# Patient Record
Sex: MALE | Race: WHITE | NOT HISPANIC OR LATINO | Employment: OTHER | URBAN - NONMETROPOLITAN AREA
[De-identification: names, ages, dates, MRNs, and addresses within clinical notes are randomized per-mention and may not be internally consistent; named-entity substitution may affect disease eponyms.]

---

## 2017-01-10 ENCOUNTER — TRANSCRIBE ORDERS (OUTPATIENT)
Dept: ADMINISTRATIVE | Facility: HOSPITAL | Age: 57
End: 2017-01-10

## 2017-01-10 DIAGNOSIS — C18.7 MALIGNANT NEOPLASM OF SIGMOID COLON (HCC): Primary | ICD-10-CM

## 2017-01-13 ENCOUNTER — APPOINTMENT (OUTPATIENT)
Dept: CT IMAGING | Facility: HOSPITAL | Age: 57
End: 2017-01-13

## 2017-01-17 ENCOUNTER — HOSPITAL ENCOUNTER (OUTPATIENT)
Dept: CT IMAGING | Facility: HOSPITAL | Age: 57
Discharge: HOME | End: 2017-01-17

## 2017-01-17 DIAGNOSIS — C18.7 MALIGNANT NEOPLASM OF SIGMOID COLON (HCC): ICD-10-CM

## 2017-01-20 ENCOUNTER — HOSPITAL ENCOUNTER (OUTPATIENT)
Dept: CT IMAGING | Facility: HOSPITAL | Age: 57
Discharge: HOME | End: 2017-01-20

## 2017-03-21 ENCOUNTER — APPOINTMENT (OUTPATIENT)
Dept: CT IMAGING | Facility: HOSPITAL | Age: 57
End: 2017-03-21

## 2017-03-23 ENCOUNTER — HOSPITAL ENCOUNTER (OUTPATIENT)
Dept: CT IMAGING | Facility: HOSPITAL | Age: 57
Discharge: HOME | End: 2017-03-23
Admitting: SPECIALIST

## 2017-03-23 PROCEDURE — A9552 F18 FDG: HCPCS | Performed by: SPECIALIST

## 2017-03-23 PROCEDURE — 78815 PET IMAGE W/CT SKULL-THIGH: CPT

## 2017-03-23 PROCEDURE — 0 FLUDEOXYGLUCOSE F18 SOLUTION: Performed by: SPECIALIST

## 2017-03-23 RX ADMIN — FLUDEOXYGLUCOSE F18 1 DOSE: 300 INJECTION INTRAVENOUS at 13:30

## 2017-08-23 ENCOUNTER — OFFICE VISIT (OUTPATIENT)
Dept: GASTROENTEROLOGY | Facility: CLINIC | Age: 57
End: 2017-08-23

## 2017-08-23 VITALS
OXYGEN SATURATION: 92 % | HEART RATE: 76 BPM | DIASTOLIC BLOOD PRESSURE: 72 MMHG | WEIGHT: 225 LBS | TEMPERATURE: 97.5 F | SYSTOLIC BLOOD PRESSURE: 142 MMHG | BODY MASS INDEX: 35.31 KG/M2 | HEIGHT: 67 IN

## 2017-08-23 DIAGNOSIS — K63.5 COLON POLYPS: ICD-10-CM

## 2017-08-23 DIAGNOSIS — C18.7 MALIGNANT NEOPLASM OF SIGMOID COLON (HCC): Primary | ICD-10-CM

## 2017-08-23 DIAGNOSIS — Z79.01 ANTICOAGULATED BY ANTICOAGULATION TREATMENT: ICD-10-CM

## 2017-08-23 PROBLEM — C18.9 COLON CANCER (HCC): Status: RESOLVED | Noted: 2017-08-23 | Resolved: 2017-08-23

## 2017-08-23 PROBLEM — C18.9 COLON CANCER (HCC): Status: ACTIVE | Noted: 2017-08-23

## 2017-08-23 PROCEDURE — S0285 CNSLT BEFORE SCREEN COLONOSC: HCPCS | Performed by: INTERNAL MEDICINE

## 2017-08-23 RX ORDER — NITROGLYCERIN 0.4 MG/1
0.4 TABLET SUBLINGUAL
COMMUNITY

## 2017-08-23 RX ORDER — ATROPINE SULFATE 10 MG/ML
SOLUTION/ DROPS OPHTHALMIC
Status: ON HOLD | COMMUNITY
End: 2017-11-25

## 2017-08-23 RX ORDER — LOSARTAN POTASSIUM 100 MG/1
100 TABLET ORAL DAILY
Status: ON HOLD | COMMUNITY
End: 2020-01-17 | Stop reason: SDUPTHER

## 2017-08-23 RX ORDER — DOCUSATE SODIUM 100 MG/1
100 CAPSULE, LIQUID FILLED ORAL 2 TIMES DAILY PRN
COMMUNITY

## 2017-08-23 RX ORDER — LANOLIN ALCOHOL/MO/W.PET/CERES
CREAM (GRAM) TOPICAL
Status: ON HOLD | COMMUNITY
End: 2017-11-25

## 2017-08-23 RX ORDER — CLOPIDOGREL BISULFATE 75 MG/1
75 TABLET ORAL DAILY
COMMUNITY

## 2017-08-23 RX ORDER — RANITIDINE 150 MG/1
150 TABLET ORAL 2 TIMES DAILY PRN
COMMUNITY

## 2017-08-23 RX ORDER — DILTIAZEM HYDROCHLORIDE 180 MG/1
CAPSULE, COATED, EXTENDED RELEASE ORAL
Status: ON HOLD | COMMUNITY
End: 2017-11-25 | Stop reason: ALTCHOICE

## 2017-08-23 RX ORDER — DORZOLAMIDE HCL 20 MG/ML
SOLUTION/ DROPS OPHTHALMIC
Status: ON HOLD | COMMUNITY
End: 2017-11-25

## 2017-08-23 RX ORDER — GABAPENTIN 400 MG/1
CAPSULE ORAL
Status: ON HOLD | COMMUNITY
End: 2017-11-25 | Stop reason: ALTCHOICE

## 2017-08-23 RX ORDER — THEOPHYLLINE 300 MG/1
TABLET, EXTENDED RELEASE ORAL
Status: ON HOLD | COMMUNITY
End: 2017-11-25

## 2017-08-23 RX ORDER — MORPHINE SULFATE 30 MG/1
CAPSULE, EXTENDED RELEASE ORAL
Status: ON HOLD | COMMUNITY
End: 2017-11-25

## 2017-08-23 RX ORDER — PREDNISOLONE ACETATE 10 MG/ML
SUSPENSION/ DROPS OPHTHALMIC
Status: ON HOLD | COMMUNITY
End: 2017-11-25

## 2017-08-23 RX ORDER — ALBUTEROL SULFATE 2.5 MG/3ML
SOLUTION RESPIRATORY (INHALATION)
Status: ON HOLD | COMMUNITY
End: 2017-11-25 | Stop reason: SDUPTHER

## 2017-08-23 RX ORDER — ISOSORBIDE MONONITRATE 60 MG/1
90 TABLET, EXTENDED RELEASE ORAL DAILY
COMMUNITY
End: 2020-08-14 | Stop reason: DRUGHIGH

## 2017-08-23 RX ORDER — ERGOCALCIFEROL 1.25 MG/1
CAPSULE ORAL
Status: ON HOLD | COMMUNITY
End: 2017-11-25

## 2017-08-23 RX ORDER — CLONAZEPAM 1 MG/1
TABLET ORAL
Status: ON HOLD | COMMUNITY
End: 2017-11-25 | Stop reason: ALTCHOICE

## 2017-08-23 NOTE — PROGRESS NOTES
Primary Physician: Jarod Tillman MD    Chief Complaint   Patient presents with   • Follow-up     Patient is here today for a follow up.       Subjective     Sai Adam is a 56 y.o. male.    HPI  Colon cancer  08/23/17  He has had large polyps in his colon that were followed by a variety of doctors over the years.  Most recently, I did a colonoscopy 05/27/2016.  This did show a large mass in the splenic flexure/descending colon area.  This is been marked with ink.  Biopsies showed liver adenoma with high-grade dysplasia suspicious for malignancy.  I referred him to [facility].  He was seen by Narda Leggett M.D. who did a surgical procedure 11/08/2016.  I do not have her reports available for review.  What is clear as the patient has had a colostomy placed.  He tells me that it was in the lymph nodes.  [facility]  He had a follow-up PET scan after completion of therapy that shows that there is no evidence of disease.  He denies any melena or hematochezia.  There is no abdominal pain.  He is unaware if there is a plan to reverse the ostomy or not.    Past Medical History:   Diagnosis Date   • Arthritis    • Colon cancer 11/2016   • COPD (chronic obstructive pulmonary disease)    • Diabetes mellitus        Past Surgical History:   Procedure Laterality Date   • CARDIAC SURGERY     • COLON SURGERY     • COLONOSCOPY  05/27/2016   • HERNIA REPAIR          Current Outpatient Prescriptions:   •  albuterol (PROVENTIL) (2.5 MG/3ML) 0.083% nebulizer solution, Take 2.5 mg by nebulization every 6 hours as needed.  , Disp: , Rfl:   •  aspirin 325 MG EC tablet, Take  by mouth., Disp: , Rfl:   •  atropine 1 % ophthalmic solution, 1 drop 3 times daily.  , Disp: , Rfl:   •  clonazePAM (KlonoPIN) 1 MG tablet, Take  by mouth., Disp: , Rfl:   •  clopidogrel (PLAVIX) 75 MG tablet, Take  by mouth., Disp: , Rfl:   •  diltiaZEM CD (CARDIZEM CD) 180 MG 24 hr capsule, Take  by mouth.,  Disp: , Rfl:   •  docusate sodium (COLACE) 100 MG capsule, Take  by mouth., Disp: , Rfl:   •  dorzolamide (TRUSOPT) 2 % ophthalmic solution, 2 drops 3 times daily.  , Disp: , Rfl:   •  gabapentin (NEURONTIN) 400 MG capsule, Take  by mouth., Disp: , Rfl:   •  isosorbide mononitrate (IMDUR) 60 MG 24 hr tablet, Take  by mouth., Disp: , Rfl:   •  losartan (COZAAR) 100 MG tablet, Take  by mouth., Disp: , Rfl:   •  Magnesium Citrate (CITRATE OF MAGNESIA) 1.745 GM/30ML solution, Take 1 bottle by mouth 1 (One) Time., Disp: 1 bottle, Rfl: 0  •  metFORMIN (GLUCOPHAGE) 500 MG tablet, Take  by mouth., Disp: , Rfl:   •  metoprolol tartrate (LOPRESSOR) 25 MG tablet, Take  by mouth., Disp: , Rfl:   •  mometasone (ASMANEX TWISTHALER) inhaler 220 mcg/inhalation, Inhale., Disp: , Rfl:   •  Morphine (MAGDALENO) 30 MG 24 hr capsule, Take  by mouth., Disp: , Rfl:   •  niacin (SLO-NIACIN) 500 MG CR tablet, Take  by mouth., Disp: , Rfl:   •  nitroglycerin (NITROSTAT) 0.4 MG SL tablet, Place  under the tongue., Disp: , Rfl:   •  polyethylene glycol (GOLYTELY) 236 g solution, Take 4,000 mL by mouth 1 (One) Time for 1 dose. As directed by instructions provided at office, Disp: 4000 mL, Rfl: 0  •  prednisoLONE acetate (PRED FORTE) 1 % ophthalmic suspension, 1 drop 4 times daily.  , Disp: , Rfl:   •  raNITIdine (ZANTAC) 150 MG tablet, Take  by mouth., Disp: , Rfl:   •  theophylline (THEODUR) 300 MG 12 hr tablet, Take  by mouth., Disp: , Rfl:   •  tiotropium (SPIRIVA) 18 MCG per inhalation capsule, Place  into inhaler and inhale., Disp: , Rfl:   •  vitamin D (ERGOCALCIFEROL) 66711 units capsule capsule, Take  by mouth., Disp: , Rfl:     Allergies   Allergen Reactions   • Lamotrigine    • Lisinopril Hives   • Methadone Swelling   • Tetanus Toxoids    • Tramadol Hives   • Penicillins Rash       Social History     Social History   • Marital status:      Spouse name: N/A   • Number of children: N/A   • Years of education: N/A     Occupational  "History   • Not on file.     Social History Main Topics   • Smoking status: Current Every Day Smoker   • Smokeless tobacco: Never Used   • Alcohol use No   • Drug use: Not on file   • Sexual activity: Not on file     Other Topics Concern   • Not on file     Social History Narrative       History reviewed. No pertinent family history.    Review of Systems   Constitutional: Negative for fever.   Respiratory: Negative for cough and shortness of breath.    Cardiovascular: Negative for chest pain.   Genitourinary: Negative for dysuria.   Skin: Negative for rash.   Neurological: Negative for seizures.       Objective     /72  Pulse 76  Temp 97.5 °F (36.4 °C)  Ht 67\" (170.2 cm)  Wt 225 lb (102 kg)  SpO2 92%  BMI 35.24 kg/m2    Physical Exam   Constitutional: He is oriented to person, place, and time. He appears well-developed.   Eyes: No scleral icterus.   Cardiovascular: Normal rate and regular rhythm.    Pulmonary/Chest: Breath sounds normal.   Abdominal: Soft. Bowel sounds are normal. He exhibits no distension and no mass. There is no tenderness. There is no rebound and no guarding.   He has an ostomy in the mid abdominal area more so on the left side.   Musculoskeletal: Normal range of motion.   Neurological: He is alert and oriented to person, place, and time.   Skin: No rash noted.   Psychiatric: He has a normal mood and affect. His behavior is normal.   Vitals reviewed.      Lab Results   Component Value Date    WBC 10.20 05/11/2016    WBC 9.43 03/07/2016    WBC 11.25 (H) 06/05/2015    HGB 15.6 05/11/2016    HGB 16.5 03/07/2016    HGB 15.8 06/05/2015    HCT 44.7 05/11/2016    HCT 46.2 03/07/2016    HCT 49.0 06/06/2015     05/11/2016     03/07/2016     06/05/2015        Lab Results   Component Value Date     05/11/2016     (L) 03/07/2016     06/06/2015    K 3.9 05/11/2016    K 3.6 03/07/2016    K 2.9 (C) 06/05/2015    CL 98 05/11/2016    CL 93 (L) 03/07/2016    CL 93 " (L) 06/05/2015    CO2 26 05/11/2016    CO2 27 03/07/2016    CO2 29 06/05/2015    BUN 4 (L) 05/11/2016    BUN 8 03/07/2016    BUN 12 06/05/2015    CREATININE 0.82 05/11/2016    CREATININE 0.80 03/07/2016    CREATININE 0.95 06/05/2015    BILITOT 0.7 05/11/2016    BILITOT 0.8 03/07/2016    BILITOT 0.6 06/05/2015    ALKPHOS 89 05/11/2016    ALKPHOS 150 (H) 03/07/2016    ALKPHOS 111 06/05/2015    ALT 34 05/11/2016    ALT 32 03/07/2016    ALT 27 06/05/2015    AST 54 (H) 05/11/2016    AST 52 (H) 03/07/2016    AST 56 (H) 06/05/2015    GLUCOSE 419 (H) 05/11/2016    GLUCOSE 441 (H) 03/07/2016    GLUCOSE 266 (H) 06/05/2015       Lab Results   Component Value Date    INR 1.18 (H) 06/05/2015    INR 1.15 (H) 04/03/2015    INR 1.21 (H) 07/02/2014       IMPRESSION/PLAN:    Assessment/Plan      Problem List Items Addressed This Visit        Digestive    Malignant neoplasm of sigmoid colon - Primary    Relevant Medications    polyethylene glycol (GOLYTELY) 236 g solution    Magnesium Citrate (CITRATE OF MAGNESIA) 1.745 GM/30ML solution    Other Relevant Orders    Case Request (Completed)    Colon polyps    Overview     Surgically resected 11/08/2016.  The patient underwent chemotherapy for lymph node disease.  He is due for a colonoscopy around November 2017.  The risks, benefits, and alternatives of colonoscopy were reviewed with the patient today.  Risks including perforation of the colon possibly requiring surgery or colostomy.  Additional risks include risk of bleeding from biopsies or removal of colon tissue.  There is also the risk of a drug reaction or problems with anesthesia.  This will be discussed with the further by the anesthesia team on the day of the procedure.  Lastly there is a possibility of missing a colon polyp or cancer.  The benefits include the diagnosis and management of disease of the colon and rectum.  Alternatives to colonoscopy include barium enema, laboratory testing, radiographic evaluation, or no  intervention.  The patient verbalizes understanding and agrees.    I was very specific and emphasizing that he is at increased risk of perforation from doing a colonoscopy in the left colon stump.  I explained why this is the case.  I explained that we can mitigate his risk by using CO2 which is what we routinely due for colonoscopy.  If we do not look in the left colon, then there is risk formation and ultimately cancer progression.    We will also work on getting records from Dr. Narda Leggett's office to see if there is any plan to reanastomose his colon.            Other    Anticoagulated by anticoagulation treatment    Overview     Patient is on Plavix.         Current Assessment & Plan     We will need to hold his Plavix for 5 days prior to the procedure.  [facility].                       Yessenia Gregg MD  08/23/17  4:21 PM    Much of this encounter note is an electronic transcription/translation of spoken language to printed text. The electronic translation of spoken language may permit erroneous, or at times, nonsensical words or phrases to be inadvertently transcribed; although I have reviewed the note for such errors, some may still exist.

## 2017-08-23 NOTE — ASSESSMENT & PLAN NOTE
We will need to hold his Plavix for 5 days prior to the procedure.  [facility].

## 2017-09-06 ENCOUNTER — TELEPHONE (OUTPATIENT)
Dept: GASTROENTEROLOGY | Facility: CLINIC | Age: 57
End: 2017-09-06

## 2017-09-06 NOTE — TELEPHONE ENCOUNTER
OK to schedule procedure now that clearance to hold antiplatelet/anticoagulatant has been obtained.    Yessenia Gregg MD

## 2017-09-06 NOTE — TELEPHONE ENCOUNTER
I received Plavix clearance from Dr. Tillman at [facility].     He is already scheduled for colonoscopy on 10/12.

## 2017-09-06 NOTE — TELEPHONE ENCOUNTER
He is scheduled already for 10/12. I explained that Dr. Tillman has given Plavix clearance and last dose will be 10/6.

## 2017-10-12 ENCOUNTER — ANESTHESIA (OUTPATIENT)
Dept: GASTROENTEROLOGY | Facility: HOSPITAL | Age: 57
End: 2017-10-12

## 2017-10-12 ENCOUNTER — ANESTHESIA EVENT (OUTPATIENT)
Dept: GASTROENTEROLOGY | Facility: HOSPITAL | Age: 57
End: 2017-10-12

## 2017-10-12 ENCOUNTER — TELEPHONE (OUTPATIENT)
Dept: GASTROENTEROLOGY | Facility: CLINIC | Age: 57
End: 2017-10-12

## 2017-10-12 ENCOUNTER — HOSPITAL ENCOUNTER (OUTPATIENT)
Facility: HOSPITAL | Age: 57
Setting detail: HOSPITAL OUTPATIENT SURGERY
Discharge: HOME | End: 2017-10-12
Attending: INTERNAL MEDICINE | Admitting: INTERNAL MEDICINE

## 2017-10-12 VITALS
BODY MASS INDEX: 34.84 KG/M2 | WEIGHT: 222 LBS | SYSTOLIC BLOOD PRESSURE: 124 MMHG | DIASTOLIC BLOOD PRESSURE: 85 MMHG | OXYGEN SATURATION: 90 % | HEART RATE: 89 BPM | RESPIRATION RATE: 24 BRPM | TEMPERATURE: 98.2 F | HEIGHT: 67 IN

## 2017-10-12 DIAGNOSIS — C18.7 MALIGNANT NEOPLASM OF SIGMOID COLON (HCC): ICD-10-CM

## 2017-10-12 LAB — GLUCOSE BLDC GLUCOMTR-MCNC: 255 MG/DL (ref 70–130)

## 2017-10-12 PROCEDURE — 82962 GLUCOSE BLOOD TEST: CPT

## 2017-10-12 PROCEDURE — 25010000002 PROPOFOL 10 MG/ML EMULSION: Performed by: NURSE ANESTHETIST, CERTIFIED REGISTERED

## 2017-10-12 PROCEDURE — G0105 COLORECTAL SCRN; HI RISK IND: HCPCS | Performed by: INTERNAL MEDICINE

## 2017-10-12 RX ORDER — IPRATROPIUM BROMIDE AND ALBUTEROL SULFATE 2.5; .5 MG/3ML; MG/3ML
3 SOLUTION RESPIRATORY (INHALATION) ONCE
Status: COMPLETED | OUTPATIENT
Start: 2017-10-12 | End: 2017-10-12

## 2017-10-12 RX ORDER — PROPOFOL 10 MG/ML
VIAL (ML) INTRAVENOUS AS NEEDED
Status: DISCONTINUED | OUTPATIENT
Start: 2017-10-12 | End: 2017-10-12 | Stop reason: SURG

## 2017-10-12 RX ORDER — SODIUM CHLORIDE 9 MG/ML
500 INJECTION, SOLUTION INTRAVENOUS CONTINUOUS PRN
Status: DISCONTINUED | OUTPATIENT
Start: 2017-10-12 | End: 2017-10-12 | Stop reason: HOSPADM

## 2017-10-12 RX ORDER — SODIUM CHLORIDE 0.9 % (FLUSH) 0.9 %
3 SYRINGE (ML) INJECTION AS NEEDED
Status: DISCONTINUED | OUTPATIENT
Start: 2017-10-12 | End: 2017-10-12 | Stop reason: HOSPADM

## 2017-10-12 RX ADMIN — LIDOCAINE HYDROCHLORIDE 0.5 ML: 10 INJECTION, SOLUTION EPIDURAL; INFILTRATION; INTRACAUDAL; PERINEURAL at 08:19

## 2017-10-12 RX ADMIN — PROPOFOL 250 MG: 10 INJECTION, EMULSION INTRAVENOUS at 10:26

## 2017-10-12 RX ADMIN — SODIUM CHLORIDE 500 ML: 9 INJECTION, SOLUTION INTRAVENOUS at 08:19

## 2017-10-12 RX ADMIN — IPRATROPIUM BROMIDE AND ALBUTEROL SULFATE 3 ML: .5; 3 SOLUTION RESPIRATORY (INHALATION) at 09:14

## 2017-10-12 NOTE — ANESTHESIA POSTPROCEDURE EVALUATION
"Patient: Sai Adam    Procedure Summary     Date Anesthesia Start Anesthesia Stop Room / Location    10/12/17 1022 [facility] ENDOSCOPY 5 / [facility] ENDOSCOPY       Procedure Diagnosis Surgeon Provider    COLONOSCOPY WITH ANESTHESIA (N/A ) Malignant neoplasm of sigmoid colon  (Malignant neoplasm of sigmoid colon [C18.7]) MD Christopher Orlando CRNA          Anesthesia Type: general  Last vitals  BP   (P) 125/74 (10/12/17 1050)    Temp        Pulse   (P) 75 (10/12/17 1050)   Resp   (P) 19 (10/12/17 1050)    SpO2   (P) 90 % (10/12/17 1050)      Post Anesthesia Care and Evaluation    Patient location during evaluation: PACU  Patient participation: complete - patient participated  Level of consciousness: awake and alert  Pain score: 0  Pain management: adequate  Airway patency: patent  Anesthetic complications: No anesthetic complications    Cardiovascular status: acceptable  Respiratory status: acceptable  Hydration status: acceptable    Comments: Blood pressure (P) 125/74, pulse (P) 75, temperature 98.2 °F (36.8 °C), temperature source Temporal Artery , resp. rate (P) 19, height 67\" (170.2 cm), weight 222 lb (101 kg), SpO2 (P) 90 %.        "

## 2017-10-12 NOTE — H&P
Chief Complaint:   Colon cancer    Subjective     HPI:   The patient has had colon cancer surgically removed in November 2016.  This exam is to reassess.  He had an ostomy placed due to poor pulmonary reserve and ongoing smoking.  He does have rectal stump.    Past Medical History:   Past Medical History:   Diagnosis Date   • Arthritis    • Colon cancer 11/2016   • COPD (chronic obstructive pulmonary disease)    • Diabetes mellitus        Past Surgical History:  [unfilled]    Family History:  History reviewed. No pertinent family history.    Social History:   reports that he has been smoking.  He has never used smokeless tobacco. He reports that he does not drink alcohol or use illicit drugs.    Medications:   Prescriptions Prior to Admission   Medication Sig Dispense Refill Last Dose   • albuterol (PROVENTIL) (2.5 MG/3ML) 0.083% nebulizer solution Take 2.5 mg by nebulization every 6 hours as needed.     10/11/2017 at Unknown time   • docusate sodium (COLACE) 100 MG capsule Take  by mouth.   10/11/2017 at Unknown time   • gabapentin (NEURONTIN) 400 MG capsule Take  by mouth.   10/11/2017 at Unknown time   • isosorbide mononitrate (IMDUR) 60 MG 24 hr tablet Take  by mouth.   10/11/2017 at Unknown time   • losartan (COZAAR) 100 MG tablet Take  by mouth.   10/11/2017 at Unknown time   • Magnesium Citrate (CITRATE OF MAGNESIA) 1.745 GM/30ML solution Take 1 bottle by mouth 1 (One) Time. 1 bottle 0 10/12/2017 at Unknown time   • metFORMIN (GLUCOPHAGE) 500 MG tablet Take  by mouth.   10/11/2017 at Unknown time   • metoprolol tartrate (LOPRESSOR) 25 MG tablet Take  by mouth.   10/11/2017 at 2000   • mometasone (ASMANEX TWISTHALER) inhaler 220 mcg/inhalation Inhale.   10/11/2017 at Unknown time   • Morphine (MAGDALENO) 30 MG 24 hr capsule Take  by mouth.   10/11/2017 at Unknown time   • niacin (SLO-NIACIN) 500 MG CR tablet Take  by mouth.   10/11/2017 at Unknown time   • raNITIdine (ZANTAC) 150 MG tablet Take  by mouth.    "10/11/2017 at Unknown time   • theophylline (THEODUR) 300 MG 12 hr tablet Take  by mouth.   10/11/2017 at Unknown time   • tiotropium (SPIRIVA) 18 MCG per inhalation capsule Place  into inhaler and inhale.   10/11/2017 at Unknown time   • vitamin D (ERGOCALCIFEROL) 73971 units capsule capsule Take  by mouth.   10/11/2017 at Unknown time   • atropine 1 % ophthalmic solution 1 drop 3 times daily.     10/7/2017   • clonazePAM (KlonoPIN) 1 MG tablet Take  by mouth.   10/7/2017   • clopidogrel (PLAVIX) 75 MG tablet Take  by mouth.   10/6/2017   • diltiaZEM CD (CARDIZEM CD) 180 MG 24 hr capsule Take  by mouth.   10/8/2017   • dorzolamide (TRUSOPT) 2 % ophthalmic solution 2 drops 3 times daily.     10/8/2017   • nitroglycerin (NITROSTAT) 0.4 MG SL tablet Place  under the tongue.   Unknown at Unknown time   • prednisoLONE acetate (PRED FORTE) 1 % ophthalmic suspension 1 drop 4 times daily.     10/7/2017       Allergies:  Lamotrigine; Lisinopril; Methadone; Tetanus toxoids; Tramadol; and Penicillins    ROS:    General: Weight stable  Resp: No SOA  Cardiovascular: No CP      Objective     /93 (BP Location: Right arm, Patient Position: Sitting)  Pulse 90  Temp 98.2 °F (36.8 °C) (Temporal Artery )   Resp 18  Ht 67\" (170.2 cm)  Wt 222 lb (101 kg)  SpO2 94%  BMI 34.77 kg/m2    Physical Exam   Constitutional: Pt is oriented to person, place, and in no distress.  Eyes: No icterus  ENMT: Unremarkable   Cardiovascular: Normal rate, regular rhythm.    Pulmonary/Chest: No distress.  No audible wheezes  Abdominal: Soft.   Skin: Skin is warm and dry.   Psychiatric: Mood, memory, affect and judgment appear normal.     Assessment/Plan     Diagnosis:  Colon cancer    Anticipated Surgical Procedure:  Colonoscopy    The risks, benefits, and alternatives of colonoscopy were reviewed with the patient today.  Risks including perforation of the colon possibly requiring surgery or colostomy.  Additional risks include risk of bleeding " from biopsies or removal of colon tissue.  There is also the risk of a drug reaction or problems with anesthesia.  This will be discussed with the further by the anesthesia team on the day of the procedure.  Lastly there is a possibility of missing a colon polyp or cancer.  The benefits include the diagnosis and management of disease of the colon and rectum.  Alternatives to colonoscopy include barium enema, laboratory testing, radiographic evaluation, or no intervention.  The patient verbalizes understanding and agrees.    Much of this encounter note is an electronic transcription/translation of spoken language to printed text. The electronic translation of spoken language may permit erroneous, or at times, nonsensical words or phrases to be inadvertently transcribed; although I have reviewed the note for such errors, some may still exist.

## 2017-10-12 NOTE — PLAN OF CARE
Problem: Patient Care Overview (Adult)  Goal: Plan of Care Review  Outcome: Outcome(s) achieved Date Met:  10/12/17    10/12/17 1054   Coping/Psychosocial Response Interventions   Plan Of Care Reviewed With patient;spouse   Patient Care Overview   Progress improving   Outcome Evaluation   Outcome Summary/Follow up Plan DISCHARGE CRITERIA MET

## 2017-10-12 NOTE — PLAN OF CARE
Problem: Patient Care Overview (Adult)  Goal: Plan of Care Review  Outcome: Ongoing (interventions implemented as appropriate)    10/12/17 1041   Coping/Psychosocial Response Interventions   Plan Of Care Reviewed With patient   Patient Care Overview   Progress no change   Outcome Evaluation   Outcome Summary/Follow up Plan tolerating well         Problem: GI Endoscopy (Adult)  Goal: Signs and Symptoms of Listed Potential Problems Will be Absent or Manageable (GI Endoscopy)  Outcome: Ongoing (interventions implemented as appropriate)

## 2017-10-12 NOTE — ANESTHESIA PREPROCEDURE EVALUATION
Anesthesia Evaluation     Patient summary reviewed and Nursing notes reviewed   no history of anesthetic complications:  NPO Solid Status: > 6 hours  NPO Liquid Status: > 6 hours     Airway   Mallampati: II  no difficulty expected  Dental    (+) lower dentures and upper dentures    Pulmonary    (+) a smoker Current Smoked day of surgery, COPD severe, shortness of breath,     ROS comment: Wears 2L 02 at all times  Cardiovascular   Exercise tolerance: poor (<4 METS)    PT is on anticoagulation therapy  Patient on routine beta blocker and Beta blocker given within 24 hours of surgery    (+) past MI  >12 months, CAD,   CAB.    ROS comment: 2015 Echo and stress test show EF 60%    Neuro/Psych- negative ROS  GI/Hepatic/Renal/Endo    (+) obesity,  diabetes mellitus type 2 poorly controlled using insulin,     Musculoskeletal     Abdominal    Substance History - negative use     OB/GYN negative ob/gyn ROS         Other   (+) arthritis   history of cancer                              Anesthesia Plan    ASA 3     general     intravenous induction   Anesthetic plan and risks discussed with patient.

## 2017-10-12 NOTE — PLAN OF CARE
Problem: GI Endoscopy (Adult)  Goal: Signs and Symptoms of Listed Potential Problems Will be Absent or Manageable (GI Endoscopy)  Outcome: Outcome(s) achieved Date Met:  10/12/17    10/12/17 1054   GI Endoscopy   Problems Assessed (GI Endoscopy) all   Problems Present (GI Endoscopy) none

## 2017-10-12 NOTE — PLAN OF CARE
Problem: Patient Care Overview (Adult)  Goal: Adult Individualization and Mutuality    10/12/17 0758   Individualization   Patient Specific Preferences none   Patient Specific Goals none   Patient Specific Interventions none   Mutuality/Individual Preferences   What Anxieties, Fears or Concerns Do You Have About Your Health or Care? none   What Questions Do You Have About Your Health or Care? none   What Information Would Help Us Give You More Personalized Care? none

## 2017-11-24 ENCOUNTER — HOSPITAL ENCOUNTER (INPATIENT)
Facility: HOSPITAL | Age: 57
LOS: 4 days | Discharge: HOME | End: 2017-11-28
Attending: EMERGENCY MEDICINE | Admitting: FAMILY MEDICINE

## 2017-11-24 ENCOUNTER — APPOINTMENT (OUTPATIENT)
Dept: GENERAL RADIOLOGY | Facility: HOSPITAL | Age: 57
End: 2017-11-24

## 2017-11-24 DIAGNOSIS — J44.1 COPD EXACERBATION (HCC): Primary | ICD-10-CM

## 2017-11-24 PROBLEM — I25.10 CORONARY ARTERY DISEASE: Status: ACTIVE | Noted: 2017-11-24

## 2017-11-24 PROBLEM — E11.9 DIABETES MELLITUS (HCC): Status: ACTIVE | Noted: 2017-11-24

## 2017-11-24 PROBLEM — J96.22 ACUTE ON CHRONIC RESPIRATORY FAILURE WITH HYPOXIA AND HYPERCAPNIA (HCC): Status: ACTIVE | Noted: 2017-11-24

## 2017-11-24 PROBLEM — Z85.038 HISTORY OF COLON CANCER: Status: ACTIVE | Noted: 2017-11-24

## 2017-11-24 PROBLEM — J96.21 ACUTE ON CHRONIC RESPIRATORY FAILURE WITH HYPOXIA AND HYPERCAPNIA (HCC): Status: ACTIVE | Noted: 2017-11-24

## 2017-11-24 LAB
ALBUMIN SERPL-MCNC: 4 G/DL (ref 3.5–5)
ALBUMIN/GLOB SERPL: 1.1 G/DL (ref 1.1–2.5)
ALP SERPL-CCNC: 93 U/L (ref 24–120)
ALT SERPL W P-5'-P-CCNC: 22 U/L (ref 0–54)
ANION GAP SERPL CALCULATED.3IONS-SCNC: 11 MMOL/L (ref 4–13)
ANION GAP SERPL CALCULATED.3IONS-SCNC: 7 MMOL/L (ref 4–13)
ARTERIAL PATENCY WRIST A: POSITIVE
ARTERIAL PATENCY WRIST A: POSITIVE
AST SERPL-CCNC: 42 U/L (ref 7–45)
ATMOSPHERIC PRESS: 746 MMHG
ATMOSPHERIC PRESS: 746 MMHG
BASE EXCESS BLDA CALC-SCNC: 6.8 MMOL/L (ref 0–2)
BASE EXCESS BLDA CALC-SCNC: 7.5 MMOL/L (ref 0–2)
BASOPHILS # BLD AUTO: 0.04 10*3/MM3 (ref 0–0.2)
BASOPHILS # BLD AUTO: 0.06 10*3/MM3 (ref 0–0.2)
BASOPHILS NFR BLD AUTO: 0.4 % (ref 0–2)
BASOPHILS NFR BLD AUTO: 0.6 % (ref 0–2)
BDY SITE: ABNORMAL
BDY SITE: ABNORMAL
BILIRUB SERPL-MCNC: 0.9 MG/DL (ref 0.1–1)
BODY TEMPERATURE: 37 C
BODY TEMPERATURE: 37 C
BUN BLD-MCNC: 12 MG/DL (ref 5–21)
BUN BLD-MCNC: 12 MG/DL (ref 5–21)
BUN/CREAT SERPL: 16.2 (ref 7–25)
BUN/CREAT SERPL: 18.5 (ref 7–25)
CALCIUM SPEC-SCNC: 8.9 MG/DL (ref 8.4–10.4)
CALCIUM SPEC-SCNC: 9 MG/DL (ref 8.4–10.4)
CHLORIDE SERPL-SCNC: 92 MMOL/L (ref 98–110)
CHLORIDE SERPL-SCNC: 94 MMOL/L (ref 98–110)
CO2 SERPL-SCNC: 37 MMOL/L (ref 24–31)
CO2 SERPL-SCNC: 37 MMOL/L (ref 24–31)
CREAT BLD-MCNC: 0.65 MG/DL (ref 0.5–1.4)
CREAT BLD-MCNC: 0.74 MG/DL (ref 0.5–1.4)
D-LACTATE SERPL-SCNC: 1.1 MMOL/L (ref 0.5–2)
DEPRECATED RDW RBC AUTO: 45.3 FL (ref 40–54)
DEPRECATED RDW RBC AUTO: 45.7 FL (ref 40–54)
EOSINOPHIL # BLD AUTO: 0 10*3/MM3 (ref 0–0.7)
EOSINOPHIL # BLD AUTO: 0 10*3/MM3 (ref 0–0.7)
EOSINOPHIL NFR BLD AUTO: 0 % (ref 0–4)
EOSINOPHIL NFR BLD AUTO: 0 % (ref 0–4)
EPAP: 6
ERYTHROCYTE [DISTWIDTH] IN BLOOD BY AUTOMATED COUNT: 14.2 % (ref 12–15)
ERYTHROCYTE [DISTWIDTH] IN BLOOD BY AUTOMATED COUNT: 14.3 % (ref 12–15)
GAS FLOW AIRWAY: 4 LPM
GFR SERPL CREATININE-BSD FRML MDRD: 108 ML/MIN/1.73
GFR SERPL CREATININE-BSD FRML MDRD: 126 ML/MIN/1.73
GLOBULIN UR ELPH-MCNC: 3.5 GM/DL
GLUCOSE BLD-MCNC: 256 MG/DL (ref 70–100)
GLUCOSE BLD-MCNC: 498 MG/DL (ref 70–100)
GLUCOSE BLDC GLUCOMTR-MCNC: 439 MG/DL (ref 70–130)
HCO3 BLDA-SCNC: 35.2 MMOL/L (ref 20–26)
HCO3 BLDA-SCNC: 35.9 MMOL/L (ref 20–26)
HCT VFR BLD AUTO: 48.7 % (ref 40–52)
HCT VFR BLD AUTO: 49.7 % (ref 40–52)
HGB BLD-MCNC: 15.9 G/DL (ref 14–18)
HGB BLD-MCNC: 16.1 G/DL (ref 14–18)
HOLD SPECIMEN: NORMAL
HOLD SPECIMEN: NORMAL
HOROWITZ INDEX BLD+IHG-RTO: 40 %
IMM GRANULOCYTES # BLD: 0.11 10*3/MM3 (ref 0–0.03)
IMM GRANULOCYTES # BLD: 0.19 10*3/MM3 (ref 0–0.03)
IMM GRANULOCYTES NFR BLD: 1.1 % (ref 0–5)
IMM GRANULOCYTES NFR BLD: 2.1 % (ref 0–5)
IPAP: 16
LYMPHOCYTES # BLD AUTO: 1.16 10*3/MM3 (ref 0.72–4.86)
LYMPHOCYTES # BLD AUTO: 2.72 10*3/MM3 (ref 0.72–4.86)
LYMPHOCYTES NFR BLD AUTO: 12.6 % (ref 15–45)
LYMPHOCYTES NFR BLD AUTO: 26.1 % (ref 15–45)
Lab: ABNORMAL
MCH RBC QN AUTO: 28.4 PG (ref 28–32)
MCH RBC QN AUTO: 29 PG (ref 28–32)
MCHC RBC AUTO-ENTMCNC: 32 G/DL (ref 33–36)
MCHC RBC AUTO-ENTMCNC: 33.1 G/DL (ref 33–36)
MCV RBC AUTO: 87.7 FL (ref 82–95)
MCV RBC AUTO: 88.9 FL (ref 82–95)
MODALITY: ABNORMAL
MODALITY: ABNORMAL
MONOCYTES # BLD AUTO: 0.2 10*3/MM3 (ref 0.19–1.3)
MONOCYTES # BLD AUTO: 0.82 10*3/MM3 (ref 0.19–1.3)
MONOCYTES NFR BLD AUTO: 2.2 % (ref 4–12)
MONOCYTES NFR BLD AUTO: 7.9 % (ref 4–12)
NEUTROPHILS # BLD AUTO: 6.7 10*3/MM3 (ref 1.87–8.4)
NEUTROPHILS # BLD AUTO: 7.58 10*3/MM3 (ref 1.87–8.4)
NEUTROPHILS NFR BLD AUTO: 64.3 % (ref 39–78)
NEUTROPHILS NFR BLD AUTO: 82.7 % (ref 39–78)
NOTIFIED BY: ABNORMAL
NOTIFIED WHO: ABNORMAL
NRBC BLD MANUAL-RTO: 0 /100 WBC (ref 0–0)
NT-PROBNP SERPL-MCNC: 90.9 PG/ML (ref 0–900)
PCO2 BLDA: 63.7 MM HG (ref 35–45)
PCO2 BLDA: 64.2 MM HG (ref 35–45)
PH BLDA: 7.35 PH UNITS (ref 7.35–7.45)
PH BLDA: 7.36 PH UNITS (ref 7.35–7.45)
PLATELET # BLD AUTO: 145 10*3/MM3 (ref 130–400)
PLATELET # BLD AUTO: 146 10*3/MM3 (ref 130–400)
PMV BLD AUTO: 9.3 FL (ref 6–12)
PMV BLD AUTO: 9.5 FL (ref 6–12)
PO2 BLDA: 48.7 MM HG (ref 83–108)
PO2 BLDA: 61.2 MM HG (ref 83–108)
POTASSIUM BLD-SCNC: 3.8 MMOL/L (ref 3.5–5.3)
POTASSIUM BLD-SCNC: 3.9 MMOL/L (ref 3.5–5.3)
PROT SERPL-MCNC: 7.5 G/DL (ref 6.3–8.7)
RBC # BLD AUTO: 5.55 10*6/MM3 (ref 4.8–5.9)
RBC # BLD AUTO: 5.59 10*6/MM3 (ref 4.8–5.9)
SAO2 % BLDCOA: 84.5 % (ref 94–99)
SAO2 % BLDCOA: 91.3 % (ref 94–99)
SET MECH RESP RATE: 12
SODIUM BLD-SCNC: 138 MMOL/L (ref 135–145)
SODIUM BLD-SCNC: 140 MMOL/L (ref 135–145)
THEOPHYLLINE SERPL-MCNC: <1 MCG/ML (ref 10–20)
VENTILATOR MODE: ABNORMAL
VENTILATOR MODE: ABNORMAL
WBC NRBC COR # BLD: 10.41 10*3/MM3 (ref 4.8–10.8)
WBC NRBC COR # BLD: 9.17 10*3/MM3 (ref 4.8–10.8)
WHOLE BLOOD HOLD SPECIMEN: NORMAL
WHOLE BLOOD HOLD SPECIMEN: NORMAL

## 2017-11-24 PROCEDURE — 99285 EMERGENCY DEPT VISIT HI MDM: CPT

## 2017-11-24 PROCEDURE — 63710000001 INSULIN LISPRO (HUMAN) PER 5 UNITS: Performed by: FAMILY MEDICINE

## 2017-11-24 PROCEDURE — 94660 CPAP INITIATION&MGMT: CPT

## 2017-11-24 PROCEDURE — 71010 HC CHEST PA OR AP: CPT

## 2017-11-24 PROCEDURE — 25010000002 LEVOFLOXACIN PER 250 MG: Performed by: EMERGENCY MEDICINE

## 2017-11-24 PROCEDURE — 94799 UNLISTED PULMONARY SVC/PX: CPT

## 2017-11-24 PROCEDURE — 94644 CONT INHLJ TX 1ST HOUR: CPT

## 2017-11-24 PROCEDURE — 83880 ASSAY OF NATRIURETIC PEPTIDE: CPT | Performed by: EMERGENCY MEDICINE

## 2017-11-24 PROCEDURE — 25010000002 ENOXAPARIN PER 10 MG: Performed by: FAMILY MEDICINE

## 2017-11-24 PROCEDURE — 85025 COMPLETE CBC W/AUTO DIFF WBC: CPT | Performed by: EMERGENCY MEDICINE

## 2017-11-24 PROCEDURE — 82803 BLOOD GASES ANY COMBINATION: CPT

## 2017-11-24 PROCEDURE — 80198 ASSAY OF THEOPHYLLINE: CPT | Performed by: FAMILY MEDICINE

## 2017-11-24 PROCEDURE — 82962 GLUCOSE BLOOD TEST: CPT

## 2017-11-24 PROCEDURE — 36600 WITHDRAWAL OF ARTERIAL BLOOD: CPT

## 2017-11-24 PROCEDURE — 94640 AIRWAY INHALATION TREATMENT: CPT

## 2017-11-24 PROCEDURE — 80053 COMPREHEN METABOLIC PANEL: CPT | Performed by: EMERGENCY MEDICINE

## 2017-11-24 PROCEDURE — 25010000002 METHYLPREDNISOLONE PER 125 MG: Performed by: EMERGENCY MEDICINE

## 2017-11-24 PROCEDURE — 5A09457 ASSISTANCE WITH RESPIRATORY VENTILATION, 24-96 CONSECUTIVE HOURS, CONTINUOUS POSITIVE AIRWAY PRESSURE: ICD-10-PCS | Performed by: FAMILY MEDICINE

## 2017-11-24 PROCEDURE — 80048 BASIC METABOLIC PNL TOTAL CA: CPT | Performed by: FAMILY MEDICINE

## 2017-11-24 PROCEDURE — 87040 BLOOD CULTURE FOR BACTERIA: CPT | Performed by: EMERGENCY MEDICINE

## 2017-11-24 PROCEDURE — 25010000002 METHYLPREDNISOLONE PER 125 MG: Performed by: FAMILY MEDICINE

## 2017-11-24 PROCEDURE — 63710000001 INSULIN DETEMIR PER 5 UNITS: Performed by: FAMILY MEDICINE

## 2017-11-24 PROCEDURE — 85025 COMPLETE CBC W/AUTO DIFF WBC: CPT | Performed by: FAMILY MEDICINE

## 2017-11-24 PROCEDURE — 83605 ASSAY OF LACTIC ACID: CPT | Performed by: EMERGENCY MEDICINE

## 2017-11-24 PROCEDURE — 93005 ELECTROCARDIOGRAM TRACING: CPT | Performed by: EMERGENCY MEDICINE

## 2017-11-24 PROCEDURE — 93010 ELECTROCARDIOGRAM REPORT: CPT | Performed by: INTERNAL MEDICINE

## 2017-11-24 RX ORDER — DORZOLAMIDE HCL 20 MG/ML
2 SOLUTION/ DROPS OPHTHALMIC 3 TIMES DAILY
Status: DISCONTINUED | OUTPATIENT
Start: 2017-11-24 | End: 2017-11-25

## 2017-11-24 RX ORDER — IPRATROPIUM BROMIDE AND ALBUTEROL SULFATE 2.5; .5 MG/3ML; MG/3ML
3 SOLUTION RESPIRATORY (INHALATION)
Status: DISCONTINUED | OUTPATIENT
Start: 2017-11-24 | End: 2017-11-28 | Stop reason: HOSPADM

## 2017-11-24 RX ORDER — SODIUM CHLORIDE 0.9 % (FLUSH) 0.9 %
10 SYRINGE (ML) INJECTION AS NEEDED
Status: DISCONTINUED | OUTPATIENT
Start: 2017-11-24 | End: 2017-11-28 | Stop reason: HOSPADM

## 2017-11-24 RX ORDER — FAMOTIDINE 20 MG/1
20 TABLET, FILM COATED ORAL 2 TIMES DAILY
Status: DISCONTINUED | OUTPATIENT
Start: 2017-11-24 | End: 2017-11-28 | Stop reason: HOSPADM

## 2017-11-24 RX ORDER — GABAPENTIN 400 MG/1
400 CAPSULE ORAL EVERY 8 HOURS SCHEDULED
Status: DISCONTINUED | OUTPATIENT
Start: 2017-11-24 | End: 2017-11-25 | Stop reason: ALTCHOICE

## 2017-11-24 RX ORDER — MORPHINE SULFATE 15 MG/1
15 TABLET, FILM COATED, EXTENDED RELEASE ORAL EVERY 12 HOURS SCHEDULED
Status: DISCONTINUED | OUTPATIENT
Start: 2017-11-24 | End: 2017-11-27

## 2017-11-24 RX ORDER — THEOPHYLLINE 300 MG/1
300 TABLET, EXTENDED RELEASE ORAL EVERY 12 HOURS SCHEDULED
Status: DISCONTINUED | OUTPATIENT
Start: 2017-11-24 | End: 2017-11-25 | Stop reason: ALTCHOICE

## 2017-11-24 RX ORDER — PREDNISOLONE ACETATE 10 MG/ML
1 SUSPENSION/ DROPS OPHTHALMIC 4 TIMES DAILY
Status: DISCONTINUED | OUTPATIENT
Start: 2017-11-24 | End: 2017-11-25

## 2017-11-24 RX ORDER — IPRATROPIUM BROMIDE AND ALBUTEROL SULFATE 2.5; .5 MG/3ML; MG/3ML
3 SOLUTION RESPIRATORY (INHALATION) EVERY 4 HOURS PRN
Status: DISCONTINUED | OUTPATIENT
Start: 2017-11-24 | End: 2017-11-28 | Stop reason: HOSPADM

## 2017-11-24 RX ORDER — HYDROCODONE BITARTRATE AND ACETAMINOPHEN 7.5; 325 MG/1; MG/1
1 TABLET ORAL EVERY 4 HOURS PRN
Status: DISCONTINUED | OUTPATIENT
Start: 2017-11-24 | End: 2017-11-28 | Stop reason: HOSPADM

## 2017-11-24 RX ORDER — OXYCODONE AND ACETAMINOPHEN 7.5; 325 MG/1; MG/1
1 TABLET ORAL EVERY 4 HOURS PRN
Status: DISCONTINUED | OUTPATIENT
Start: 2017-11-24 | End: 2017-11-28

## 2017-11-24 RX ORDER — LOSARTAN POTASSIUM 50 MG/1
100 TABLET ORAL
Status: DISCONTINUED | OUTPATIENT
Start: 2017-11-25 | End: 2017-11-28 | Stop reason: HOSPADM

## 2017-11-24 RX ORDER — NICOTINE POLACRILEX 4 MG
15 LOZENGE BUCCAL
Status: DISCONTINUED | OUTPATIENT
Start: 2017-11-24 | End: 2017-11-28 | Stop reason: HOSPADM

## 2017-11-24 RX ORDER — LEVOFLOXACIN 5 MG/ML
750 INJECTION, SOLUTION INTRAVENOUS ONCE
Status: COMPLETED | OUTPATIENT
Start: 2017-11-24 | End: 2017-11-24

## 2017-11-24 RX ORDER — BENZONATATE 100 MG/1
100 CAPSULE ORAL 3 TIMES DAILY PRN
Status: DISCONTINUED | OUTPATIENT
Start: 2017-11-24 | End: 2017-11-28 | Stop reason: HOSPADM

## 2017-11-24 RX ORDER — BISACODYL 5 MG/1
5 TABLET, DELAYED RELEASE ORAL DAILY PRN
Status: DISCONTINUED | OUTPATIENT
Start: 2017-11-24 | End: 2017-11-28 | Stop reason: HOSPADM

## 2017-11-24 RX ORDER — ATROPINE SULFATE 10 MG/ML
1 SOLUTION/ DROPS OPHTHALMIC 3 TIMES DAILY
Status: DISCONTINUED | OUTPATIENT
Start: 2017-11-24 | End: 2017-11-25

## 2017-11-24 RX ORDER — DEXTROSE MONOHYDRATE 25 G/50ML
25 INJECTION, SOLUTION INTRAVENOUS
Status: DISCONTINUED | OUTPATIENT
Start: 2017-11-24 | End: 2017-11-28 | Stop reason: HOSPADM

## 2017-11-24 RX ORDER — ONDANSETRON 2 MG/ML
4 INJECTION INTRAMUSCULAR; INTRAVENOUS EVERY 6 HOURS PRN
Status: DISCONTINUED | OUTPATIENT
Start: 2017-11-24 | End: 2017-11-28 | Stop reason: HOSPADM

## 2017-11-24 RX ORDER — ALBUTEROL SULFATE 2.5 MG/3ML
10 SOLUTION RESPIRATORY (INHALATION) CONTINUOUS
Status: DISPENSED | OUTPATIENT
Start: 2017-11-24 | End: 2017-11-24

## 2017-11-24 RX ORDER — DOCUSATE SODIUM 100 MG/1
100 CAPSULE, LIQUID FILLED ORAL DAILY
Status: DISCONTINUED | OUTPATIENT
Start: 2017-11-25 | End: 2017-11-28 | Stop reason: HOSPADM

## 2017-11-24 RX ORDER — CLONAZEPAM 1 MG/1
1 TABLET ORAL 3 TIMES DAILY PRN
Status: DISCONTINUED | OUTPATIENT
Start: 2017-11-24 | End: 2017-11-25 | Stop reason: ALTCHOICE

## 2017-11-24 RX ORDER — METHYLPREDNISOLONE SODIUM SUCCINATE 125 MG/2ML
60 INJECTION, POWDER, LYOPHILIZED, FOR SOLUTION INTRAMUSCULAR; INTRAVENOUS EVERY 8 HOURS SCHEDULED
Status: DISCONTINUED | OUTPATIENT
Start: 2017-11-24 | End: 2017-11-26

## 2017-11-24 RX ORDER — ISOSORBIDE MONONITRATE 60 MG/1
60 TABLET, EXTENDED RELEASE ORAL
Status: DISCONTINUED | OUTPATIENT
Start: 2017-11-25 | End: 2017-11-28 | Stop reason: HOSPADM

## 2017-11-24 RX ORDER — CLOPIDOGREL BISULFATE 75 MG/1
75 TABLET ORAL DAILY
Status: DISCONTINUED | OUTPATIENT
Start: 2017-11-25 | End: 2017-11-28 | Stop reason: HOSPADM

## 2017-11-24 RX ORDER — DILTIAZEM HYDROCHLORIDE 180 MG/1
180 CAPSULE, COATED, EXTENDED RELEASE ORAL
Status: DISCONTINUED | OUTPATIENT
Start: 2017-11-25 | End: 2017-11-25 | Stop reason: ALTCHOICE

## 2017-11-24 RX ORDER — ALUMINA, MAGNESIA, AND SIMETHICONE 2400; 2400; 240 MG/30ML; MG/30ML; MG/30ML
15 SUSPENSION ORAL EVERY 6 HOURS PRN
Status: DISCONTINUED | OUTPATIENT
Start: 2017-11-24 | End: 2017-11-28 | Stop reason: HOSPADM

## 2017-11-24 RX ORDER — METHYLPREDNISOLONE SODIUM SUCCINATE 125 MG/2ML
125 INJECTION, POWDER, LYOPHILIZED, FOR SOLUTION INTRAMUSCULAR; INTRAVENOUS ONCE
Status: COMPLETED | OUTPATIENT
Start: 2017-11-24 | End: 2017-11-24

## 2017-11-24 RX ORDER — NICOTINE 21 MG/24HR
1 PATCH, TRANSDERMAL 24 HOURS TRANSDERMAL NIGHTLY
Status: DISCONTINUED | OUTPATIENT
Start: 2017-11-24 | End: 2017-11-28 | Stop reason: HOSPADM

## 2017-11-24 RX ORDER — SODIUM CHLORIDE 0.9 % (FLUSH) 0.9 %
1-10 SYRINGE (ML) INJECTION AS NEEDED
Status: DISCONTINUED | OUTPATIENT
Start: 2017-11-24 | End: 2017-11-28 | Stop reason: HOSPADM

## 2017-11-24 RX ORDER — LORAZEPAM 1 MG/1
1 TABLET ORAL EVERY 6 HOURS PRN
Status: DISCONTINUED | OUTPATIENT
Start: 2017-11-24 | End: 2017-11-28

## 2017-11-24 RX ADMIN — ALBUTEROL SULFATE 10 MG: 2.5 SOLUTION RESPIRATORY (INHALATION) at 15:50

## 2017-11-24 RX ADMIN — LEVOFLOXACIN 750 MG: 5 INJECTION, SOLUTION INTRAVENOUS at 17:07

## 2017-11-24 RX ADMIN — DOXYCYCLINE 100 MG: 100 INJECTION, POWDER, LYOPHILIZED, FOR SOLUTION INTRAVENOUS at 23:05

## 2017-11-24 RX ADMIN — METOPROLOL TARTRATE 25 MG: 25 TABLET, FILM COATED ORAL at 23:05

## 2017-11-24 RX ADMIN — INSULIN DETEMIR 40 UNITS: 100 INJECTION, SOLUTION SUBCUTANEOUS at 23:01

## 2017-11-24 RX ADMIN — ENOXAPARIN SODIUM 40 MG: 40 INJECTION SUBCUTANEOUS at 22:28

## 2017-11-24 RX ADMIN — METFORMIN HYDROCHLORIDE 500 MG: 500 TABLET ORAL at 23:07

## 2017-11-24 RX ADMIN — METHYLPREDNISOLONE SODIUM SUCCINATE 60 MG: 125 INJECTION, POWDER, FOR SOLUTION INTRAMUSCULAR; INTRAVENOUS at 22:29

## 2017-11-24 RX ADMIN — THEOPHYLLINE 300 MG: 300 TABLET, EXTENDED RELEASE ORAL at 23:02

## 2017-11-24 RX ADMIN — INSULIN LISPRO 20 UNITS: 100 INJECTION, SOLUTION INTRAVENOUS; SUBCUTANEOUS at 23:01

## 2017-11-24 RX ADMIN — MORPHINE SULFATE 15 MG: 15 TABLET, EXTENDED RELEASE ORAL at 22:29

## 2017-11-24 RX ADMIN — METHYLPREDNISOLONE SODIUM SUCCINATE 125 MG: 125 INJECTION, POWDER, FOR SOLUTION INTRAMUSCULAR; INTRAVENOUS at 17:06

## 2017-11-25 LAB
ANION GAP SERPL CALCULATED.3IONS-SCNC: 9 MMOL/L (ref 4–13)
ARTERIAL PATENCY WRIST A: POSITIVE
ATMOSPHERIC PRESS: 747 MMHG
BASE EXCESS BLDA CALC-SCNC: 6.3 MMOL/L (ref 0–2)
BASOPHILS # BLD AUTO: 0.02 10*3/MM3 (ref 0–0.2)
BASOPHILS NFR BLD AUTO: 0.2 % (ref 0–2)
BDY SITE: ABNORMAL
BODY TEMPERATURE: 37 C
BUN BLD-MCNC: 13 MG/DL (ref 5–21)
BUN/CREAT SERPL: 18.1 (ref 7–25)
CALCIUM SPEC-SCNC: 9.3 MG/DL (ref 8.4–10.4)
CHLORIDE SERPL-SCNC: 95 MMOL/L (ref 98–110)
CO2 SERPL-SCNC: 37 MMOL/L (ref 24–31)
COHGB MFR BLD: 2.6 % (ref 0–5)
CREAT BLD-MCNC: 0.72 MG/DL (ref 0.5–1.4)
DEPRECATED RDW RBC AUTO: 45.3 FL (ref 40–54)
EOSINOPHIL # BLD AUTO: 0 10*3/MM3 (ref 0–0.7)
EOSINOPHIL NFR BLD AUTO: 0 % (ref 0–4)
ERYTHROCYTE [DISTWIDTH] IN BLOOD BY AUTOMATED COUNT: 14.2 % (ref 12–15)
GAS FLOW AIRWAY: 6 LPM
GFR SERPL CREATININE-BSD FRML MDRD: 112 ML/MIN/1.73
GLUCOSE BLD-MCNC: 248 MG/DL (ref 70–100)
GLUCOSE BLDC GLUCOMTR-MCNC: 163 MG/DL (ref 70–130)
GLUCOSE BLDC GLUCOMTR-MCNC: 202 MG/DL (ref 70–130)
GLUCOSE BLDC GLUCOMTR-MCNC: 231 MG/DL (ref 70–130)
GLUCOSE BLDC GLUCOMTR-MCNC: 242 MG/DL (ref 70–130)
GLUCOSE BLDC GLUCOMTR-MCNC: 253 MG/DL (ref 70–130)
HCO3 BLDA-SCNC: 35.4 MMOL/L (ref 20–26)
HCT VFR BLD AUTO: 49.5 % (ref 40–52)
HCT VFR BLD CALC: 50.7 % (ref 38–51)
HGB BLD-MCNC: 15.8 G/DL (ref 14–18)
HGB BLDA-MCNC: 16.5 G/DL (ref 14–18)
IMM GRANULOCYTES # BLD: 0.14 10*3/MM3 (ref 0–0.03)
IMM GRANULOCYTES NFR BLD: 1.3 % (ref 0–5)
LYMPHOCYTES # BLD AUTO: 1.65 10*3/MM3 (ref 0.72–4.86)
LYMPHOCYTES NFR BLD AUTO: 15.5 % (ref 15–45)
Lab: ABNORMAL
Lab: ABNORMAL
MCH RBC QN AUTO: 28.2 PG (ref 28–32)
MCHC RBC AUTO-ENTMCNC: 31.9 G/DL (ref 33–36)
MCV RBC AUTO: 88.2 FL (ref 82–95)
METHGB BLD QL: 0.4 % (ref 0–3)
MODALITY: ABNORMAL
MONOCYTES # BLD AUTO: 0.49 10*3/MM3 (ref 0.19–1.3)
MONOCYTES NFR BLD AUTO: 4.6 % (ref 4–12)
NEUTROPHILS # BLD AUTO: 8.34 10*3/MM3 (ref 1.87–8.4)
NEUTROPHILS NFR BLD AUTO: 78.4 % (ref 39–78)
NOTIFIED BY: ABNORMAL
NOTIFIED WHO: ABNORMAL
OXYHGB MFR BLDV: 89.2 % (ref 94–99)
PCO2 BLDA: 68.7 MM HG (ref 35–45)
PH BLDA: 7.32 PH UNITS (ref 7.35–7.45)
PLATELET # BLD AUTO: 147 10*3/MM3 (ref 130–400)
PMV BLD AUTO: 9.1 FL (ref 6–12)
PO2 BLDA: 65.5 MM HG (ref 83–108)
POTASSIUM BLD-SCNC: 4.4 MMOL/L (ref 3.5–5.3)
POTASSIUM BLDA-SCNC: 4.5 MMOL/L (ref 3.5–5.2)
RBC # BLD AUTO: 5.61 10*6/MM3 (ref 4.8–5.9)
SAO2 % BLDCOA: 91.9 % (ref 94–99)
SODIUM BLD-SCNC: 141 MMOL/L (ref 135–145)
SODIUM BLDA-SCNC: 139 MMOL/L (ref 136–145)
VENTILATOR MODE: ABNORMAL
WBC NRBC COR # BLD: 10.64 10*3/MM3 (ref 4.8–10.8)

## 2017-11-25 PROCEDURE — 83050 HGB METHEMOGLOBIN QUAN: CPT

## 2017-11-25 PROCEDURE — 94660 CPAP INITIATION&MGMT: CPT

## 2017-11-25 PROCEDURE — 94799 UNLISTED PULMONARY SVC/PX: CPT

## 2017-11-25 PROCEDURE — 63710000001 INSULIN LISPRO (HUMAN) PER 5 UNITS: Performed by: FAMILY MEDICINE

## 2017-11-25 PROCEDURE — 36600 WITHDRAWAL OF ARTERIAL BLOOD: CPT

## 2017-11-25 PROCEDURE — 82962 GLUCOSE BLOOD TEST: CPT

## 2017-11-25 PROCEDURE — 82805 BLOOD GASES W/O2 SATURATION: CPT

## 2017-11-25 PROCEDURE — 63710000001 INSULIN DETEMIR PER 5 UNITS: Performed by: FAMILY MEDICINE

## 2017-11-25 PROCEDURE — 25010000002 METHYLPREDNISOLONE PER 125 MG: Performed by: FAMILY MEDICINE

## 2017-11-25 PROCEDURE — 80048 BASIC METABOLIC PNL TOTAL CA: CPT | Performed by: FAMILY MEDICINE

## 2017-11-25 PROCEDURE — 85025 COMPLETE CBC W/AUTO DIFF WBC: CPT | Performed by: FAMILY MEDICINE

## 2017-11-25 PROCEDURE — 82375 ASSAY CARBOXYHB QUANT: CPT

## 2017-11-25 PROCEDURE — 25010000002 ENOXAPARIN PER 10 MG: Performed by: FAMILY MEDICINE

## 2017-11-25 RX ORDER — SPIRONOLACTONE 25 MG/1
25 TABLET ORAL DAILY
Status: DISCONTINUED | OUTPATIENT
Start: 2017-11-25 | End: 2017-11-28 | Stop reason: HOSPADM

## 2017-11-25 RX ORDER — MORPHINE SULFATE 30 MG/1
30 TABLET, FILM COATED, EXTENDED RELEASE ORAL EVERY 12 HOURS SCHEDULED
COMMUNITY
End: 2017-11-28 | Stop reason: HOSPADM

## 2017-11-25 RX ORDER — ALBUTEROL SULFATE 2.5 MG/3ML
2.5 SOLUTION RESPIRATORY (INHALATION) EVERY 6 HOURS PRN
COMMUNITY
End: 2018-01-23 | Stop reason: HOSPADM

## 2017-11-25 RX ORDER — OLANZAPINE 10 MG/1
20 TABLET ORAL NIGHTLY
Status: DISCONTINUED | OUTPATIENT
Start: 2017-11-25 | End: 2017-11-28

## 2017-11-25 RX ORDER — LEVOTHYROXINE SODIUM 0.05 MG/1
50 TABLET ORAL DAILY
COMMUNITY

## 2017-11-25 RX ORDER — PREGABALIN 300 MG/1
300 CAPSULE ORAL 2 TIMES DAILY
COMMUNITY
End: 2017-11-28 | Stop reason: HOSPADM

## 2017-11-25 RX ORDER — TAMSULOSIN HYDROCHLORIDE 0.4 MG/1
0.4 CAPSULE ORAL NIGHTLY
Status: DISCONTINUED | OUTPATIENT
Start: 2017-11-25 | End: 2017-11-28 | Stop reason: HOSPADM

## 2017-11-25 RX ORDER — LEVOTHYROXINE SODIUM 0.05 MG/1
50 TABLET ORAL DAILY
Status: DISCONTINUED | OUTPATIENT
Start: 2017-11-25 | End: 2017-11-28 | Stop reason: HOSPADM

## 2017-11-25 RX ORDER — NIFEDIPINE 30 MG/1
30 TABLET, EXTENDED RELEASE ORAL DAILY
COMMUNITY
End: 2018-01-23 | Stop reason: HOSPADM

## 2017-11-25 RX ORDER — VENLAFAXINE HYDROCHLORIDE 75 MG/1
225 CAPSULE, EXTENDED RELEASE ORAL DAILY
Status: DISCONTINUED | OUTPATIENT
Start: 2017-11-25 | End: 2017-11-28 | Stop reason: HOSPADM

## 2017-11-25 RX ORDER — TAMSULOSIN HYDROCHLORIDE 0.4 MG/1
1 CAPSULE ORAL NIGHTLY
COMMUNITY
End: 2020-11-23 | Stop reason: SDUPTHER

## 2017-11-25 RX ORDER — CYCLOBENZAPRINE HCL 10 MG
10 TABLET ORAL 3 TIMES DAILY PRN
COMMUNITY
End: 2017-11-28 | Stop reason: HOSPADM

## 2017-11-25 RX ORDER — ATROPINE SULFATE 10 MG/ML
1 SOLUTION/ DROPS OPHTHALMIC 2 TIMES DAILY
Status: DISCONTINUED | OUTPATIENT
Start: 2017-11-25 | End: 2017-11-28 | Stop reason: HOSPADM

## 2017-11-25 RX ORDER — ATROPINE SULFATE 10 MG/ML
1 SOLUTION/ DROPS OPHTHALMIC 2 TIMES DAILY
Status: DISCONTINUED | OUTPATIENT
Start: 2017-11-25 | End: 2017-11-25

## 2017-11-25 RX ORDER — VENLAFAXINE HYDROCHLORIDE 75 MG/1
225 CAPSULE, EXTENDED RELEASE ORAL DAILY
Status: ON HOLD | COMMUNITY
End: 2020-01-14

## 2017-11-25 RX ORDER — DORZOLAMIDE HCL 20 MG/ML
1 SOLUTION/ DROPS OPHTHALMIC 2 TIMES DAILY
Status: DISCONTINUED | OUTPATIENT
Start: 2017-11-25 | End: 2017-11-28 | Stop reason: HOSPADM

## 2017-11-25 RX ORDER — HYDROCODONE BITARTRATE AND ACETAMINOPHEN 5; 325 MG/1; MG/1
1 TABLET ORAL EVERY 6 HOURS PRN
COMMUNITY
End: 2018-01-23 | Stop reason: HOSPADM

## 2017-11-25 RX ORDER — OLANZAPINE 20 MG/1
20 TABLET ORAL NIGHTLY
COMMUNITY
End: 2017-11-28 | Stop reason: HOSPADM

## 2017-11-25 RX ORDER — INSULIN GLARGINE 100 [IU]/ML
170 INJECTION, SOLUTION SUBCUTANEOUS DAILY
Status: ON HOLD | COMMUNITY
End: 2020-01-17 | Stop reason: SDUPTHER

## 2017-11-25 RX ORDER — PREGABALIN 100 MG/1
300 CAPSULE ORAL EVERY 12 HOURS SCHEDULED
Status: DISCONTINUED | OUTPATIENT
Start: 2017-11-25 | End: 2017-11-27

## 2017-11-25 RX ORDER — ATENOLOL 100 MG/1
100 TABLET ORAL EVERY 12 HOURS SCHEDULED
COMMUNITY
End: 2017-11-28 | Stop reason: HOSPADM

## 2017-11-25 RX ORDER — POTASSIUM CHLORIDE 20 MEQ/1
40 TABLET, EXTENDED RELEASE ORAL 2 TIMES DAILY
Status: ON HOLD | COMMUNITY
End: 2020-01-14

## 2017-11-25 RX ORDER — DORZOLAMIDE HCL 20 MG/ML
1 SOLUTION/ DROPS OPHTHALMIC 2 TIMES DAILY
COMMUNITY

## 2017-11-25 RX ORDER — SPIRONOLACTONE 25 MG/1
25 TABLET ORAL DAILY
COMMUNITY

## 2017-11-25 RX ORDER — ATENOLOL 100 MG/1
100 TABLET ORAL EVERY 12 HOURS SCHEDULED
Status: DISCONTINUED | OUTPATIENT
Start: 2017-11-25 | End: 2017-11-27

## 2017-11-25 RX ORDER — NIFEDIPINE 30 MG/1
30 TABLET, EXTENDED RELEASE ORAL
Status: DISCONTINUED | OUTPATIENT
Start: 2017-11-26 | End: 2017-11-28 | Stop reason: HOSPADM

## 2017-11-25 RX ORDER — ATROPINE SULFATE 10 MG/ML
1 SOLUTION/ DROPS OPHTHALMIC 2 TIMES DAILY
Status: ON HOLD | COMMUNITY
End: 2020-01-14

## 2017-11-25 RX ORDER — DORZOLAMIDE HCL 20 MG/ML
1 SOLUTION/ DROPS OPHTHALMIC 2 TIMES DAILY
Status: DISCONTINUED | OUTPATIENT
Start: 2017-11-25 | End: 2017-11-25

## 2017-11-25 RX ADMIN — ISOSORBIDE MONONITRATE 60 MG: 60 TABLET, EXTENDED RELEASE ORAL at 09:18

## 2017-11-25 RX ADMIN — CLOPIDOGREL BISULFATE 75 MG: 75 TABLET, FILM COATED ORAL at 09:17

## 2017-11-25 RX ADMIN — HYDROCODONE BITARTRATE AND ACETAMINOPHEN 1 TABLET: 7.5; 325 TABLET ORAL at 03:59

## 2017-11-25 RX ADMIN — LEVOTHYROXINE SODIUM 50 MCG: 50 TABLET ORAL at 16:41

## 2017-11-25 RX ADMIN — INSULIN LISPRO 8 UNITS: 100 INJECTION, SOLUTION INTRAVENOUS; SUBCUTANEOUS at 22:36

## 2017-11-25 RX ADMIN — OLANZAPINE 20 MG: 10 TABLET, FILM COATED ORAL at 22:20

## 2017-11-25 RX ADMIN — IPRATROPIUM BROMIDE AND ALBUTEROL SULFATE 3 ML: 2.5; .5 SOLUTION RESPIRATORY (INHALATION) at 10:54

## 2017-11-25 RX ADMIN — FAMOTIDINE 20 MG: 20 TABLET, FILM COATED ORAL at 17:33

## 2017-11-25 RX ADMIN — DORZOLAMIDE HYDROCHLORIDE 1 DROP: 20 SOLUTION OPHTHALMIC at 12:17

## 2017-11-25 RX ADMIN — ATROPINE SULFATE 1 DROP: 10 SOLUTION/ DROPS OPHTHALMIC at 12:17

## 2017-11-25 RX ADMIN — METFORMIN HYDROCHLORIDE 500 MG: 500 TABLET ORAL at 09:18

## 2017-11-25 RX ADMIN — ATROPINE SULFATE 1 DROP: 10 SOLUTION/ DROPS OPHTHALMIC at 17:33

## 2017-11-25 RX ADMIN — DOCUSATE SODIUM 100 MG: 100 CAPSULE ORAL at 09:17

## 2017-11-25 RX ADMIN — THEOPHYLLINE 300 MG: 300 TABLET, EXTENDED RELEASE ORAL at 09:19

## 2017-11-25 RX ADMIN — IPRATROPIUM BROMIDE AND ALBUTEROL SULFATE 3 ML: 2.5; .5 SOLUTION RESPIRATORY (INHALATION) at 18:50

## 2017-11-25 RX ADMIN — METOPROLOL TARTRATE 25 MG: 25 TABLET, FILM COATED ORAL at 09:18

## 2017-11-25 RX ADMIN — INSULIN LISPRO 8 UNITS: 100 INJECTION, SOLUTION INTRAVENOUS; SUBCUTANEOUS at 09:19

## 2017-11-25 RX ADMIN — METHYLPREDNISOLONE SODIUM SUCCINATE 60 MG: 125 INJECTION, POWDER, FOR SOLUTION INTRAMUSCULAR; INTRAVENOUS at 16:41

## 2017-11-25 RX ADMIN — LINAGLIPTIN 5 MG: 5 TABLET, FILM COATED ORAL at 16:41

## 2017-11-25 RX ADMIN — SPIRONOLACTONE 25 MG: 25 TABLET ORAL at 16:41

## 2017-11-25 RX ADMIN — METHYLPREDNISOLONE SODIUM SUCCINATE 60 MG: 125 INJECTION, POWDER, FOR SOLUTION INTRAMUSCULAR; INTRAVENOUS at 06:18

## 2017-11-25 RX ADMIN — IPRATROPIUM BROMIDE AND ALBUTEROL SULFATE 3 ML: 2.5; .5 SOLUTION RESPIRATORY (INHALATION) at 15:23

## 2017-11-25 RX ADMIN — METHYLPREDNISOLONE SODIUM SUCCINATE 60 MG: 125 INJECTION, POWDER, FOR SOLUTION INTRAMUSCULAR; INTRAVENOUS at 22:15

## 2017-11-25 RX ADMIN — VENLAFAXINE HYDROCHLORIDE 225 MG: 75 CAPSULE, EXTENDED RELEASE ORAL at 16:41

## 2017-11-25 RX ADMIN — PREGABALIN 300 MG: 100 CAPSULE ORAL at 22:16

## 2017-11-25 RX ADMIN — INSULIN DETEMIR 50 UNITS: 100 INJECTION, SOLUTION SUBCUTANEOUS at 22:37

## 2017-11-25 RX ADMIN — MORPHINE SULFATE 15 MG: 15 TABLET, EXTENDED RELEASE ORAL at 09:18

## 2017-11-25 RX ADMIN — MORPHINE SULFATE 15 MG: 15 TABLET, EXTENDED RELEASE ORAL at 22:17

## 2017-11-25 RX ADMIN — BENZONATATE 100 MG: 100 CAPSULE, LIQUID FILLED ORAL at 04:57

## 2017-11-25 RX ADMIN — INSULIN LISPRO 12 UNITS: 100 INJECTION, SOLUTION INTRAVENOUS; SUBCUTANEOUS at 12:17

## 2017-11-25 RX ADMIN — DILTIAZEM HYDROCHLORIDE 180 MG: 180 CAPSULE, COATED, EXTENDED RELEASE ORAL at 06:18

## 2017-11-25 RX ADMIN — METFORMIN HYDROCHLORIDE 1000 MG: 500 TABLET ORAL at 17:33

## 2017-11-25 RX ADMIN — PREGABALIN 300 MG: 100 CAPSULE ORAL at 12:17

## 2017-11-25 RX ADMIN — IPRATROPIUM BROMIDE AND ALBUTEROL SULFATE 3 ML: 2.5; .5 SOLUTION RESPIRATORY (INHALATION) at 06:57

## 2017-11-25 RX ADMIN — DORZOLAMIDE HYDROCHLORIDE 1 DROP: 20 SOLUTION OPHTHALMIC at 17:33

## 2017-11-25 RX ADMIN — FAMOTIDINE 20 MG: 20 TABLET, FILM COATED ORAL at 09:17

## 2017-11-25 RX ADMIN — DOXYCYCLINE 100 MG: 100 INJECTION, POWDER, LYOPHILIZED, FOR SOLUTION INTRAVENOUS at 11:10

## 2017-11-25 RX ADMIN — ENOXAPARIN SODIUM 40 MG: 40 INJECTION SUBCUTANEOUS at 22:15

## 2017-11-25 RX ADMIN — INSULIN LISPRO 4 UNITS: 100 INJECTION, SOLUTION INTRAVENOUS; SUBCUTANEOUS at 17:33

## 2017-11-25 RX ADMIN — LOSARTAN POTASSIUM 100 MG: 50 TABLET ORAL at 09:18

## 2017-11-25 RX ADMIN — TAMSULOSIN HYDROCHLORIDE 0.4 MG: 0.4 CAPSULE ORAL at 22:20

## 2017-11-25 RX ADMIN — HYDROCODONE BITARTRATE AND ACETAMINOPHEN 1 TABLET: 7.5; 325 TABLET ORAL at 17:32

## 2017-11-26 PROBLEM — J96.12 CHRONIC RESPIRATORY ACIDOSIS (HCC): Status: ACTIVE | Noted: 2017-11-26

## 2017-11-26 LAB
ARTERIAL PATENCY WRIST A: POSITIVE
ARTERIAL PATENCY WRIST A: POSITIVE
ATMOSPHERIC PRESS: 757 MMHG
ATMOSPHERIC PRESS: 759 MMHG
BASE EXCESS BLDA CALC-SCNC: 4.4 MMOL/L (ref 0–2)
BASE EXCESS BLDA CALC-SCNC: 4.8 MMOL/L (ref 0–2)
BDY SITE: ABNORMAL
BDY SITE: ABNORMAL
BODY TEMPERATURE: 37 C
BODY TEMPERATURE: 37 C
EPAP: 4
EPAP: 6
GLUCOSE BLDC GLUCOMTR-MCNC: 158 MG/DL (ref 70–130)
GLUCOSE BLDC GLUCOMTR-MCNC: 228 MG/DL (ref 70–130)
GLUCOSE BLDC GLUCOMTR-MCNC: 231 MG/DL (ref 70–130)
GLUCOSE BLDC GLUCOMTR-MCNC: 231 MG/DL (ref 70–130)
GLUCOSE BLDC GLUCOMTR-MCNC: 243 MG/DL (ref 70–130)
GLUCOSE BLDC GLUCOMTR-MCNC: 287 MG/DL (ref 70–130)
HCO3 BLDA-SCNC: 32.9 MMOL/L (ref 20–26)
HCO3 BLDA-SCNC: 33 MMOL/L (ref 20–26)
HOROWITZ INDEX BLD+IHG-RTO: 40 %
HOROWITZ INDEX BLD+IHG-RTO: 45 %
IPAP: 19
IPAP: 19
Lab: ABNORMAL
Lab: ABNORMAL
MODALITY: ABNORMAL
MODALITY: ABNORMAL
PCO2 BLDA: 61.7 MM HG (ref 35–45)
PCO2 BLDA: 64.1 MM HG (ref 35–45)
PH BLDA: 7.32 PH UNITS (ref 7.35–7.45)
PH BLDA: 7.34 PH UNITS (ref 7.35–7.45)
PO2 BLDA: 63.2 MM HG (ref 83–108)
PO2 BLDA: 71.7 MM HG (ref 83–108)
SAO2 % BLDCOA: 90.9 % (ref 94–99)
SAO2 % BLDCOA: 93.5 % (ref 94–99)
SET MECH RESP RATE: 12
SET MECH RESP RATE: 12
VENTILATOR MODE: ABNORMAL
VENTILATOR MODE: ABNORMAL

## 2017-11-26 PROCEDURE — 94660 CPAP INITIATION&MGMT: CPT

## 2017-11-26 PROCEDURE — 82803 BLOOD GASES ANY COMBINATION: CPT

## 2017-11-26 PROCEDURE — 94799 UNLISTED PULMONARY SVC/PX: CPT

## 2017-11-26 PROCEDURE — 36600 WITHDRAWAL OF ARTERIAL BLOOD: CPT

## 2017-11-26 PROCEDURE — 25010000002 METHYLPREDNISOLONE PER 125 MG: Performed by: FAMILY MEDICINE

## 2017-11-26 PROCEDURE — 63710000001 INSULIN LISPRO (HUMAN) PER 5 UNITS: Performed by: FAMILY MEDICINE

## 2017-11-26 PROCEDURE — 25010000002 ENOXAPARIN PER 10 MG: Performed by: FAMILY MEDICINE

## 2017-11-26 PROCEDURE — 82962 GLUCOSE BLOOD TEST: CPT

## 2017-11-26 RX ORDER — METHYLPREDNISOLONE SODIUM SUCCINATE 125 MG/2ML
40 INJECTION, POWDER, LYOPHILIZED, FOR SOLUTION INTRAMUSCULAR; INTRAVENOUS EVERY 8 HOURS SCHEDULED
Status: DISCONTINUED | OUTPATIENT
Start: 2017-11-26 | End: 2017-11-28

## 2017-11-26 RX ADMIN — METHYLPREDNISOLONE SODIUM SUCCINATE 40 MG: 125 INJECTION, POWDER, FOR SOLUTION INTRAMUSCULAR; INTRAVENOUS at 21:59

## 2017-11-26 RX ADMIN — OLANZAPINE 20 MG: 10 TABLET, FILM COATED ORAL at 21:59

## 2017-11-26 RX ADMIN — SPIRONOLACTONE 25 MG: 25 TABLET ORAL at 10:56

## 2017-11-26 RX ADMIN — TAMSULOSIN HYDROCHLORIDE 0.4 MG: 0.4 CAPSULE ORAL at 21:59

## 2017-11-26 RX ADMIN — MORPHINE SULFATE 15 MG: 15 TABLET, EXTENDED RELEASE ORAL at 12:31

## 2017-11-26 RX ADMIN — DOXYCYCLINE 100 MG: 100 INJECTION, POWDER, LYOPHILIZED, FOR SOLUTION INTRAVENOUS at 00:44

## 2017-11-26 RX ADMIN — DORZOLAMIDE HYDROCHLORIDE 1 DROP: 20 SOLUTION OPHTHALMIC at 10:57

## 2017-11-26 RX ADMIN — VENLAFAXINE HYDROCHLORIDE 225 MG: 75 CAPSULE, EXTENDED RELEASE ORAL at 12:30

## 2017-11-26 RX ADMIN — METHYLPREDNISOLONE SODIUM SUCCINATE 40 MG: 125 INJECTION, POWDER, FOR SOLUTION INTRAMUSCULAR; INTRAVENOUS at 15:23

## 2017-11-26 RX ADMIN — ENOXAPARIN SODIUM 40 MG: 40 INJECTION SUBCUTANEOUS at 22:00

## 2017-11-26 RX ADMIN — IPRATROPIUM BROMIDE AND ALBUTEROL SULFATE 3 ML: 2.5; .5 SOLUTION RESPIRATORY (INHALATION) at 15:17

## 2017-11-26 RX ADMIN — INSULIN LISPRO 8 UNITS: 100 INJECTION, SOLUTION INTRAVENOUS; SUBCUTANEOUS at 18:07

## 2017-11-26 RX ADMIN — PREGABALIN 300 MG: 100 CAPSULE ORAL at 21:59

## 2017-11-26 RX ADMIN — ATENOLOL 100 MG: 100 TABLET ORAL at 21:59

## 2017-11-26 RX ADMIN — DORZOLAMIDE HYDROCHLORIDE 1 DROP: 20 SOLUTION OPHTHALMIC at 18:07

## 2017-11-26 RX ADMIN — ATROPINE SULFATE 1 DROP: 10 SOLUTION/ DROPS OPHTHALMIC at 18:07

## 2017-11-26 RX ADMIN — LINAGLIPTIN 5 MG: 5 TABLET, FILM COATED ORAL at 10:57

## 2017-11-26 RX ADMIN — INSULIN LISPRO 4 UNITS: 100 INJECTION, SOLUTION INTRAVENOUS; SUBCUTANEOUS at 22:00

## 2017-11-26 RX ADMIN — IPRATROPIUM BROMIDE AND ALBUTEROL SULFATE 3 ML: 2.5; .5 SOLUTION RESPIRATORY (INHALATION) at 11:22

## 2017-11-26 RX ADMIN — ATROPINE SULFATE 1 DROP: 10 SOLUTION/ DROPS OPHTHALMIC at 10:57

## 2017-11-26 RX ADMIN — FAMOTIDINE 20 MG: 20 TABLET, FILM COATED ORAL at 10:56

## 2017-11-26 RX ADMIN — DOCUSATE SODIUM 100 MG: 100 CAPSULE ORAL at 10:56

## 2017-11-26 RX ADMIN — FAMOTIDINE 20 MG: 20 TABLET, FILM COATED ORAL at 19:33

## 2017-11-26 RX ADMIN — LEVOTHYROXINE SODIUM 50 MCG: 50 TABLET ORAL at 10:56

## 2017-11-26 RX ADMIN — METFORMIN HYDROCHLORIDE 1000 MG: 500 TABLET ORAL at 18:07

## 2017-11-26 RX ADMIN — DOXYCYCLINE 100 MG: 100 INJECTION, POWDER, LYOPHILIZED, FOR SOLUTION INTRAVENOUS at 11:05

## 2017-11-26 RX ADMIN — METFORMIN HYDROCHLORIDE 1000 MG: 500 TABLET ORAL at 10:56

## 2017-11-26 RX ADMIN — MORPHINE SULFATE 15 MG: 15 TABLET, EXTENDED RELEASE ORAL at 21:59

## 2017-11-26 RX ADMIN — CLOPIDOGREL BISULFATE 75 MG: 75 TABLET, FILM COATED ORAL at 10:56

## 2017-11-26 RX ADMIN — INSULIN LISPRO 12 UNITS: 100 INJECTION, SOLUTION INTRAVENOUS; SUBCUTANEOUS at 12:34

## 2017-11-26 RX ADMIN — IPRATROPIUM BROMIDE AND ALBUTEROL SULFATE 3 ML: 2.5; .5 SOLUTION RESPIRATORY (INHALATION) at 07:02

## 2017-11-26 RX ADMIN — IPRATROPIUM BROMIDE AND ALBUTEROL SULFATE 3 ML: 2.5; .5 SOLUTION RESPIRATORY (INHALATION) at 19:51

## 2017-11-26 RX ADMIN — INSULIN LISPRO 8 UNITS: 100 INJECTION, SOLUTION INTRAVENOUS; SUBCUTANEOUS at 10:56

## 2017-11-26 RX ADMIN — PREGABALIN 300 MG: 100 CAPSULE ORAL at 12:31

## 2017-11-27 LAB
ARTERIAL PATENCY WRIST A: POSITIVE
ATMOSPHERIC PRESS: 756 MMHG
BASE EXCESS BLDA CALC-SCNC: 5.8 MMOL/L (ref 0–2)
BDY SITE: ABNORMAL
BODY TEMPERATURE: 37 C
COHGB MFR BLD: 1.1 % (ref 0–5)
GAS FLOW AIRWAY: 4 LPM
GLUCOSE BLDC GLUCOMTR-MCNC: 232 MG/DL (ref 70–130)
GLUCOSE BLDC GLUCOMTR-MCNC: 238 MG/DL (ref 70–130)
GLUCOSE BLDC GLUCOMTR-MCNC: 251 MG/DL (ref 70–130)
GLUCOSE BLDC GLUCOMTR-MCNC: 283 MG/DL (ref 70–130)
HCO3 BLDA-SCNC: 32.6 MMOL/L (ref 20–26)
HCT VFR BLD CALC: 52.6 % (ref 38–51)
HGB BLDA-MCNC: 17.2 G/DL (ref 14–18)
Lab: ABNORMAL
METHGB BLD QL: 0.6 % (ref 0–3)
MODALITY: ABNORMAL
OXYHGB MFR BLDV: 88.3 % (ref 94–99)
PCO2 BLDA: 53.3 MM HG (ref 35–45)
PH BLDA: 7.39 PH UNITS (ref 7.35–7.45)
PO2 BLDA: 58.4 MM HG (ref 83–108)
POTASSIUM BLDA-SCNC: 4.5 MMOL/L (ref 3.5–5.2)
SAO2 % BLDCOA: 89.8 % (ref 94–99)
SODIUM BLDA-SCNC: 136 MMOL/L (ref 136–145)
VENTILATOR MODE: ABNORMAL

## 2017-11-27 PROCEDURE — 82805 BLOOD GASES W/O2 SATURATION: CPT

## 2017-11-27 PROCEDURE — 25010000002 METHYLPREDNISOLONE PER 125 MG: Performed by: FAMILY MEDICINE

## 2017-11-27 PROCEDURE — 82962 GLUCOSE BLOOD TEST: CPT

## 2017-11-27 PROCEDURE — 63710000001 INSULIN DETEMIR PER 5 UNITS: Performed by: FAMILY MEDICINE

## 2017-11-27 PROCEDURE — 36600 WITHDRAWAL OF ARTERIAL BLOOD: CPT

## 2017-11-27 PROCEDURE — 83050 HGB METHEMOGLOBIN QUAN: CPT

## 2017-11-27 PROCEDURE — 94799 UNLISTED PULMONARY SVC/PX: CPT

## 2017-11-27 PROCEDURE — 25010000002 ENOXAPARIN PER 10 MG: Performed by: FAMILY MEDICINE

## 2017-11-27 PROCEDURE — 82375 ASSAY CARBOXYHB QUANT: CPT

## 2017-11-27 PROCEDURE — 63710000001 INSULIN LISPRO (HUMAN) PER 5 UNITS: Performed by: FAMILY MEDICINE

## 2017-11-27 PROCEDURE — 94660 CPAP INITIATION&MGMT: CPT

## 2017-11-27 RX ORDER — DOXYCYCLINE 50 MG/1
100 CAPSULE ORAL EVERY 12 HOURS SCHEDULED
Status: DISCONTINUED | OUTPATIENT
Start: 2017-11-27 | End: 2017-11-28 | Stop reason: HOSPADM

## 2017-11-27 RX ORDER — ATENOLOL 25 MG/1
25 TABLET ORAL EVERY 12 HOURS SCHEDULED
Status: DISCONTINUED | OUTPATIENT
Start: 2017-11-27 | End: 2017-11-28 | Stop reason: HOSPADM

## 2017-11-27 RX ORDER — PREGABALIN 75 MG/1
150 CAPSULE ORAL EVERY 12 HOURS SCHEDULED
Status: DISCONTINUED | OUTPATIENT
Start: 2017-11-27 | End: 2017-11-28 | Stop reason: HOSPADM

## 2017-11-27 RX ORDER — ATENOLOL 50 MG/1
50 TABLET ORAL EVERY 12 HOURS SCHEDULED
Status: DISCONTINUED | OUTPATIENT
Start: 2017-11-27 | End: 2017-11-27

## 2017-11-27 RX ADMIN — INSULIN LISPRO 8 UNITS: 100 INJECTION, SOLUTION INTRAVENOUS; SUBCUTANEOUS at 17:24

## 2017-11-27 RX ADMIN — PREGABALIN 150 MG: 75 CAPSULE ORAL at 22:16

## 2017-11-27 RX ADMIN — DOXYCYCLINE 100 MG: 50 CAPSULE ORAL at 22:16

## 2017-11-27 RX ADMIN — FAMOTIDINE 20 MG: 20 TABLET, FILM COATED ORAL at 09:47

## 2017-11-27 RX ADMIN — ENOXAPARIN SODIUM 40 MG: 40 INJECTION SUBCUTANEOUS at 22:15

## 2017-11-27 RX ADMIN — IPRATROPIUM BROMIDE AND ALBUTEROL SULFATE 3 ML: 2.5; .5 SOLUTION RESPIRATORY (INHALATION) at 20:16

## 2017-11-27 RX ADMIN — ISOSORBIDE MONONITRATE 60 MG: 60 TABLET, EXTENDED RELEASE ORAL at 11:55

## 2017-11-27 RX ADMIN — INSULIN LISPRO 12 UNITS: 100 INJECTION, SOLUTION INTRAVENOUS; SUBCUTANEOUS at 22:27

## 2017-11-27 RX ADMIN — METHYLPREDNISOLONE SODIUM SUCCINATE 40 MG: 125 INJECTION, POWDER, FOR SOLUTION INTRAMUSCULAR; INTRAVENOUS at 22:28

## 2017-11-27 RX ADMIN — DOCUSATE SODIUM 100 MG: 100 CAPSULE ORAL at 09:47

## 2017-11-27 RX ADMIN — VENLAFAXINE HYDROCHLORIDE 225 MG: 75 CAPSULE, EXTENDED RELEASE ORAL at 11:55

## 2017-11-27 RX ADMIN — OLANZAPINE 20 MG: 10 TABLET, FILM COATED ORAL at 22:16

## 2017-11-27 RX ADMIN — METHYLPREDNISOLONE SODIUM SUCCINATE 40 MG: 125 INJECTION, POWDER, FOR SOLUTION INTRAMUSCULAR; INTRAVENOUS at 05:27

## 2017-11-27 RX ADMIN — ATROPINE SULFATE 1 DROP: 10 SOLUTION/ DROPS OPHTHALMIC at 17:24

## 2017-11-27 RX ADMIN — ATROPINE SULFATE 1 DROP: 10 SOLUTION/ DROPS OPHTHALMIC at 09:47

## 2017-11-27 RX ADMIN — INSULIN LISPRO 8 UNITS: 100 INJECTION, SOLUTION INTRAVENOUS; SUBCUTANEOUS at 09:54

## 2017-11-27 RX ADMIN — DOXYCYCLINE 100 MG: 100 INJECTION, POWDER, LYOPHILIZED, FOR SOLUTION INTRAVENOUS at 00:01

## 2017-11-27 RX ADMIN — SPIRONOLACTONE 25 MG: 25 TABLET ORAL at 11:56

## 2017-11-27 RX ADMIN — IPRATROPIUM BROMIDE AND ALBUTEROL SULFATE 3 ML: 2.5; .5 SOLUTION RESPIRATORY (INHALATION) at 10:37

## 2017-11-27 RX ADMIN — DOXYCYCLINE 100 MG: 50 CAPSULE ORAL at 11:56

## 2017-11-27 RX ADMIN — INSULIN LISPRO 12 UNITS: 100 INJECTION, SOLUTION INTRAVENOUS; SUBCUTANEOUS at 11:55

## 2017-11-27 RX ADMIN — TAMSULOSIN HYDROCHLORIDE 0.4 MG: 0.4 CAPSULE ORAL at 22:16

## 2017-11-27 RX ADMIN — DORZOLAMIDE HYDROCHLORIDE 1 DROP: 20 SOLUTION OPHTHALMIC at 17:24

## 2017-11-27 RX ADMIN — METFORMIN HYDROCHLORIDE 1000 MG: 500 TABLET ORAL at 17:24

## 2017-11-27 RX ADMIN — DORZOLAMIDE HYDROCHLORIDE 1 DROP: 20 SOLUTION OPHTHALMIC at 09:48

## 2017-11-27 RX ADMIN — INSULIN DETEMIR 50 UNITS: 100 INJECTION, SOLUTION SUBCUTANEOUS at 22:27

## 2017-11-27 RX ADMIN — METHYLPREDNISOLONE SODIUM SUCCINATE 40 MG: 125 INJECTION, POWDER, FOR SOLUTION INTRAMUSCULAR; INTRAVENOUS at 14:45

## 2017-11-27 RX ADMIN — IPRATROPIUM BROMIDE AND ALBUTEROL SULFATE 3 ML: 2.5; .5 SOLUTION RESPIRATORY (INHALATION) at 07:12

## 2017-11-27 RX ADMIN — CLOPIDOGREL BISULFATE 75 MG: 75 TABLET, FILM COATED ORAL at 09:47

## 2017-11-27 RX ADMIN — LEVOTHYROXINE SODIUM 50 MCG: 50 TABLET ORAL at 09:47

## 2017-11-27 RX ADMIN — LINAGLIPTIN 5 MG: 5 TABLET, FILM COATED ORAL at 09:47

## 2017-11-27 RX ADMIN — IPRATROPIUM BROMIDE AND ALBUTEROL SULFATE 3 ML: 2.5; .5 SOLUTION RESPIRATORY (INHALATION) at 14:40

## 2017-11-27 RX ADMIN — FAMOTIDINE 20 MG: 20 TABLET, FILM COATED ORAL at 17:24

## 2017-11-27 RX ADMIN — HYDROCODONE BITARTRATE AND ACETAMINOPHEN 1 TABLET: 7.5; 325 TABLET ORAL at 17:24

## 2017-11-28 VITALS
SYSTOLIC BLOOD PRESSURE: 107 MMHG | TEMPERATURE: 97.5 F | DIASTOLIC BLOOD PRESSURE: 45 MMHG | HEART RATE: 71 BPM | OXYGEN SATURATION: 90 % | BODY MASS INDEX: 34.2 KG/M2 | HEIGHT: 67 IN | RESPIRATION RATE: 18 BRPM | WEIGHT: 217.9 LBS

## 2017-11-28 LAB
GLUCOSE BLDC GLUCOMTR-MCNC: 166 MG/DL (ref 70–130)
GLUCOSE BLDC GLUCOMTR-MCNC: 186 MG/DL (ref 70–130)
GLUCOSE BLDC GLUCOMTR-MCNC: 279 MG/DL (ref 70–130)

## 2017-11-28 PROCEDURE — 94799 UNLISTED PULMONARY SVC/PX: CPT

## 2017-11-28 PROCEDURE — 94660 CPAP INITIATION&MGMT: CPT

## 2017-11-28 PROCEDURE — 63710000001 INSULIN LISPRO (HUMAN) PER 5 UNITS: Performed by: FAMILY MEDICINE

## 2017-11-28 PROCEDURE — 63710000001 PREDNISONE PER 5 MG: Performed by: FAMILY MEDICINE

## 2017-11-28 PROCEDURE — 25010000002 METHYLPREDNISOLONE PER 125 MG: Performed by: FAMILY MEDICINE

## 2017-11-28 PROCEDURE — 82962 GLUCOSE BLOOD TEST: CPT

## 2017-11-28 RX ORDER — METHYLPREDNISOLONE SODIUM SUCCINATE 125 MG/2ML
40 INJECTION, POWDER, LYOPHILIZED, FOR SOLUTION INTRAMUSCULAR; INTRAVENOUS 2 TIMES DAILY
Status: DISCONTINUED | OUTPATIENT
Start: 2017-11-28 | End: 2017-11-28 | Stop reason: HOSPADM

## 2017-11-28 RX ORDER — ATENOLOL 25 MG/1
25 TABLET ORAL EVERY 12 HOURS SCHEDULED
Qty: 60 TABLET | Refills: 0 | Status: ON HOLD | OUTPATIENT
Start: 2017-11-28 | End: 2020-01-14

## 2017-11-28 RX ORDER — PREGABALIN 150 MG/1
150 CAPSULE ORAL EVERY 12 HOURS SCHEDULED
Qty: 60 CAPSULE | Refills: 0 | Status: ON HOLD | OUTPATIENT
Start: 2017-11-28 | End: 2020-01-14

## 2017-11-28 RX ORDER — OLANZAPINE 10 MG/1
10 TABLET ORAL NIGHTLY
Status: DISCONTINUED | OUTPATIENT
Start: 2017-11-28 | End: 2017-11-28 | Stop reason: HOSPADM

## 2017-11-28 RX ORDER — OLANZAPINE 10 MG/1
10 TABLET ORAL NIGHTLY
Qty: 30 TABLET | Refills: 0 | Status: SHIPPED | OUTPATIENT
Start: 2017-11-28 | End: 2018-01-23 | Stop reason: HOSPADM

## 2017-11-28 RX ORDER — PREDNISONE 10 MG/1
TABLET ORAL
Qty: 1 EACH | Refills: 0 | Status: SHIPPED | OUTPATIENT
Start: 2017-11-28 | End: 2017-12-29

## 2017-11-28 RX ADMIN — HYDROCODONE BITARTRATE AND ACETAMINOPHEN 1 TABLET: 7.5; 325 TABLET ORAL at 09:57

## 2017-11-28 RX ADMIN — INSULIN LISPRO 4 UNITS: 100 INJECTION, SOLUTION INTRAVENOUS; SUBCUTANEOUS at 11:54

## 2017-11-28 RX ADMIN — ISOSORBIDE MONONITRATE 60 MG: 60 TABLET, EXTENDED RELEASE ORAL at 08:50

## 2017-11-28 RX ADMIN — IPRATROPIUM BROMIDE AND ALBUTEROL SULFATE 3 ML: 2.5; .5 SOLUTION RESPIRATORY (INHALATION) at 10:31

## 2017-11-28 RX ADMIN — METHYLPREDNISOLONE SODIUM SUCCINATE 40 MG: 125 INJECTION, POWDER, FOR SOLUTION INTRAMUSCULAR; INTRAVENOUS at 05:30

## 2017-11-28 RX ADMIN — LEVOTHYROXINE SODIUM 50 MCG: 50 TABLET ORAL at 08:50

## 2017-11-28 RX ADMIN — VENLAFAXINE HYDROCHLORIDE 225 MG: 75 CAPSULE, EXTENDED RELEASE ORAL at 08:50

## 2017-11-28 RX ADMIN — DOXYCYCLINE 100 MG: 50 CAPSULE ORAL at 08:51

## 2017-11-28 RX ADMIN — NIFEDIPINE 30 MG: 30 TABLET, FILM COATED, EXTENDED RELEASE ORAL at 08:50

## 2017-11-28 RX ADMIN — INSULIN LISPRO 12 UNITS: 100 INJECTION, SOLUTION INTRAVENOUS; SUBCUTANEOUS at 17:02

## 2017-11-28 RX ADMIN — SPIRONOLACTONE 25 MG: 25 TABLET ORAL at 08:50

## 2017-11-28 RX ADMIN — DOCUSATE SODIUM 100 MG: 100 CAPSULE ORAL at 08:50

## 2017-11-28 RX ADMIN — LINAGLIPTIN 5 MG: 5 TABLET, FILM COATED ORAL at 08:51

## 2017-11-28 RX ADMIN — IPRATROPIUM BROMIDE AND ALBUTEROL SULFATE 3 ML: 2.5; .5 SOLUTION RESPIRATORY (INHALATION) at 06:42

## 2017-11-28 RX ADMIN — METFORMIN HYDROCHLORIDE 1000 MG: 500 TABLET ORAL at 17:02

## 2017-11-28 RX ADMIN — CLOPIDOGREL BISULFATE 75 MG: 75 TABLET, FILM COATED ORAL at 08:50

## 2017-11-28 RX ADMIN — METFORMIN HYDROCHLORIDE 1000 MG: 500 TABLET ORAL at 08:50

## 2017-11-28 RX ADMIN — LOSARTAN POTASSIUM 100 MG: 50 TABLET ORAL at 08:50

## 2017-11-28 RX ADMIN — IPRATROPIUM BROMIDE AND ALBUTEROL SULFATE 3 ML: 2.5; .5 SOLUTION RESPIRATORY (INHALATION) at 14:37

## 2017-11-28 RX ADMIN — PREDNISONE 60 MG: 50 TABLET ORAL at 08:50

## 2017-11-28 RX ADMIN — FAMOTIDINE 20 MG: 20 TABLET, FILM COATED ORAL at 17:02

## 2017-11-28 RX ADMIN — FAMOTIDINE 20 MG: 20 TABLET, FILM COATED ORAL at 08:50

## 2017-11-28 RX ADMIN — PREGABALIN 150 MG: 75 CAPSULE ORAL at 08:51

## 2017-11-29 LAB
BACTERIA SPEC AEROBE CULT: NORMAL
BACTERIA SPEC AEROBE CULT: NORMAL

## 2017-12-01 NOTE — DISCHARGE PLACEMENT REQUEST
Yonathan Arceo MD Physician Signed Pulmonology Progress Notes Date of Service: 11/28/2017  9:13 AM         Hide copied text  [facility] Pulmonary Progress Note     Patient: Sai Adam  1960   MR# [id number]   Acct# [id number]  11/28/17   9:13 AM  Referring Provider: Ernie Cornejo DO        Problem list:       Patient Active Problem List   Diagnosis   • Malignant neoplasm of sigmoid colon   • Colon polyps   • Anticoagulated by anticoagulation treatment   • COPD exacerbation   • Acute on chronic respiratory failure with hypoxia and hypercapnia   • Diabetes mellitus   • History of colon cancer   • Coronary artery disease   • Chronic respiratory acidosis         Chief Complaint: Hypercapnia, hypotension and bradycardia      Interval history: He is currently off BiPAP on 4L NC.  O2 sat 87%.  He is sleeping.  He does awaken easily and then falls back to sleep very quickly.  Asked RT to place patient back on BiPAP.  Discussed with attending and medication adjustments were again made.  Decreased zyprexa dose and DC'd ativan.  No other aggravating or allevitating factors.       Cough   Associated symptoms include shortness of breath. Pertinent negatives include no chest pain, chills, ear pain, fever, headaches, myalgias, rash, sore throat or wheezing.   Shortness of Breath   Pertinent negatives include no abdominal pain, chest pain, ear pain, fever, headaches, rash, sore throat or wheezing.   Allergy:         Allergies   Allergen Reactions   • Tetanus Toxoids Shortness Of Breath and Swelling   • Lamotrigine         Unknown   • Lisinopril Hives   • Methadone Swelling   • Penicillins Rash   • Tramadol Rash         Meds:        atenolol 25 mg Oral Q12H   atropine 1 drop Right Eye BID   clopidogrel 75 mg Oral Daily   docusate sodium 100 mg Oral Daily   dorzolamide 1 drop Left Eye BID   doxycycline 100 mg Oral Q12H   enoxaparin 40 mg Subcutaneous Nightly   famotidine 20 mg Oral BID   insulin detemir 50 Units  Subcutaneous Nightly   insulin lispro 0-24 Units Subcutaneous 4x Daily With Meals & Nightly   ipratropium-albuterol 3 mL Nebulization 4x Daily - RT   isosorbide mononitrate 60 mg Oral Q24H   levothyroxine 50 mcg Oral Daily   linagliptin 5 mg Oral Daily   losartan 100 mg Oral Q24H   metFORMIN 1,000 mg Oral BID With Meals   methylPREDNISolone sodium succinate 40 mg Intravenous Q8H   nicotine 1 patch Transdermal Nightly   NIFEdipine XL 30 mg Oral Q24H   OLANZapine 10 mg Oral Nightly   predniSONE 60 mg Oral Daily With Breakfast   pregabalin 150 mg Oral Q12H   spironolactone 25 mg Oral Daily   tamsulosin 0.4 mg Oral Nightly   venlafaxine  mg Oral Daily         Review of Systems:   Review of Systems   Constitutional: Negative for chills and fever.   HENT: Negative for congestion, ear pain and sore throat.    Eyes: Negative for pain and discharge.   Respiratory: Positive for cough and shortness of breath. Negative for wheezing.    Cardiovascular: Negative for chest pain and palpitations.   Gastrointestinal: Negative for abdominal pain, constipation, diarrhea and nausea.   Genitourinary: Negative for difficulty urinating and dysuria.   Musculoskeletal: Negative for joint swelling and myalgias.   Skin: Negative for rash.   Neurological: Negative for seizures, light-headedness and headaches.   Hematological: Does not bruise/bleed easily.   Psychiatric/Behavioral: Negative for behavioral problems.   All other systems reviewed and are negative.     Physical Exam:      Vitals:     11/28/17 0720   BP: 128/62   Pulse: 52   Resp: 16   Temp: 96.4 °F (35.8 °C)   SpO2: (!) 88%      No intake or output data in the 24 hours ending 11/28/17 0913  Physical Exam   Constitutional: He appears well-developed and well-nourished. He appears lethargic. He is sleeping. No distress. Nasal cannula in place.   HENT:   Head: Normocephalic and atraumatic.   Eyes: Conjunctivae and EOM are normal. Pupils are equal, round, and reactive to light. No  scleral icterus.   Neck: Normal range of motion. Neck supple.   Cardiovascular: Normal rate, regular rhythm and normal heart sounds.  Exam reveals no friction rub.    No murmur heard.  Pulmonary/Chest: Effort normal. No respiratory distress. He has decreased breath sounds. He has no wheezes. He has no rales.   Abdominal: Soft. Bowel sounds are normal. He exhibits no distension. There is no tenderness.   Musculoskeletal: Normal range of motion. He exhibits no edema.   Neurological: He appears lethargic.   Skin: Skin is warm and dry.   Psychiatric: He has a normal mood and affect. His behavior is normal. Judgment and thought content normal.   Nursing note and vitals reviewed.     Data:      Results from last 7 days  Lab Units 11/25/17  0626 11/24/17  2211 11/24/17  1608   WBC 10*3/mm3 10.64 9.17 10.41   HEMOGLOBIN g/dL 15.8 15.9 16.1   PLATELETS 10*3/mm3 147 145 146         Results from last 7 days  Lab Units 11/27/17  1015 11/25/17  0626 11/25/17  0400 11/24/17  2211 11/24/17  1608   SODIUM mmol/L  --  141  --  140 138   SODIUM, ARTERIAL mmol/L 136  --  139  --   --    POTASSIUM mmol/L  --  4.4  --  3.9 3.8   BUN mg/dL  --  13  --  12 12   CREATININE mg/dL  --  0.72  --  0.74 0.65         Results from last 7 days  Lab Units 11/27/17  1015 11/26/17  0934 11/26/17  0450   11/24/17  1708   PH, ARTERIAL pH units 7.395 7.320* 7.336*  < > 7.348*   PCO2, ARTERIAL mm Hg 53.3* 64.1* 61.7*  < > 64.2*   PO2 ART mm Hg 58.4* 71.7* 63.2*  < > 61.2*   FIO2 %  --  45 40  --  40   < > = values in this interval not displayed.  Radiology:      Imaging Results (last 24 hours)      ** No results found for the last 24 hours. **          Pulmonary Assessment:  1. Recurrent/chronic hypercapnic respiratory failure with hypoxia   2. COPD, AE  3. IRWIN  4. CAD  5. Tobacco abuse  6. Bradycardia  7. Hypotension      Plan:   · Continue on BiPAP as patient is still lethargic.  Discussed with attending and medication changes were again made.  Zyprexa  was decreased.    · Continue nebs  · Decrease solumedrol to 40mg IV BID   · Further recommendations per Dr. Arceo     Electronically signed by MARYLOU Bishop, 11/28/17, 9:13 AM      Physician substantive contribution:  Pertinent symptoms/interval history include: The patient was on BiPAP earlier but is now back on nasal cannula and appears quite comfortable.  Respiratory exam shows pertinent findings of decreased breath sounds on exam.  Plan includes: He is on a CPAP device at home.  I told him if he wanted to transition to BiPAP after discharge this probably would have to [facility].  I have seen and examined patient personally, performing a face-to-face diagnostic evaluation with plan of care reviewed and developed with APRN and nursing staff. I have addended and/or modified the above history of present illness, physical examination, and assessment and plan to reflect my findings and impressions. Essential elements of the care plan were discussed with APRN above.  Agree with findings and assessment/plan as documented above.     Electronically signed by Yonathan Arceo MD, on 11/28/2017, 6:02 PM

## 2017-12-29 ENCOUNTER — HOSPITAL ENCOUNTER (INPATIENT)
Facility: HOSPITAL | Age: 57
LOS: 4 days | Discharge: HOME | End: 2018-01-02
Attending: EMERGENCY MEDICINE | Admitting: INTERNAL MEDICINE

## 2017-12-29 ENCOUNTER — APPOINTMENT (OUTPATIENT)
Dept: GENERAL RADIOLOGY | Facility: HOSPITAL | Age: 57
End: 2017-12-29

## 2017-12-29 DIAGNOSIS — J44.1 COPD EXACERBATION (HCC): Primary | ICD-10-CM

## 2017-12-29 LAB
ALBUMIN SERPL-MCNC: 4 G/DL (ref 3.5–5)
ALBUMIN/GLOB SERPL: 1.3 G/DL (ref 1.1–2.5)
ALP SERPL-CCNC: 127 U/L (ref 24–120)
ALT SERPL W P-5'-P-CCNC: 27 U/L (ref 0–54)
ANION GAP SERPL CALCULATED.3IONS-SCNC: 10 MMOL/L (ref 4–13)
ARTERIAL PATENCY WRIST A: POSITIVE
ARTERIAL PATENCY WRIST A: POSITIVE
AST SERPL-CCNC: 34 U/L (ref 7–45)
ATMOSPHERIC PRESS: 757 MMHG
ATMOSPHERIC PRESS: 758 MMHG
BASE EXCESS BLDA CALC-SCNC: 4.8 MMOL/L (ref 0–2)
BASE EXCESS BLDA CALC-SCNC: 6 MMOL/L (ref 0–2)
BASOPHILS # BLD AUTO: 0.04 10*3/MM3 (ref 0–0.2)
BASOPHILS NFR BLD AUTO: 0.6 % (ref 0–2)
BDY SITE: ABNORMAL
BDY SITE: ABNORMAL
BILIRUB SERPL-MCNC: 0.8 MG/DL (ref 0.1–1)
BODY TEMPERATURE: 37 C
BODY TEMPERATURE: 37 C
BUN BLD-MCNC: 8 MG/DL (ref 5–21)
BUN/CREAT SERPL: 11.9 (ref 7–25)
CALCIUM SPEC-SCNC: 8.9 MG/DL (ref 8.4–10.4)
CHLORIDE SERPL-SCNC: 94 MMOL/L (ref 98–110)
CO2 SERPL-SCNC: 35 MMOL/L (ref 24–31)
COHGB MFR BLD: 4.9 % (ref 0–5)
CREAT BLD-MCNC: 0.67 MG/DL (ref 0.5–1.4)
D-LACTATE SERPL-SCNC: 1.6 MMOL/L (ref 0.5–2)
DEPRECATED RDW RBC AUTO: 52.3 FL (ref 40–54)
EOSINOPHIL # BLD AUTO: 0 10*3/MM3 (ref 0–0.7)
EOSINOPHIL NFR BLD AUTO: 0 % (ref 0–4)
EPAP: 8
ERYTHROCYTE [DISTWIDTH] IN BLOOD BY AUTOMATED COUNT: 16.3 % (ref 12–15)
FLUAV AG NPH QL: NEGATIVE
FLUBV AG NPH QL IA: NEGATIVE
GAS FLOW AIRWAY: 6 LPM
GFR SERPL CREATININE-BSD FRML MDRD: 121 ML/MIN/1.73
GLOBULIN UR ELPH-MCNC: 3.1 GM/DL
GLUCOSE BLD-MCNC: 473 MG/DL (ref 70–100)
GLUCOSE BLDC GLUCOMTR-MCNC: 269 MG/DL (ref 70–130)
HBA1C MFR BLD: 11.3 %
HCO3 BLDA-SCNC: 34.2 MMOL/L (ref 20–26)
HCO3 BLDA-SCNC: 35.3 MMOL/L (ref 20–26)
HCT VFR BLD AUTO: 50.9 % (ref 40–52)
HCT VFR BLD CALC: 49.7 % (ref 38–51)
HGB BLD-MCNC: 15.7 G/DL (ref 14–18)
HGB BLDA-MCNC: 16.2 G/DL (ref 14–18)
HOROWITZ INDEX BLD+IHG-RTO: 40 %
IMM GRANULOCYTES # BLD: 0.19 10*3/MM3 (ref 0–0.03)
IMM GRANULOCYTES NFR BLD: 2.6 % (ref 0–5)
IPAP: 18
LYMPHOCYTES # BLD AUTO: 1.47 10*3/MM3 (ref 0.72–4.86)
LYMPHOCYTES NFR BLD AUTO: 20.3 % (ref 15–45)
Lab: ABNORMAL
MCH RBC QN AUTO: 27.1 PG (ref 28–32)
MCHC RBC AUTO-ENTMCNC: 30.8 G/DL (ref 33–36)
MCV RBC AUTO: 87.8 FL (ref 82–95)
METHGB BLD QL: 0.6 % (ref 0–3)
MODALITY: ABNORMAL
MODALITY: ABNORMAL
MONOCYTES # BLD AUTO: 0.87 10*3/MM3 (ref 0.19–1.3)
MONOCYTES NFR BLD AUTO: 12 % (ref 4–12)
NEUTROPHILS # BLD AUTO: 4.68 10*3/MM3 (ref 1.87–8.4)
NEUTROPHILS NFR BLD AUTO: 64.5 % (ref 39–78)
NOTIFIED BY: ABNORMAL
NOTIFIED BY: ABNORMAL
NOTIFIED WHO: ABNORMAL
NOTIFIED WHO: ABNORMAL
NRBC BLD MANUAL-RTO: 0 /100 WBC (ref 0–0)
OXYHGB MFR BLDV: 84.6 % (ref 94–99)
PCO2 BLDA: 69.8 MM HG (ref 35–45)
PCO2 BLDA: 70 MM HG (ref 35–45)
PH BLDA: 7.3 PH UNITS (ref 7.35–7.45)
PH BLDA: 7.31 PH UNITS (ref 7.35–7.45)
PLATELET # BLD AUTO: 101 10*3/MM3 (ref 130–400)
PMV BLD AUTO: 9.3 FL (ref 6–12)
PO2 BLDA: 59 MM HG (ref 83–108)
PO2 BLDA: 81 MM HG (ref 83–108)
POTASSIUM BLD-SCNC: 4.3 MMOL/L (ref 3.5–5.3)
POTASSIUM BLDA-SCNC: 4.2 MMOL/L (ref 3.5–5.2)
PROT SERPL-MCNC: 7.1 G/DL (ref 6.3–8.7)
RBC # BLD AUTO: 5.8 10*6/MM3 (ref 4.8–5.9)
SAO2 % BLDCOA: 89.5 % (ref 94–99)
SAO2 % BLDCOA: 96 % (ref 94–99)
SET MECH RESP RATE: 12
SODIUM BLD-SCNC: 139 MMOL/L (ref 135–145)
SODIUM BLDA-SCNC: 136 MMOL/L (ref 136–145)
VENTILATOR MODE: ABNORMAL
VENTILATOR MODE: ABNORMAL
WBC NRBC COR # BLD: 7.25 10*3/MM3 (ref 4.8–10.8)

## 2017-12-29 PROCEDURE — 25010000002 CEFTRIAXONE PER 250 MG: Performed by: EMERGENCY MEDICINE

## 2017-12-29 PROCEDURE — 25010000002 METHYLPREDNISOLONE PER 125 MG: Performed by: INTERNAL MEDICINE

## 2017-12-29 PROCEDURE — 5A09457 ASSISTANCE WITH RESPIRATORY VENTILATION, 24-96 CONSECUTIVE HOURS, CONTINUOUS POSITIVE AIRWAY PRESSURE: ICD-10-PCS | Performed by: INTERNAL MEDICINE

## 2017-12-29 PROCEDURE — 94660 CPAP INITIATION&MGMT: CPT

## 2017-12-29 PROCEDURE — 82803 BLOOD GASES ANY COMBINATION: CPT

## 2017-12-29 PROCEDURE — 94799 UNLISTED PULMONARY SVC/PX: CPT

## 2017-12-29 PROCEDURE — 87804 INFLUENZA ASSAY W/OPTIC: CPT | Performed by: EMERGENCY MEDICINE

## 2017-12-29 PROCEDURE — 82962 GLUCOSE BLOOD TEST: CPT

## 2017-12-29 PROCEDURE — 83605 ASSAY OF LACTIC ACID: CPT | Performed by: EMERGENCY MEDICINE

## 2017-12-29 PROCEDURE — 82805 BLOOD GASES W/O2 SATURATION: CPT

## 2017-12-29 PROCEDURE — 99285 EMERGENCY DEPT VISIT HI MDM: CPT

## 2017-12-29 PROCEDURE — 87040 BLOOD CULTURE FOR BACTERIA: CPT | Performed by: EMERGENCY MEDICINE

## 2017-12-29 PROCEDURE — 93005 ELECTROCARDIOGRAM TRACING: CPT | Performed by: EMERGENCY MEDICINE

## 2017-12-29 PROCEDURE — 80053 COMPREHEN METABOLIC PANEL: CPT | Performed by: EMERGENCY MEDICINE

## 2017-12-29 PROCEDURE — 94640 AIRWAY INHALATION TREATMENT: CPT

## 2017-12-29 PROCEDURE — 36600 WITHDRAWAL OF ARTERIAL BLOOD: CPT

## 2017-12-29 PROCEDURE — 93010 ELECTROCARDIOGRAM REPORT: CPT | Performed by: INTERNAL MEDICINE

## 2017-12-29 PROCEDURE — 71010 HC CHEST PA OR AP: CPT

## 2017-12-29 PROCEDURE — 83036 HEMOGLOBIN GLYCOSYLATED A1C: CPT | Performed by: INTERNAL MEDICINE

## 2017-12-29 PROCEDURE — 85025 COMPLETE CBC W/AUTO DIFF WBC: CPT | Performed by: EMERGENCY MEDICINE

## 2017-12-29 PROCEDURE — 82375 ASSAY CARBOXYHB QUANT: CPT

## 2017-12-29 PROCEDURE — 63710000001 INSULIN LISPRO (HUMAN) PER 5 UNITS: Performed by: INTERNAL MEDICINE

## 2017-12-29 PROCEDURE — 63710000001 INSULIN REGULAR HUMAN PER 5 UNITS: Performed by: EMERGENCY MEDICINE

## 2017-12-29 PROCEDURE — 83050 HGB METHEMOGLOBIN QUAN: CPT

## 2017-12-29 RX ORDER — ATENOLOL 25 MG/1
25 TABLET ORAL EVERY 12 HOURS SCHEDULED
Status: DISCONTINUED | OUTPATIENT
Start: 2017-12-29 | End: 2018-01-01

## 2017-12-29 RX ORDER — LOSARTAN POTASSIUM 50 MG/1
100 TABLET ORAL DAILY
Status: DISCONTINUED | OUTPATIENT
Start: 2017-12-30 | End: 2018-01-01

## 2017-12-29 RX ORDER — METHYLPREDNISOLONE SODIUM SUCCINATE 125 MG/2ML
80 INJECTION, POWDER, LYOPHILIZED, FOR SOLUTION INTRAMUSCULAR; INTRAVENOUS EVERY 6 HOURS
Status: DISCONTINUED | OUTPATIENT
Start: 2017-12-29 | End: 2017-12-30

## 2017-12-29 RX ORDER — NIFEDIPINE 30 MG/1
30 TABLET, EXTENDED RELEASE ORAL DAILY
Status: DISCONTINUED | OUTPATIENT
Start: 2017-12-30 | End: 2018-01-02 | Stop reason: HOSPADM

## 2017-12-29 RX ORDER — FAMOTIDINE 20 MG/1
20 TABLET, FILM COATED ORAL 2 TIMES DAILY
Status: DISCONTINUED | OUTPATIENT
Start: 2017-12-29 | End: 2018-01-02 | Stop reason: HOSPADM

## 2017-12-29 RX ORDER — SODIUM CHLORIDE 0.9 % (FLUSH) 0.9 %
1-10 SYRINGE (ML) INJECTION AS NEEDED
Status: DISCONTINUED | OUTPATIENT
Start: 2017-12-29 | End: 2018-01-02 | Stop reason: HOSPADM

## 2017-12-29 RX ORDER — SODIUM CHLORIDE 450 MG/100ML
50 INJECTION, SOLUTION INTRAVENOUS CONTINUOUS
Status: DISCONTINUED | OUTPATIENT
Start: 2017-12-29 | End: 2018-01-01

## 2017-12-29 RX ORDER — LEVOTHYROXINE SODIUM 0.05 MG/1
50 TABLET ORAL DAILY
Status: DISCONTINUED | OUTPATIENT
Start: 2017-12-30 | End: 2018-01-02 | Stop reason: HOSPADM

## 2017-12-29 RX ORDER — NITROGLYCERIN 0.4 MG/1
0.4 TABLET SUBLINGUAL
Status: DISCONTINUED | OUTPATIENT
Start: 2017-12-29 | End: 2018-01-02 | Stop reason: HOSPADM

## 2017-12-29 RX ORDER — IPRATROPIUM BROMIDE AND ALBUTEROL SULFATE 2.5; .5 MG/3ML; MG/3ML
3 SOLUTION RESPIRATORY (INHALATION)
Status: DISCONTINUED | OUTPATIENT
Start: 2017-12-29 | End: 2018-01-02 | Stop reason: HOSPADM

## 2017-12-29 RX ORDER — DEXTROSE MONOHYDRATE 50 MG/ML
100 INJECTION, SOLUTION INTRAVENOUS CONTINUOUS
Status: DISCONTINUED | OUTPATIENT
Start: 2017-12-29 | End: 2017-12-29

## 2017-12-29 RX ORDER — DEXTROSE MONOHYDRATE 25 G/50ML
25 INJECTION, SOLUTION INTRAVENOUS
Status: DISCONTINUED | OUTPATIENT
Start: 2017-12-29 | End: 2018-01-02 | Stop reason: HOSPADM

## 2017-12-29 RX ORDER — ACETAMINOPHEN 325 MG/1
650 TABLET ORAL EVERY 4 HOURS PRN
Status: DISCONTINUED | OUTPATIENT
Start: 2017-12-29 | End: 2018-01-02 | Stop reason: HOSPADM

## 2017-12-29 RX ORDER — AZITHROMYCIN 250 MG/1
500 TABLET, FILM COATED ORAL EVERY 24 HOURS
Status: DISCONTINUED | OUTPATIENT
Start: 2017-12-29 | End: 2017-12-30

## 2017-12-29 RX ORDER — GUAIFENESIN 600 MG/1
1200 TABLET, EXTENDED RELEASE ORAL EVERY 12 HOURS
Status: DISCONTINUED | OUTPATIENT
Start: 2017-12-29 | End: 2018-01-02 | Stop reason: HOSPADM

## 2017-12-29 RX ORDER — TAMSULOSIN HYDROCHLORIDE 0.4 MG/1
0.4 CAPSULE ORAL NIGHTLY
Status: DISCONTINUED | OUTPATIENT
Start: 2017-12-29 | End: 2018-01-02 | Stop reason: HOSPADM

## 2017-12-29 RX ORDER — HYDROCODONE BITARTRATE AND ACETAMINOPHEN 5; 325 MG/1; MG/1
1 TABLET ORAL EVERY 6 HOURS PRN
Status: DISCONTINUED | OUTPATIENT
Start: 2017-12-29 | End: 2018-01-02 | Stop reason: HOSPADM

## 2017-12-29 RX ORDER — OLANZAPINE 10 MG/1
10 TABLET ORAL NIGHTLY
Status: DISCONTINUED | OUTPATIENT
Start: 2017-12-29 | End: 2018-01-02 | Stop reason: HOSPADM

## 2017-12-29 RX ORDER — DORZOLAMIDE HCL 20 MG/ML
1 SOLUTION/ DROPS OPHTHALMIC 3 TIMES DAILY
Status: DISCONTINUED | OUTPATIENT
Start: 2017-12-29 | End: 2018-01-02 | Stop reason: HOSPADM

## 2017-12-29 RX ORDER — IPRATROPIUM BROMIDE AND ALBUTEROL SULFATE 2.5; .5 MG/3ML; MG/3ML
3 SOLUTION RESPIRATORY (INHALATION) ONCE
Status: COMPLETED | OUTPATIENT
Start: 2017-12-29 | End: 2017-12-29

## 2017-12-29 RX ORDER — PREGABALIN 75 MG/1
150 CAPSULE ORAL EVERY 12 HOURS SCHEDULED
Status: DISCONTINUED | OUTPATIENT
Start: 2017-12-29 | End: 2018-01-02 | Stop reason: HOSPADM

## 2017-12-29 RX ORDER — DOCUSATE SODIUM 100 MG/1
100 CAPSULE, LIQUID FILLED ORAL 2 TIMES DAILY PRN
Status: DISCONTINUED | OUTPATIENT
Start: 2017-12-29 | End: 2018-01-02 | Stop reason: HOSPADM

## 2017-12-29 RX ORDER — POTASSIUM CHLORIDE 750 MG/1
40 CAPSULE, EXTENDED RELEASE ORAL DAILY
Status: DISCONTINUED | OUTPATIENT
Start: 2017-12-30 | End: 2017-12-31

## 2017-12-29 RX ORDER — ACETAMINOPHEN 650 MG/1
650 SUPPOSITORY RECTAL EVERY 4 HOURS PRN
Status: DISCONTINUED | OUTPATIENT
Start: 2017-12-29 | End: 2018-01-02 | Stop reason: HOSPADM

## 2017-12-29 RX ORDER — ATROPINE SULFATE 10 MG/ML
1 SOLUTION/ DROPS OPHTHALMIC 3 TIMES DAILY
Status: DISCONTINUED | OUTPATIENT
Start: 2017-12-29 | End: 2018-01-02 | Stop reason: HOSPADM

## 2017-12-29 RX ORDER — SPIRONOLACTONE 25 MG/1
25 TABLET ORAL DAILY
Status: DISCONTINUED | OUTPATIENT
Start: 2017-12-30 | End: 2018-01-02 | Stop reason: HOSPADM

## 2017-12-29 RX ORDER — DOXAZOSIN MESYLATE 4 MG/1
4 TABLET ORAL NIGHTLY
COMMUNITY
End: 2018-01-23 | Stop reason: HOSPADM

## 2017-12-29 RX ORDER — CLOPIDOGREL BISULFATE 75 MG/1
75 TABLET ORAL DAILY
Status: DISCONTINUED | OUTPATIENT
Start: 2017-12-30 | End: 2018-01-02 | Stop reason: HOSPADM

## 2017-12-29 RX ORDER — VENLAFAXINE HYDROCHLORIDE 75 MG/1
225 CAPSULE, EXTENDED RELEASE ORAL DAILY
Status: DISCONTINUED | OUTPATIENT
Start: 2017-12-30 | End: 2018-01-02 | Stop reason: HOSPADM

## 2017-12-29 RX ORDER — ONDANSETRON 2 MG/ML
4 INJECTION INTRAMUSCULAR; INTRAVENOUS EVERY 6 HOURS PRN
Status: DISCONTINUED | OUTPATIENT
Start: 2017-12-29 | End: 2018-01-02 | Stop reason: HOSPADM

## 2017-12-29 RX ORDER — BUDESONIDE 0.5 MG/2ML
0.5 INHALANT ORAL
Status: DISCONTINUED | OUTPATIENT
Start: 2017-12-29 | End: 2018-01-02

## 2017-12-29 RX ORDER — IPRATROPIUM BROMIDE AND ALBUTEROL SULFATE 2.5; .5 MG/3ML; MG/3ML
3 SOLUTION RESPIRATORY (INHALATION) EVERY 4 HOURS PRN
Status: DISCONTINUED | OUTPATIENT
Start: 2017-12-29 | End: 2018-01-02 | Stop reason: HOSPADM

## 2017-12-29 RX ORDER — BISACODYL 5 MG/1
5 TABLET, DELAYED RELEASE ORAL DAILY PRN
Status: DISCONTINUED | OUTPATIENT
Start: 2017-12-29 | End: 2018-01-02 | Stop reason: HOSPADM

## 2017-12-29 RX ORDER — NICOTINE POLACRILEX 4 MG
15 LOZENGE BUCCAL
Status: DISCONTINUED | OUTPATIENT
Start: 2017-12-29 | End: 2018-01-02 | Stop reason: HOSPADM

## 2017-12-29 RX ORDER — NICOTINE 21 MG/24HR
1 PATCH, TRANSDERMAL 24 HOURS TRANSDERMAL EVERY 24 HOURS
Status: DISCONTINUED | OUTPATIENT
Start: 2017-12-29 | End: 2018-01-02 | Stop reason: HOSPADM

## 2017-12-29 RX ADMIN — TAMSULOSIN HYDROCHLORIDE 0.4 MG: 0.4 CAPSULE ORAL at 22:49

## 2017-12-29 RX ADMIN — SODIUM CHLORIDE 100 ML/HR: 4.5 INJECTION, SOLUTION INTRAVENOUS at 22:49

## 2017-12-29 RX ADMIN — INSULIN HUMAN 10 UNITS: 100 INJECTION, SOLUTION PARENTERAL at 19:46

## 2017-12-29 RX ADMIN — METHYLPREDNISOLONE SODIUM SUCCINATE 80 MG: 125 INJECTION, POWDER, FOR SOLUTION INTRAMUSCULAR; INTRAVENOUS at 22:50

## 2017-12-29 RX ADMIN — AZITHROMYCIN 500 MG: 250 TABLET, FILM COATED ORAL at 22:49

## 2017-12-29 RX ADMIN — PREGABALIN 150 MG: 75 CAPSULE ORAL at 22:50

## 2017-12-29 RX ADMIN — BUDESONIDE 0.5 MG: 0.5 INHALANT RESPIRATORY (INHALATION) at 23:51

## 2017-12-29 RX ADMIN — METFORMIN HYDROCHLORIDE 1000 MG: 500 TABLET ORAL at 22:50

## 2017-12-29 RX ADMIN — CEFTRIAXONE 1 G: 1 INJECTION, SOLUTION INTRAVENOUS at 19:00

## 2017-12-29 RX ADMIN — IPRATROPIUM BROMIDE AND ALBUTEROL SULFATE 3 ML: 2.5; .5 SOLUTION RESPIRATORY (INHALATION) at 23:51

## 2017-12-29 RX ADMIN — NICOTINE 1 PATCH: 21 PATCH, EXTENDED RELEASE TRANSDERMAL at 22:51

## 2017-12-29 RX ADMIN — OLANZAPINE 10 MG: 10 TABLET, FILM COATED ORAL at 22:50

## 2017-12-29 RX ADMIN — ATENOLOL 25 MG: 25 TABLET ORAL at 22:50

## 2017-12-29 RX ADMIN — INSULIN LISPRO 12 UNITS: 100 INJECTION, SOLUTION INTRAVENOUS; SUBCUTANEOUS at 23:00

## 2017-12-29 RX ADMIN — FAMOTIDINE 20 MG: 20 TABLET ORAL at 22:50

## 2017-12-29 RX ADMIN — GUAIFENESIN 1200 MG: 600 TABLET, EXTENDED RELEASE ORAL at 22:49

## 2017-12-29 RX ADMIN — DORZOLAMIDE HYDROCHLORIDE 1 DROP: 20 SOLUTION/ DROPS OPHTHALMIC at 22:50

## 2017-12-29 RX ADMIN — ATROPINE SULFATE 1 DROP: 10 SOLUTION/ DROPS OPHTHALMIC at 22:50

## 2017-12-29 RX ADMIN — IPRATROPIUM BROMIDE AND ALBUTEROL SULFATE 3 ML: 2.5; .5 SOLUTION RESPIRATORY (INHALATION) at 19:02

## 2017-12-29 RX ADMIN — HYDROCODONE BITARTRATE AND ACETAMINOPHEN 1 TABLET: 5; 325 TABLET ORAL at 22:51

## 2017-12-30 LAB
GLUCOSE BLDC GLUCOMTR-MCNC: 186 MG/DL (ref 70–130)
GLUCOSE BLDC GLUCOMTR-MCNC: 268 MG/DL (ref 70–130)
GLUCOSE BLDC GLUCOMTR-MCNC: 288 MG/DL (ref 70–130)
GLUCOSE BLDC GLUCOMTR-MCNC: 296 MG/DL (ref 70–130)
GLUCOSE BLDC GLUCOMTR-MCNC: 353 MG/DL (ref 70–130)

## 2017-12-30 PROCEDURE — 25010000002 METHYLPREDNISOLONE PER 125 MG: Performed by: NURSE PRACTITIONER

## 2017-12-30 PROCEDURE — 63710000001 INSULIN LISPRO (HUMAN) PER 5 UNITS: Performed by: INTERNAL MEDICINE

## 2017-12-30 PROCEDURE — 25010000002 METHYLPREDNISOLONE PER 125 MG: Performed by: INTERNAL MEDICINE

## 2017-12-30 PROCEDURE — 94799 UNLISTED PULMONARY SVC/PX: CPT

## 2017-12-30 PROCEDURE — 63710000001 INSULIN LISPRO (HUMAN) PER 5 UNITS: Performed by: FAMILY MEDICINE

## 2017-12-30 PROCEDURE — 63710000001 INSULIN LISPRO (HUMAN) PER 5 UNITS: Performed by: NURSE PRACTITIONER

## 2017-12-30 PROCEDURE — 25010000002 ENOXAPARIN PER 10 MG: Performed by: INTERNAL MEDICINE

## 2017-12-30 PROCEDURE — 63710000001 INSULIN DETEMIR PER 5 UNITS: Performed by: INTERNAL MEDICINE

## 2017-12-30 PROCEDURE — 82962 GLUCOSE BLOOD TEST: CPT

## 2017-12-30 PROCEDURE — 94660 CPAP INITIATION&MGMT: CPT

## 2017-12-30 RX ORDER — CHOLECALCIFEROL (VITAMIN D3) 50 MCG
6000 TABLET ORAL DAILY
COMMUNITY

## 2017-12-30 RX ORDER — CYCLOBENZAPRINE HCL 10 MG
5 TABLET ORAL 3 TIMES DAILY PRN
COMMUNITY
End: 2018-01-23 | Stop reason: HOSPADM

## 2017-12-30 RX ORDER — METHYLPREDNISOLONE SODIUM SUCCINATE 125 MG/2ML
60 INJECTION, POWDER, LYOPHILIZED, FOR SOLUTION INTRAMUSCULAR; INTRAVENOUS EVERY 8 HOURS
Status: DISCONTINUED | OUTPATIENT
Start: 2017-12-30 | End: 2017-12-31

## 2017-12-30 RX ORDER — METHYLPREDNISOLONE SODIUM SUCCINATE 125 MG/2ML
80 INJECTION, POWDER, LYOPHILIZED, FOR SOLUTION INTRAMUSCULAR; INTRAVENOUS EVERY 8 HOURS
Status: DISCONTINUED | OUTPATIENT
Start: 2017-12-30 | End: 2017-12-30

## 2017-12-30 RX ORDER — MORPHINE SULFATE 30 MG/1
30 TABLET ORAL EVERY 12 HOURS PRN
COMMUNITY
End: 2018-01-02 | Stop reason: HOSPADM

## 2017-12-30 RX ADMIN — DOXYCYCLINE 100 MG: 100 INJECTION, POWDER, LYOPHILIZED, FOR SOLUTION INTRAVENOUS at 21:14

## 2017-12-30 RX ADMIN — ENOXAPARIN SODIUM 40 MG: 40 INJECTION SUBCUTANEOUS at 08:21

## 2017-12-30 RX ADMIN — METHYLPREDNISOLONE SODIUM SUCCINATE 60 MG: 125 INJECTION, POWDER, FOR SOLUTION INTRAMUSCULAR; INTRAVENOUS at 12:06

## 2017-12-30 RX ADMIN — GUAIFENESIN 1200 MG: 600 TABLET, EXTENDED RELEASE ORAL at 09:45

## 2017-12-30 RX ADMIN — IPRATROPIUM BROMIDE AND ALBUTEROL SULFATE 3 ML: 2.5; .5 SOLUTION RESPIRATORY (INHALATION) at 07:09

## 2017-12-30 RX ADMIN — NICOTINE 1 PATCH: 21 PATCH, EXTENDED RELEASE TRANSDERMAL at 21:24

## 2017-12-30 RX ADMIN — ATROPINE SULFATE 1 DROP: 10 SOLUTION/ DROPS OPHTHALMIC at 15:37

## 2017-12-30 RX ADMIN — IPRATROPIUM BROMIDE AND ALBUTEROL SULFATE 3 ML: 2.5; .5 SOLUTION RESPIRATORY (INHALATION) at 03:43

## 2017-12-30 RX ADMIN — ATROPINE SULFATE 1 DROP: 10 SOLUTION/ DROPS OPHTHALMIC at 21:14

## 2017-12-30 RX ADMIN — IPRATROPIUM BROMIDE AND ALBUTEROL SULFATE 3 ML: 2.5; .5 SOLUTION RESPIRATORY (INHALATION) at 10:56

## 2017-12-30 RX ADMIN — FAMOTIDINE 20 MG: 20 TABLET ORAL at 08:20

## 2017-12-30 RX ADMIN — INSULIN LISPRO 12 UNITS: 100 INJECTION, SOLUTION INTRAVENOUS; SUBCUTANEOUS at 17:22

## 2017-12-30 RX ADMIN — ATENOLOL 25 MG: 25 TABLET ORAL at 08:20

## 2017-12-30 RX ADMIN — DORZOLAMIDE HYDROCHLORIDE 1 DROP: 20 SOLUTION/ DROPS OPHTHALMIC at 21:14

## 2017-12-30 RX ADMIN — LINAGLIPTIN 5 MG: 5 TABLET, FILM COATED ORAL at 08:20

## 2017-12-30 RX ADMIN — INSULIN LISPRO 40 UNITS: 100 INJECTION, SOLUTION INTRAVENOUS; SUBCUTANEOUS at 17:22

## 2017-12-30 RX ADMIN — INSULIN LISPRO 4 UNITS: 100 INJECTION, SOLUTION INTRAVENOUS; SUBCUTANEOUS at 12:08

## 2017-12-30 RX ADMIN — METHYLPREDNISOLONE SODIUM SUCCINATE 80 MG: 125 INJECTION, POWDER, FOR SOLUTION INTRAMUSCULAR; INTRAVENOUS at 04:06

## 2017-12-30 RX ADMIN — VENLAFAXINE HYDROCHLORIDE 225 MG: 75 CAPSULE, EXTENDED RELEASE ORAL at 08:19

## 2017-12-30 RX ADMIN — SPIRONOLACTONE 25 MG: 25 TABLET ORAL at 08:20

## 2017-12-30 RX ADMIN — INSULIN LISPRO 40 UNITS: 100 INJECTION, SOLUTION INTRAVENOUS; SUBCUTANEOUS at 08:22

## 2017-12-30 RX ADMIN — ISOSORBIDE MONONITRATE 90 MG: 30 TABLET, EXTENDED RELEASE ORAL at 08:19

## 2017-12-30 RX ADMIN — LOSARTAN POTASSIUM 100 MG: 50 TABLET ORAL at 08:20

## 2017-12-30 RX ADMIN — OLANZAPINE 10 MG: 10 TABLET, FILM COATED ORAL at 21:14

## 2017-12-30 RX ADMIN — DORZOLAMIDE HYDROCHLORIDE 1 DROP: 20 SOLUTION/ DROPS OPHTHALMIC at 15:37

## 2017-12-30 RX ADMIN — POTASSIUM CHLORIDE 40 MEQ: 750 CAPSULE, EXTENDED RELEASE ORAL at 08:19

## 2017-12-30 RX ADMIN — METHYLPREDNISOLONE SODIUM SUCCINATE 60 MG: 125 INJECTION, POWDER, FOR SOLUTION INTRAMUSCULAR; INTRAVENOUS at 21:14

## 2017-12-30 RX ADMIN — INSULIN LISPRO 40 UNITS: 100 INJECTION, SOLUTION INTRAVENOUS; SUBCUTANEOUS at 12:08

## 2017-12-30 RX ADMIN — INSULIN LISPRO 4 UNITS: 100 INJECTION, SOLUTION INTRAVENOUS; SUBCUTANEOUS at 21:33

## 2017-12-30 RX ADMIN — FAMOTIDINE 20 MG: 20 TABLET ORAL at 21:14

## 2017-12-30 RX ADMIN — TAMSULOSIN HYDROCHLORIDE 0.4 MG: 0.4 CAPSULE ORAL at 23:17

## 2017-12-30 RX ADMIN — NIFEDIPINE 30 MG: 30 TABLET, FILM COATED, EXTENDED RELEASE ORAL at 08:20

## 2017-12-30 RX ADMIN — CLOPIDOGREL 75 MG: 75 TABLET, FILM COATED ORAL at 08:20

## 2017-12-30 RX ADMIN — BUDESONIDE 0.5 MG: 0.5 INHALANT RESPIRATORY (INHALATION) at 19:31

## 2017-12-30 RX ADMIN — LEVOTHYROXINE SODIUM 50 MCG: 50 TABLET ORAL at 08:19

## 2017-12-30 RX ADMIN — SODIUM CHLORIDE 75 ML/HR: 4.5 INJECTION, SOLUTION INTRAVENOUS at 21:11

## 2017-12-30 RX ADMIN — GUAIFENESIN 1200 MG: 600 TABLET, EXTENDED RELEASE ORAL at 21:14

## 2017-12-30 RX ADMIN — IPRATROPIUM BROMIDE AND ALBUTEROL SULFATE 3 ML: 2.5; .5 SOLUTION RESPIRATORY (INHALATION) at 19:31

## 2017-12-30 RX ADMIN — INSULIN DETEMIR 160 UNITS: 100 INJECTION, SOLUTION SUBCUTANEOUS at 08:35

## 2017-12-30 RX ADMIN — BUDESONIDE 0.5 MG: 0.5 INHALANT RESPIRATORY (INHALATION) at 07:09

## 2017-12-30 RX ADMIN — PREGABALIN 150 MG: 75 CAPSULE ORAL at 08:20

## 2017-12-30 RX ADMIN — DOXYCYCLINE 100 MG: 100 INJECTION, POWDER, LYOPHILIZED, FOR SOLUTION INTRAVENOUS at 09:39

## 2017-12-30 RX ADMIN — HYDROCODONE BITARTRATE AND ACETAMINOPHEN 1 TABLET: 5; 325 TABLET ORAL at 17:35

## 2017-12-30 RX ADMIN — IPRATROPIUM BROMIDE AND ALBUTEROL SULFATE 3 ML: 2.5; .5 SOLUTION RESPIRATORY (INHALATION) at 14:48

## 2017-12-30 RX ADMIN — METFORMIN HYDROCHLORIDE 1000 MG: 500 TABLET ORAL at 17:22

## 2017-12-30 RX ADMIN — METFORMIN HYDROCHLORIDE 1000 MG: 500 TABLET ORAL at 08:19

## 2017-12-31 LAB
ANION GAP SERPL CALCULATED.3IONS-SCNC: 6 MMOL/L (ref 4–13)
BASOPHILS # BLD AUTO: 0.01 10*3/MM3 (ref 0–0.2)
BASOPHILS NFR BLD AUTO: 0.1 % (ref 0–2)
BUN BLD-MCNC: 28 MG/DL (ref 5–21)
BUN/CREAT SERPL: 35 (ref 7–25)
CALCIUM SPEC-SCNC: 8.8 MG/DL (ref 8.4–10.4)
CHLORIDE SERPL-SCNC: 98 MMOL/L (ref 98–110)
CO2 SERPL-SCNC: 32 MMOL/L (ref 24–31)
CREAT BLD-MCNC: 0.8 MG/DL (ref 0.5–1.4)
DEPRECATED RDW RBC AUTO: 49.4 FL (ref 40–54)
EOSINOPHIL # BLD AUTO: 0 10*3/MM3 (ref 0–0.7)
EOSINOPHIL NFR BLD AUTO: 0 % (ref 0–4)
ERYTHROCYTE [DISTWIDTH] IN BLOOD BY AUTOMATED COUNT: 15.4 % (ref 12–15)
GFR SERPL CREATININE-BSD FRML MDRD: 99 ML/MIN/1.73
GLUCOSE BLD-MCNC: 166 MG/DL (ref 70–100)
GLUCOSE BLDC GLUCOMTR-MCNC: 105 MG/DL (ref 70–130)
GLUCOSE BLDC GLUCOMTR-MCNC: 232 MG/DL (ref 70–130)
GLUCOSE BLDC GLUCOMTR-MCNC: 235 MG/DL (ref 70–130)
GLUCOSE BLDC GLUCOMTR-MCNC: 85 MG/DL (ref 70–130)
HCT VFR BLD AUTO: 45.5 % (ref 40–52)
HGB BLD-MCNC: 14 G/DL (ref 14–18)
IMM GRANULOCYTES # BLD: 0.08 10*3/MM3 (ref 0–0.03)
IMM GRANULOCYTES NFR BLD: 0.7 % (ref 0–5)
LYMPHOCYTES # BLD AUTO: 1.86 10*3/MM3 (ref 0.72–4.86)
LYMPHOCYTES NFR BLD AUTO: 15.5 % (ref 15–45)
MCH RBC QN AUTO: 27 PG (ref 28–32)
MCHC RBC AUTO-ENTMCNC: 30.8 G/DL (ref 33–36)
MCV RBC AUTO: 87.8 FL (ref 82–95)
MONOCYTES # BLD AUTO: 0.46 10*3/MM3 (ref 0.19–1.3)
MONOCYTES NFR BLD AUTO: 3.8 % (ref 4–12)
NEUTROPHILS # BLD AUTO: 9.62 10*3/MM3 (ref 1.87–8.4)
NEUTROPHILS NFR BLD AUTO: 79.9 % (ref 39–78)
NRBC BLD MANUAL-RTO: 0 /100 WBC (ref 0–0)
PLATELET # BLD AUTO: 130 10*3/MM3 (ref 130–400)
PMV BLD AUTO: 9.8 FL (ref 6–12)
POTASSIUM BLD-SCNC: 5.5 MMOL/L (ref 3.5–5.3)
RBC # BLD AUTO: 5.18 10*6/MM3 (ref 4.8–5.9)
SODIUM BLD-SCNC: 136 MMOL/L (ref 135–145)
WBC NRBC COR # BLD: 12.03 10*3/MM3 (ref 4.8–10.8)

## 2017-12-31 PROCEDURE — 25010000002 METHYLPREDNISOLONE PER 125 MG: Performed by: NURSE PRACTITIONER

## 2017-12-31 PROCEDURE — 94760 N-INVAS EAR/PLS OXIMETRY 1: CPT

## 2017-12-31 PROCEDURE — 85025 COMPLETE CBC W/AUTO DIFF WBC: CPT | Performed by: NURSE PRACTITIONER

## 2017-12-31 PROCEDURE — 63710000001 INSULIN DETEMIR PER 5 UNITS: Performed by: FAMILY MEDICINE

## 2017-12-31 PROCEDURE — 94799 UNLISTED PULMONARY SVC/PX: CPT

## 2017-12-31 PROCEDURE — 82962 GLUCOSE BLOOD TEST: CPT

## 2017-12-31 PROCEDURE — 94660 CPAP INITIATION&MGMT: CPT

## 2017-12-31 PROCEDURE — 80048 BASIC METABOLIC PNL TOTAL CA: CPT | Performed by: NURSE PRACTITIONER

## 2017-12-31 PROCEDURE — 25010000002 ENOXAPARIN PER 10 MG: Performed by: INTERNAL MEDICINE

## 2017-12-31 PROCEDURE — 63710000001 INSULIN LISPRO (HUMAN) PER 5 UNITS: Performed by: INTERNAL MEDICINE

## 2017-12-31 PROCEDURE — 63710000001 INSULIN LISPRO (HUMAN) PER 5 UNITS: Performed by: FAMILY MEDICINE

## 2017-12-31 PROCEDURE — 25010000002 METHYLPREDNISOLONE PER 40 MG: Performed by: NURSE PRACTITIONER

## 2017-12-31 RX ORDER — SODIUM POLYSTYRENE SULFONATE 15 G/60ML
15 SUSPENSION ORAL; RECTAL ONCE
Status: COMPLETED | OUTPATIENT
Start: 2017-12-31 | End: 2017-12-31

## 2017-12-31 RX ORDER — SODIUM CHLORIDE 450 MG/100ML
500 INJECTION, SOLUTION INTRAVENOUS ONCE
Status: COMPLETED | OUTPATIENT
Start: 2017-12-31 | End: 2017-12-31

## 2017-12-31 RX ORDER — METHYLPREDNISOLONE SODIUM SUCCINATE 40 MG/ML
40 INJECTION, POWDER, LYOPHILIZED, FOR SOLUTION INTRAMUSCULAR; INTRAVENOUS EVERY 8 HOURS
Status: DISCONTINUED | OUTPATIENT
Start: 2017-12-31 | End: 2018-01-01

## 2017-12-31 RX ADMIN — SODIUM CHLORIDE 50 ML/HR: 4.5 INJECTION, SOLUTION INTRAVENOUS at 19:58

## 2017-12-31 RX ADMIN — DORZOLAMIDE HYDROCHLORIDE 1 DROP: 20 SOLUTION/ DROPS OPHTHALMIC at 09:12

## 2017-12-31 RX ADMIN — HYDROCODONE BITARTRATE AND ACETAMINOPHEN 1 TABLET: 5; 325 TABLET ORAL at 21:52

## 2017-12-31 RX ADMIN — ENOXAPARIN SODIUM 40 MG: 40 INJECTION SUBCUTANEOUS at 09:13

## 2017-12-31 RX ADMIN — BUDESONIDE 0.5 MG: 0.5 INHALANT RESPIRATORY (INHALATION) at 07:46

## 2017-12-31 RX ADMIN — SODIUM CHLORIDE 75 ML/HR: 4.5 INJECTION, SOLUTION INTRAVENOUS at 06:30

## 2017-12-31 RX ADMIN — INSULIN LISPRO 8 UNITS: 100 INJECTION, SOLUTION INTRAVENOUS; SUBCUTANEOUS at 09:13

## 2017-12-31 RX ADMIN — METFORMIN HYDROCHLORIDE 1000 MG: 500 TABLET ORAL at 17:34

## 2017-12-31 RX ADMIN — NICOTINE 1 PATCH: 21 PATCH, EXTENDED RELEASE TRANSDERMAL at 21:41

## 2017-12-31 RX ADMIN — CLOPIDOGREL 75 MG: 75 TABLET, FILM COATED ORAL at 09:13

## 2017-12-31 RX ADMIN — ATROPINE SULFATE 1 DROP: 10 SOLUTION/ DROPS OPHTHALMIC at 17:34

## 2017-12-31 RX ADMIN — DOXYCYCLINE 100 MG: 100 INJECTION, POWDER, LYOPHILIZED, FOR SOLUTION INTRAVENOUS at 09:12

## 2017-12-31 RX ADMIN — DOXYCYCLINE 100 MG: 100 INJECTION, POWDER, LYOPHILIZED, FOR SOLUTION INTRAVENOUS at 21:52

## 2017-12-31 RX ADMIN — LEVOTHYROXINE SODIUM 50 MCG: 50 TABLET ORAL at 09:13

## 2017-12-31 RX ADMIN — ATROPINE SULFATE 1 DROP: 10 SOLUTION/ DROPS OPHTHALMIC at 21:38

## 2017-12-31 RX ADMIN — IPRATROPIUM BROMIDE AND ALBUTEROL SULFATE 3 ML: 2.5; .5 SOLUTION RESPIRATORY (INHALATION) at 15:34

## 2017-12-31 RX ADMIN — ATROPINE SULFATE 1 DROP: 10 SOLUTION/ DROPS OPHTHALMIC at 09:12

## 2017-12-31 RX ADMIN — SPIRONOLACTONE 25 MG: 25 TABLET ORAL at 09:14

## 2017-12-31 RX ADMIN — IPRATROPIUM BROMIDE AND ALBUTEROL SULFATE 3 ML: 2.5; .5 SOLUTION RESPIRATORY (INHALATION) at 03:49

## 2017-12-31 RX ADMIN — METHYLPREDNISOLONE SODIUM SUCCINATE 60 MG: 125 INJECTION, POWDER, FOR SOLUTION INTRAMUSCULAR; INTRAVENOUS at 04:33

## 2017-12-31 RX ADMIN — METHYLPREDNISOLONE SODIUM SUCCINATE 40 MG: 125 INJECTION, POWDER, FOR SOLUTION INTRAMUSCULAR; INTRAVENOUS at 21:52

## 2017-12-31 RX ADMIN — FAMOTIDINE 20 MG: 20 TABLET ORAL at 09:14

## 2017-12-31 RX ADMIN — BUDESONIDE 0.5 MG: 0.5 INHALANT RESPIRATORY (INHALATION) at 19:51

## 2017-12-31 RX ADMIN — IPRATROPIUM BROMIDE AND ALBUTEROL SULFATE 3 ML: 2.5; .5 SOLUTION RESPIRATORY (INHALATION) at 07:46

## 2017-12-31 RX ADMIN — LINAGLIPTIN 5 MG: 5 TABLET, FILM COATED ORAL at 09:14

## 2017-12-31 RX ADMIN — DORZOLAMIDE HYDROCHLORIDE 1 DROP: 20 SOLUTION/ DROPS OPHTHALMIC at 21:38

## 2017-12-31 RX ADMIN — SODIUM POLYSTYRENE SULFONATE 15 G: 15 SUSPENSION ORAL; RECTAL at 09:13

## 2017-12-31 RX ADMIN — INSULIN DETEMIR 90 UNITS: 100 INJECTION, SOLUTION SUBCUTANEOUS at 09:13

## 2017-12-31 RX ADMIN — METFORMIN HYDROCHLORIDE 1000 MG: 500 TABLET ORAL at 09:14

## 2017-12-31 RX ADMIN — TAMSULOSIN HYDROCHLORIDE 0.4 MG: 0.4 CAPSULE ORAL at 21:41

## 2017-12-31 RX ADMIN — IPRATROPIUM BROMIDE AND ALBUTEROL SULFATE 3 ML: 2.5; .5 SOLUTION RESPIRATORY (INHALATION) at 11:35

## 2017-12-31 RX ADMIN — INSULIN LISPRO 8 UNITS: 100 INJECTION, SOLUTION INTRAVENOUS; SUBCUTANEOUS at 13:35

## 2017-12-31 RX ADMIN — METHYLPREDNISOLONE SODIUM SUCCINATE 40 MG: 125 INJECTION, POWDER, FOR SOLUTION INTRAMUSCULAR; INTRAVENOUS at 13:35

## 2017-12-31 RX ADMIN — INSULIN LISPRO 40 UNITS: 100 INJECTION, SOLUTION INTRAVENOUS; SUBCUTANEOUS at 09:17

## 2017-12-31 RX ADMIN — PREGABALIN 150 MG: 75 CAPSULE ORAL at 21:52

## 2017-12-31 RX ADMIN — GUAIFENESIN 1200 MG: 600 TABLET, EXTENDED RELEASE ORAL at 21:38

## 2017-12-31 RX ADMIN — IPRATROPIUM BROMIDE AND ALBUTEROL SULFATE 3 ML: 2.5; .5 SOLUTION RESPIRATORY (INHALATION) at 19:51

## 2017-12-31 RX ADMIN — GUAIFENESIN 1200 MG: 600 TABLET, EXTENDED RELEASE ORAL at 13:35

## 2017-12-31 RX ADMIN — OLANZAPINE 10 MG: 10 TABLET, FILM COATED ORAL at 21:40

## 2017-12-31 RX ADMIN — DORZOLAMIDE HYDROCHLORIDE 1 DROP: 20 SOLUTION/ DROPS OPHTHALMIC at 17:34

## 2017-12-31 RX ADMIN — INSULIN LISPRO 40 UNITS: 100 INJECTION, SOLUTION INTRAVENOUS; SUBCUTANEOUS at 13:35

## 2017-12-31 RX ADMIN — IPRATROPIUM BROMIDE AND ALBUTEROL SULFATE 3 ML: 2.5; .5 SOLUTION RESPIRATORY (INHALATION) at 00:26

## 2017-12-31 RX ADMIN — SODIUM CHLORIDE 500 ML: 4.5 INJECTION, SOLUTION INTRAVENOUS at 05:20

## 2017-12-31 RX ADMIN — FAMOTIDINE 20 MG: 20 TABLET ORAL at 21:38

## 2018-01-01 LAB
ANION GAP SERPL CALCULATED.3IONS-SCNC: 8 MMOL/L (ref 4–13)
BASOPHILS # BLD AUTO: 0.02 10*3/MM3 (ref 0–0.2)
BASOPHILS NFR BLD AUTO: 0.1 % (ref 0–2)
BUN BLD-MCNC: 22 MG/DL (ref 5–21)
BUN/CREAT SERPL: 34.9 (ref 7–25)
CALCIUM SPEC-SCNC: 9.1 MG/DL (ref 8.4–10.4)
CHLORIDE SERPL-SCNC: 100 MMOL/L (ref 98–110)
CO2 SERPL-SCNC: 34 MMOL/L (ref 24–31)
CREAT BLD-MCNC: 0.63 MG/DL (ref 0.5–1.4)
DEPRECATED RDW RBC AUTO: 48.3 FL (ref 40–54)
EOSINOPHIL # BLD AUTO: 0 10*3/MM3 (ref 0–0.7)
EOSINOPHIL NFR BLD AUTO: 0 % (ref 0–4)
ERYTHROCYTE [DISTWIDTH] IN BLOOD BY AUTOMATED COUNT: 15.6 % (ref 12–15)
GFR SERPL CREATININE-BSD FRML MDRD: 130 ML/MIN/1.73
GLUCOSE BLD-MCNC: 77 MG/DL (ref 70–100)
GLUCOSE BLDC GLUCOMTR-MCNC: 198 MG/DL (ref 70–130)
GLUCOSE BLDC GLUCOMTR-MCNC: 258 MG/DL (ref 70–130)
GLUCOSE BLDC GLUCOMTR-MCNC: 268 MG/DL (ref 70–130)
GLUCOSE BLDC GLUCOMTR-MCNC: 99 MG/DL (ref 70–130)
HCT VFR BLD AUTO: 45.1 % (ref 40–52)
HGB BLD-MCNC: 14.6 G/DL (ref 14–18)
IMM GRANULOCYTES # BLD: 0.09 10*3/MM3 (ref 0–0.03)
IMM GRANULOCYTES NFR BLD: 0.7 % (ref 0–5)
LYMPHOCYTES # BLD AUTO: 1.65 10*3/MM3 (ref 0.72–4.86)
LYMPHOCYTES NFR BLD AUTO: 12.2 % (ref 15–45)
MCH RBC QN AUTO: 27.6 PG (ref 28–32)
MCHC RBC AUTO-ENTMCNC: 32.4 G/DL (ref 33–36)
MCV RBC AUTO: 85.3 FL (ref 82–95)
MONOCYTES # BLD AUTO: 0.29 10*3/MM3 (ref 0.19–1.3)
MONOCYTES NFR BLD AUTO: 2.1 % (ref 4–12)
NEUTROPHILS # BLD AUTO: 11.48 10*3/MM3 (ref 1.87–8.4)
NEUTROPHILS NFR BLD AUTO: 84.9 % (ref 39–78)
NRBC BLD MANUAL-RTO: 0 /100 WBC (ref 0–0)
PLATELET # BLD AUTO: 141 10*3/MM3 (ref 130–400)
PMV BLD AUTO: 9.4 FL (ref 6–12)
POTASSIUM BLD-SCNC: 4.5 MMOL/L (ref 3.5–5.3)
RBC # BLD AUTO: 5.29 10*6/MM3 (ref 4.8–5.9)
SODIUM BLD-SCNC: 142 MMOL/L (ref 135–145)
WBC NRBC COR # BLD: 13.53 10*3/MM3 (ref 4.8–10.8)

## 2018-01-01 PROCEDURE — 85025 COMPLETE CBC W/AUTO DIFF WBC: CPT | Performed by: NURSE PRACTITIONER

## 2018-01-01 PROCEDURE — 25010000002 METHYLPREDNISOLONE PER 40 MG: Performed by: NURSE PRACTITIONER

## 2018-01-01 PROCEDURE — 63710000001 INSULIN DETEMIR PER 5 UNITS: Performed by: NURSE PRACTITIONER

## 2018-01-01 PROCEDURE — 25010000002 ENOXAPARIN PER 10 MG: Performed by: INTERNAL MEDICINE

## 2018-01-01 PROCEDURE — 80048 BASIC METABOLIC PNL TOTAL CA: CPT | Performed by: NURSE PRACTITIONER

## 2018-01-01 PROCEDURE — 94760 N-INVAS EAR/PLS OXIMETRY 1: CPT

## 2018-01-01 PROCEDURE — 82962 GLUCOSE BLOOD TEST: CPT

## 2018-01-01 PROCEDURE — 94799 UNLISTED PULMONARY SVC/PX: CPT

## 2018-01-01 PROCEDURE — 63710000001 INSULIN LISPRO (HUMAN) PER 5 UNITS: Performed by: FAMILY MEDICINE

## 2018-01-01 PROCEDURE — 94660 CPAP INITIATION&MGMT: CPT

## 2018-01-01 RX ORDER — METHYLPREDNISOLONE SODIUM SUCCINATE 40 MG/ML
40 INJECTION, POWDER, LYOPHILIZED, FOR SOLUTION INTRAMUSCULAR; INTRAVENOUS EVERY 12 HOURS
Status: DISCONTINUED | OUTPATIENT
Start: 2018-01-01 | End: 2018-01-02

## 2018-01-01 RX ORDER — LOSARTAN POTASSIUM 50 MG/1
50 TABLET ORAL DAILY
Status: DISCONTINUED | OUTPATIENT
Start: 2018-01-02 | End: 2018-01-02 | Stop reason: HOSPADM

## 2018-01-01 RX ORDER — SACCHAROMYCES BOULARDII 250 MG
250 CAPSULE ORAL 2 TIMES DAILY
Status: DISCONTINUED | OUTPATIENT
Start: 2018-01-01 | End: 2018-01-02 | Stop reason: HOSPADM

## 2018-01-01 RX ORDER — DOXYCYCLINE 50 MG/1
100 CAPSULE ORAL EVERY 12 HOURS SCHEDULED
Status: DISCONTINUED | OUTPATIENT
Start: 2018-01-01 | End: 2018-01-02 | Stop reason: HOSPADM

## 2018-01-01 RX ORDER — ATENOLOL 25 MG/1
12.5 TABLET ORAL EVERY 12 HOURS SCHEDULED
Status: DISCONTINUED | OUTPATIENT
Start: 2018-01-01 | End: 2018-01-02 | Stop reason: HOSPADM

## 2018-01-01 RX ADMIN — IPRATROPIUM BROMIDE AND ALBUTEROL SULFATE 3 ML: 2.5; .5 SOLUTION RESPIRATORY (INHALATION) at 23:39

## 2018-01-01 RX ADMIN — DORZOLAMIDE HYDROCHLORIDE 1 DROP: 20 SOLUTION/ DROPS OPHTHALMIC at 23:18

## 2018-01-01 RX ADMIN — GUAIFENESIN 1200 MG: 600 TABLET, EXTENDED RELEASE ORAL at 23:16

## 2018-01-01 RX ADMIN — GUAIFENESIN 1200 MG: 600 TABLET, EXTENDED RELEASE ORAL at 11:04

## 2018-01-01 RX ADMIN — TAMSULOSIN HYDROCHLORIDE 0.4 MG: 0.4 CAPSULE ORAL at 23:16

## 2018-01-01 RX ADMIN — IPRATROPIUM BROMIDE AND ALBUTEROL SULFATE 3 ML: 2.5; .5 SOLUTION RESPIRATORY (INHALATION) at 00:08

## 2018-01-01 RX ADMIN — PREGABALIN 150 MG: 75 CAPSULE ORAL at 23:15

## 2018-01-01 RX ADMIN — IPRATROPIUM BROMIDE AND ALBUTEROL SULFATE 3 ML: 2.5; .5 SOLUTION RESPIRATORY (INHALATION) at 06:38

## 2018-01-01 RX ADMIN — CLOPIDOGREL 75 MG: 75 TABLET, FILM COATED ORAL at 09:00

## 2018-01-01 RX ADMIN — LEVOTHYROXINE SODIUM 50 MCG: 50 TABLET ORAL at 09:01

## 2018-01-01 RX ADMIN — INSULIN DETEMIR 50 UNITS: 100 INJECTION, SOLUTION SUBCUTANEOUS at 23:34

## 2018-01-01 RX ADMIN — ATROPINE SULFATE 1 DROP: 10 SOLUTION/ DROPS OPHTHALMIC at 09:00

## 2018-01-01 RX ADMIN — VENLAFAXINE HYDROCHLORIDE 225 MG: 75 CAPSULE, EXTENDED RELEASE ORAL at 09:01

## 2018-01-01 RX ADMIN — IPRATROPIUM BROMIDE AND ALBUTEROL SULFATE 3 ML: 2.5; .5 SOLUTION RESPIRATORY (INHALATION) at 10:00

## 2018-01-01 RX ADMIN — METHYLPREDNISOLONE SODIUM SUCCINATE 40 MG: 125 INJECTION, POWDER, FOR SOLUTION INTRAMUSCULAR; INTRAVENOUS at 04:50

## 2018-01-01 RX ADMIN — PREGABALIN 150 MG: 75 CAPSULE ORAL at 09:06

## 2018-01-01 RX ADMIN — FAMOTIDINE 20 MG: 20 TABLET ORAL at 23:16

## 2018-01-01 RX ADMIN — BUDESONIDE 0.5 MG: 0.5 INHALANT RESPIRATORY (INHALATION) at 06:38

## 2018-01-01 RX ADMIN — DORZOLAMIDE HYDROCHLORIDE 1 DROP: 20 SOLUTION/ DROPS OPHTHALMIC at 09:00

## 2018-01-01 RX ADMIN — NIFEDIPINE 30 MG: 30 TABLET, FILM COATED, EXTENDED RELEASE ORAL at 09:01

## 2018-01-01 RX ADMIN — ATENOLOL 12.5 MG: 25 TABLET ORAL at 09:03

## 2018-01-01 RX ADMIN — ATROPINE SULFATE 1 DROP: 10 SOLUTION/ DROPS OPHTHALMIC at 15:10

## 2018-01-01 RX ADMIN — IPRATROPIUM BROMIDE AND ALBUTEROL SULFATE 3 ML: 2.5; .5 SOLUTION RESPIRATORY (INHALATION) at 03:03

## 2018-01-01 RX ADMIN — DOXYCYCLINE 100 MG: 50 CAPSULE ORAL at 23:19

## 2018-01-01 RX ADMIN — FAMOTIDINE 20 MG: 20 TABLET ORAL at 09:01

## 2018-01-01 RX ADMIN — Medication 250 MG: at 23:20

## 2018-01-01 RX ADMIN — BUDESONIDE 0.5 MG: 0.5 INHALANT RESPIRATORY (INHALATION) at 18:45

## 2018-01-01 RX ADMIN — IPRATROPIUM BROMIDE AND ALBUTEROL SULFATE 3 ML: 2.5; .5 SOLUTION RESPIRATORY (INHALATION) at 18:45

## 2018-01-01 RX ADMIN — INSULIN LISPRO 4 UNITS: 100 INJECTION, SOLUTION INTRAVENOUS; SUBCUTANEOUS at 12:01

## 2018-01-01 RX ADMIN — DOXYCYCLINE 100 MG: 50 CAPSULE ORAL at 09:00

## 2018-01-01 RX ADMIN — Medication 250 MG: at 11:49

## 2018-01-01 RX ADMIN — DORZOLAMIDE HYDROCHLORIDE 1 DROP: 20 SOLUTION/ DROPS OPHTHALMIC at 15:09

## 2018-01-01 RX ADMIN — INSULIN LISPRO 12 UNITS: 100 INJECTION, SOLUTION INTRAVENOUS; SUBCUTANEOUS at 23:34

## 2018-01-01 RX ADMIN — METHYLPREDNISOLONE SODIUM SUCCINATE 40 MG: 40 INJECTION, POWDER, FOR SOLUTION INTRAMUSCULAR; INTRAVENOUS at 17:18

## 2018-01-01 RX ADMIN — NICOTINE 1 PATCH: 21 PATCH, EXTENDED RELEASE TRANSDERMAL at 23:19

## 2018-01-01 RX ADMIN — OLANZAPINE 10 MG: 10 TABLET, FILM COATED ORAL at 23:18

## 2018-01-01 RX ADMIN — ENOXAPARIN SODIUM 40 MG: 40 INJECTION SUBCUTANEOUS at 09:00

## 2018-01-01 RX ADMIN — ATROPINE SULFATE 1 DROP: 10 SOLUTION/ DROPS OPHTHALMIC at 23:18

## 2018-01-01 RX ADMIN — HYDROCODONE BITARTRATE AND ACETAMINOPHEN 1 TABLET: 5; 325 TABLET ORAL at 23:15

## 2018-01-01 RX ADMIN — ATENOLOL 12.5 MG: 25 TABLET ORAL at 23:21

## 2018-01-01 RX ADMIN — INSULIN LISPRO 12 UNITS: 100 INJECTION, SOLUTION INTRAVENOUS; SUBCUTANEOUS at 17:18

## 2018-01-01 RX ADMIN — IPRATROPIUM BROMIDE AND ALBUTEROL SULFATE 3 ML: 2.5; .5 SOLUTION RESPIRATORY (INHALATION) at 14:20

## 2018-01-01 RX ADMIN — HYDROCODONE BITARTRATE AND ACETAMINOPHEN 1 TABLET: 5; 325 TABLET ORAL at 11:04

## 2018-01-01 NOTE — PROGRESS NOTES
[facility]  INPATIENT PROGRESS NOTE    Length of Stay: 3  Date of Admission: 12/29/2017  Primary Care Physician: Jarod Tillman MD    Subjective   Chief Complaint: follow up    HPI   Currently sitting up in bed on BiPAP.  Tolerated 3 L yesterday well.  Ambulated to the bathroom during the night, did okay.  To ambulate in the hallway today.  Discussed diet and glucoses.  He apparently eats whatever he wants to at home, therefore he takes a lot of insulin.  Not requiring as much here due to diet regulated.    Review of Systems   Constitutional: Positive for activity change, chills and fatigue. Negative for appetite change.   HENT: Negative for hearing loss, nosebleeds, tinnitus and trouble swallowing.    Eyes: Negative for visual disturbance.   Respiratory: Positive for cough. Negative for chest tightness.    Cardiovascular: Negative for palpitations.   Gastrointestinal: Negative for abdominal distention, blood in stool, constipation, diarrhea and nausea.   Endocrine: Negative for cold intolerance, heat intolerance, polydipsia, polyphagia and polyuria.   Genitourinary: Negative for decreased urine volume, difficulty urinating, dysuria, flank pain, frequency and hematuria.   Musculoskeletal: Negative for arthralgias, joint swelling and myalgias.   Allergic/Immunologic: Negative for immunocompromised state.   Neurological: Positive for weakness. Negative for dizziness, syncope and light-headedness.   Hematological: Negative for adenopathy. Does not bruise/bleed easily.   Psychiatric/Behavioral: Negative for confusion and sleep disturbance. The patient is not nervous/anxious.       All pertinent negatives and positives are as above. All other systems have been reviewed and are negative unless otherwise stated.     Objective    Temp:  [97.2 °F (36.2 °C)-98.1 °F (36.7 °C)] 98.1 °F (36.7 °C)  Heart Rate:  [64-97] 68  Resp:  [14-22] 18  BP: ()/(50-74) 119/72      Physical Exam   Constitutional: He is oriented to person, place, and time. He appears well-developed and well-nourished.   obese   HENT:   Head: Normocephalic and atraumatic.   Eyes: Conjunctivae and EOM are normal. Pupils are equal, round, and reactive to light.   Neck: Neck supple. No JVD present. No thyromegaly present.   Cardiovascular: Normal rate, regular rhythm, normal heart sounds and intact distal pulses.  Exam reveals no gallop and no friction rub.    No murmur heard.  SR 77   Pulmonary/Chest: Effort normal. No respiratory distress. He has wheezes. He has no rales. He exhibits no tenderness.   Abdominal: Soft. Bowel sounds are normal. He exhibits no distension. There is no tenderness. There is no rebound and no guarding.   Colostomy present   Musculoskeletal: Normal range of motion. He exhibits no edema, tenderness or deformity.   Lymphadenopathy:     He has no cervical adenopathy.   Neurological: He is alert and oriented to person, place, and time. He displays normal reflexes. No cranial nerve deficit. He exhibits normal muscle tone.   Skin: Skin is warm and dry. No rash noted.   Psychiatric: He has a normal mood and affect. His behavior is normal. Judgment and thought content normal.   Vitals reviewed.    Results Review:  I have reviewed the labs, radiology results, and diagnostic studies.    Laboratory Data:     Results from last 7 days  Lab Units 01/01/18 0425 12/31/17  0458 12/29/17  1828   WBC 10*3/mm3 13.53* 12.03* 7.25   HEMOGLOBIN g/dL 14.6 14.0 15.7   HEMATOCRIT % 45.1 45.5 50.9   PLATELETS 10*3/mm3 141 130 101*       Results from last 7 days  Lab Units 01/01/18  0425 12/31/17  0458 12/29/17  1828   SODIUM mmol/L 142 136 139   POTASSIUM mmol/L 4.5 5.5* 4.3   CHLORIDE mmol/L 100 98 94*   CO2 mmol/L 34.0* 32.0* 35.0*   BUN mg/dL 22* 28* 8   CREATININE mg/dL 0.63 0.80 0.67   CALCIUM mg/dL 9.1 8.8 8.9   BILIRUBIN mg/dL  --   --  0.8   ALK PHOS U/L  --   --  127*   ALT (SGPT) U/L  --   --  27    AST (SGOT) U/L  --   --  34   GLUCOSE mg/dL 77 166* 473*     Radiology Data:   Imaging Results (last 24 hours)     ** No results found for the last 24 hours. **        I have reviewed the patient current medications.     Assessment/Plan     Assessment:  1.  Acute on chronic respiratory failure with hypoxia and hypercapnia, currently on BiPAP  2.  Chronic obstructive pulmonary disease with acute exacerbation  3.  Diabetes mellitus, type II, A1C 11.3 on insulin therapy  4.  Acute febrile illness  5.  Thrombocytopenia, 101  6.  Coronary artery disease status post stent placement  7.  History of colon cancer with colon resection and colostomy  8.  Essential hypertension  9.  Hyperlipidemia  10.  Hyperkalemia, Resolved    Plan:  1.  Continue BiPAP therapy wean FiO2 as tolerated  2.  Doxycycline day # 3, change to oral therapy  3.  Decrease Solu-Medrol IV to 40 mg every 12 hours  4.  Discontinue IV fluids  5.  Continue Levemir and Humalog with meals, decrease.  6.  Glucoses: 166, 235, 85, 77.   7.  Monitor glucoses closely.  8.  Lovenox for DVT prophylaxis  9.  CBC and BMP in a.m.  10.  Monitor vital signs temperature every 4 hours  11.  Incentive spirometry.    12.  Mucinex and nebs.   13.  Potassium 4.5  14.  Consult PT and OT - ambulate only  15.  Blood pressures running on the lower side, Atenolol has been held past 24 hours.  Will decrease to 12.5 mg BID.    Discharge Planning: I expect the patient to be discharged to home in 1-2 days.    MARYLOU Wilson   01/01/18   8:02 AM     I personally evaluated and examined the patient in conjunction with MARYLOU Whyte and agree with the assessment, treatment plan, and disposition of the patient as recorded by her. My history, exam, and further recommendations are: Patient reports that he would really like to go home today.  He states that he wears BiPAP all night.  He currently is on 5 L by nasal cannula currently, and normally only wears 3 L by nasal cannula at  home.  He reports that his O2 saturations were 77% on 3 L prior to admission to the hospital.  He feels like that his breathing is improving.  I would like to keep him for an additional day as I do not think that he is back to his baseline yet, but he is close.  Agree with treatment plan as outlined above.  Also talked with patient about the importance of tobacco cessation, particularly in this situation given what appears to be advanced chronic obstructive pulmonary disease.  Patient also informs me that he has tele-health services at [facility].        Fredy Underwood MD  01/01/18  11:17 AM

## 2018-01-01 NOTE — PLAN OF CARE
Problem: Patient Care Overview (Adult)  Goal: Plan of Care Review  Outcome: Ongoing (interventions implemented as appropriate)   01/01/18 040   Coping/Psychosocial Response Interventions   Plan Of Care Reviewed With patient   Patient Care Overview   Progress improving   Outcome Evaluation   Outcome Summary/Follow up Plan Pt BG was 84 before bed, PT refused 90 of levimer. Pts blood pressure WNL this shift, pt refused atenolol this shift, will continue to monitor       Problem: Pain, Chronic (Adult)  Goal: Identify Related Risk Factors and Signs and Symptoms  Outcome: Ongoing (interventions implemented as appropriate)    Goal: Acceptable Pain Control/Comfort Level  Outcome: Ongoing (interventions implemented as appropriate)      Problem: Sepsis (Adult)  Goal: Signs and Symptoms of Listed Potential Problems Will be Absent or Manageable (Sepsis)  Outcome: Ongoing (interventions implemented as appropriate)

## 2018-01-01 NOTE — PLAN OF CARE
Problem: COPD, Chronic Bronchitis/Emphysema (Adult)  Goal: Signs and Symptoms of Listed Potential Problems Will be Absent or Manageable (COPD, Chronic Bronchitis/Emphysema)  Outcome: Ongoing (interventions implemented as appropriate)      Problem: Patient Care Overview (Adult)  Goal: Plan of Care Review  Outcome: Ongoing (interventions implemented as appropriate)   01/01/18 5098   Coping/Psychosocial Response Interventions   Plan Of Care Reviewed With patient   Patient Care Overview   Progress no change   Outcome Evaluation   Outcome Summary/Follow up Plan Patient refused majority of AM BP medications and morning diabetic medications and insulins APRN made aware, vitals and blood glucoses continued to monitor. PRN medication x1 for pain with minimum relief. Will continue to monitor.       Problem: Pain, Chronic (Adult)  Goal: Acceptable Pain Control/Comfort Level  Outcome: Ongoing (interventions implemented as appropriate)      Problem: Sepsis (Adult)  Goal: Signs and Symptoms of Listed Potential Problems Will be Absent or Manageable (Sepsis)  Outcome: Ongoing (interventions implemented as appropriate)

## 2018-01-02 VITALS
TEMPERATURE: 97 F | HEIGHT: 67 IN | WEIGHT: 216.5 LBS | SYSTOLIC BLOOD PRESSURE: 130 MMHG | RESPIRATION RATE: 18 BRPM | OXYGEN SATURATION: 90 % | BODY MASS INDEX: 33.98 KG/M2 | HEART RATE: 67 BPM | DIASTOLIC BLOOD PRESSURE: 78 MMHG

## 2018-01-02 LAB
ANION GAP SERPL CALCULATED.3IONS-SCNC: 9 MMOL/L (ref 4–13)
BUN BLD-MCNC: 20 MG/DL (ref 5–21)
BUN/CREAT SERPL: 31.7 (ref 7–25)
CALCIUM SPEC-SCNC: 8.9 MG/DL (ref 8.4–10.4)
CHLORIDE SERPL-SCNC: 96 MMOL/L (ref 98–110)
CO2 SERPL-SCNC: 35 MMOL/L (ref 24–31)
CREAT BLD-MCNC: 0.63 MG/DL (ref 0.5–1.4)
GFR SERPL CREATININE-BSD FRML MDRD: 130 ML/MIN/1.73
GLUCOSE BLD-MCNC: 189 MG/DL (ref 70–100)
GLUCOSE BLDC GLUCOMTR-MCNC: 141 MG/DL (ref 70–130)
GLUCOSE BLDC GLUCOMTR-MCNC: 215 MG/DL (ref 70–130)
POTASSIUM BLD-SCNC: 4.1 MMOL/L (ref 3.5–5.3)
SODIUM BLD-SCNC: 140 MMOL/L (ref 135–145)

## 2018-01-02 PROCEDURE — 63710000001 INSULIN LISPRO (HUMAN) PER 5 UNITS: Performed by: INTERNAL MEDICINE

## 2018-01-02 PROCEDURE — 94760 N-INVAS EAR/PLS OXIMETRY 1: CPT

## 2018-01-02 PROCEDURE — 94799 UNLISTED PULMONARY SVC/PX: CPT

## 2018-01-02 PROCEDURE — 80048 BASIC METABOLIC PNL TOTAL CA: CPT | Performed by: NURSE PRACTITIONER

## 2018-01-02 PROCEDURE — 94660 CPAP INITIATION&MGMT: CPT

## 2018-01-02 PROCEDURE — 25010000002 ENOXAPARIN PER 10 MG: Performed by: INTERNAL MEDICINE

## 2018-01-02 PROCEDURE — 82962 GLUCOSE BLOOD TEST: CPT

## 2018-01-02 PROCEDURE — 63710000001 INSULIN DETEMIR PER 5 UNITS: Performed by: NURSE PRACTITIONER

## 2018-01-02 PROCEDURE — 25010000002 METHYLPREDNISOLONE PER 40 MG: Performed by: NURSE PRACTITIONER

## 2018-01-02 PROCEDURE — 63710000001 INSULIN LISPRO (HUMAN) PER 5 UNITS: Performed by: FAMILY MEDICINE

## 2018-01-02 PROCEDURE — 63710000001 PREDNISONE PER 1 MG: Performed by: INTERNAL MEDICINE

## 2018-01-02 RX ORDER — PREDNISONE 20 MG/1
20 TABLET ORAL 2 TIMES DAILY WITH MEALS
Status: DISCONTINUED | OUTPATIENT
Start: 2018-01-02 | End: 2018-01-02 | Stop reason: HOSPADM

## 2018-01-02 RX ORDER — PREDNISONE 20 MG/1
20 TABLET ORAL 2 TIMES DAILY WITH MEALS
Qty: 11 TABLET | Refills: 0 | Status: ON HOLD | OUTPATIENT
Start: 2018-01-02 | End: 2018-01-21

## 2018-01-02 RX ORDER — GUAIFENESIN 600 MG/1
1200 TABLET, EXTENDED RELEASE ORAL EVERY 12 HOURS
Status: ON HOLD
Start: 2018-01-02 | End: 2020-01-14

## 2018-01-02 RX ORDER — BUDESONIDE AND FORMOTEROL FUMARATE DIHYDRATE 160; 4.5 UG/1; UG/1
2 AEROSOL RESPIRATORY (INHALATION)
Status: DISCONTINUED | OUTPATIENT
Start: 2018-01-02 | End: 2018-01-02 | Stop reason: HOSPADM

## 2018-01-02 RX ORDER — BUDESONIDE AND FORMOTEROL FUMARATE DIHYDRATE 160; 4.5 UG/1; UG/1
2 AEROSOL RESPIRATORY (INHALATION)
Qty: 1 INHALER | Refills: 0 | Status: SHIPPED | OUTPATIENT
Start: 2018-01-02 | End: 2018-01-23 | Stop reason: HOSPADM

## 2018-01-02 RX ORDER — DOXYCYCLINE 100 MG/1
100 CAPSULE ORAL EVERY 12 HOURS SCHEDULED
Qty: 11 CAPSULE | Refills: 0 | Status: SHIPPED | OUTPATIENT
Start: 2018-01-02 | End: 2018-01-08

## 2018-01-02 RX ORDER — PREDNISONE 20 MG/1
20 TABLET ORAL 2 TIMES DAILY WITH MEALS
Status: DISCONTINUED | OUTPATIENT
Start: 2018-01-02 | End: 2018-01-02

## 2018-01-02 RX ADMIN — PREDNISONE 20 MG: 20 TABLET ORAL at 12:08

## 2018-01-02 RX ADMIN — IPRATROPIUM BROMIDE AND ALBUTEROL SULFATE 3 ML: 2.5; .5 SOLUTION RESPIRATORY (INHALATION) at 06:37

## 2018-01-02 RX ADMIN — ATENOLOL 12.5 MG: 25 TABLET ORAL at 08:52

## 2018-01-02 RX ADMIN — GUAIFENESIN 1200 MG: 600 TABLET, EXTENDED RELEASE ORAL at 12:08

## 2018-01-02 RX ADMIN — NIFEDIPINE 30 MG: 30 TABLET, FILM COATED, EXTENDED RELEASE ORAL at 08:53

## 2018-01-02 RX ADMIN — ENOXAPARIN SODIUM 40 MG: 40 INJECTION SUBCUTANEOUS at 08:53

## 2018-01-02 RX ADMIN — INSULIN DETEMIR 50 UNITS: 100 INJECTION, SOLUTION SUBCUTANEOUS at 08:54

## 2018-01-02 RX ADMIN — Medication 250 MG: at 08:53

## 2018-01-02 RX ADMIN — LINAGLIPTIN 5 MG: 5 TABLET, FILM COATED ORAL at 08:53

## 2018-01-02 RX ADMIN — ISOSORBIDE MONONITRATE 90 MG: 30 TABLET, EXTENDED RELEASE ORAL at 08:53

## 2018-01-02 RX ADMIN — IPRATROPIUM BROMIDE AND ALBUTEROL SULFATE 3 ML: 2.5; .5 SOLUTION RESPIRATORY (INHALATION) at 10:19

## 2018-01-02 RX ADMIN — INSULIN LISPRO 40 UNITS: 100 INJECTION, SOLUTION INTRAVENOUS; SUBCUTANEOUS at 08:54

## 2018-01-02 RX ADMIN — LOSARTAN POTASSIUM 50 MG: 50 TABLET ORAL at 08:53

## 2018-01-02 RX ADMIN — IPRATROPIUM BROMIDE AND ALBUTEROL SULFATE 3 ML: 2.5; .5 SOLUTION RESPIRATORY (INHALATION) at 14:18

## 2018-01-02 RX ADMIN — DOXYCYCLINE 100 MG: 50 CAPSULE ORAL at 08:52

## 2018-01-02 RX ADMIN — METHYLPREDNISOLONE SODIUM SUCCINATE 40 MG: 40 INJECTION, POWDER, FOR SOLUTION INTRAMUSCULAR; INTRAVENOUS at 04:39

## 2018-01-02 RX ADMIN — IPRATROPIUM BROMIDE AND ALBUTEROL SULFATE 3 ML: 2.5; .5 SOLUTION RESPIRATORY (INHALATION) at 03:35

## 2018-01-02 RX ADMIN — LEVOTHYROXINE SODIUM 50 MCG: 50 TABLET ORAL at 08:53

## 2018-01-02 RX ADMIN — SPIRONOLACTONE 25 MG: 25 TABLET ORAL at 08:52

## 2018-01-02 RX ADMIN — INSULIN LISPRO 8 UNITS: 100 INJECTION, SOLUTION INTRAVENOUS; SUBCUTANEOUS at 12:08

## 2018-01-02 RX ADMIN — BUDESONIDE 0.5 MG: 0.5 INHALANT RESPIRATORY (INHALATION) at 06:37

## 2018-01-02 RX ADMIN — METFORMIN HYDROCHLORIDE 1000 MG: 500 TABLET ORAL at 08:53

## 2018-01-02 RX ADMIN — INSULIN LISPRO 40 UNITS: 100 INJECTION, SOLUTION INTRAVENOUS; SUBCUTANEOUS at 12:08

## 2018-01-02 RX ADMIN — PREGABALIN 150 MG: 75 CAPSULE ORAL at 08:53

## 2018-01-02 RX ADMIN — CLOPIDOGREL 75 MG: 75 TABLET, FILM COATED ORAL at 08:52

## 2018-01-02 RX ADMIN — FAMOTIDINE 20 MG: 20 TABLET ORAL at 08:52

## 2018-01-02 RX ADMIN — VENLAFAXINE HYDROCHLORIDE 225 MG: 75 CAPSULE, EXTENDED RELEASE ORAL at 08:52

## 2018-01-02 NOTE — PROGRESS NOTES
Pt. States he has had both flu and pneumonia shot . Says he is compliant in taking his respiratory meds. He says he triees to eat pretty healthy when he can. States he is active for the most part. Walks his dog but don't exercise much at all. Left keegan copd with pt. Encouraged pt.  To ask any questions that he might have.

## 2018-01-02 NOTE — DISCHARGE SUMMARY
[facility] Medicine Services  DISCHARGE SUMMARY       Date of Admission: 12/29/2017  Date of Discharge:  1/2/2018  Primary Care Physician: Jarod Tillman MD    Presenting Problem/History of Present Illness:  COPD exacerbation [J44.1]     Final Discharge Diagnoses:  Hospital Problem List     COPD exacerbation      1.  Acute on chronic respiratory failure with hypoxia and hypercapnia, currently on BiPAP  2.  Chronic obstructive pulmonary disease with acute exacerbation  3.  Diabetes mellitus, type II, A1C 11.3 on insulin therapy  4.  Acute febrile illness  5.  Thrombocytopenia, 101  6.  Coronary artery disease status post stent placement  7.  History of colon cancer with colon resection and colostomy  8.  Essential hypertension  9.  Hyperlipidemia  10.  Hyperkalemia, Resolved    Consults: None    Procedures Performed: None    Pertinent Test Results:   Lab Results (last 24 hours)     Procedure Component Value Units Date/Time    POC Glucose Once [[id number]]  (Abnormal) Collected:  01/01/18 1558    Specimen:  Blood Updated:  01/01/18 1636     Glucose 258 (H) mg/dL       : [id number] Georges Carvalho ID: [id number]       Blood Culture - Blood, [[id number]]  (Normal) Collected:  12/29/17 1828    Specimen:  Blood from Arm, Left Updated:  01/01/18 1846     Blood Culture No growth at 3 days    Blood Culture - Blood, [[id number]]  (Normal) Collected:  12/29/17 1855    Specimen:  Blood from Arm, Right Updated:  01/01/18 1946     Blood Culture No growth at 3 days    POC Glucose Once [[id number]]  (Abnormal) Collected:  01/01/18 2328    Specimen:  Blood Updated:  01/01/18 2359     Glucose 268 (H) mg/dL       : [id number] Rodger Lucero ID: [id number]       Basic Metabolic Panel [[id number]]  (Abnormal) Collected:  01/02/18 0432    Specimen:  Blood Updated:  01/02/18 0613     Glucose 189 (H) mg/dL      BUN 20 mg/dL      Creatinine 0.63 mg/dL      Sodium 140 mmol/L       Potassium 4.1 mmol/L      Chloride 96 (L) mmol/L      CO2 35.0 (H) mmol/L      Calcium 8.9 mg/dL      eGFR Non African Amer 130 mL/min/1.73      BUN/Creatinine Ratio 31.7 (H)     Anion Gap 9.0 mmol/L     Narrative:       GFR Normal >60  Chronic Kidney Disease <60  Kidney Failure <15    POC Glucose Once [[id number]]  (Abnormal) Collected:  01/02/18 0717    Specimen:  Blood Updated:  01/02/18 0730     Glucose 141 (H) mg/dL       : [id number] Justus Shahter ID: [id number]       POC Glucose Once [[id number]]  (Abnormal) Collected:  01/02/18 1124    Specimen:  Blood Updated:  01/02/18 1135     Glucose 215 (H) mg/dL       : [id number] Justus Cuello ID: [id number]           XR Chest 1 View [[id number]] Not Reviewed        Order Status: Completed Collected: 12/29/17 1854        Updated: 12/29/17 1859       Narrative:         EXAMINATION: XR CHEST 1 VW-. 12/29/2017 6:54 PM CST     CHEST, ONE VIEW:     HISTORY: Shortness of air     COMPARISON: 11/24/2017     A single frontal chest radiograph was obtained.     FINDINGS:     Median sternotomy changes noted. Right-sided infusion catheter observed.     The lungs are clear without acute infiltrates.     The heart is normal in size, without heart failure.     The bony structures are intact.     Radiographically, chest is likely unchanged.                                        Impression:         1. No acute cardiopulmonary process.     This report was finalized on 12/29/2017 18:56 by Dr. Frandy Vizcarra MD.      Hospital Course:  The patient is a 57 y.o. male who presented to [facility] with shortness of breath.  The patient presented to the emergency department in respiratory distress and was found to have a CO2 greater than 70.  He was started on BiPAP therapy and improved greatly.  He is admitted for further evaluation.  He was started on IV antibiotics with azithromycin and Rocephin as well as IV steroids.  He was started on DuoNeb's and antibiotics  "as well.  He has improved well.  He does use BiPAP at home for his chronic respiratory failure.  Per his home medications he was taking 160 units of Levemir twice a day.  When this was restarted his sugars bottomed out to 77.  He states that he would eat whatever he wanted to at home and that is why his insulin regimen is so high.  His A1c is 11.3.  He has been instructed on proper diet and diabetes maintenance.  He has been noncompliant throughout this hospitalization not taking his medications are insulin when advised.  He also did not follow instructions per his discharge last time and resumed medications that had been altered.  He is back at his baseline with BiPAP at night and at 3 L during the day.  He has ambulated without difficulty.  He is stable and good condition for discharge home today.  Medication revisions have been educated to the patient again.  [facility]     Condition on Discharge:  Stable    Physical Exam on Discharge:  /78 (BP Location: Right arm, Patient Position: Lying)  Pulse 68  Temp 97 °F (36.1 °C) (Temporal Artery )   Resp 18  Ht 170.2 cm (67\")  Wt 98.2 kg (216 lb 8 oz)  SpO2 90%  BMI 33.91 kg/m2     Physical Exam  Constitutional: He is oriented to person, place, and time. He appears well-developed and well-nourished.   obese   HENT:   Head: Normocephalic and atraumatic.   Eyes: Conjunctivae and EOM are normal. Pupils are equal, round, and reactive to light.   Neck: Neck supple. No JVD present. No thyromegaly present.   Cardiovascular: Normal rate, regular rhythm, normal heart sounds and intact distal pulses.  Exam reveals no gallop and no friction rub.    No murmur heard.  SR 77   Pulmonary/Chest: Effort normal. No respiratory distress. He has wheezes. He has no rales. He exhibits no tenderness.   Abdominal: Soft. Bowel sounds are normal. He exhibits no distension. There is no tenderness. There is no rebound and no guarding.   Colostomy present "   Musculoskeletal: Normal range of motion. He exhibits no edema, tenderness or deformity.   Lymphadenopathy:     He has no cervical adenopathy.   Neurological: He is alert and oriented to person, place, and time. He displays normal reflexes. No cranial nerve deficit. He exhibits normal muscle tone.   Skin: Skin is warm and dry. No rash noted.   Psychiatric: He has a normal mood and affect. His behavior is normal. Judgment and thought content normal.   Vitals reviewed.    Discharge Disposition:  Home or Self Care    Discharge Medications:   Sia Adam   Home Medication Instructions EDER:[id number]    Printed on:01/02/18 1846   Medication Information                      albuterol (PROVENTIL) (2.5 MG/3ML) 0.083% nebulizer solution  Take 2.5 mg by nebulization Every 6 (Six) Hours As Needed for Wheezing.             atenolol (TENORMIN) 25 MG tablet  Take 1 tablet by mouth Every 12 (Twelve) Hours.             atropine 1 % ophthalmic solution  Administer 1 drop to the right eye 2 (Two) Times a Day.             budesonide-formoterol (SYMBICORT) 160-4.5 MCG/ACT inhaler  Inhale 2 puffs 2 (Two) Times a Day.             Cholecalciferol (VITAMIN D) 2000 units tablet  Take 4,000 Units by mouth Daily.             clopidogrel (PLAVIX) 75 MG tablet  Take 75 mg by mouth Daily.             cyclobenzaprine (FLEXERIL) 10 MG tablet  Take 5 mg by mouth 3 (Three) Times a Day As Needed for Muscle Spasms.             docusate sodium (COLACE) 100 MG capsule  Take 100 mg by mouth 2 (Two) Times a Day As Needed for Constipation.             dorzolamide (TRUSOPT) 2 % ophthalmic solution  Administer 1 drop into the left eye 2 (Two) Times a Day.             doxazosin (CARDURA) 4 MG tablet  Take 4 mg by mouth Every Night.             doxycycline (MONODOX) 100 MG capsule  Take 1 capsule by mouth Every 12 (Twelve) Hours for 11 doses. Indications: Upper Respiratory Tract Infection             guaiFENesin (MUCINEX) 600 MG 12 hr tablet  Take 2  tablets by mouth Every 12 (Twelve) Hours.             HYDROcodone-acetaminophen (NORCO) 5-325 MG per tablet  Take 1 tablet by mouth Every 6 (Six) Hours As Needed for Moderate Pain .             insulin aspart (novoLOG) 100 UNIT/ML injection  Inject 40 Units under the skin 3 (Three) Times a Day Before Meals.             insulin glargine (LANTUS) 100 UNIT/ML injection  Inject 160 Units under the skin Daily.             isosorbide mononitrate (IMDUR) 60 MG 24 hr tablet  Take 90 mg by mouth Daily.             levothyroxine (SYNTHROID, LEVOTHROID) 50 MCG tablet  Take 50 mcg by mouth Daily.             losartan (COZAAR) 100 MG tablet  Take 100 mg by mouth Daily.             metFORMIN (GLUCOPHAGE) 500 MG tablet  Take 1,000 mg by mouth 2 (Two) Times a Day With Meals.             mometasone (ASMANEX TWISTHALER) inhaler 220 mcg/inhalation  Inhale 2 puffs 2 (Two) Times a Day.             NIFEdipine XL (PROCARDIA XL) 30 MG 24 hr tablet  Take 30 mg by mouth Daily.             nitroglycerin (NITROSTAT) 0.4 MG SL tablet  Place 0.4 mg under the tongue Every 5 (Five) Minutes As Needed for Chest Pain.             OLANZapine (zyPREXA) 10 MG tablet  Take 1 tablet by mouth Every Night.             potassium chloride (K-DUR,KLOR-CON) 20 MEQ CR tablet  Take 40 mEq by mouth 2 (Two) Times a Day.             predniSONE (DELTASONE) 20 MG tablet  Take 1 tablet by mouth 2 (Two) Times a Day With Meals. Take 20 mg BID for 3 days, 20 mg daily x 3 days, 10 mg daily for 3 days then stop             pregabalin (LYRICA) 150 MG capsule  Take 1 capsule by mouth Every 12 (Twelve) Hours.             raNITIdine (ZANTAC) 150 MG tablet  Take 150 mg by mouth 2 (Two) Times a Day As Needed for Indigestion.             SAXagliptin (ONGLYZA) 5 MG tablet  Take 5 mg by mouth Daily.             spironolactone (ALDACTONE) 25 MG tablet  Take 25 mg by mouth Daily.             tamsulosin (FLOMAX) 0.4 MG capsule 24 hr capsule  Take 1 capsule by mouth Every Night.              Tiotropium Bromide Monohydrate 2.5 MCG/ACT aerosol solution  Inhale 2 puffs Daily.             venlafaxine XR (EFFEXOR-XR) 75 MG 24 hr capsule  Take 225 mg by mouth Daily.               Discharge Diet:   Diet Instructions     Diet: Consistent Carbohydrate, Cardiac; Thin       Discharge Diet:   Consistent Carbohydrate  Cardiac      Fluid Consistency:  Thin               Activity at Discharge:   Activity Instructions     Activity as Tolerated                   Follow-up Appointments:   1.[facility]  No future appointments.    Test Results Pending at Discharge: None    MARYLOU Wilson  01/02/18  12:05 PM    Time: 35 minutes

## 2018-01-02 NOTE — PLAN OF CARE
Problem: COPD, Chronic Bronchitis/Emphysema (Adult)  Goal: Signs and Symptoms of Listed Potential Problems Will be Absent or Manageable (COPD, Chronic Bronchitis/Emphysema)  Outcome: Ongoing (interventions implemented as appropriate)      Problem: Patient Care Overview (Adult)  Goal: Plan of Care Review  Outcome: Ongoing (interventions implemented as appropriate)   01/01/18 1615 01/02/18 0328   Coping/Psychosocial Response Interventions   Plan Of Care Reviewed With patient --    Patient Care Overview   Progress --  no change   Outcome Evaluation   Outcome Summary/Follow up Plan --  pt agreed to take BP meds and Insulines this shift. The patient expressed that he was told that a BG in the 80's was too low and that it needed to be kept high, I explaned to the patient that  is about the normal range for BG to be in and that it being in the 200's was to high, Pt responded with a head nod, possible further education needed. VSS, will continue to monitor       Problem: Pain, Chronic (Adult)  Goal: Identify Related Risk Factors and Signs and Symptoms  Outcome: Ongoing (interventions implemented as appropriate)    Goal: Acceptable Pain Control/Comfort Level  Outcome: Ongoing (interventions implemented as appropriate)      Problem: Sepsis (Adult)  Goal: Signs and Symptoms of Listed Potential Problems Will be Absent or Manageable (Sepsis)  Outcome: Ongoing (interventions implemented as appropriate)

## 2018-01-03 LAB
BACTERIA SPEC AEROBE CULT: NORMAL
BACTERIA SPEC AEROBE CULT: NORMAL

## 2018-01-03 NOTE — PAYOR COMM NOTE
"[place] 1-2-18  [place] PHONE     Sai Adam (57 y.o. Male)     Date of Birth Social Security Number Address Home Phone MRN    1960  [address] [place] [id number] [phone] [id number]    Roman Catholic Marital Status          Church        Admission Date Admission Type Admitting Provider Attending Provider Department, Room/Bed    12/29/17 Emergency Fredy Underwood MD  [facility] 4C, 462/1    Discharge Date Discharge Disposition Discharge Destination        1/2/2018 Home or Self Care             Attending Provider: (none)    Allergies:  Tetanus Toxoids, Lamotrigine, Lisinopril, Methadone, Penicillins, Tramadol    Isolation:  None   Infection:  None   Code Status:  Prior    Ht:  170.2 cm (67\")   Wt:  98.2 kg (216 lb 8 oz)    Admission Cmt:  None   Principal Problem:  None                Active Insurance as of 12/29/2017     Primary Coverage     Payor Plan Insurance Group Employer/Plan Group    [employer] [id number]      Payor Plan Address Payor Plan Phone Number Effective From Effective To    [employer] [phone] 10/25/2017     [address] [place] [address]       Subscriber Name Subscriber Birth Date Member ID       SAI ADAM 1960 [id number]                 Emergency Contacts      (Rel.) Home Phone Work Phone Mobile Phone    Una Adam (Spouse) [phone] -- --               History & Physical      Tanner Hackett MD at 12/29/2017  7:55 PM              [facility] Medicine Services  HISTORY AND PHYSICAL    Date of Admission: 12/29/2017  Primary Care Physician: Jarod Tillman MD    Subjective     Chief Complaint: SOB cough    History of Present Illness  Patient is a 57-year-old white male past medical history of COPD type II diabetes coronary disease who presents to the emergency room with a three-day history of increasing shortness of breath cough " congestion.  When he presented he was found to be in respiratory distress with a CO2 of greater than 70.  He was started on BiPAP and has somewhat stabilized.  He needs to be admitted to the intensive care unit for further evaluation treatment.        Review of Systems   14 point review of systems negative except for as per HPI    Otherwise complete ROS reviewed and negative except as mentioned in the HPI.    Past Medical History:   Past Medical History:   Diagnosis Date   • Arthritis    • CAD (coronary artery disease)    • Colon cancer 11/2016   • COPD (chronic obstructive pulmonary disease)    • Diabetes mellitus    • HLD (hyperlipidemia)    • Hypertension      Past Surgical History:  Past Surgical History:   Procedure Laterality Date   • CARDIAC SURGERY     • CARPAL TUNNEL RELEASE     • COLON RESECTION WITH COLOSTOMY     • COLON SURGERY     • COLONOSCOPY  05/27/2016   • COLONOSCOPY N/A 10/12/2017    Procedure: COLONOSCOPY WITH ANESTHESIA;  Surgeon: Yessenia Gregg MD;  Location: [facility] ENDOSCOPY;  Service:    • CORONARY ANGIOPLASTY WITH STENT PLACEMENT     • HERNIA REPAIR     • ROTATOR CUFF REPAIR       Social History:  reports that he has been smoking.  He has a 40.00 pack-year smoking history. He has never used smokeless tobacco. He reports that he does not drink alcohol or use illicit drugs.    Family History: family history includes Arthritis in his father; Cancer in his brother; Diabetes in his brother; Heart disease in his father; Hepatitis in his brother; Hypertension in his brother and mother; Stroke in his mother.       Allergies:  Allergies   Allergen Reactions   • Tetanus Toxoids Shortness Of Breath and Swelling   • Lamotrigine      Unknown     • Lisinopril Hives   • Methadone Swelling   • Penicillins Rash   • Tramadol Rash     Medications:  Prior to Admission medications    Medication Sig Start Date End Date Taking? Authorizing Provider   albuterol (PROVENTIL) (2.5 MG/3ML) 0.083% nebulizer solution  Take 2.5 mg by nebulization Every 6 (Six) Hours As Needed for Wheezing.    Historical Provider, MD   atenolol (TENORMIN) 25 MG tablet Take 1 tablet by mouth Every 12 (Twelve) Hours. 11/28/17   Ernie Cornejo,    atropine 1 % ophthalmic solution Administer 1 drop to the right eye 2 (Two) Times a Day.    Historical Provider, MD   Cholecalciferol 37528 units tablet Take 4,000 Units by mouth Daily.    Historical Provider, MD   clopidogrel (PLAVIX) 75 MG tablet Take 75 mg by mouth Daily.    Historical Provider, MD   docusate sodium (COLACE) 100 MG capsule Take 100 mg by mouth 2 (Two) Times a Day As Needed.    Historical Provider, MD   dorzolamide (TRUSOPT) 2 % ophthalmic solution Administer 1 drop into the left eye 2 (Two) Times a Day.    Historical Provider, MD   HYDROcodone-acetaminophen (NORCO) 5-325 MG per tablet Take 1 tablet by mouth Every 6 (Six) Hours As Needed for Moderate Pain .    Historical Provider, MD   insulin aspart (novoLOG) 100 UNIT/ML injection Inject 40 Units under the skin 3 (Three) Times a Day Before Meals.    Historical Provider, MD   insulin glargine (LANTUS) 100 UNIT/ML injection Inject 160 Units under the skin Daily.    Historical Provider, MD   isosorbide mononitrate (IMDUR) 60 MG 24 hr tablet Take 90 mg by mouth Daily.    Historical Provider, MD   levothyroxine (SYNTHROID, LEVOTHROID) 50 MCG tablet Take 50 mcg by mouth Daily.    Historical Provider, MD   losartan (COZAAR) 100 MG tablet Take 100 mg by mouth Daily.    Historical Provider, MD   metFORMIN (GLUCOPHAGE) 500 MG tablet Take 1,000 mg by mouth 2 (Two) Times a Day With Meals.    Historical Provider, MD   mometasone (ASMANEX TWISTHALER) inhaler 220 mcg/inhalation Inhale 2 puffs 2 (Two) Times a Day.    Historical Provider, MD   NIFEdipine XL (PROCARDIA XL) 30 MG 24 hr tablet Take 30 mg by mouth Daily.    Historical Provider, MD   nitroglycerin (NITROSTAT) 0.4 MG SL tablet Place 0.4 mg under the tongue Every 5 (Five) Minutes As Needed  "for Chest Pain.    Historical Provider, MD   OLANZapine (zyPREXA) 10 MG tablet Take 1 tablet by mouth Every Night. 11/28/17   Ernie Cornejo DO   potassium chloride (K-DUR,KLOR-CON) 20 MEQ CR tablet Take 40 mEq by mouth 2 (Two) Times a Day.    Historical Provider, MD   PredniSONE (DELTASONE) 10 MG (48) tablet pack Take as directed 11/28/17   Ernie oCrnejo DO   pregabalin (LYRICA) 150 MG capsule Take 1 capsule by mouth Every 12 (Twelve) Hours. 11/28/17   Ernie Cornejo DO   raNITIdine (ZANTAC) 150 MG tablet Take 150 mg by mouth 2 (Two) Times a Day As Needed for Indigestion.    Historical Provider, MD   SAXagliptin (ONGLYZA) 5 MG tablet Take 5 mg by mouth Daily.    Historical Provider, MD   spironolactone (ALDACTONE) 25 MG tablet Take 25 mg by mouth Daily.    Historical Provider, MD   tamsulosin (FLOMAX) 0.4 MG capsule 24 hr capsule Take 1 capsule by mouth Every Night.    Historical Provider, MD   Tiotropium Bromide Monohydrate 2.5 MCG/ACT aerosol solution Inhale 2 puffs Daily.    Historical Provider, MD   venlafaxine XR (EFFEXOR-XR) 75 MG 24 hr capsule Take 225 mg by mouth Daily.    Historical Provider, MD     Objective     Vital Signs: BP (!) 176/103  Pulse (!) 122  Temp (!) 100.9 °F (38.3 °C)  Resp 17  Ht 170.2 cm (67\")  Wt 95.3 kg (210 lb)  SpO2 95%  BMI 32.89 kg/m2  Physical Exam  Constitutional: He is oriented to person, place, and time. He appears well-developed and well-nourished.   HENT:   Head: Normocephalic and atraumatic.   Nose congested   Eyes: EOM are normal. Pupils are equal, round, and reactive to light.   Neck: Normal range of motion. Neck supple.   Cardiovascular: Regular rhythm.    Moderate tachycardia   Pulmonary/Chest:   Significant distress with retractions and diffuse wheezes.   Abdominal: Soft. Bowel sounds are normal.   Colostomy noted   Musculoskeletal: Normal range of motion.   Neurological: He is alert and oriented to person, place, and time.   Skin: Skin is warm and " dry.   Psychiatric: He has a normal mood and affect. His behavior is normal.   Nursing note and vitals reviewed.      Results Reviewed:  Lab Results (last 24 hours)     Procedure Component Value Units Date/Time    Blood Gas, Arterial With Co-Ox [[id number]]  (Abnormal) Collected:  12/29/17 1815    Specimen:  Arterial Blood Updated:  12/29/17 1827     Site Right Radial     Jarad's Test Positive     pH, Arterial 7.297 (L) pH units      pCO2, Arterial 70.0 (C) mm Hg      pO2, Arterial 59.0 (L) mm Hg      HCO3, Arterial 34.2 (H) mmol/L      Base Excess, Arterial 4.8 (H) mmol/L      O2 Saturation, Arterial 89.5 (L) %      Hemoglobin, Blood Gas 16.2 g/dL      Hematocrit, Blood Gas 49.7 %      Oxyhemoglobin 84.6 (L) %      Methemoglobin 0.60 %      Carboxyhemoglobin 4.9 %      Temperature 37.0 C      Sodium, Arterial 136 mmol/L      Potassium, Arterial 4.2 mmol/L      Barometric Pressure for Blood Gas 758 mmHg      Modality Nasal Cannula     Flow Rate 6.0 lpm      Ventilator Mode NA     Notified [facility] [id number]     Notified By [id number]     Notified Time 12/29/2017 18:26     Collected by [id number]    CBC & Differential [[id number]] Collected:  12/29/17 1828    Specimen:  Blood Updated:  12/29/17 1841    Narrative:       The following orders were created for panel order CBC & Differential.  Procedure                               Abnormality         Status                     ---------                               -----------         ------                     CBC Auto Differential[[id number]]        Abnormal            Final result                 Please view results for these tests on the individual orders.    CBC Auto Differential [[id number]]  (Abnormal) Collected:  12/29/17 1828    Specimen:  Blood Updated:  12/29/17 1841     WBC 7.25 10*3/mm3      RBC 5.80 10*6/mm3      Hemoglobin 15.7 g/dL      Hematocrit 50.9 %      MCV 87.8 fL      MCH 27.1 (L) pg      MCHC 30.8 (L) g/dL      RDW 16.3 (H) %      RDW-SD 52.3 fl      MPV 9.3 fL       Platelets 101 (L) 10*3/mm3      Neutrophil % 64.5 %      Lymphocyte % 20.3 %      Monocyte % 12.0 %      Eosinophil % 0.0 %      Basophil % 0.6 %      Immature Grans % 2.6 %      Neutrophils, Absolute 4.68 10*3/mm3      Lymphocytes, Absolute 1.47 10*3/mm3      Monocytes, Absolute 0.87 10*3/mm3      Eosinophils, Absolute 0.00 10*3/mm3      Basophils, Absolute 0.04 10*3/mm3      Immature Grans, Absolute 0.19 (H) 10*3/mm3      nRBC 0.0 /100 WBC     Blood Culture - Blood, [[id number]] Collected:  12/29/17 1828    Specimen:  Blood from Arm, Left Updated:  12/29/17 1841    Influenza Antigen, Rapid - Swab, Nasopharynx [[id number]]  (Normal) Collected:  12/29/17 1823    Specimen:  Swab from Nasopharynx Updated:  12/29/17 1851     Influenza A Ag, EIA Negative     Influenza B Ag, EIA Negative    Narrative:         Recommend confirmation of negative results by viral culture or molecular assay.    Lactic Acid, Plasma [[id number]]  (Normal) Collected:  12/29/17 1828    Specimen:  Blood Updated:  12/29/17 1853     Lactate 1.6 mmol/L     Comprehensive Metabolic Panel [[id number]]  (Abnormal) Collected:  12/29/17 1828    Specimen:  Blood Updated:  12/29/17 1859     Glucose 473 (C) mg/dL      BUN 8 mg/dL      Creatinine 0.67 mg/dL      Sodium 139 mmol/L      Potassium 4.3 mmol/L      Chloride 94 (L) mmol/L      CO2 35.0 (H) mmol/L      Calcium 8.9 mg/dL      Total Protein 7.1 g/dL      Albumin 4.00 g/dL      ALT (SGPT) 27 U/L      AST (SGOT) 34 U/L      Alkaline Phosphatase 127 (H) U/L      Total Bilirubin 0.8 mg/dL      eGFR Non African Amer 121 mL/min/1.73      Globulin 3.1 gm/dL      A/G Ratio 1.3 g/dL      BUN/Creatinine Ratio 11.9     Anion Gap 10.0 mmol/L     Blood Culture - Blood, [[id number]] Collected:  12/29/17 1855    Specimen:  Blood from Arm, Right Updated:  12/29/17 1937    Blood Gas, Arterial [[id number]]  (Abnormal) Collected:  12/29/17 1945    Specimen:  Arterial Blood Updated:  12/29/17 1952     Site Right Radial      Jarad's Test Positive     pH, Arterial 7.313 (L) pH units      pCO2, Arterial 69.8 (C) mm Hg      pO2, Arterial 81.0 (L) mm Hg      HCO3, Arterial 35.3 (H) mmol/L      Base Excess, Arterial 6.0 (H) mmol/L      O2 Saturation, Arterial 96.0 %      Temperature 37.0 C      Barometric Pressure for Blood Gas 757 mmHg      Modality BiPap     FIO2 40 %      Ventilator Mode NA     Set [facility] Resp Rate 12.0     IPAP 18     EPAP 8     Notified Who Dr. Carrero     Notified By [id number]     Notified Time 12/29/2017 19:54     Collected by [id number]        Imaging Results (last 24 hours)     Procedure Component Value Units Date/Time    XR Chest 1 View [[id number]] Collected:  12/29/17 1854     Updated:  12/29/17 1859    Narrative:       EXAMINATION: XR CHEST 1 VW-. 12/29/2017 6:54 PM CST     CHEST, ONE VIEW:     HISTORY: Shortness of air     COMPARISON: 11/24/2017     A single frontal chest radiograph was obtained.     FINDINGS:     Median sternotomy changes noted. Right-sided infusion catheter observed.     The lungs are clear without acute infiltrates.     The heart is normal in size, without heart failure.     The bony structures are intact.     Radiographically, chest is likely unchanged.                                     Impression:       1. No acute cardiopulmonary process.     This report was finalized on 12/29/2017 18:56 by Dr. Frandy Vizcarra MD.        I have personally reviewed and interpreted the radiology studies and ECG obtained at time of admission.     Assessment / Plan     Assessment:   Hospital Problem List     COPD exacerbation      1.  Acute on chronic respiratory failure with hypoxia and hypercapnia: Plan is to admit to ICU BiPAP broad-spectrum antibiotics with Rocephin and Zithromax 2 nebs every 4 hours and when necessary Solu-Medrol 80 mg IV every 6 hours mucolytic's and monitor closely with and provided supplemental O2.  2.  COPD with acute exacerbation plans as per above  3.  CAD stable  4.  Type II diabetes we  will resume home insulin medications also will give aggressive high-dose sliding scale insulin since he is on a high-dose of insulin regularly.  And steroids will also probably exacerbated his diabetes.  Also check hemoglobin A1c.        Code Status: Conditional no CPR do not intubate per patient     I discussed the patients findings and my recommendations with the patient his son is his healthcare POA.    Estimated length of stay 3-4 days    Tanner Hackett MD   12/29/17   7:56 PM             Electronically signed by Tanner Hackett MD at 12/29/2017  8:11 PM           Discharge Summary      MARYLOU Wilson at 1/2/2018 12:05 PM     Attestation signed by Fredy Underwood MD at 1/2/2018  5:23 PM        I personally evaluated and examined the patient in conjunction with MARYLOU Whyte and agree with the assessment, treatment plan, and disposition of the patient as recorded by her. My history, exam, and further recommendations are: Patient is been very eager for discharge on the course of the past couple of days.  He reports that his breathing continues to improve.  He has CPAP which he will use on an outpatient basis.  I talked with him extensively on the importance of tobacco cessation given his underlying lung disease and cannot emphasize enough the importance of complete tobacco cessation in this setting.  He will complete a course of doxycycline, prednisone taper, nebulizer treatments, in addition to COPD maintenance medication of Symbicort on an outpatient basis.  He will have close outpatient follow-up.          Fredy Underwood MD  01/02/18  5:20 PM                                   [facility] Medicine Services  DISCHARGE SUMMARY       Date of Admission: 12/29/2017  Date of Discharge:  1/2/2018  Primary Care Physician: Jarod Tillman MD    Presenting Problem/History of Present Illness:  COPD exacerbation [J44.1]     Final Discharge Diagnoses:  Hospital  Problem List     COPD exacerbation      1.  Acute on chronic respiratory failure with hypoxia and hypercapnia, currently on BiPAP  2.  Chronic obstructive pulmonary disease with acute exacerbation  3.  Diabetes mellitus, type II, A1C 11.3 on insulin therapy  4.  Acute febrile illness  5.  Thrombocytopenia, 101  6.  Coronary artery disease status post stent placement  7.  History of colon cancer with colon resection and colostomy  8.  Essential hypertension  9.  Hyperlipidemia  10.  Hyperkalemia, Resolved    Consults: None    Procedures Performed: None    Pertinent Test Results:   Lab Results (last 24 hours)     Procedure Component Value Units Date/Time    POC Glucose Once [[id number]]  (Abnormal) Collected:  01/01/18 1558    Specimen:  Blood Updated:  01/01/18 1636     Glucose 258 (H) mg/dL       : [id number] Georges Carvalho ID: [id number]       Blood Culture - Blood, [[id number]]  (Normal) Collected:  12/29/17 1828    Specimen:  Blood from Arm, Left Updated:  01/01/18 1846     Blood Culture No growth at 3 days    Blood Culture - Blood, [[id number]]  (Normal) Collected:  12/29/17 1855    Specimen:  Blood from Arm, Right Updated:  01/01/18 1946     Blood Culture No growth at 3 days    POC Glucose Once [[id number]]  (Abnormal) Collected:  01/01/18 2328    Specimen:  Blood Updated:  01/01/18 2359     Glucose 268 (H) mg/dL       : [id number] Rodger VelezeccaMeter ID: [id number]       Basic Metabolic Panel [[id number]]  (Abnormal) Collected:  01/02/18 0432    Specimen:  Blood Updated:  01/02/18 0613     Glucose 189 (H) mg/dL      BUN 20 mg/dL      Creatinine 0.63 mg/dL      Sodium 140 mmol/L      Potassium 4.1 mmol/L      Chloride 96 (L) mmol/L      CO2 35.0 (H) mmol/L      Calcium 8.9 mg/dL      eGFR Non African Amer 130 mL/min/1.73      BUN/Creatinine Ratio 31.7 (H)     Anion Gap 9.0 mmol/L     Narrative:       GFR Normal >60  Chronic Kidney Disease <60  Kidney Failure <15    POC Glucose Once [[id number]]   (Abnormal) Collected:  01/02/18 0717    Specimen:  Blood Updated:  01/02/18 0730     Glucose 141 (H) mg/dL       : [id number] Justus Shahter ID: [id number]       POC Glucose Once [[id number]]  (Abnormal) Collected:  01/02/18 1124    Specimen:  Blood Updated:  01/02/18 1135     Glucose 215 (H) mg/dL       : [id number] Justus Shahter ID: [id number]           XR Chest 1 View [[id number]] Not Reviewed        Order Status: Completed Collected: 12/29/17 1854        Updated: 12/29/17 1859       Narrative:         EXAMINATION: XR CHEST 1 VW-. 12/29/2017 6:54 PM CST     CHEST, ONE VIEW:     HISTORY: Shortness of air     COMPARISON: 11/24/2017     A single frontal chest radiograph was obtained.     FINDINGS:     Median sternotomy changes noted. Right-sided infusion catheter observed.     The lungs are clear without acute infiltrates.     The heart is normal in size, without heart failure.     The bony structures are intact.     Radiographically, chest is likely unchanged.                                        Impression:         1. No acute cardiopulmonary process.     This report was finalized on 12/29/2017 18:56 by Dr. Frandy Vizcarra MD.      Hospital Course:  The patient is a 57 y.o. male who presented to [facility] with shortness of breath.  The patient presented to the emergency department in respiratory distress and was found to have a CO2 greater than 70.  He was started on BiPAP therapy and improved greatly.  He is admitted for further evaluation.  He was started on IV antibiotics with azithromycin and Rocephin as well as IV steroids.  He was started on DuoNeb's and antibiotics as well.  He has improved well.  He does use BiPAP at home for his chronic respiratory failure.  Per his home medications he was taking 160 units of Levemir twice a day.  When this was restarted his sugars bottomed out to 77.  He states that he would eat whatever he wanted to at home and that is why his insulin  "regimen is so high.  His A1c is 11.3.  He has been instructed on proper diet and diabetes maintenance.  He has been noncompliant throughout this hospitalization not taking his medications are insulin when advised.  He also did not follow instructions per his discharge last time and resumed medications that had been altered.  He is back at his baseline with BiPAP at night and at 3 L during the day.  He has ambulated without difficulty.  He is stable and good condition for discharge home today.  Medication revisions have been educated to the patient again.  [facility]     Condition on Discharge:  Stable    Physical Exam on Discharge:  /78 (BP Location: Right arm, Patient Position: Lying)  Pulse 68  Temp 97 °F (36.1 °C) (Temporal Artery )   Resp 18  Ht 170.2 cm (67\")  Wt 98.2 kg (216 lb 8 oz)  SpO2 90%  BMI 33.91 kg/m2     Physical Exam  Constitutional: He is oriented to person, place, and time. He appears well-developed and well-nourished.   obese   HENT:   Head: Normocephalic and atraumatic.   Eyes: Conjunctivae and EOM are normal. Pupils are equal, round, and reactive to light.   Neck: Neck supple. No JVD present. No thyromegaly present.   Cardiovascular: Normal rate, regular rhythm, normal heart sounds and intact distal pulses.  Exam reveals no gallop and no friction rub.    No murmur heard.  SR 77   Pulmonary/Chest: Effort normal. No respiratory distress. He has wheezes. He has no rales. He exhibits no tenderness.   Abdominal: Soft. Bowel sounds are normal. He exhibits no distension. There is no tenderness. There is no rebound and no guarding.   Colostomy present   Musculoskeletal: Normal range of motion. He exhibits no edema, tenderness or deformity.   Lymphadenopathy:     He has no cervical adenopathy.   Neurological: He is alert and oriented to person, place, and time. He displays normal reflexes. No cranial nerve deficit. He exhibits normal muscle tone.   Skin: Skin " is warm and dry. No rash noted.   Psychiatric: He has a normal mood and affect. His behavior is normal. Judgment and thought content normal.   Vitals reviewed.    Discharge Disposition:  Home or Self Care    Discharge Medications:   Sai Adam   Home Medication Instructions EDER:[id number]    Printed on:01/02/18 0349   Medication Information                      albuterol (PROVENTIL) (2.5 MG/3ML) 0.083% nebulizer solution  Take 2.5 mg by nebulization Every 6 (Six) Hours As Needed for Wheezing.             atenolol (TENORMIN) 25 MG tablet  Take 1 tablet by mouth Every 12 (Twelve) Hours.             atropine 1 % ophthalmic solution  Administer 1 drop to the right eye 2 (Two) Times a Day.             budesonide-formoterol (SYMBICORT) 160-4.5 MCG/ACT inhaler  Inhale 2 puffs 2 (Two) Times a Day.             Cholecalciferol (VITAMIN D) 2000 units tablet  Take 4,000 Units by mouth Daily.             clopidogrel (PLAVIX) 75 MG tablet  Take 75 mg by mouth Daily.             cyclobenzaprine (FLEXERIL) 10 MG tablet  Take 5 mg by mouth 3 (Three) Times a Day As Needed for Muscle Spasms.             docusate sodium (COLACE) 100 MG capsule  Take 100 mg by mouth 2 (Two) Times a Day As Needed for Constipation.             dorzolamide (TRUSOPT) 2 % ophthalmic solution  Administer 1 drop into the left eye 2 (Two) Times a Day.             doxazosin (CARDURA) 4 MG tablet  Take 4 mg by mouth Every Night.             doxycycline (MONODOX) 100 MG capsule  Take 1 capsule by mouth Every 12 (Twelve) Hours for 11 doses. Indications: Upper Respiratory Tract Infection             guaiFENesin (MUCINEX) 600 MG 12 hr tablet  Take 2 tablets by mouth Every 12 (Twelve) Hours.             HYDROcodone-acetaminophen (NORCO) 5-325 MG per tablet  Take 1 tablet by mouth Every 6 (Six) Hours As Needed for Moderate Pain .             insulin aspart (novoLOG) 100 UNIT/ML injection  Inject 40 Units under the skin 3 (Three) Times a Day Before Meals.              insulin glargine (LANTUS) 100 UNIT/ML injection  Inject 160 Units under the skin Daily.             isosorbide mononitrate (IMDUR) 60 MG 24 hr tablet  Take 90 mg by mouth Daily.             levothyroxine (SYNTHROID, LEVOTHROID) 50 MCG tablet  Take 50 mcg by mouth Daily.             losartan (COZAAR) 100 MG tablet  Take 100 mg by mouth Daily.             metFORMIN (GLUCOPHAGE) 500 MG tablet  Take 1,000 mg by mouth 2 (Two) Times a Day With Meals.             mometasone (ASMANEX TWISTHALER) inhaler 220 mcg/inhalation  Inhale 2 puffs 2 (Two) Times a Day.             NIFEdipine XL (PROCARDIA XL) 30 MG 24 hr tablet  Take 30 mg by mouth Daily.             nitroglycerin (NITROSTAT) 0.4 MG SL tablet  Place 0.4 mg under the tongue Every 5 (Five) Minutes As Needed for Chest Pain.             OLANZapine (zyPREXA) 10 MG tablet  Take 1 tablet by mouth Every Night.             potassium chloride (K-DUR,KLOR-CON) 20 MEQ CR tablet  Take 40 mEq by mouth 2 (Two) Times a Day.             predniSONE (DELTASONE) 20 MG tablet  Take 1 tablet by mouth 2 (Two) Times a Day With Meals. Take 20 mg BID for 3 days, 20 mg daily x 3 days, 10 mg daily for 3 days then stop             pregabalin (LYRICA) 150 MG capsule  Take 1 capsule by mouth Every 12 (Twelve) Hours.             raNITIdine (ZANTAC) 150 MG tablet  Take 150 mg by mouth 2 (Two) Times a Day As Needed for Indigestion.             SAXagliptin (ONGLYZA) 5 MG tablet  Take 5 mg by mouth Daily.             spironolactone (ALDACTONE) 25 MG tablet  Take 25 mg by mouth Daily.             tamsulosin (FLOMAX) 0.4 MG capsule 24 hr capsule  Take 1 capsule by mouth Every Night.             Tiotropium Bromide Monohydrate 2.5 MCG/ACT aerosol solution  Inhale 2 puffs Daily.             venlafaxine XR (EFFEXOR-XR) 75 MG 24 hr capsule  Take 225 mg by mouth Daily.               Discharge Diet:   Diet Instructions     Diet: Consistent Carbohydrate, Cardiac; Thin       Discharge Diet:   Consistent  Carbohydrate  Cardiac      Fluid Consistency:  Thin               Activity at Discharge:   Activity Instructions     Activity as Tolerated                   Follow-up Appointments:   [facility]  No future appointments.    Test Results Pending at Discharge: None    MARYLOU Wilson  01/02/18  12:05 PM    Time: 35 minutes           Electronically signed by Fredy Underwood MD at 1/2/2018  5:23 PM

## 2018-01-18 ENCOUNTER — HOSPITAL ENCOUNTER (INPATIENT)
Facility: HOSPITAL | Age: 58
LOS: 5 days | Discharge: HOME HEALTH | End: 2018-01-23
Attending: FAMILY MEDICINE | Admitting: FAMILY MEDICINE

## 2018-01-18 ENCOUNTER — APPOINTMENT (OUTPATIENT)
Dept: GENERAL RADIOLOGY | Facility: HOSPITAL | Age: 58
End: 2018-01-18

## 2018-01-18 DIAGNOSIS — R06.03 RESPIRATORY DISTRESS: ICD-10-CM

## 2018-01-18 DIAGNOSIS — Z74.09 IMPAIRED MOBILITY AND ADLS: ICD-10-CM

## 2018-01-18 DIAGNOSIS — R50.9 FEVER, UNSPECIFIED FEVER CAUSE: ICD-10-CM

## 2018-01-18 DIAGNOSIS — A41.9 SEVERE SEPSIS (HCC): ICD-10-CM

## 2018-01-18 DIAGNOSIS — R09.02 HYPOXIA: ICD-10-CM

## 2018-01-18 DIAGNOSIS — J96.12 CHRONIC RESPIRATORY ACIDOSIS (HCC): ICD-10-CM

## 2018-01-18 DIAGNOSIS — J18.9 PNEUMONIA OF RIGHT MIDDLE LOBE DUE TO INFECTIOUS ORGANISM: Primary | ICD-10-CM

## 2018-01-18 DIAGNOSIS — R00.0 TACHYCARDIA: ICD-10-CM

## 2018-01-18 DIAGNOSIS — R65.20 SEVERE SEPSIS (HCC): ICD-10-CM

## 2018-01-18 DIAGNOSIS — Z78.9 IMPAIRED MOBILITY AND ADLS: ICD-10-CM

## 2018-01-18 DIAGNOSIS — J44.1 COPD EXACERBATION (HCC): ICD-10-CM

## 2018-01-18 LAB
ALBUMIN SERPL-MCNC: 3.6 G/DL (ref 3.5–5)
ALBUMIN/GLOB SERPL: 1.3 G/DL (ref 1.1–2.5)
ALP SERPL-CCNC: 94 U/L (ref 24–120)
ALT SERPL W P-5'-P-CCNC: 31 U/L (ref 0–54)
ANION GAP SERPL CALCULATED.3IONS-SCNC: 10 MMOL/L (ref 4–13)
ARTERIAL PATENCY WRIST A: POSITIVE
AST SERPL-CCNC: 33 U/L (ref 7–45)
ATMOSPHERIC PRESS: 759 MMHG
BASE EXCESS BLDA CALC-SCNC: 5.4 MMOL/L (ref 0–2)
BASOPHILS # BLD AUTO: 0.07 10*3/MM3 (ref 0–0.2)
BASOPHILS NFR BLD AUTO: 0.4 % (ref 0–2)
BDY SITE: ABNORMAL
BILIRUB SERPL-MCNC: 1.2 MG/DL (ref 0.1–1)
BODY TEMPERATURE: 37 C
BUN BLD-MCNC: 8 MG/DL (ref 5–21)
BUN/CREAT SERPL: 9.8 (ref 7–25)
CALCIUM SPEC-SCNC: 8.8 MG/DL (ref 8.4–10.4)
CHLORIDE SERPL-SCNC: 99 MMOL/L (ref 98–110)
CO2 SERPL-SCNC: 34 MMOL/L (ref 24–31)
CREAT BLD-MCNC: 0.82 MG/DL (ref 0.5–1.4)
D-LACTATE SERPL-SCNC: 2.1 MMOL/L (ref 0.5–2)
DEPRECATED RDW RBC AUTO: 50.6 FL (ref 40–54)
EOSINOPHIL # BLD AUTO: 0 10*3/MM3 (ref 0–0.7)
EOSINOPHIL NFR BLD AUTO: 0 % (ref 0–4)
ERYTHROCYTE [DISTWIDTH] IN BLOOD BY AUTOMATED COUNT: 16.3 % (ref 12–15)
FLUAV AG NPH QL: NEGATIVE
FLUBV AG NPH QL IA: NEGATIVE
GAS FLOW AIRWAY: 15 LPM
GFR SERPL CREATININE-BSD FRML MDRD: 96 ML/MIN/1.73
GLOBULIN UR ELPH-MCNC: 2.8 GM/DL
GLUCOSE BLD-MCNC: 278 MG/DL (ref 70–100)
GLUCOSE BLDC GLUCOMTR-MCNC: 294 MG/DL (ref 70–130)
HCO3 BLDA-SCNC: 34.2 MMOL/L (ref 20–26)
HCT VFR BLD AUTO: 49.9 % (ref 40–52)
HGB BLD-MCNC: 15.5 G/DL (ref 14–18)
HOROWITZ INDEX BLD+IHG-RTO: 100 %
IMM GRANULOCYTES # BLD: 0.35 10*3/MM3 (ref 0–0.03)
IMM GRANULOCYTES NFR BLD: 1.8 % (ref 0–5)
INR PPP: 1.29 (ref 0.91–1.09)
LYMPHOCYTES # BLD AUTO: 1.91 10*3/MM3 (ref 0.72–4.86)
LYMPHOCYTES NFR BLD AUTO: 9.7 % (ref 15–45)
Lab: ABNORMAL
Lab: ABNORMAL
MCH RBC QN AUTO: 26.9 PG (ref 28–32)
MCHC RBC AUTO-ENTMCNC: 31.1 G/DL (ref 33–36)
MCV RBC AUTO: 86.5 FL (ref 82–95)
MODALITY: ABNORMAL
MONOCYTES # BLD AUTO: 1.25 10*3/MM3 (ref 0.19–1.3)
MONOCYTES NFR BLD AUTO: 6.4 % (ref 4–12)
NEUTROPHILS # BLD AUTO: 16.07 10*3/MM3 (ref 1.87–8.4)
NEUTROPHILS NFR BLD AUTO: 81.7 % (ref 39–78)
NOTIFIED BY: ABNORMAL
NOTIFIED WHO: ABNORMAL
NRBC BLD MANUAL-RTO: 0 /100 WBC (ref 0–0)
NT-PROBNP SERPL-MCNC: 936 PG/ML (ref 0–900)
PCO2 BLDA: 66.6 MM HG (ref 35–45)
PH BLDA: 7.32 PH UNITS (ref 7.35–7.45)
PLATELET # BLD AUTO: 121 10*3/MM3 (ref 130–400)
PMV BLD AUTO: 8.6 FL (ref 6–12)
PO2 BLDA: 41.1 MM HG (ref 83–108)
POTASSIUM BLD-SCNC: 4 MMOL/L (ref 3.5–5.3)
PROT SERPL-MCNC: 6.4 G/DL (ref 6.3–8.7)
PROTHROMBIN TIME: 16.5 SECONDS (ref 11.9–14.6)
RBC # BLD AUTO: 5.77 10*6/MM3 (ref 4.8–5.9)
SAO2 % BLDCOA: 75.5 % (ref 94–99)
SODIUM BLD-SCNC: 143 MMOL/L (ref 135–145)
TROPONIN I SERPL-MCNC: <0.012 NG/ML (ref 0–0.03)
VENTILATOR MODE: ABNORMAL
WBC NRBC COR # BLD: 19.65 10*3/MM3 (ref 4.8–10.8)

## 2018-01-18 PROCEDURE — 94799 UNLISTED PULMONARY SVC/PX: CPT

## 2018-01-18 PROCEDURE — 80053 COMPREHEN METABOLIC PANEL: CPT | Performed by: FAMILY MEDICINE

## 2018-01-18 PROCEDURE — 25010000002 METHYLPREDNISOLONE PER 125 MG: Performed by: FAMILY MEDICINE

## 2018-01-18 PROCEDURE — 99285 EMERGENCY DEPT VISIT HI MDM: CPT

## 2018-01-18 PROCEDURE — 82962 GLUCOSE BLOOD TEST: CPT

## 2018-01-18 PROCEDURE — 83880 ASSAY OF NATRIURETIC PEPTIDE: CPT | Performed by: FAMILY MEDICINE

## 2018-01-18 PROCEDURE — 94640 AIRWAY INHALATION TREATMENT: CPT

## 2018-01-18 PROCEDURE — 87040 BLOOD CULTURE FOR BACTERIA: CPT | Performed by: FAMILY MEDICINE

## 2018-01-18 PROCEDURE — 83605 ASSAY OF LACTIC ACID: CPT | Performed by: FAMILY MEDICINE

## 2018-01-18 PROCEDURE — 71045 X-RAY EXAM CHEST 1 VIEW: CPT

## 2018-01-18 PROCEDURE — 94660 CPAP INITIATION&MGMT: CPT

## 2018-01-18 PROCEDURE — 25010000002 LEVOFLOXACIN PER 250 MG: Performed by: EMERGENCY MEDICINE

## 2018-01-18 PROCEDURE — 84484 ASSAY OF TROPONIN QUANT: CPT | Performed by: FAMILY MEDICINE

## 2018-01-18 PROCEDURE — 85025 COMPLETE CBC W/AUTO DIFF WBC: CPT | Performed by: FAMILY MEDICINE

## 2018-01-18 PROCEDURE — 82803 BLOOD GASES ANY COMBINATION: CPT

## 2018-01-18 PROCEDURE — 5A09457 ASSISTANCE WITH RESPIRATORY VENTILATION, 24-96 CONSECUTIVE HOURS, CONTINUOUS POSITIVE AIRWAY PRESSURE: ICD-10-PCS | Performed by: EMERGENCY MEDICINE

## 2018-01-18 PROCEDURE — 25010000002 VANCOMYCIN PER 500 MG: Performed by: EMERGENCY MEDICINE

## 2018-01-18 PROCEDURE — 87804 INFLUENZA ASSAY W/OPTIC: CPT | Performed by: EMERGENCY MEDICINE

## 2018-01-18 PROCEDURE — 93005 ELECTROCARDIOGRAM TRACING: CPT

## 2018-01-18 PROCEDURE — 94644 CONT INHLJ TX 1ST HOUR: CPT

## 2018-01-18 PROCEDURE — 93010 ELECTROCARDIOGRAM REPORT: CPT | Performed by: INTERNAL MEDICINE

## 2018-01-18 PROCEDURE — 36600 WITHDRAWAL OF ARTERIAL BLOOD: CPT

## 2018-01-18 PROCEDURE — 85610 PROTHROMBIN TIME: CPT | Performed by: FAMILY MEDICINE

## 2018-01-18 RX ORDER — METHYLPREDNISOLONE SODIUM SUCCINATE 125 MG/2ML
125 INJECTION, POWDER, LYOPHILIZED, FOR SOLUTION INTRAMUSCULAR; INTRAVENOUS ONCE
Status: COMPLETED | OUTPATIENT
Start: 2018-01-18 | End: 2018-01-18

## 2018-01-18 RX ORDER — ALBUTEROL SULFATE 2.5 MG/3ML
10 SOLUTION RESPIRATORY (INHALATION) CONTINUOUS
Status: DISPENSED | OUTPATIENT
Start: 2018-01-18 | End: 2018-01-18

## 2018-01-18 RX ORDER — SODIUM CHLORIDE 0.9 % (FLUSH) 0.9 %
10 SYRINGE (ML) INJECTION AS NEEDED
Status: DISCONTINUED | OUTPATIENT
Start: 2018-01-18 | End: 2018-01-23 | Stop reason: HOSPADM

## 2018-01-18 RX ORDER — LEVOFLOXACIN 5 MG/ML
750 INJECTION, SOLUTION INTRAVENOUS ONCE
Status: COMPLETED | OUTPATIENT
Start: 2018-01-18 | End: 2018-01-19

## 2018-01-18 RX ADMIN — ALBUTEROL SULFATE 10 MG: 2.5 SOLUTION RESPIRATORY (INHALATION) at 22:33

## 2018-01-18 RX ADMIN — AZTREONAM 1000 MG: 1 INJECTION, POWDER, LYOPHILIZED, FOR SOLUTION INTRAMUSCULAR; INTRAVENOUS at 23:31

## 2018-01-18 RX ADMIN — METHYLPREDNISOLONE SODIUM SUCCINATE 125 MG: 125 INJECTION, POWDER, FOR SOLUTION INTRAMUSCULAR; INTRAVENOUS at 22:50

## 2018-01-18 RX ADMIN — VANCOMYCIN HYDROCHLORIDE 2000 MG: 1 INJECTION, POWDER, LYOPHILIZED, FOR SOLUTION INTRAVENOUS at 23:30

## 2018-01-18 RX ADMIN — SODIUM CHLORIDE 1000 ML: 9 INJECTION, SOLUTION INTRAVENOUS at 22:57

## 2018-01-18 RX ADMIN — LEVOFLOXACIN 750 MG: 5 INJECTION, SOLUTION INTRAVENOUS at 22:59

## 2018-01-18 RX ADMIN — ACETAMINOPHEN 650 MG: 325 SUPPOSITORY RECTAL at 23:28

## 2018-01-19 ENCOUNTER — APPOINTMENT (OUTPATIENT)
Dept: GENERAL RADIOLOGY | Facility: HOSPITAL | Age: 58
End: 2018-01-19

## 2018-01-19 LAB
ALBUMIN SERPL-MCNC: 3 G/DL (ref 3.5–5)
ALBUMIN/GLOB SERPL: 1.2 G/DL (ref 1.1–2.5)
ALP SERPL-CCNC: 71 U/L (ref 24–120)
ALT SERPL W P-5'-P-CCNC: 30 U/L (ref 0–54)
ANION GAP SERPL CALCULATED.3IONS-SCNC: 8 MMOL/L (ref 4–13)
ARTERIAL PATENCY WRIST A: POSITIVE
ARTERIAL PATENCY WRIST A: POSITIVE
AST SERPL-CCNC: 30 U/L (ref 7–45)
ATMOSPHERIC PRESS: 757 MMHG
ATMOSPHERIC PRESS: 758 MMHG
BASE EXCESS BLDA CALC-SCNC: 0.2 MMOL/L (ref 0–2)
BASE EXCESS BLDA CALC-SCNC: 3.4 MMOL/L (ref 0–2)
BDY SITE: ABNORMAL
BDY SITE: ABNORMAL
BILIRUB SERPL-MCNC: 1.3 MG/DL (ref 0.1–1)
BILIRUB UR QL STRIP: NEGATIVE
BODY TEMPERATURE: 37 C
BODY TEMPERATURE: 37 C
BUN BLD-MCNC: 8 MG/DL (ref 5–21)
BUN/CREAT SERPL: 12.3 (ref 7–25)
CALCIUM SPEC-SCNC: 7.9 MG/DL (ref 8.4–10.4)
CHLORIDE SERPL-SCNC: 104 MMOL/L (ref 98–110)
CLARITY UR: CLEAR
CO2 SERPL-SCNC: 29 MMOL/L (ref 24–31)
COLOR UR: YELLOW
CREAT BLD-MCNC: 0.65 MG/DL (ref 0.5–1.4)
D-LACTATE SERPL-SCNC: 1.5 MMOL/L (ref 0.5–2)
DEPRECATED RDW RBC AUTO: 49.7 FL (ref 40–54)
EPAP: 10
EPAP: 10
ERYTHROCYTE [DISTWIDTH] IN BLOOD BY AUTOMATED COUNT: 16 % (ref 12–15)
GFR SERPL CREATININE-BSD FRML MDRD: 126 ML/MIN/1.73
GLOBULIN UR ELPH-MCNC: 2.5 GM/DL
GLUCOSE BLD-MCNC: 289 MG/DL (ref 70–100)
GLUCOSE BLDC GLUCOMTR-MCNC: 273 MG/DL (ref 70–130)
GLUCOSE BLDC GLUCOMTR-MCNC: 280 MG/DL (ref 70–130)
GLUCOSE BLDC GLUCOMTR-MCNC: 283 MG/DL (ref 70–130)
GLUCOSE BLDC GLUCOMTR-MCNC: 298 MG/DL (ref 70–130)
GLUCOSE BLDC GLUCOMTR-MCNC: 318 MG/DL (ref 70–130)
GLUCOSE BLDC GLUCOMTR-MCNC: 406 MG/DL (ref 70–130)
GLUCOSE UR STRIP-MCNC: ABNORMAL MG/DL
HCO3 BLDA-SCNC: 29 MMOL/L (ref 20–26)
HCO3 BLDA-SCNC: 31.3 MMOL/L (ref 20–26)
HCT VFR BLD AUTO: 43.5 % (ref 40–52)
HGB BLD-MCNC: 14 G/DL (ref 14–18)
HGB UR QL STRIP.AUTO: NEGATIVE
HOLD SPECIMEN: NORMAL
HOROWITZ INDEX BLD+IHG-RTO: 70 %
HOROWITZ INDEX BLD+IHG-RTO: 70 %
IPAP: 20
IPAP: 20
KETONES UR QL STRIP: ABNORMAL
LEUKOCYTE ESTERASE UR QL STRIP.AUTO: NEGATIVE
LYMPHOCYTES # BLD MANUAL: 1.38 10*3/MM3 (ref 0.72–4.86)
LYMPHOCYTES NFR BLD MANUAL: 2 % (ref 4–12)
LYMPHOCYTES NFR BLD MANUAL: 8 % (ref 15–45)
Lab: ABNORMAL
Lab: ABNORMAL
MAGNESIUM SERPL-MCNC: 1.2 MG/DL (ref 1.4–2.2)
MCH RBC QN AUTO: 27.7 PG (ref 28–32)
MCHC RBC AUTO-ENTMCNC: 32.2 G/DL (ref 33–36)
MCV RBC AUTO: 86.1 FL (ref 82–95)
MODALITY: ABNORMAL
MODALITY: ABNORMAL
MONOCYTES # BLD AUTO: 0.34 10*3/MM3 (ref 0.19–1.3)
NEUTROPHILS # BLD AUTO: 14.65 10*3/MM3 (ref 1.87–8.4)
NEUTROPHILS NFR BLD MANUAL: 64 % (ref 39–78)
NEUTS BAND NFR BLD MANUAL: 21 % (ref 0–10)
NITRITE UR QL STRIP: NEGATIVE
PCO2 BLDA: 60.7 MM HG (ref 35–45)
PCO2 BLDA: 63.6 MM HG (ref 35–45)
PH BLDA: 7.27 PH UNITS (ref 7.35–7.45)
PH BLDA: 7.32 PH UNITS (ref 7.35–7.45)
PH UR STRIP.AUTO: 5.5 [PH] (ref 5–8)
PHOSPHATE SERPL-MCNC: 2.5 MG/DL (ref 2.5–4.5)
PLATELET # BLD AUTO: 100 10*3/MM3 (ref 130–400)
PMV BLD AUTO: 9.4 FL (ref 6–12)
PO2 BLDA: 74.8 MM HG (ref 83–108)
PO2 BLDA: 78 MM HG (ref 83–108)
POTASSIUM BLD-SCNC: 4.2 MMOL/L (ref 3.5–5.3)
PROCALCITONIN SERPL-MCNC: 15.61 NG/ML
PROT SERPL-MCNC: 5.5 G/DL (ref 6.3–8.7)
PROT UR QL STRIP: NEGATIVE
RBC # BLD AUTO: 5.05 10*6/MM3 (ref 4.8–5.9)
RBC MORPH BLD: NORMAL
SAO2 % BLDCOA: 95.2 % (ref 94–99)
SAO2 % BLDCOA: 95.3 % (ref 94–99)
SET MECH RESP RATE: 20
SET MECH RESP RATE: 20
SMALL PLATELETS BLD QL SMEAR: ABNORMAL
SODIUM BLD-SCNC: 141 MMOL/L (ref 135–145)
SP GR UR STRIP: 1.02 (ref 1–1.03)
TROPONIN I SERPL-MCNC: <0.012 NG/ML (ref 0–0.03)
UROBILINOGEN UR QL STRIP: ABNORMAL
VARIANT LYMPHS NFR BLD MANUAL: 5 % (ref 0–5)
VENTILATOR MODE: ABNORMAL
VENTILATOR MODE: ABNORMAL
WBC MORPH BLD: NORMAL
WBC NRBC COR # BLD: 17.24 10*3/MM3 (ref 4.8–10.8)

## 2018-01-19 PROCEDURE — 94799 UNLISTED PULMONARY SVC/PX: CPT

## 2018-01-19 PROCEDURE — 36600 WITHDRAWAL OF ARTERIAL BLOOD: CPT

## 2018-01-19 PROCEDURE — 83605 ASSAY OF LACTIC ACID: CPT | Performed by: FAMILY MEDICINE

## 2018-01-19 PROCEDURE — 94640 AIRWAY INHALATION TREATMENT: CPT

## 2018-01-19 PROCEDURE — 63710000001 INSULIN DETEMIR PER 5 UNITS: Performed by: FAMILY MEDICINE

## 2018-01-19 PROCEDURE — 25010000002 HEPARIN (PORCINE) PER 1000 UNITS: Performed by: FAMILY MEDICINE

## 2018-01-19 PROCEDURE — 84484 ASSAY OF TROPONIN QUANT: CPT | Performed by: FAMILY MEDICINE

## 2018-01-19 PROCEDURE — 93005 ELECTROCARDIOGRAM TRACING: CPT | Performed by: FAMILY MEDICINE

## 2018-01-19 PROCEDURE — 63710000001 INSULIN LISPRO (HUMAN) PER 5 UNITS: Performed by: FAMILY MEDICINE

## 2018-01-19 PROCEDURE — 84100 ASSAY OF PHOSPHORUS: CPT | Performed by: FAMILY MEDICINE

## 2018-01-19 PROCEDURE — 71045 X-RAY EXAM CHEST 1 VIEW: CPT

## 2018-01-19 PROCEDURE — 82962 GLUCOSE BLOOD TEST: CPT

## 2018-01-19 PROCEDURE — 25010000002 LEVOFLOXACIN PER 250 MG: Performed by: FAMILY MEDICINE

## 2018-01-19 PROCEDURE — 84145 PROCALCITONIN (PCT): CPT | Performed by: FAMILY MEDICINE

## 2018-01-19 PROCEDURE — 25010000002 METHYLPREDNISOLONE PER 125 MG: Performed by: FAMILY MEDICINE

## 2018-01-19 PROCEDURE — 80053 COMPREHEN METABOLIC PANEL: CPT | Performed by: FAMILY MEDICINE

## 2018-01-19 PROCEDURE — 85027 COMPLETE CBC AUTOMATED: CPT | Performed by: FAMILY MEDICINE

## 2018-01-19 PROCEDURE — 82803 BLOOD GASES ANY COMBINATION: CPT

## 2018-01-19 PROCEDURE — 85007 BL SMEAR W/DIFF WBC COUNT: CPT | Performed by: FAMILY MEDICINE

## 2018-01-19 PROCEDURE — 25010000002 CEFEPIME PER 500 MG: Performed by: FAMILY MEDICINE

## 2018-01-19 PROCEDURE — 94760 N-INVAS EAR/PLS OXIMETRY 1: CPT

## 2018-01-19 PROCEDURE — 25010000002 VANCOMYCIN 10 G RECONSTITUTED SOLUTION: Performed by: FAMILY MEDICINE

## 2018-01-19 PROCEDURE — 93010 ELECTROCARDIOGRAM REPORT: CPT | Performed by: INTERNAL MEDICINE

## 2018-01-19 PROCEDURE — 81003 URINALYSIS AUTO W/O SCOPE: CPT | Performed by: FAMILY MEDICINE

## 2018-01-19 PROCEDURE — 83735 ASSAY OF MAGNESIUM: CPT | Performed by: FAMILY MEDICINE

## 2018-01-19 RX ORDER — VENLAFAXINE HYDROCHLORIDE 75 MG/1
225 CAPSULE, EXTENDED RELEASE ORAL DAILY
Status: DISCONTINUED | OUTPATIENT
Start: 2018-01-19 | End: 2018-01-23 | Stop reason: HOSPADM

## 2018-01-19 RX ORDER — BUDESONIDE AND FORMOTEROL FUMARATE DIHYDRATE 160; 4.5 UG/1; UG/1
2 AEROSOL RESPIRATORY (INHALATION)
Status: DISCONTINUED | OUTPATIENT
Start: 2018-01-19 | End: 2018-01-19

## 2018-01-19 RX ORDER — NICOTINE POLACRILEX 4 MG
15 LOZENGE BUCCAL
Status: DISCONTINUED | OUTPATIENT
Start: 2018-01-19 | End: 2018-01-23 | Stop reason: HOSPADM

## 2018-01-19 RX ORDER — LEVOFLOXACIN 5 MG/ML
750 INJECTION, SOLUTION INTRAVENOUS EVERY 24 HOURS
Status: DISCONTINUED | OUTPATIENT
Start: 2018-01-19 | End: 2018-01-22 | Stop reason: CLARIF

## 2018-01-19 RX ORDER — ATENOLOL 25 MG/1
25 TABLET ORAL EVERY 12 HOURS SCHEDULED
Status: DISCONTINUED | OUTPATIENT
Start: 2018-01-19 | End: 2018-01-23 | Stop reason: HOSPADM

## 2018-01-19 RX ORDER — ARFORMOTEROL TARTRATE 15 UG/2ML
15 SOLUTION RESPIRATORY (INHALATION)
Status: DISCONTINUED | OUTPATIENT
Start: 2018-01-19 | End: 2018-01-23 | Stop reason: HOSPADM

## 2018-01-19 RX ORDER — LEVOTHYROXINE SODIUM 0.05 MG/1
50 TABLET ORAL
Status: DISCONTINUED | OUTPATIENT
Start: 2018-01-19 | End: 2018-01-23 | Stop reason: HOSPADM

## 2018-01-19 RX ORDER — FAMOTIDINE 10 MG/ML
10 INJECTION, SOLUTION INTRAVENOUS EVERY 12 HOURS SCHEDULED
Status: DISCONTINUED | OUTPATIENT
Start: 2018-01-19 | End: 2018-01-20 | Stop reason: CLARIF

## 2018-01-19 RX ORDER — IPRATROPIUM BROMIDE AND ALBUTEROL SULFATE 2.5; .5 MG/3ML; MG/3ML
3 SOLUTION RESPIRATORY (INHALATION) EVERY 4 HOURS PRN
Status: DISCONTINUED | OUTPATIENT
Start: 2018-01-19 | End: 2018-01-23 | Stop reason: HOSPADM

## 2018-01-19 RX ORDER — IPRATROPIUM BROMIDE AND ALBUTEROL SULFATE 2.5; .5 MG/3ML; MG/3ML
3 SOLUTION RESPIRATORY (INHALATION)
Status: DISCONTINUED | OUTPATIENT
Start: 2018-01-19 | End: 2018-01-23 | Stop reason: HOSPADM

## 2018-01-19 RX ORDER — METHYLPREDNISOLONE SODIUM SUCCINATE 125 MG/2ML
60 INJECTION, POWDER, LYOPHILIZED, FOR SOLUTION INTRAMUSCULAR; INTRAVENOUS EVERY 6 HOURS
Status: DISCONTINUED | OUTPATIENT
Start: 2018-01-19 | End: 2018-01-19

## 2018-01-19 RX ORDER — METHYLPREDNISOLONE SODIUM SUCCINATE 125 MG/2ML
80 INJECTION, POWDER, LYOPHILIZED, FOR SOLUTION INTRAMUSCULAR; INTRAVENOUS EVERY 6 HOURS
Status: DISCONTINUED | OUTPATIENT
Start: 2018-01-19 | End: 2018-01-20

## 2018-01-19 RX ORDER — BUDESONIDE 0.25 MG/2ML
0.25 INHALANT ORAL
Status: DISCONTINUED | OUTPATIENT
Start: 2018-01-19 | End: 2018-01-23 | Stop reason: HOSPADM

## 2018-01-19 RX ORDER — SODIUM CHLORIDE 0.9 % (FLUSH) 0.9 %
1-10 SYRINGE (ML) INJECTION AS NEEDED
Status: DISCONTINUED | OUTPATIENT
Start: 2018-01-19 | End: 2018-01-23 | Stop reason: HOSPADM

## 2018-01-19 RX ORDER — PREGABALIN 75 MG/1
150 CAPSULE ORAL EVERY 12 HOURS SCHEDULED
Status: DISCONTINUED | OUTPATIENT
Start: 2018-01-19 | End: 2018-01-23 | Stop reason: HOSPADM

## 2018-01-19 RX ORDER — ONDANSETRON 2 MG/ML
4 INJECTION INTRAMUSCULAR; INTRAVENOUS EVERY 6 HOURS PRN
Status: DISCONTINUED | OUTPATIENT
Start: 2018-01-19 | End: 2018-01-23 | Stop reason: HOSPADM

## 2018-01-19 RX ORDER — CLOPIDOGREL BISULFATE 75 MG/1
75 TABLET ORAL DAILY
Status: DISCONTINUED | OUTPATIENT
Start: 2018-01-19 | End: 2018-01-23 | Stop reason: HOSPADM

## 2018-01-19 RX ORDER — GUAIFENESIN 600 MG/1
1200 TABLET, EXTENDED RELEASE ORAL EVERY 12 HOURS SCHEDULED
Status: DISCONTINUED | OUTPATIENT
Start: 2018-01-19 | End: 2018-01-23 | Stop reason: HOSPADM

## 2018-01-19 RX ORDER — SODIUM CHLORIDE 9 MG/ML
100 INJECTION, SOLUTION INTRAVENOUS CONTINUOUS
Status: DISCONTINUED | OUTPATIENT
Start: 2018-01-19 | End: 2018-01-20

## 2018-01-19 RX ORDER — TAMSULOSIN HYDROCHLORIDE 0.4 MG/1
0.4 CAPSULE ORAL DAILY
Status: DISCONTINUED | OUTPATIENT
Start: 2018-01-19 | End: 2018-01-21

## 2018-01-19 RX ORDER — DEXTROSE MONOHYDRATE 25 G/50ML
25 INJECTION, SOLUTION INTRAVENOUS
Status: DISCONTINUED | OUTPATIENT
Start: 2018-01-19 | End: 2018-01-23 | Stop reason: HOSPADM

## 2018-01-19 RX ORDER — ATORVASTATIN CALCIUM 40 MG/1
40 TABLET, FILM COATED ORAL NIGHTLY
Status: DISCONTINUED | OUTPATIENT
Start: 2018-01-19 | End: 2018-01-23 | Stop reason: HOSPADM

## 2018-01-19 RX ORDER — ACETAMINOPHEN 325 MG/1
650 TABLET ORAL EVERY 4 HOURS PRN
Status: DISCONTINUED | OUTPATIENT
Start: 2018-01-19 | End: 2018-01-23 | Stop reason: HOSPADM

## 2018-01-19 RX ORDER — HEPARIN SODIUM 5000 [USP'U]/ML
5000 INJECTION, SOLUTION INTRAVENOUS; SUBCUTANEOUS EVERY 8 HOURS SCHEDULED
Status: DISCONTINUED | OUTPATIENT
Start: 2018-01-19 | End: 2018-01-20

## 2018-01-19 RX ORDER — SPIRONOLACTONE 25 MG/1
25 TABLET ORAL DAILY
Status: DISCONTINUED | OUTPATIENT
Start: 2018-01-19 | End: 2018-01-23 | Stop reason: HOSPADM

## 2018-01-19 RX ORDER — BENZONATATE 100 MG/1
100 CAPSULE ORAL 3 TIMES DAILY PRN
Status: DISCONTINUED | OUTPATIENT
Start: 2018-01-19 | End: 2018-01-23 | Stop reason: HOSPADM

## 2018-01-19 RX ADMIN — VENLAFAXINE HYDROCHLORIDE 225 MG: 75 CAPSULE, EXTENDED RELEASE ORAL at 08:56

## 2018-01-19 RX ADMIN — IPRATROPIUM BROMIDE AND ALBUTEROL SULFATE 3 ML: 2.5; .5 SOLUTION RESPIRATORY (INHALATION) at 20:24

## 2018-01-19 RX ADMIN — SODIUM CHLORIDE 100 ML/HR: 9 INJECTION, SOLUTION INTRAVENOUS at 21:15

## 2018-01-19 RX ADMIN — METHYLPREDNISOLONE SODIUM SUCCINATE 80 MG: 125 INJECTION, POWDER, FOR SOLUTION INTRAMUSCULAR; INTRAVENOUS at 12:17

## 2018-01-19 RX ADMIN — INSULIN DETEMIR 160 UNITS: 100 INJECTION, SOLUTION SUBCUTANEOUS at 08:59

## 2018-01-19 RX ADMIN — HEPARIN SODIUM 5000 UNITS: 5000 INJECTION, SOLUTION INTRAVENOUS; SUBCUTANEOUS at 21:15

## 2018-01-19 RX ADMIN — PREGABALIN 150 MG: 75 CAPSULE ORAL at 08:56

## 2018-01-19 RX ADMIN — INSULIN LISPRO 16 UNITS: 100 INJECTION, SOLUTION INTRAVENOUS; SUBCUTANEOUS at 12:14

## 2018-01-19 RX ADMIN — DESMOPRESSIN ACETATE 40 MG: 0.2 TABLET ORAL at 21:14

## 2018-01-19 RX ADMIN — IPRATROPIUM BROMIDE AND ALBUTEROL SULFATE 3 ML: 2.5; .5 SOLUTION RESPIRATORY (INHALATION) at 11:27

## 2018-01-19 RX ADMIN — METHYLPREDNISOLONE SODIUM SUCCINATE 80 MG: 125 INJECTION, POWDER, FOR SOLUTION INTRAMUSCULAR; INTRAVENOUS at 18:26

## 2018-01-19 RX ADMIN — IPRATROPIUM BROMIDE AND ALBUTEROL SULFATE 3 ML: 2.5; .5 SOLUTION RESPIRATORY (INHALATION) at 14:56

## 2018-01-19 RX ADMIN — INSULIN LISPRO 12 UNITS: 100 INJECTION, SOLUTION INTRAVENOUS; SUBCUTANEOUS at 08:58

## 2018-01-19 RX ADMIN — ARFORMOTEROL TARTRATE 15 MCG: 15 SOLUTION RESPIRATORY (INHALATION) at 07:18

## 2018-01-19 RX ADMIN — GUAIFENESIN 1200 MG: 600 TABLET, EXTENDED RELEASE ORAL at 21:14

## 2018-01-19 RX ADMIN — HEPARIN SODIUM 5000 UNITS: 5000 INJECTION, SOLUTION INTRAVENOUS; SUBCUTANEOUS at 16:14

## 2018-01-19 RX ADMIN — TAMSULOSIN HYDROCHLORIDE 0.4 MG: 0.4 CAPSULE ORAL at 21:14

## 2018-01-19 RX ADMIN — CLOPIDOGREL 75 MG: 75 TABLET, FILM COATED ORAL at 08:57

## 2018-01-19 RX ADMIN — HEPARIN SODIUM 5000 UNITS: 5000 INJECTION, SOLUTION INTRAVENOUS; SUBCUTANEOUS at 05:55

## 2018-01-19 RX ADMIN — BUDESONIDE 0.25 MG: 0.25 INHALANT RESPIRATORY (INHALATION) at 20:24

## 2018-01-19 RX ADMIN — ATENOLOL 25 MG: 25 TABLET ORAL at 21:14

## 2018-01-19 RX ADMIN — ATENOLOL 25 MG: 25 TABLET ORAL at 08:57

## 2018-01-19 RX ADMIN — INSULIN LISPRO 12 UNITS: 100 INJECTION, SOLUTION INTRAVENOUS; SUBCUTANEOUS at 18:26

## 2018-01-19 RX ADMIN — FAMOTIDINE 10 MG: 10 INJECTION, SOLUTION INTRAVENOUS at 08:57

## 2018-01-19 RX ADMIN — SODIUM CHLORIDE 100 ML/HR: 9 INJECTION, SOLUTION INTRAVENOUS at 02:32

## 2018-01-19 RX ADMIN — SPIRONOLACTONE 25 MG: 25 TABLET ORAL at 08:57

## 2018-01-19 RX ADMIN — BUDESONIDE 0.25 MG: 0.25 INHALANT RESPIRATORY (INHALATION) at 07:07

## 2018-01-19 RX ADMIN — SODIUM CHLORIDE 2000 ML: 9 INJECTION, SOLUTION INTRAVENOUS at 00:27

## 2018-01-19 RX ADMIN — FAMOTIDINE 10 MG: 10 INJECTION, SOLUTION INTRAVENOUS at 21:14

## 2018-01-19 RX ADMIN — ARFORMOTEROL TARTRATE 15 MCG: 15 SOLUTION RESPIRATORY (INHALATION) at 20:24

## 2018-01-19 RX ADMIN — CEFEPIME HYDROCHLORIDE 1 G: 1 INJECTION, POWDER, FOR SOLUTION INTRAMUSCULAR; INTRAVENOUS at 18:26

## 2018-01-19 RX ADMIN — PREGABALIN 150 MG: 75 CAPSULE ORAL at 21:14

## 2018-01-19 RX ADMIN — AZTREONAM 2000 MG: 2 INJECTION, POWDER, LYOPHILIZED, FOR SOLUTION INTRAMUSCULAR; INTRAVENOUS at 05:55

## 2018-01-19 RX ADMIN — INSULIN LISPRO 6 UNITS: 100 INJECTION, SOLUTION INTRAVENOUS; SUBCUTANEOUS at 02:31

## 2018-01-19 RX ADMIN — LEVOFLOXACIN 750 MG: 5 INJECTION, SOLUTION INTRAVENOUS at 22:58

## 2018-01-19 RX ADMIN — VANCOMYCIN HYDROCHLORIDE 1500 MG: 100 INJECTION, POWDER, LYOPHILIZED, FOR SOLUTION INTRAVENOUS at 12:15

## 2018-01-19 RX ADMIN — SODIUM CHLORIDE 100 ML/HR: 9 INJECTION, SOLUTION INTRAVENOUS at 12:15

## 2018-01-19 RX ADMIN — METHYLPREDNISOLONE SODIUM SUCCINATE 60 MG: 125 INJECTION, POWDER, FOR SOLUTION INTRAMUSCULAR; INTRAVENOUS at 05:55

## 2018-01-19 RX ADMIN — INSULIN LISPRO 12 UNITS: 100 INJECTION, SOLUTION INTRAVENOUS; SUBCUTANEOUS at 21:39

## 2018-01-19 RX ADMIN — CEFEPIME HYDROCHLORIDE 1 G: 1 INJECTION, POWDER, FOR SOLUTION INTRAMUSCULAR; INTRAVENOUS at 12:15

## 2018-01-19 RX ADMIN — GUAIFENESIN 1200 MG: 600 TABLET, EXTENDED RELEASE ORAL at 08:56

## 2018-01-19 NOTE — PROGRESS NOTES
Discharge Planning Assessment  [facility]     Patient Name: Sai Adam  MRN: [id number]  Today's Date: 1/19/2018    Admit Date: 1/18/2018          Discharge Needs Assessment       01/19/18 1119    Living Environment    Lives With alone    Living Arrangements house    Transportation Available car;family or friend will provide    Living Environment    Provides Primary Care For no one    Quality Of Family Relationships supportive    Able to Return to Prior Living Arrangements yes    Discharge Needs Assessment    Concerns To Be Addressed care coordination/care conferences;discharge planning concerns    Readmission Within The Last 30 Days previous discharge plan unsuccessful    Equipment Currently Used at Home bipap/ cpap;nebulizer;cane, straight;oxygen;respiratory supplies;colostomy/ostomy supplies;walker, rolling;wheelchair    Equipment Needed After Discharge noninvasive ventilator    Discharge Facility/Level Of Care Needs home with home health;nursing facility, skilled    Current Discharge Risk chronically ill;lives alone    Discharge Planning Comments Spoke to patient regarding discharge plan/needs.  Patient states he is currently residing alone.  [facility].  Patient is a readmit and was recently here from 12/29/17 - 1/2/18.  In November, it is documented that hospital  had [facility] to [facility] to [facility] a noninvasive ventilatory or home bipap for patient and patient states he has not received this item.  Patient reports he has been experiencing an increased frequency in falls and states he may be interested in some type of rehab placement upon discharge.  Patient also requests  services.  Patient states he has no other insurance other than the [facility].   has contacted [facility] , Rajwinder Alvarenga, and left voicemail requesting return call regarding the concerns for this patient.              Discharge Plan     None        Discharge  Placement     No information found                Demographic Summary     None            Functional Status     None            Psychosocial     None            Abuse/Neglect     None            Legal     None            Substance Abuse     None            Patient Forms     None          MILAGROS SalgadoW

## 2018-01-19 NOTE — PROGRESS NOTES
[facility] Medicine Services  INPATIENT PROGRESS NOTE    Length of Stay: 1  Date of Admission: 1/18/2018  Primary Care Physician: Jarod Tillman MD    Subjective   Chief Complaint: SOA  HPI   Did ok overnight.    On 70% bipap.  Sats good, Respiratory rate 18.    Fever better.   BP good.      F-ADA  A-Tylenol  S-NA  T-Lovenox  H-up 30 degrees  U-pepcid  G-elevated, increase SSI    UOP: 875          Review of Systems   Constitutional: Positive for activity change, appetite change and fatigue. Negative for fever.   HENT: Negative for congestion and ear pain.    Eyes: Negative for redness and visual disturbance.   Respiratory: Positive for cough, shortness of breath and wheezing.    Cardiovascular: Negative for chest pain and palpitations.   Gastrointestinal: Negative for abdominal pain, diarrhea, nausea and vomiting.   Endocrine: Negative for cold intolerance and heat intolerance.   Genitourinary: Negative for dysuria and frequency.   Musculoskeletal: Negative for arthralgias and back pain.   Skin: Negative for rash and wound.   Neurological: Positive for weakness. Negative for dizziness and headaches.   Psychiatric/Behavioral: Negative for confusion. The patient is not nervous/anxious.         All pertinent negatives and positives are as above. All other systems have been reviewed and are negative unless otherwise stated.     Objective    Temp:  [98.1 °F (36.7 °C)-103.1 °F (39.5 °C)] 98.1 °F (36.7 °C)  Heart Rate:  [] 85  Resp:  [17-29] 18  BP: ()/(68-83) 97/73  Physical Exam   Constitutional: He is oriented to person, place, and time. He appears well-developed and well-nourished. Face mask in place.   HENT:   Head: Normocephalic and atraumatic.   Right Ear: External ear normal.   Left Ear: External ear normal.   Nose: Nose normal.   Mouth/Throat: Oropharynx is clear and moist.   Eyes: Conjunctivae and EOM are normal. Pupils are equal, round, and reactive to light.  Right eye exhibits no discharge. Left eye exhibits no discharge. No scleral icterus.   Pupils unequal, family reports this is chronic   Neck: Normal range of motion. Neck supple. No tracheal deviation present. No thyromegaly present.   Cardiovascular: Normal rate, regular rhythm, normal heart sounds and intact distal pulses.  Exam reveals no gallop and no friction rub.    No murmur heard.  Pulmonary/Chest: Effort normal. No stridor. No respiratory distress. He has decreased breath sounds. He has wheezes. He has no rales. He exhibits no tenderness.   Abdominal: Soft. Bowel sounds are normal. He exhibits no distension and no mass. There is no tenderness. There is no rebound and no guarding. No hernia.   Colostomy intact, pink   Musculoskeletal: Normal range of motion. He exhibits no edema or deformity.   Lymphadenopathy:     He has no cervical adenopathy.   Neurological: He is alert and oriented to person, place, and time. He has normal reflexes. He displays normal reflexes. No cranial nerve deficit. He exhibits normal muscle tone. Coordination normal.   Skin: Skin is warm and dry. No rash noted. No erythema. No pallor.   Psychiatric: He has a normal mood and affect. His behavior is normal. Judgment and thought content normal.   Vitals reviewed.          Results Review:  I have reviewed the labs, radiology results, and diagnostic studies.    Laboratory Data:     Results from last 7 days  Lab Units 01/19/18  0233 01/18/18  2239   WBC 10*3/mm3 17.24* 19.65*   HEMOGLOBIN g/dL 14.0 15.5   HEMATOCRIT % 43.5 49.9   PLATELETS 10*3/mm3 100* 121*          Results from last 7 days  Lab Units 01/19/18  0233 01/18/18  2315   SODIUM mmol/L 141 143   POTASSIUM mmol/L 4.2 4.0   CHLORIDE mmol/L 104 99   CO2 mmol/L 29.0 34.0*   BUN mg/dL 8 8   CREATININE mg/dL 0.65 0.82   CALCIUM mg/dL 7.9* 8.8   BILIRUBIN mg/dL 1.3* 1.2*   ALK PHOS U/L 71 94   ALT (SGPT) U/L 30 31   AST (SGOT) U/L 30 33   GLUCOSE mg/dL 289* 278*       Culture Data:         Radiology Data:   Imaging Results (last 24 hours)     Procedure Component Value Units Date/Time    XR Chest 1 View [[id number]] Collected:  01/19/18 0649     Updated:  01/19/18 0652    Narrative:       EXAMINATION:   XR CHEST 1 VW-  1/19/2018 6:49 AM CST     HISTORY: Short of breath     Frontal upright radiograph of the chest 1/18/2018 10:30 PM CST     COMPARISON: 12/29/2017.     FINDINGS:   Left lung is clear. There is dense consolidation right upper lobe.. The  cardiomediastinal silhouette and pulmonary vascularity are within normal  limits.      The osseous structures and surrounding soft tissues demonstrate no acute  abnormality.       Impression:       1. Consolidation right upper lobe most consistent with pneumonia..        This report was finalized on 01/19/2018 06:49 by Dr. Den Rendon MD.          I have reviewed the patient current medications.     Assessment/Plan   1.  Sepsis   -IVF  -Vanc/Levaquin/Cefepime  -Cultures pending  -Telemetry    2.  HAP  -Vanc/Levaquin/Cefepime  -Cultures pending  -Nebs  -Steroids    3.  Acute Hypoxic and hypercapnic respiratory failure  -Bipap  -Wean as tolerated  -Continue to treat pneumonia  -Nebs  -Steroids  -Telemetry  -continuous pulse ox    4.  COPD with acute exacerbation  -IV Abx  -Nebs  -Steroids  -Continuous pulse-ox  -Telemetry    5.  Colon Cancer    6.  CAD  -Atenolol held given sepsis  -Plavix  -Losartan  -Imdur held given sepsis    7.  T2DM  -Levemir  -SSI  -Metformin held given Lactic Acidosis      8.  HLD  -Statin    9.  HTN  - BP meds held given sepsis    10.  Arthritis    11.  Protein malnutrition  -dietary consult    12.  Diabetic Neuropathy  -Lyrica  -Effexor    13.  BPH  -Flomax    14.  GERD  -Pepcid    15.  Hypothyroidism  -Synthroid            Discharge Planning:   Wean Bipap as per respiratory  Will keep in unit today  Hold off on therapy given breathing issues.      Satinder Jorge MD   01/19/18   8:29 AM

## 2018-01-19 NOTE — CONSULTS
"Pharmacy Dosing Service  Pharmacokinetics  Vancomycin Initial Evaluation    Assessment/Action/Plan:  Initiated Vancomycin 1,500 mg IVPB every 12 hours, following a 2,000 mg loading dose given in ER. Vancomycin trough ordered prior to 4th dose on 01/20/18 before 1200 dose. Pharmacy will monitor renal function and adjust dose accordingly.     Subjective:  Sai Adam is a 57 y.o. male with a Vancomycin \"Pharmacy to Dose\" consult for the treatment of Pneumonia x 7 days .    Complicating factors:  Diabetes mellitus   COPD  obesity    Objective:  Ht: 170.2 cm (67\"); Wt: 97 kg (213 lb 12.8 oz)  Estimated Creatinine Clearance: 107.7 mL/min (by C-G formula based on Cr of 0.82).   Lab Results   Component Value Date    CREATININE 0.82 01/18/2018      Lab Results   Component Value Date    WBC 19.65 (H) 01/18/2018      Baseline culture results:    Microbiology Results        Procedure Component Value Units Date/Time       Influenza Antigen, Rapid - Swab, Nasopharynx [[id number]] (Normal) Collected: 01/18/18 2236       Specimen: Swab from Nasopharynx Updated: 01/18/18 2306        Influenza A Ag, EIA Negative        Influenza B Ag, EIA Negative       Narrative:           Recommend confirmation of negative results by viral culture or molecular assay.       Blood Culture With LINETTE - Blood, [[id number]] Collected: 01/18/18 2225       Specimen: Blood from Hand, Left Updated: 01/18/18 2251       Blood Culture With LINETTE - Blood, [[id number]] Collected: 01/18/18 2220       Specimen: Blood from Arm, Left Updated: 01/18/18 2251          Isak Frost [facility]  01/19/18 2:28 AM    "

## 2018-01-19 NOTE — H&P
[facility] Medicine Services  HISTORY AND PHYSICAL    Date of Admission: 1/18/2018  Primary Care Physician: Jarod Tillman MD    Subjective     Chief Complaint: Shortness of breath and respiratory distress    History of Present Illness       57-year-old male with past medical history of arthritis, CAD, colon cancer, COPD, diabetes mellitus type II, hyperlipidemia and hypertension was brought into the ER for respiratory distress and progressively worsening shortness of breath.  According to patient's family, patient has been very confused today and was noted to be grasping for breath.  Patient was recently discharged after being treated for COPD exacerbation.  Today in the ER patient was found to be febrile.  During initial evaluation in the ER patient was noted to be hypoxic with 79% oxygen saturation so an ABG was performed.  His ABG was noted to be pH 7.319, PCO2 66.6, PO2 41.1, HCO3 34.2, base excess 5.4.  Patient was started on BiPAP in the ER.  His chest x-ray was noted for right-sided pneumonia.  Vitals were indicated above possible underlying sepsis with pneumonia.  Patient will be admitted in the unit for further evaluation and treatment.    [facility].  [facility] will [facility] if family requests.      Critical care assessment time: 60 minutes    Review of Systems     Otherwise complete ROS reviewed and negative except as mentioned in the HPI.    Past Medical History:   Past Medical History:   Diagnosis Date   • Arthritis    • CAD (coronary artery disease)    • Colon cancer 11/2016   • COPD (chronic obstructive pulmonary disease)    • Diabetes mellitus    • HLD (hyperlipidemia)    • Hypertension      Past Surgical History:  Past Surgical History:   Procedure Laterality Date   • CARDIAC SURGERY     • CARPAL TUNNEL RELEASE     • COLON RESECTION WITH COLOSTOMY     • COLON SURGERY     • COLONOSCOPY  05/27/2016   • COLONOSCOPY  N/A 10/12/2017    Procedure: COLONOSCOPY WITH ANESTHESIA;  Surgeon: Yessenia Gregg MD;  Location: [facility] ENDOSCOPY;  Service:    • CORONARY ANGIOPLASTY WITH STENT PLACEMENT     • HERNIA REPAIR     • ROTATOR CUFF REPAIR       Social History:  reports that he has been smoking.  He has a 40.00 pack-year smoking history. He has never used smokeless tobacco. He reports that he does not drink alcohol or use illicit drugs.    Family History: family history includes Arthritis in his father; Cancer in his brother; Diabetes in his brother; Heart disease in his father; Hepatitis in his brother; Hypertension in his brother and mother; Stroke in his mother.      Allergies:  Allergies   Allergen Reactions   • Tetanus Toxoids Shortness Of Breath and Swelling   • Lamotrigine      Unknown     • Lisinopril Hives   • Methadone Swelling   • Penicillins Rash   • Tramadol Rash     Medications:  Prior to Admission medications    Medication Sig Start Date End Date Taking? Authorizing Provider   albuterol (PROVENTIL) (2.5 MG/3ML) 0.083% nebulizer solution Take 2.5 mg by nebulization Every 6 (Six) Hours As Needed for Wheezing.    Historical Provider, MD   atenolol (TENORMIN) 25 MG tablet Take 1 tablet by mouth Every 12 (Twelve) Hours.  Patient taking differently: Take 100 mg by mouth Every 12 (Twelve) Hours. 11/28/17   Ernie Cornejo,    atropine 1 % ophthalmic solution Administer 1 drop to the right eye 2 (Two) Times a Day.    Historical Provider, MD   budesonide-formoterol (SYMBICORT) 160-4.5 MCG/ACT inhaler Inhale 2 puffs 2 (Two) Times a Day. 1/2/18   MARYLOU Wilson   Cholecalciferol (VITAMIN D) 2000 units tablet Take 4,000 Units by mouth Daily.    Historical Provider, MD   clopidogrel (PLAVIX) 75 MG tablet Take 75 mg by mouth Daily.    Historical Provider, MD   cyclobenzaprine (FLEXERIL) 10 MG tablet Take 5 mg by mouth 3 (Three) Times a Day As Needed for Muscle Spasms.    Historical Provider, MD   docusate sodium (COLACE)  100 MG capsule Take 100 mg by mouth 2 (Two) Times a Day As Needed for Constipation.    Historical Provider, MD   dorzolamide (TRUSOPT) 2 % ophthalmic solution Administer 1 drop into the left eye 2 (Two) Times a Day.    Historical Provider, MD   doxazosin (CARDURA) 4 MG tablet Take 4 mg by mouth Every Night.    Historical Provider, MD   guaiFENesin (MUCINEX) 600 MG 12 hr tablet Take 2 tablets by mouth Every 12 (Twelve) Hours. 1/2/18   MARYLOU Wilson   HYDROcodone-acetaminophen (NORCO) 5-325 MG per tablet Take 1 tablet by mouth Every 6 (Six) Hours As Needed for Moderate Pain .    Historical Provider, MD   insulin aspart (novoLOG) 100 UNIT/ML injection Inject 40 Units under the skin 3 (Three) Times a Day Before Meals.    Historical Provider, MD   insulin glargine (LANTUS) 100 UNIT/ML injection Inject 160 Units under the skin Daily.    Historical Provider, MD   isosorbide mononitrate (IMDUR) 60 MG 24 hr tablet Take 90 mg by mouth Daily.    Historical Provider, MD   levothyroxine (SYNTHROID, LEVOTHROID) 50 MCG tablet Take 50 mcg by mouth Daily.    Historical Provider, MD   losartan (COZAAR) 100 MG tablet Take 100 mg by mouth Daily.    Historical Provider, MD   metFORMIN (GLUCOPHAGE) 500 MG tablet Take 1,000 mg by mouth 2 (Two) Times a Day With Meals.    Historical Provider, MD   mometasone (ASMANEX TWISTHALER) inhaler 220 mcg/inhalation Inhale 2 puffs 2 (Two) Times a Day.    Historical Provider, MD   NIFEdipine XL (PROCARDIA XL) 30 MG 24 hr tablet Take 30 mg by mouth Daily.    Historical Provider, MD   nitroglycerin (NITROSTAT) 0.4 MG SL tablet Place 0.4 mg under the tongue Every 5 (Five) Minutes As Needed for Chest Pain.    Historical Provider, MD   OLANZapine (zyPREXA) 10 MG tablet Take 1 tablet by mouth Every Night.  Patient taking differently: Take 20 mg by mouth Every Night. 11/28/17   Ernie Cornejo DO   potassium chloride (K-DUR,KLOR-CON) 20 MEQ CR tablet Take 40 mEq by mouth 2 (Two) Times a Day.     Historical Provider, MD   predniSONE (DELTASONE) 20 MG tablet Take 1 tablet by mouth 2 (Two) Times a Day With Meals. Take 20 mg BID for 3 days, 20 mg daily x 3 days, 10 mg daily for 3 days then stop 1/2/18   MARYLOU Wilson   pregabalin (LYRICA) 150 MG capsule Take 1 capsule by mouth Every 12 (Twelve) Hours.  Patient taking differently: Take 300 mg by mouth Every 12 (Twelve) Hours. 11/28/17   Ernie Cornejo DO   raNITIdine (ZANTAC) 150 MG tablet Take 150 mg by mouth 2 (Two) Times a Day As Needed for Indigestion.    Historical Provider, MD   SAXagliptin (ONGLYZA) 5 MG tablet Take 5 mg by mouth Daily.    Historical Provider, MD   spironolactone (ALDACTONE) 25 MG tablet Take 25 mg by mouth Daily.    Historical Provider, MD   tamsulosin (FLOMAX) 0.4 MG capsule 24 hr capsule Take 1 capsule by mouth Every Night.    Historical Provider, MD   Tiotropium Bromide Monohydrate 2.5 MCG/ACT aerosol solution Inhale 2 puffs Daily.    Historical Provider, MD   venlafaxine XR (EFFEXOR-XR) 75 MG 24 hr capsule Take 225 mg by mouth Daily.    Historical Provider, MD     Objective     Vital Signs: /77  Pulse (!) 132  Temp (!) 103.1 °F (39.5 °C)  Resp 20  SpO2 (!) 78%       Physical Exam   Constitutional:   Sickly   HENT:   Head: Normocephalic.   Eyes: Pupils are equal, round, and reactive to light.   Neck: Normal range of motion.   Cardiovascular: Normal rate, regular rhythm and normal heart sounds.    Pulmonary/Chest: He is in respiratory distress. He has wheezes. He has no rales. He exhibits no tenderness.   Abdominal: Soft. Bowel sounds are normal.   Musculoskeletal: Normal range of motion.   Neurological: He is alert.   Skin: Skin is warm.   Psychiatric: He has a normal mood and affect.             Results Reviewed:  Lab Results (last 24 hours)     Procedure Component Value Units Date/Time    POC Glucose Once [[id number]]  (Abnormal) Collected:  01/18/18 2214    Specimen:  Blood Updated:  01/18/18 2227     Glucose  294 (H) mg/dL       : [id number] Ernesto Wilkerson ID: [id number]       Blood Gas, Arterial [[id number]]  (Abnormal) Collected:  01/18/18 2232    Specimen:  Arterial Blood Updated:  01/18/18 2238     Site Right Radial     Jarad's Test Positive     pH, Arterial 7.319 (L) pH units      pCO2, Arterial 66.6 (H) mm Hg      pO2, Arterial 41.1 (C) mm Hg      HCO3, Arterial 34.2 (H) mmol/L      Base Excess, Arterial 5.4 (H) mmol/L      O2 Saturation, Arterial 75.5 (L) %      Temperature 37.0 C      Barometric Pressure for Blood Gas 759 mmHg      Modality NRB     FIO2 100 %      Flow Rate 15.0 lpm      Ventilator Mode NA     Notified Who DR. DORY MARROQUIN     Notified By [id number]     Notified Time 01/18/2018 22:38     Collected by [id number]        Imaging Results (last 24 hours)     Procedure Component Value Units Date/Time    XR Chest 1 View [[id number]] Updated:  01/18/18 2235        I have personally reviewed and interpreted the radiology studies and ECG obtained at time of admission.     Assessment / Plan     Assessment:     1.  Right-sided pneumonia  2.  Sepsis  3.  Abnormal ABG  4.  Hypoxia  5.  COPD exacerbation  6.  Diabetes mellitus type II  7.  Hypertension    Plan:      -Admit to the unit  -Continue cardiac monitoring  -Continuous pulse ox  -Status post 1 dose IV antibiotic, steroids and IV fluid bolus in the ER  -Continue IV antibiotic  -Continue IV steroid  -Continue breathing treatment  -Continue IV fluid  -Follow-up lactic acid  -Follow-up urinalysis  -Follow-up blood culture  -Follow-up respiratory culture  -Follow-up repeat ABG in 4 hours  -Continue BiPAP  -Readjust BiPAP setting as needed as per ABG  -Continue to monitor vitals  -Follow-up a.m. WBC  -Strict glycemic control  -Continue home insulin therapy  -Continue home medications  -Keep nothing by mouth for now  -GI prophylaxis  -DVT prophylaxis      Code Status: Conditional code     I discussed the patients findings and my recommendations with the  patient's RN    Estimated length of stay 2-3 days    Hany Valdez MD   01/18/18   10:42 PM        '

## 2018-01-19 NOTE — PLAN OF CARE
Problem: Patient Care Overview (Adult)  Goal: Plan of Care Review  Outcome: Ongoing (interventions implemented as appropriate)   01/19/18 1207   Coping/Psychosocial Response Interventions   Plan Of Care Reviewed With patient   Outcome Evaluation   Outcome Summary/Follow up Plan Initial dietary assessment. [facility] No intake recorded at this time. Pt may have a hard time eating with BiPAP. Pt stated he is willing to try a Glucerna. Encourage intake. A1c 11.3. Will give DM education as appropriate. Will cont to follow.

## 2018-01-19 NOTE — ED PROVIDER NOTES
Subjective   HPI Comments: 57-year-old male who presents with cough and shortness of breath for 2 weeks.  History limited secondary to respiratory distress.  Patient did not receive albuterol and route secondary to tachycardia.  History of COPD.  Patient placed on 15 L nonrebreather mask and was satting 78% upon arrival.      History provided by:  EMS personnel  History limited by:  Severe respiratory distress      Review of Systems   Unable to perform ROS: Severe respiratory distress       Past Medical History:   Diagnosis Date   • Arthritis    • CAD (coronary artery disease)    • Colon cancer 11/2016   • COPD (chronic obstructive pulmonary disease)    • Diabetes mellitus    • HLD (hyperlipidemia)    • Hypertension        Allergies   Allergen Reactions   • Tetanus Toxoids Shortness Of Breath and Swelling   • Lamotrigine      Unknown     • Lisinopril Hives   • Methadone Swelling   • Penicillins Rash   • Tramadol Rash       Past Surgical History:   Procedure Laterality Date   • CARDIAC SURGERY     • CARPAL TUNNEL RELEASE     • COLON RESECTION WITH COLOSTOMY     • COLON SURGERY     • COLONOSCOPY  05/27/2016   • COLONOSCOPY N/A 10/12/2017    Procedure: COLONOSCOPY WITH ANESTHESIA;  Surgeon: Yessenia Gregg MD;  Location: [facility] ENDOSCOPY;  Service:    • CORONARY ANGIOPLASTY WITH STENT PLACEMENT     • HERNIA REPAIR     • ROTATOR CUFF REPAIR         Family History   Problem Relation Age of Onset   • Stroke Mother    • Hypertension Mother    • Arthritis Father    • Heart disease Father    • Cancer Brother    • Diabetes Brother    • Hepatitis Brother    • Hypertension Brother        Social History     Social History   • Marital status:      Spouse name: N/A   • Number of children: N/A   • Years of education: N/A     Social History Main Topics   • Smoking status: Current Every Day Smoker     Packs/day: 1.00     Years: 40.00   • Smokeless tobacco: Never Used   • Alcohol use No   • Drug use: No   • Sexual activity:  Defer     Other Topics Concern   • None     Social History Narrative       Lab Results (last 24 hours)     Procedure Component Value Units Date/Time    POC Glucose Once [[id number]]  (Abnormal) Collected:  01/18/18 2214    Specimen:  Blood Updated:  01/18/18 2227     Glucose 294 (H) mg/dL       : [id number] Ernesto DavidMeter ID: [id number]       Comprehensive Metabolic Panel [[id number]] Updated:  01/18/18 2258    Specimen:  Blood     BNP [[id number]] Updated:  01/18/18 2258    Specimen:  Blood     Troponin [[id number]] Updated:  01/18/18 2258    Specimen:  Blood     Lactic Acid, Plasma [[id number]] Updated:  01/18/18 2258    Specimen:  Blood     Blood Culture With LINETTE - Blood, [[id number]] Collected:  01/18/18 2220    Specimen:  Blood from Arm, Left Updated:  01/18/18 2251    Blood Culture With LINETTE - Blood, [[id number]] Collected:  01/18/18 2225    Specimen:  Blood from Hand, Left Updated:  01/18/18 2251    Blood Gas, Arterial [[id number]]  (Abnormal) Collected:  01/18/18 2232    Specimen:  Arterial Blood Updated:  01/18/18 2238     Site Right Radial     Jarad's Test Positive     pH, Arterial 7.319 (L) pH units      pCO2, Arterial 66.6 (H) mm Hg      pO2, Arterial 41.1 (C) mm Hg      HCO3, Arterial 34.2 (H) mmol/L      Base Excess, Arterial 5.4 (H) mmol/L      O2 Saturation, Arterial 75.5 (L) %      Temperature 37.0 C      Barometric Pressure for Blood Gas 759 mmHg      Modality NRB     FIO2 100 %      Flow Rate 15.0 lpm      Ventilator Mode NA     Notified Who DR. DORY MARROQUIN     Notified By [id number]     Notified Time 01/18/2018 22:38     Collected by [id number]    Influenza Antigen, Rapid - Swab, Nasopharynx [[id number]] Collected:  01/18/18 2236    Specimen:  Swab from Nasopharynx Updated:  01/18/18 2251    CBC & Differential [[id number]] Collected:  01/18/18 2239    Specimen:  Blood Updated:  01/18/18 2255    Narrative:       The following orders were created for panel order CBC & Differential.  Procedure                                Abnormality         Status                     ---------                               -----------         ------                     CBC Auto Differential[[id number]]        Abnormal            Final result                 Please view results for these tests on the individual orders.    Protime-INR [[id number]]  (Abnormal) Collected:  01/18/18 2239    Specimen:  Blood Updated:  01/18/18 2302     Protime 16.5 (H) Seconds      INR 1.29 (H)    CBC Auto Differential [[id number]]  (Abnormal) Collected:  01/18/18 2239    Specimen:  Blood Updated:  01/18/18 2255     WBC 19.65 (H) 10*3/mm3      RBC 5.77 10*6/mm3      Hemoglobin 15.5 g/dL      Hematocrit 49.9 %      MCV 86.5 fL      MCH 26.9 (L) pg      MCHC 31.1 (L) g/dL      RDW 16.3 (H) %      RDW-SD 50.6 fl      MPV 8.6 fL      Platelets 121 (L) 10*3/mm3      Neutrophil % 81.7 (H) %      Lymphocyte % 9.7 (L) %      Monocyte % 6.4 %      Eosinophil % 0.0 %      Basophil % 0.4 %      Immature Grans % 1.8 %      Neutrophils, Absolute 16.07 (H) 10*3/mm3      Lymphocytes, Absolute 1.91 10*3/mm3      Monocytes, Absolute 1.25 10*3/mm3      Eosinophils, Absolute 0.00 10*3/mm3      Basophils, Absolute 0.07 10*3/mm3      Immature Grans, Absolute 0.35 (H) 10*3/mm3      nRBC 0.0 /100 WBC           Objective   Physical Exam   Constitutional: He has a sickly appearance. Nasal cannula and face mask in place.   HENT:   Head: Atraumatic.   Mouth/Throat: Oropharynx is clear and moist.   Eyes: EOM are normal. Pupils are equal, round, and reactive to light.   Blindness which is chronic   Neck: Normal range of motion. Neck supple. No JVD present. No tracheal deviation present.   Cardiovascular: Regular rhythm.    No murmur heard.  Tachycardic   Pulmonary/Chest: No stridor. Tachypnea noted. He is in respiratory distress. He has no decreased breath sounds. He has wheezes in the right upper field, the right middle field, the right lower field, the left upper field, the left middle  field and the left lower field. He has no rhonchi. He has no rales.   Abdominal: Soft. He exhibits no distension.   Ostomy in left lower quadrant without surrounding erythema, tenderness   Musculoskeletal: He exhibits no edema.   Neurological: He is alert.   Follows commands, moves all 4 extremities   Skin: Skin is warm and dry.   Nursing note and vitals reviewed.      ECG 12 Lead    Date/Time: 1/18/2018 10:22 PM  Performed by: DORY MARROQUIN  Authorized by: EMERGENCY, [employer] [employer]   Interpreted by physician  Rhythm: sinus tachycardia  Rate: tachycardic  Conduction: 1st degree  Clinical impression: non-specific ECG  Comments: Sinus tachycardia with rate 131, NV interval 200, QRS 78, .    Critical Care  Performed by: DORY MARROQUIN  Authorized by: SELIN VERONICA     Critical care provider statement:     Critical care time (minutes):  40    Critical care time was exclusive of:  Separately billable procedures and treating other patients    Critical care was necessary to treat or prevent imminent or life-threatening deterioration of the following conditions:  Respiratory failure    Critical care was time spent personally by me on the following activities:  Development of treatment plan with patient or surrogate, discussions with consultants, evaluation of patient's response to treatment, examination of patient, ordering and performing treatments and interventions, ordering and review of laboratory studies, ordering and review of radiographic studies, pulse oximetry, re-evaluation of patient's condition and review of old charts    I assumed direction of critical care for this patient from another provider in my specialty: no               XR Chest 1 View   ED Interpretation   Right-sided infiltrate, no pneumothorax.          /77  Pulse (!) 131  Temp (!) 103.1 °F (39.5 °C)  Resp 26  SpO2 98%    ED Course    ED Course   Value Comment By Time    This is a 57-year-old male who presents in  setting of severe respiratory distress.  Hypoxic on 15 L nonrebreather mask upon arrival with sats in the 70s.  Temperature 39.5 Celsius. Placed on BiPAP.  Immediate chest x-ray obtained without pneumothorax.  Blood pressure normal but patient tachycardic.  Chest x-ray concern for right-sided pneumonia.  Patient has diffuse wheezing consistent with likely COPD.  We will treat for pneumonia and COPD and obtain lab work.  Patient will require admission. Manpreet Sage MD 01/18 2230    Patient was recently discharged from the hospital late December for COPD exacerbation.  Given recent hospitalization, we will broaden coverage for healthcare associated pneumonia and give the patient vancomycin, levofloxacin, cefepime.  BiPAP placed on patient with settings 20/10 with FiO2 100%.  Saturations now 97%.  We will titrate down on the settings. Manpreet Sage MD 01/18 2237    I spoke to Dr. Valdez regarding patient.  We will admit to the ICU.  He will follow-up on lab work. Manpreet Sage MD 01/18 2243   WBC: (!) 19.65 (Reviewed) Manpreet Sage MD 01/18 2259   Neutrophil %: (!) 81.7 (Reviewed) Manpreet Sage MD 01/18 2259   pCO2, Arterial: (!) 66.6 (Reviewed) Manpreet Sage MD 01/18 2259   pH, Arterial: (!) 7.319 (Reviewed) Manpreet Sage MD 01/18 2259   HCO3, Arterial: (!) 34.2 (Reviewed) Manpreet Sage MD 01/18 2259       Medications   sodium chloride 0.9 % flush 10 mL (not administered)   albuterol (PROVENTIL) nebulizer solution 0.083% 2.5 mg/3mL (10 mg Nebulization New Bag 1/18/18 2233)   vancomycin (VANCOCIN) 2,000 mg in sodium chloride 0.9 % 500 mL IVPB (not administered)   ceFEPime (MAXIPIME) 2g/12.5ml IV PUSH syringe (not administered)   sodium chloride 0.9 % bolus 1,000 mL (1,000 mL Intravenous New Bag 1/18/18 2257)   methylPREDNISolone sodium succinate (SOLU-Medrol) injection 125 mg (125 mg Intravenous Given 1/18/18 2250)   levoFLOXacin (LEVAQUIN) 750 mg/150 mL D5W (premix)  (LEVAQUIN) 750 mg (750 mg Intravenous New Bag 1/18/18 9719)            MDM  Number of Diagnoses or Management Options  COPD exacerbation: new and requires workup  Fever, unspecified fever cause: new and requires workup  Hypoxia: new and requires workup  Pneumonia of right middle lobe due to infectious organism: new and requires workup  Respiratory distress: new and requires workup  Severe sepsis: new and requires workup  Tachycardia: new and requires workup     Amount and/or Complexity of Data Reviewed  Clinical lab tests: reviewed and ordered  Tests in the radiology section of CPT®: reviewed and ordered  Tests in the medicine section of CPT®: reviewed  Decide to obtain previous medical records or to obtain history from someone other than the patient: yes  Review and summarize past medical records: yes  Independent visualization of images, tracings, or specimens: yes    Risk of Complications, Morbidity, and/or Mortality  Presenting problems: high  Diagnostic procedures: high  Management options: high    Critical Care  Total time providing critical care: 30-74 minutes    Patient Progress  Patient progress: improved      Final diagnoses:   Pneumonia of right middle lobe due to infectious organism   COPD exacerbation   Respiratory distress   Hypoxia   Tachycardia   Fever, unspecified fever cause   Severe sepsis          Manpreet Sage MD  01/18/18 0952

## 2018-01-19 NOTE — NURSING NOTE
"Discussed with patients son on admission to unit about patients baseline pupils. Son said that after a cataract surgery the patients R eye went blind resulting in an abnormal pupil (4mm, irregular, non reactive) and the L eye having only \"straw vision\" (2mm, round, non reactive)  "

## 2018-01-19 NOTE — PROGRESS NOTES
Call placed to [facility] transfer [place] re: pt admission. Asked to call me back without info given.  I went ahead and faxed information to him.  Awaiting return phone call.

## 2018-01-19 NOTE — PLAN OF CARE
Problem: Patient Care Overview (Adult)  Goal: Plan of Care Review  Outcome: Ongoing (interventions implemented as appropriate)   01/19/18 0531   Coping/Psychosocial Response Interventions   Plan Of Care Reviewed With patient   Patient Care Overview   Progress no change   Outcome Evaluation   Outcome Summary/Follow up Plan temperature down to 98.1. patient repsonding to voice, answers questions, follows commands. very weak. in and out cath, 875. Bipap at 70%. will continue to monitor.     Goal: Adult Individualization and Mutuality  Outcome: Ongoing (interventions implemented as appropriate)    Goal: Discharge Needs Assessment  Outcome: Ongoing (interventions implemented as appropriate)      Problem: Infection, Risk/Actual (Adult)  Goal: Identify Related Risk Factors and Signs and Symptoms  Outcome: Ongoing (interventions implemented as appropriate)    Goal: Infection Prevention/Resolution  Outcome: Ongoing (interventions implemented as appropriate)      Problem: Pneumonia (Adult)  Goal: Signs and Symptoms of Listed Potential Problems Will be Absent or Manageable (Pneumonia)  Outcome: Ongoing (interventions implemented as appropriate)      Problem: Respiratory Insufficiency (Adult)  Goal: Identify Related Risk Factors and Signs and Symptoms  Outcome: Ongoing (interventions implemented as appropriate)    Goal: Acid/Base Balance  Outcome: Ongoing (interventions implemented as appropriate)    Goal: Effective Ventilation  Outcome: Ongoing (interventions implemented as appropriate)

## 2018-01-19 NOTE — PROGRESS NOTES
PT TRANSPORTED TO  CCU ON BIPAP. PT REMAINED STABLE DURING TRANSPORT TO CCU. PT SPO2 REMAINED STABLE AT 94%. PT HAD NO ISSUES. REPORT GIVEN TO KHADAR CHATTERJEE.

## 2018-01-20 LAB
ALBUMIN SERPL-MCNC: 2.9 G/DL (ref 3.5–5)
ALBUMIN/GLOB SERPL: 1.4 G/DL (ref 1.1–2.5)
ALP SERPL-CCNC: 67 U/L (ref 24–120)
ALT SERPL W P-5'-P-CCNC: 29 U/L (ref 0–54)
ANION GAP SERPL CALCULATED.3IONS-SCNC: 10 MMOL/L (ref 4–13)
ANISOCYTOSIS BLD QL: NORMAL
AST SERPL-CCNC: 27 U/L (ref 7–45)
BASOPHILS # BLD AUTO: 0.01 10*3/MM3 (ref 0–0.2)
BASOPHILS NFR BLD AUTO: 0.1 % (ref 0–2)
BILIRUB SERPL-MCNC: 0.4 MG/DL (ref 0.1–1)
BUN BLD-MCNC: 19 MG/DL (ref 5–21)
BUN/CREAT SERPL: 33.9 (ref 7–25)
CALCIUM SPEC-SCNC: 8.4 MG/DL (ref 8.4–10.4)
CHLORIDE SERPL-SCNC: 106 MMOL/L (ref 98–110)
CO2 SERPL-SCNC: 23 MMOL/L (ref 24–31)
CREAT BLD-MCNC: 0.56 MG/DL (ref 0.5–1.4)
DEPRECATED RDW RBC AUTO: 50.1 FL (ref 40–54)
EOSINOPHIL # BLD AUTO: 0 10*3/MM3 (ref 0–0.7)
EOSINOPHIL NFR BLD AUTO: 0 % (ref 0–4)
ERYTHROCYTE [DISTWIDTH] IN BLOOD BY AUTOMATED COUNT: 15.9 % (ref 12–15)
GFR SERPL CREATININE-BSD FRML MDRD: 149 ML/MIN/1.73
GLOBULIN UR ELPH-MCNC: 2.1 GM/DL
GLUCOSE BLD-MCNC: 316 MG/DL (ref 70–100)
GLUCOSE BLDC GLUCOMTR-MCNC: 180 MG/DL (ref 70–130)
GLUCOSE BLDC GLUCOMTR-MCNC: 224 MG/DL (ref 70–130)
GLUCOSE BLDC GLUCOMTR-MCNC: 227 MG/DL (ref 70–130)
GLUCOSE BLDC GLUCOMTR-MCNC: 242 MG/DL (ref 70–130)
HCT VFR BLD AUTO: 44.2 % (ref 40–52)
HGB BLD-MCNC: 14.1 G/DL (ref 14–18)
IMM GRANULOCYTES # BLD: 0.14 10*3/MM3 (ref 0–0.03)
IMM GRANULOCYTES NFR BLD: 1.1 % (ref 0–5)
LYMPHOCYTES # BLD AUTO: 1 10*3/MM3 (ref 0.72–4.86)
LYMPHOCYTES NFR BLD AUTO: 8 % (ref 15–45)
MCH RBC QN AUTO: 27.6 PG (ref 28–32)
MCHC RBC AUTO-ENTMCNC: 31.9 G/DL (ref 33–36)
MCV RBC AUTO: 86.5 FL (ref 82–95)
MONOCYTES # BLD AUTO: 0.32 10*3/MM3 (ref 0.19–1.3)
MONOCYTES NFR BLD AUTO: 2.6 % (ref 4–12)
NEUTROPHILS # BLD AUTO: 11.07 10*3/MM3 (ref 1.87–8.4)
NEUTROPHILS NFR BLD AUTO: 88.2 % (ref 39–78)
NRBC BLD MANUAL-RTO: 0 /100 WBC (ref 0–0)
PLATELET # BLD AUTO: 85 10*3/MM3 (ref 130–400)
PMV BLD AUTO: 9.6 FL (ref 6–12)
POTASSIUM BLD-SCNC: 4.2 MMOL/L (ref 3.5–5.3)
PROT SERPL-MCNC: 5 G/DL (ref 6.3–8.7)
RBC # BLD AUTO: 5.11 10*6/MM3 (ref 4.8–5.9)
SMALL PLATELETS BLD QL SMEAR: NORMAL
SODIUM BLD-SCNC: 139 MMOL/L (ref 135–145)
VANCOMYCIN TROUGH SERPL-MCNC: 12.9 MCG/ML (ref 10–20)
WBC MORPH BLD: NORMAL
WBC NRBC COR # BLD: 12.54 10*3/MM3 (ref 4.8–10.8)

## 2018-01-20 PROCEDURE — 25010000002 CEFEPIME PER 500 MG: Performed by: FAMILY MEDICINE

## 2018-01-20 PROCEDURE — 25010000002 HEPARIN (PORCINE) PER 1000 UNITS: Performed by: FAMILY MEDICINE

## 2018-01-20 PROCEDURE — 63710000001 INSULIN DETEMIR PER 5 UNITS: Performed by: FAMILY MEDICINE

## 2018-01-20 PROCEDURE — 94760 N-INVAS EAR/PLS OXIMETRY 1: CPT

## 2018-01-20 PROCEDURE — 25010000002 VANCOMYCIN 10 G RECONSTITUTED SOLUTION: Performed by: FAMILY MEDICINE

## 2018-01-20 PROCEDURE — 25010000002 MAGNESIUM SULFATE IN D5W 1G/100ML (PREMIX) 1-5 GM/100ML-% SOLUTION: Performed by: FAMILY MEDICINE

## 2018-01-20 PROCEDURE — 87205 SMEAR GRAM STAIN: CPT | Performed by: FAMILY MEDICINE

## 2018-01-20 PROCEDURE — 94799 UNLISTED PULMONARY SVC/PX: CPT

## 2018-01-20 PROCEDURE — 25010000002 ENOXAPARIN PER 10 MG: Performed by: FAMILY MEDICINE

## 2018-01-20 PROCEDURE — 63710000001 INSULIN LISPRO (HUMAN) PER 5 UNITS: Performed by: FAMILY MEDICINE

## 2018-01-20 PROCEDURE — 87070 CULTURE OTHR SPECIMN AEROBIC: CPT | Performed by: FAMILY MEDICINE

## 2018-01-20 PROCEDURE — 80202 ASSAY OF VANCOMYCIN: CPT | Performed by: FAMILY MEDICINE

## 2018-01-20 PROCEDURE — 85025 COMPLETE CBC W/AUTO DIFF WBC: CPT | Performed by: FAMILY MEDICINE

## 2018-01-20 PROCEDURE — 80053 COMPREHEN METABOLIC PANEL: CPT | Performed by: FAMILY MEDICINE

## 2018-01-20 PROCEDURE — 82962 GLUCOSE BLOOD TEST: CPT

## 2018-01-20 PROCEDURE — 25010000002 METHYLPREDNISOLONE PER 125 MG: Performed by: FAMILY MEDICINE

## 2018-01-20 PROCEDURE — 25010000002 LEVOFLOXACIN PER 250 MG: Performed by: FAMILY MEDICINE

## 2018-01-20 PROCEDURE — 85007 BL SMEAR W/DIFF WBC COUNT: CPT | Performed by: FAMILY MEDICINE

## 2018-01-20 RX ORDER — MAGNESIUM SULFATE 1 G/100ML
1 INJECTION INTRAVENOUS ONCE
Status: COMPLETED | OUTPATIENT
Start: 2018-01-20 | End: 2018-01-20

## 2018-01-20 RX ORDER — FAMOTIDINE 20 MG/1
20 TABLET, FILM COATED ORAL 2 TIMES DAILY
Status: DISCONTINUED | OUTPATIENT
Start: 2018-01-20 | End: 2018-01-23 | Stop reason: HOSPADM

## 2018-01-20 RX ORDER — METHYLPREDNISOLONE SODIUM SUCCINATE 125 MG/2ML
125 INJECTION, POWDER, LYOPHILIZED, FOR SOLUTION INTRAMUSCULAR; INTRAVENOUS EVERY 6 HOURS
Status: DISCONTINUED | OUTPATIENT
Start: 2018-01-20 | End: 2018-01-22

## 2018-01-20 RX ORDER — BETHANECHOL CHLORIDE 10 MG/1
10 TABLET ORAL 3 TIMES DAILY
Status: DISCONTINUED | OUTPATIENT
Start: 2018-01-20 | End: 2018-01-21

## 2018-01-20 RX ORDER — KETOROLAC TROMETHAMINE 30 MG/ML
30 INJECTION, SOLUTION INTRAMUSCULAR; INTRAVENOUS EVERY 6 HOURS PRN
Status: DISCONTINUED | OUTPATIENT
Start: 2018-01-20 | End: 2018-01-20

## 2018-01-20 RX ADMIN — FAMOTIDINE 10 MG: 10 INJECTION, SOLUTION INTRAVENOUS at 08:23

## 2018-01-20 RX ADMIN — GUAIFENESIN 1200 MG: 600 TABLET, EXTENDED RELEASE ORAL at 08:24

## 2018-01-20 RX ADMIN — IPRATROPIUM BROMIDE AND ALBUTEROL SULFATE 3 ML: 2.5; .5 SOLUTION RESPIRATORY (INHALATION) at 20:38

## 2018-01-20 RX ADMIN — INSULIN LISPRO 8 UNITS: 100 INJECTION, SOLUTION INTRAVENOUS; SUBCUTANEOUS at 21:58

## 2018-01-20 RX ADMIN — METHYLPREDNISOLONE SODIUM SUCCINATE 80 MG: 125 INJECTION, POWDER, FOR SOLUTION INTRAMUSCULAR; INTRAVENOUS at 00:34

## 2018-01-20 RX ADMIN — SPIRONOLACTONE 25 MG: 25 TABLET ORAL at 09:27

## 2018-01-20 RX ADMIN — ENOXAPARIN SODIUM 40 MG: 40 INJECTION SUBCUTANEOUS at 08:25

## 2018-01-20 RX ADMIN — BUDESONIDE 0.25 MG: 0.25 INHALANT RESPIRATORY (INHALATION) at 20:39

## 2018-01-20 RX ADMIN — ARFORMOTEROL TARTRATE 15 MCG: 15 SOLUTION RESPIRATORY (INHALATION) at 07:16

## 2018-01-20 RX ADMIN — GUAIFENESIN 1200 MG: 600 TABLET, EXTENDED RELEASE ORAL at 21:59

## 2018-01-20 RX ADMIN — VENLAFAXINE HYDROCHLORIDE 225 MG: 75 CAPSULE, EXTENDED RELEASE ORAL at 08:24

## 2018-01-20 RX ADMIN — ATENOLOL 25 MG: 25 TABLET ORAL at 21:59

## 2018-01-20 RX ADMIN — IPRATROPIUM BROMIDE AND ALBUTEROL SULFATE 3 ML: 2.5; .5 SOLUTION RESPIRATORY (INHALATION) at 14:45

## 2018-01-20 RX ADMIN — TAMSULOSIN HYDROCHLORIDE 0.4 MG: 0.4 CAPSULE ORAL at 08:24

## 2018-01-20 RX ADMIN — LEVOFLOXACIN 750 MG: 5 INJECTION, SOLUTION INTRAVENOUS at 22:18

## 2018-01-20 RX ADMIN — METHYLPREDNISOLONE SODIUM SUCCINATE 80 MG: 125 INJECTION, POWDER, FOR SOLUTION INTRAMUSCULAR; INTRAVENOUS at 06:10

## 2018-01-20 RX ADMIN — CEFEPIME HYDROCHLORIDE 1 G: 1 INJECTION, POWDER, FOR SOLUTION INTRAMUSCULAR; INTRAVENOUS at 02:03

## 2018-01-20 RX ADMIN — ARFORMOTEROL TARTRATE 15 MCG: 15 SOLUTION RESPIRATORY (INHALATION) at 20:39

## 2018-01-20 RX ADMIN — METHYLPREDNISOLONE SODIUM SUCCINATE 125 MG: 125 INJECTION, POWDER, FOR SOLUTION INTRAMUSCULAR; INTRAVENOUS at 23:57

## 2018-01-20 RX ADMIN — MAGNESIUM SULFATE HEPTAHYDRATE 1 G: 1 INJECTION, SOLUTION INTRAVENOUS at 09:26

## 2018-01-20 RX ADMIN — DESMOPRESSIN ACETATE 40 MG: 0.2 TABLET ORAL at 21:59

## 2018-01-20 RX ADMIN — LEVOTHYROXINE SODIUM 50 MCG: 50 TABLET ORAL at 06:10

## 2018-01-20 RX ADMIN — BUDESONIDE 0.25 MG: 0.25 INHALANT RESPIRATORY (INHALATION) at 06:55

## 2018-01-20 RX ADMIN — BETHANECHOL CHLORIDE 10 MG: 10 TABLET ORAL at 16:01

## 2018-01-20 RX ADMIN — IPRATROPIUM BROMIDE AND ALBUTEROL SULFATE 3 ML: 2.5; .5 SOLUTION RESPIRATORY (INHALATION) at 06:55

## 2018-01-20 RX ADMIN — VANCOMYCIN HYDROCHLORIDE 1500 MG: 100 INJECTION, POWDER, LYOPHILIZED, FOR SOLUTION INTRAVENOUS at 23:57

## 2018-01-20 RX ADMIN — VANCOMYCIN HYDROCHLORIDE 1500 MG: 100 INJECTION, POWDER, LYOPHILIZED, FOR SOLUTION INTRAVENOUS at 13:14

## 2018-01-20 RX ADMIN — INSULIN DETEMIR 160 UNITS: 100 INJECTION, SOLUTION SUBCUTANEOUS at 08:24

## 2018-01-20 RX ADMIN — METHYLPREDNISOLONE SODIUM SUCCINATE 125 MG: 125 INJECTION, POWDER, FOR SOLUTION INTRAMUSCULAR; INTRAVENOUS at 13:18

## 2018-01-20 RX ADMIN — IPRATROPIUM BROMIDE AND ALBUTEROL SULFATE 3 ML: 2.5; .5 SOLUTION RESPIRATORY (INHALATION) at 10:51

## 2018-01-20 RX ADMIN — CEFEPIME HYDROCHLORIDE 1 G: 1 INJECTION, POWDER, FOR SOLUTION INTRAMUSCULAR; INTRAVENOUS at 09:28

## 2018-01-20 RX ADMIN — CEFEPIME HYDROCHLORIDE 1 G: 1 INJECTION, POWDER, FOR SOLUTION INTRAMUSCULAR; INTRAVENOUS at 17:38

## 2018-01-20 RX ADMIN — INSULIN LISPRO 4 UNITS: 100 INJECTION, SOLUTION INTRAVENOUS; SUBCUTANEOUS at 17:38

## 2018-01-20 RX ADMIN — HEPARIN SODIUM 5000 UNITS: 5000 INJECTION, SOLUTION INTRAVENOUS; SUBCUTANEOUS at 06:10

## 2018-01-20 RX ADMIN — VANCOMYCIN HYDROCHLORIDE 1500 MG: 100 INJECTION, POWDER, LYOPHILIZED, FOR SOLUTION INTRAVENOUS at 00:34

## 2018-01-20 RX ADMIN — INSULIN LISPRO 8 UNITS: 100 INJECTION, SOLUTION INTRAVENOUS; SUBCUTANEOUS at 08:10

## 2018-01-20 RX ADMIN — FAMOTIDINE 20 MG: 20 TABLET, FILM COATED ORAL at 21:59

## 2018-01-20 RX ADMIN — ATENOLOL 25 MG: 25 TABLET ORAL at 08:25

## 2018-01-20 RX ADMIN — BETHANECHOL CHLORIDE 10 MG: 10 TABLET ORAL at 08:25

## 2018-01-20 RX ADMIN — METHYLPREDNISOLONE SODIUM SUCCINATE 125 MG: 125 INJECTION, POWDER, FOR SOLUTION INTRAMUSCULAR; INTRAVENOUS at 17:38

## 2018-01-20 RX ADMIN — INSULIN LISPRO 8 UNITS: 100 INJECTION, SOLUTION INTRAVENOUS; SUBCUTANEOUS at 13:18

## 2018-01-20 RX ADMIN — PREGABALIN 150 MG: 75 CAPSULE ORAL at 22:04

## 2018-01-20 RX ADMIN — CLOPIDOGREL 75 MG: 75 TABLET, FILM COATED ORAL at 08:25

## 2018-01-20 NOTE — PLAN OF CARE
Problem: Infection, Risk/Actual (Adult)  Goal: Infection Prevention/Resolution  Outcome: Ongoing (interventions implemented as appropriate)      Problem: Pneumonia (Adult)  Goal: Signs and Symptoms of Listed Potential Problems Will be Absent or Manageable (Pneumonia)  Outcome: Ongoing (interventions implemented as appropriate)      Problem: Respiratory Insufficiency (Adult)  Goal: Acid/Base Balance  Outcome: Ongoing (interventions implemented as appropriate)    Goal: Effective Ventilation  Outcome: Ongoing (interventions implemented as appropriate)

## 2018-01-20 NOTE — PROGRESS NOTES
"Pharmacy Renal Dose Conversion  Automatic IV to PO Pharmacy Note - General    Sai Adam is a 57 y.o. year old male  170.2 cm (67\") 95.9 kg (211 lb 6.4 oz)  who meets the following criteria for IV to PO therapy conversion :     · Tolerating oral fluids or 40ml/hour of enteral nutrition and oral route not otherwise compromised  · Receiving other oral medications on a scheduled basis    Estimated Creatinine Clearance: 156.7 mL/min (by C-G formula based on Cr of 0.56).   Lab Results   Component Value Date    CREATININE 0.56 01/20/2018    CREATININE 0.65 01/19/2018    CREATININE 0.82 01/18/2018       PLAN  Based on prescribing information provided by the drug  and iv to po criteria, Famotidine 10mg iv Q12H,  has been changed to Famotidine 20mg po BID.    Adjusted per the directives and guidelines established by [facility] Pharmacy and Therapeutics Committee and [facility] Medical Executive Committee.  Pharmacy will continue to monitor daily and make further adjustment(s) accordingly.    Lucas Sheridan [facility]  01/20/1810:26 AM    "

## 2018-01-20 NOTE — PLAN OF CARE
Problem: Patient Care Overview (Adult)  Goal: Plan of Care Review  Outcome: Ongoing (interventions implemented as appropriate)   01/20/18 0616   Coping/Psychosocial Response Interventions   Plan Of Care Reviewed With patient   Patient Care Overview   Progress no change   Outcome Evaluation   Outcome Summary/Follow up Plan VSS other than a few episodes of bradycardia this morning, slept with bipap on all night, pt did use the urinal and have an accident in the bed but refused to use the urinal before and after the one time      Goal: Adult Individualization and Mutuality  Outcome: Ongoing (interventions implemented as appropriate)    Goal: Discharge Needs Assessment  Outcome: Ongoing (interventions implemented as appropriate)      Problem: Infection, Risk/Actual (Adult)  Goal: Identify Related Risk Factors and Signs and Symptoms  Outcome: Ongoing (interventions implemented as appropriate)    Goal: Infection Prevention/Resolution  Outcome: Ongoing (interventions implemented as appropriate)      Problem: Pneumonia (Adult)  Goal: Signs and Symptoms of Listed Potential Problems Will be Absent or Manageable (Pneumonia)  Outcome: Ongoing (interventions implemented as appropriate)      Problem: Respiratory Insufficiency (Adult)  Goal: Identify Related Risk Factors and Signs and Symptoms  Outcome: Ongoing (interventions implemented as appropriate)    Goal: Acid/Base Balance  Outcome: Ongoing (interventions implemented as appropriate)    Goal: Effective Ventilation  Outcome: Ongoing (interventions implemented as appropriate)

## 2018-01-20 NOTE — SIGNIFICANT NOTE
Pt transported to 491 per 5 lpm cannula. Bipap on SBy place in room on standby. Report given to Marleny CATALAN

## 2018-01-20 NOTE — PLAN OF CARE
Problem: Patient Care Overview (Adult)  Goal: Plan of Care Review  Outcome: Ongoing (interventions implemented as appropriate)   01/20/18 1414   Coping/Psychosocial Response Interventions   Plan Of Care Reviewed With patient   Patient Care Overview   Progress improving   Outcome Evaluation   Outcome Summary/Follow up Plan pt stable to floor today, baseline O2 86-88 on 6L NC. pt ad rory, sit up in chair, abx given, std, and insulin. VSS. cont to monitor       Problem: Infection, Risk/Actual (Adult)  Goal: Identify Related Risk Factors and Signs and Symptoms  Outcome: Outcome(s) achieved Date Met: 01/20/18    Goal: Infection Prevention/Resolution  Outcome: Ongoing (interventions implemented as appropriate)      Problem: Pneumonia (Adult)  Goal: Signs and Symptoms of Listed Potential Problems Will be Absent or Manageable (Pneumonia)  Outcome: Outcome(s) achieved Date Met: 01/20/18      Problem: Respiratory Insufficiency (Adult)  Goal: Identify Related Risk Factors and Signs and Symptoms  Outcome: Outcome(s) achieved Date Met: 01/20/18    Goal: Acid/Base Balance  Outcome: Ongoing (interventions implemented as appropriate)    Goal: Effective Ventilation  Outcome: Ongoing (interventions implemented as appropriate)

## 2018-01-20 NOTE — PROGRESS NOTES
"Pharmacy Dosing Service  Pharmacokinetics  Vancomycin Follow-up Evaluation    Assessment/Action/Plan:  1/20 1108 Vancomycin trough = 12.9 (10.5 hours post 1500mg dose).  Continue Vancomycin 1500mg iv q12h.  Pharmacy will continue to monitor renal function and adjust dose accordingly.     Subjective:  Sai Adam is a 57 y.o. male currently on Vancomycin 1500 mg IV every 12 hours for the treatment of pneumonia, day 3 of therapy.  Final dose scheduled on 1/28/2018.    Objective:  Ht: 170.2 cm (67\"); Wt: 95.9 kg (211 lb 6.4 oz)  Estimated Creatinine Clearance: 156.7 mL/min (by C-G formula based on Cr of 0.56).   Lab Results   Component Value Date    CREATININE 0.56 01/20/2018    CREATININE 0.65 01/19/2018    CREATININE 0.82 01/18/2018      Lab Results   Component Value Date    WBC 12.54 (H) 01/20/2018    WBC 17.24 (H) 01/19/2018    WBC 19.65 (H) 01/18/2018         Lab Results   Component Value Date    VANCOTROUGH 12.90 01/20/2018       Culture Results:  Microbiology Results (last 10 days)       Procedure Component Value - Date/Time      Influenza Antigen, Rapid - Swab, Nasopharynx [[id number]]  (Normal) Collected:  01/18/18 2236    Lab Status:  Final result Specimen:  Swab from Nasopharynx Updated:  01/18/18 2306     Influenza A Ag, EIA Negative     Influenza B Ag, EIA Negative    Narrative:         Recommend confirmation of negative results by viral culture or molecular assay.    Blood Culture With LINETTE - Blood, [[id number]]  (Normal) Collected:  01/18/18 2225    Lab Status:  Preliminary result Specimen:  Blood from Hand, Left Updated:  01/19/18 2304     Blood Culture No growth at 24 hours    Blood Culture With LINETTE - Blood, [[id number]]  (Normal) Collected:  01/18/18 2220    Lab Status:  Preliminary result Specimen:  Blood from Arm, Left Updated:  01/19/18 2304     Blood Culture No growth at 24 hours            Lucas Sheridan [facility]   01/20/18 1:38 PM    "

## 2018-01-20 NOTE — PROGRESS NOTES
[facility] Medicine Services  INPATIENT PROGRESS NOTE    Length of Stay: 2  Date of Admission: 1/18/2018  Primary Care Physician: Jarod Tillman MD    Subjective   Chief Complaint: SOA  HPI   Did ok overnight.  Requiring Bipap for sleep.  Sats 93% while sleeping. He tells me that he wears 3-3.5L of oxygen at home.  He has had increasing SOA the last several days prior to coming in despite this.      Had some urinary retention.  Already on flomax.  Will add Bethanechol.  Good UOP overall.  Afebrile.  CBC this AM pending.    Sitting up and eating breakfast.  Oxygen sats 88-91% while eating.          F-ADA  A-Tylenol  S-NA  T-Lovenox  H-up 30 degrees  U-pepcid  G-elevated, increase SSI    UOP:  1775        Review of Systems   Constitutional: Positive for fatigue. Negative for fever.   HENT: Negative for congestion and ear pain.    Eyes: Negative for redness and visual disturbance.   Respiratory: Positive for cough, shortness of breath and wheezing.    Cardiovascular: Negative for chest pain and palpitations.   Gastrointestinal: Negative for abdominal pain, diarrhea, nausea and vomiting.   Endocrine: Negative for cold intolerance and heat intolerance.   Genitourinary: Negative for dysuria and frequency.   Musculoskeletal: Negative for arthralgias and back pain.   Skin: Negative for rash and wound.   Neurological: Positive for weakness. Negative for dizziness and headaches.   Psychiatric/Behavioral: Negative for confusion. The patient is not nervous/anxious.         All pertinent negatives and positives are as above. All other systems have been reviewed and are negative unless otherwise stated.     Objective    Temp:  [98.2 °F (36.8 °C)-98.6 °F (37 °C)] 98.2 °F (36.8 °C)  Heart Rate:  [55-88] 65  Resp:  [18-22] 18  BP: ()/(66-92) 102/71  Physical Exam   Constitutional: He is oriented to person, place, and time. He appears well-developed and well-nourished. Nasal cannula in  place.   HENT:   Head: Normocephalic and atraumatic.   Right Ear: External ear normal.   Left Ear: External ear normal.   Nose: Nose normal.   Mouth/Throat: Oropharynx is clear and moist.   Eyes: Conjunctivae and EOM are normal. Pupils are equal, round, and reactive to light. Right eye exhibits no discharge. Left eye exhibits no discharge. No scleral icterus.   Neck: Normal range of motion. Neck supple. No tracheal deviation present. No thyromegaly present.   Cardiovascular: Normal rate, regular rhythm, normal heart sounds and intact distal pulses.  Exam reveals no gallop and no friction rub.    No murmur heard.  Pulmonary/Chest: Effort normal. No stridor. No respiratory distress. He has decreased breath sounds. He has no wheezes. He has rhonchi. He has no rales. He exhibits no tenderness.   Abdominal: Soft. Bowel sounds are normal. He exhibits no distension and no mass. There is no tenderness. There is no rebound and no guarding. No hernia.   Musculoskeletal: Normal range of motion. He exhibits no edema or deformity.   Lymphadenopathy:     He has no cervical adenopathy.   Neurological: He is alert and oriented to person, place, and time. He has normal reflexes. He displays normal reflexes. No cranial nerve deficit. He exhibits normal muscle tone. Coordination normal.   Skin: Skin is warm and dry. No rash noted. No erythema. No pallor.   Psychiatric: He has a normal mood and affect. His behavior is normal. Judgment and thought content normal.   Vitals reviewed.          Results Review:  I have reviewed the labs, radiology results, and diagnostic studies.    Laboratory Data:     Results from last 7 days  Lab Units 01/19/18  0233 01/18/18  2239   WBC 10*3/mm3 17.24* 19.65*   HEMOGLOBIN g/dL 14.0 15.5   HEMATOCRIT % 43.5 49.9   PLATELETS 10*3/mm3 100* 121*          Results from last 7 days  Lab Units 01/19/18  0233 01/18/18  2315   SODIUM mmol/L 141 143   POTASSIUM mmol/L 4.2 4.0   CHLORIDE mmol/L 104 99   CO2 mmol/L  29.0 34.0*   BUN mg/dL 8 8   CREATININE mg/dL 0.65 0.82   CALCIUM mg/dL 7.9* 8.8   BILIRUBIN mg/dL 1.3* 1.2*   ALK PHOS U/L 71 94   ALT (SGPT) U/L 30 31   AST (SGOT) U/L 30 33   GLUCOSE mg/dL 289* 278*       Culture Data:   Blood Culture   Date Value Ref Range Status   01/18/2018 No growth at 24 hours  Preliminary   01/18/2018 No growth at 24 hours  Preliminary       Radiology Data:   Imaging Results (last 24 hours)     Procedure Component Value Units Date/Time    XR Chest 1 View [[id number]] Collected:  01/19/18 1534     Updated:  01/19/18 1539    Narrative:       EXAMINATION:   XR CHEST 1 VW-  1/19/2018 3:34 PM CST     HISTORY: Pneumonia     Frontal upright radiograph of the chest 1/19/2018 3:11 PM CST     COMPARISON: 01/18/2018.     FINDINGS:   Right upper lobe infiltrate persists. There is mild improvement in the  aeration however significant residual persists. Left lung is clear.. The  cardiomediastinal silhouette and pulmonary vascularity are within normal  limits. Wires are present previous median sternotomy.     The osseous structures and surrounding soft tissues demonstrate no acute  abnormality.       Impression:       1. Right upper lobe pneumonia.        This report was finalized on 01/19/2018 15:36 by Dr. Den Rendon MD.          I have reviewed the patient current medications.     Assessment/Plan     1.  Sepsis   -IVF  -Vanc/Levaquin/Cefepime  -Cultures pending  -Telemetry     2.  HAP  -Vanc/Levaquin/Cefepime  -Cultures pending  -Nebs  -Steroids increased     3.  Acute Hypoxic and hypercapnic respiratory failure  -Bipap  -Wean as tolerated  -Continue to treat pneumonia  -Nebs  -Steroids increased  -Telemetry  -continuous pulse ox     4.  COPD with acute exacerbation  -IV Abx  -Nebs  -Steroids increased  -Continuous pulse-ox  -Telemetry     5.  Colon Cancer     6.  CAD  -Atenolol held given sepsis  -Plavix  -Losartan  -Imdur held given sepsis     7.  T2DM  -Levemir  -SSI  -Metformin held given Lactic  Acidosis  -Continue to check FSBS  -Hypoglycemia protocol      8.  HLD  -Statin     9.  HTN  - BP meds held given sepsis     10.  Arthritis     11.  Protein malnutrition  -dietary consult     12.  Diabetic Neuropathy  -Lyrica  -Effexor     13.  BPH  -Flomax     14.  GERD  -Pepcid     15.  Hypothyroidism  -Synthroid    16.  Lactic Acidosis  -Resolved  -Monitor  -IVF  -Telemetry    17.  Urinary Retention  -Flomax  -Add Bethanechol  -Continue to do bladder scanning/in and out cath as needed  -Would prefer to avoid anchoring a jones if possible as it would provide a potential second source of infection                Discharge Planning: I expect the patient to be discharged to home in 3-4 days    Satinder Jorge MD   01/20/18   7:50 AM

## 2018-01-21 LAB
ALBUMIN SERPL-MCNC: 2.5 G/DL (ref 3.5–5)
ALBUMIN/GLOB SERPL: 1 G/DL (ref 1.1–2.5)
ALP SERPL-CCNC: 69 U/L (ref 24–120)
ALT SERPL W P-5'-P-CCNC: 25 U/L (ref 0–54)
ANION GAP SERPL CALCULATED.3IONS-SCNC: 11 MMOL/L (ref 4–13)
AST SERPL-CCNC: 26 U/L (ref 7–45)
BASOPHILS # BLD AUTO: 0 10*3/MM3 (ref 0–0.2)
BASOPHILS NFR BLD AUTO: 0 % (ref 0–2)
BILIRUB SERPL-MCNC: 0.3 MG/DL (ref 0.1–1)
BUN BLD-MCNC: 19 MG/DL (ref 5–21)
BUN/CREAT SERPL: 33.9 (ref 7–25)
CALCIUM SPEC-SCNC: 8.6 MG/DL (ref 8.4–10.4)
CHLORIDE SERPL-SCNC: 110 MMOL/L (ref 98–110)
CO2 SERPL-SCNC: 23 MMOL/L (ref 24–31)
CREAT BLD-MCNC: 0.56 MG/DL (ref 0.5–1.4)
DEPRECATED RDW RBC AUTO: 48.9 FL (ref 40–54)
EOSINOPHIL # BLD AUTO: 0 10*3/MM3 (ref 0–0.7)
EOSINOPHIL NFR BLD AUTO: 0 % (ref 0–4)
ERYTHROCYTE [DISTWIDTH] IN BLOOD BY AUTOMATED COUNT: 16 % (ref 12–15)
GFR SERPL CREATININE-BSD FRML MDRD: 149 ML/MIN/1.73
GLOBULIN UR ELPH-MCNC: 2.4 GM/DL
GLUCOSE BLD-MCNC: 150 MG/DL (ref 70–100)
GLUCOSE BLDC GLUCOMTR-MCNC: 121 MG/DL (ref 70–130)
GLUCOSE BLDC GLUCOMTR-MCNC: 225 MG/DL (ref 70–130)
GLUCOSE BLDC GLUCOMTR-MCNC: 242 MG/DL (ref 70–130)
GLUCOSE BLDC GLUCOMTR-MCNC: 342 MG/DL (ref 70–130)
HCT VFR BLD AUTO: 42.3 % (ref 40–52)
HGB BLD-MCNC: 13.3 G/DL (ref 14–18)
IMM GRANULOCYTES # BLD: 0.22 10*3/MM3 (ref 0–0.03)
IMM GRANULOCYTES NFR BLD: 1.7 % (ref 0–5)
LYMPHOCYTES # BLD AUTO: 1.28 10*3/MM3 (ref 0.72–4.86)
LYMPHOCYTES NFR BLD AUTO: 10 % (ref 15–45)
MCH RBC QN AUTO: 26.6 PG (ref 28–32)
MCHC RBC AUTO-ENTMCNC: 31.4 G/DL (ref 33–36)
MCV RBC AUTO: 84.6 FL (ref 82–95)
MONOCYTES # BLD AUTO: 0.28 10*3/MM3 (ref 0.19–1.3)
MONOCYTES NFR BLD AUTO: 2.2 % (ref 4–12)
NEUTROPHILS # BLD AUTO: 11.03 10*3/MM3 (ref 1.87–8.4)
NEUTROPHILS NFR BLD AUTO: 86.1 % (ref 39–78)
NRBC BLD MANUAL-RTO: 0 /100 WBC (ref 0–0)
PLATELET # BLD AUTO: 115 10*3/MM3 (ref 130–400)
PMV BLD AUTO: 10.3 FL (ref 6–12)
POTASSIUM BLD-SCNC: 3.4 MMOL/L (ref 3.5–5.3)
PROT SERPL-MCNC: 4.9 G/DL (ref 6.3–8.7)
RBC # BLD AUTO: 5 10*6/MM3 (ref 4.8–5.9)
SODIUM BLD-SCNC: 144 MMOL/L (ref 135–145)
WBC NRBC COR # BLD: 12.81 10*3/MM3 (ref 4.8–10.8)

## 2018-01-21 PROCEDURE — 94799 UNLISTED PULMONARY SVC/PX: CPT

## 2018-01-21 PROCEDURE — 63710000001 INSULIN DETEMIR PER 5 UNITS: Performed by: FAMILY MEDICINE

## 2018-01-21 PROCEDURE — 97166 OT EVAL MOD COMPLEX 45 MIN: CPT | Performed by: OCCUPATIONAL THERAPIST

## 2018-01-21 PROCEDURE — 25010000002 LEVOFLOXACIN PER 250 MG: Performed by: FAMILY MEDICINE

## 2018-01-21 PROCEDURE — 80053 COMPREHEN METABOLIC PANEL: CPT | Performed by: FAMILY MEDICINE

## 2018-01-21 PROCEDURE — 25010000002 METHYLPREDNISOLONE PER 125 MG: Performed by: FAMILY MEDICINE

## 2018-01-21 PROCEDURE — 85025 COMPLETE CBC W/AUTO DIFF WBC: CPT | Performed by: FAMILY MEDICINE

## 2018-01-21 PROCEDURE — G8988 SELF CARE GOAL STATUS: HCPCS | Performed by: OCCUPATIONAL THERAPIST

## 2018-01-21 PROCEDURE — G8987 SELF CARE CURRENT STATUS: HCPCS | Performed by: OCCUPATIONAL THERAPIST

## 2018-01-21 PROCEDURE — 63710000001 INSULIN LISPRO (HUMAN) PER 5 UNITS: Performed by: FAMILY MEDICINE

## 2018-01-21 PROCEDURE — 25010000002 VANCOMYCIN 10 G RECONSTITUTED SOLUTION: Performed by: FAMILY MEDICINE

## 2018-01-21 PROCEDURE — 82962 GLUCOSE BLOOD TEST: CPT

## 2018-01-21 PROCEDURE — 25010000002 CEFEPIME PER 500 MG: Performed by: FAMILY MEDICINE

## 2018-01-21 PROCEDURE — 94760 N-INVAS EAR/PLS OXIMETRY 1: CPT

## 2018-01-21 RX ORDER — FINASTERIDE 5 MG/1
5 TABLET, FILM COATED ORAL DAILY
Status: DISCONTINUED | OUTPATIENT
Start: 2018-01-21 | End: 2018-01-23 | Stop reason: HOSPADM

## 2018-01-21 RX ORDER — LOSARTAN POTASSIUM 50 MG/1
100 TABLET ORAL
Status: DISCONTINUED | OUTPATIENT
Start: 2018-01-21 | End: 2018-01-23 | Stop reason: HOSPADM

## 2018-01-21 RX ORDER — BETHANECHOL CHLORIDE 25 MG/1
25 TABLET ORAL 3 TIMES DAILY
Status: DISCONTINUED | OUTPATIENT
Start: 2018-01-21 | End: 2018-01-22

## 2018-01-21 RX ORDER — TAMSULOSIN HYDROCHLORIDE 0.4 MG/1
0.8 CAPSULE ORAL DAILY
Status: DISCONTINUED | OUTPATIENT
Start: 2018-01-22 | End: 2018-01-23 | Stop reason: HOSPADM

## 2018-01-21 RX ORDER — NICOTINE 21 MG/24HR
1 PATCH, TRANSDERMAL 24 HOURS TRANSDERMAL EVERY 24 HOURS
Status: DISCONTINUED | OUTPATIENT
Start: 2018-01-21 | End: 2018-01-23 | Stop reason: HOSPADM

## 2018-01-21 RX ORDER — MORPHINE SULFATE 30 MG/1
30 TABLET, FILM COATED, EXTENDED RELEASE ORAL EVERY 12 HOURS SCHEDULED
COMMUNITY
End: 2018-01-23 | Stop reason: HOSPADM

## 2018-01-21 RX ADMIN — IPRATROPIUM BROMIDE AND ALBUTEROL SULFATE 3 ML: 2.5; .5 SOLUTION RESPIRATORY (INHALATION) at 16:06

## 2018-01-21 RX ADMIN — BETHANECHOL CHLORIDE 10 MG: 10 TABLET ORAL at 08:43

## 2018-01-21 RX ADMIN — ATENOLOL 25 MG: 25 TABLET ORAL at 20:49

## 2018-01-21 RX ADMIN — BUDESONIDE 0.25 MG: 0.25 INHALANT RESPIRATORY (INHALATION) at 07:34

## 2018-01-21 RX ADMIN — METHYLPREDNISOLONE SODIUM SUCCINATE 125 MG: 125 INJECTION, POWDER, FOR SOLUTION INTRAMUSCULAR; INTRAVENOUS at 23:36

## 2018-01-21 RX ADMIN — GUAIFENESIN 1200 MG: 600 TABLET, EXTENDED RELEASE ORAL at 20:49

## 2018-01-21 RX ADMIN — IPRATROPIUM BROMIDE AND ALBUTEROL SULFATE 3 ML: 2.5; .5 SOLUTION RESPIRATORY (INHALATION) at 19:32

## 2018-01-21 RX ADMIN — TAMSULOSIN HYDROCHLORIDE 0.4 MG: 0.4 CAPSULE ORAL at 08:45

## 2018-01-21 RX ADMIN — CEFEPIME HYDROCHLORIDE 1 G: 1 INJECTION, POWDER, FOR SOLUTION INTRAMUSCULAR; INTRAVENOUS at 08:44

## 2018-01-21 RX ADMIN — CEFEPIME HYDROCHLORIDE 1 G: 1 INJECTION, POWDER, FOR SOLUTION INTRAMUSCULAR; INTRAVENOUS at 02:54

## 2018-01-21 RX ADMIN — BETHANECHOL CHLORIDE 10 MG: 10 TABLET ORAL at 00:15

## 2018-01-21 RX ADMIN — BETHANECHOL CHLORIDE 25 MG: 25 TABLET ORAL at 20:50

## 2018-01-21 RX ADMIN — INSULIN LISPRO 8 UNITS: 100 INJECTION, SOLUTION INTRAVENOUS; SUBCUTANEOUS at 20:50

## 2018-01-21 RX ADMIN — FAMOTIDINE 20 MG: 20 TABLET, FILM COATED ORAL at 20:50

## 2018-01-21 RX ADMIN — METHYLPREDNISOLONE SODIUM SUCCINATE 125 MG: 125 INJECTION, POWDER, FOR SOLUTION INTRAMUSCULAR; INTRAVENOUS at 06:19

## 2018-01-21 RX ADMIN — IPRATROPIUM BROMIDE AND ALBUTEROL SULFATE 3 ML: 2.5; .5 SOLUTION RESPIRATORY (INHALATION) at 11:26

## 2018-01-21 RX ADMIN — FINASTERIDE 5 MG: 5 TABLET, FILM COATED ORAL at 15:20

## 2018-01-21 RX ADMIN — METHYLPREDNISOLONE SODIUM SUCCINATE 125 MG: 125 INJECTION, POWDER, FOR SOLUTION INTRAMUSCULAR; INTRAVENOUS at 17:32

## 2018-01-21 RX ADMIN — SPIRONOLACTONE 25 MG: 25 TABLET ORAL at 08:45

## 2018-01-21 RX ADMIN — ATENOLOL 25 MG: 25 TABLET ORAL at 08:45

## 2018-01-21 RX ADMIN — INSULIN LISPRO 16 UNITS: 100 INJECTION, SOLUTION INTRAVENOUS; SUBCUTANEOUS at 11:51

## 2018-01-21 RX ADMIN — INSULIN DETEMIR 160 UNITS: 100 INJECTION, SOLUTION SUBCUTANEOUS at 08:19

## 2018-01-21 RX ADMIN — LEVOFLOXACIN 750 MG: 5 INJECTION, SOLUTION INTRAVENOUS at 23:36

## 2018-01-21 RX ADMIN — DESMOPRESSIN ACETATE 40 MG: 0.2 TABLET ORAL at 20:50

## 2018-01-21 RX ADMIN — IPRATROPIUM BROMIDE AND ALBUTEROL SULFATE 3 ML: 2.5; .5 SOLUTION RESPIRATORY (INHALATION) at 07:34

## 2018-01-21 RX ADMIN — METHYLPREDNISOLONE SODIUM SUCCINATE 125 MG: 125 INJECTION, POWDER, FOR SOLUTION INTRAMUSCULAR; INTRAVENOUS at 11:52

## 2018-01-21 RX ADMIN — GUAIFENESIN 1200 MG: 600 TABLET, EXTENDED RELEASE ORAL at 08:45

## 2018-01-21 RX ADMIN — BETHANECHOL CHLORIDE 25 MG: 25 TABLET ORAL at 15:21

## 2018-01-21 RX ADMIN — VANCOMYCIN HYDROCHLORIDE 1500 MG: 100 INJECTION, POWDER, LYOPHILIZED, FOR SOLUTION INTRAVENOUS at 11:51

## 2018-01-21 RX ADMIN — VENLAFAXINE HYDROCHLORIDE 225 MG: 75 CAPSULE, EXTENDED RELEASE ORAL at 08:44

## 2018-01-21 RX ADMIN — LEVOTHYROXINE SODIUM 50 MCG: 50 TABLET ORAL at 06:19

## 2018-01-21 RX ADMIN — NICOTINE 1 PATCH: 14 PATCH, EXTENDED RELEASE TRANSDERMAL at 10:42

## 2018-01-21 RX ADMIN — CLOPIDOGREL 75 MG: 75 TABLET, FILM COATED ORAL at 08:45

## 2018-01-21 RX ADMIN — PREGABALIN 150 MG: 75 CAPSULE ORAL at 08:47

## 2018-01-21 RX ADMIN — LOSARTAN POTASSIUM 100 MG: 50 TABLET ORAL at 15:21

## 2018-01-21 RX ADMIN — ARFORMOTEROL TARTRATE 15 MCG: 15 SOLUTION RESPIRATORY (INHALATION) at 19:32

## 2018-01-21 RX ADMIN — BUDESONIDE 0.25 MG: 0.25 INHALANT RESPIRATORY (INHALATION) at 19:32

## 2018-01-21 RX ADMIN — INSULIN LISPRO 8 UNITS: 100 INJECTION, SOLUTION INTRAVENOUS; SUBCUTANEOUS at 17:32

## 2018-01-21 RX ADMIN — FAMOTIDINE 20 MG: 20 TABLET, FILM COATED ORAL at 08:45

## 2018-01-21 RX ADMIN — PREGABALIN 150 MG: 75 CAPSULE ORAL at 20:56

## 2018-01-21 RX ADMIN — ARFORMOTEROL TARTRATE 15 MCG: 15 SOLUTION RESPIRATORY (INHALATION) at 07:46

## 2018-01-21 NOTE — THERAPY EVALUATION
Acute Care - Occupational Therapy Initial Evaluation  [facility]     Patient Name: Sai Adam  : 1960  MRN: [id number]  Today's Date: 2018  Onset of Illness/Injury or Date of Surgery Date: 18  Date of Referral to OT: 18  Referring Physician: Dr. Jorge    Admit Date: 2018       ICD-10-CM ICD-9-CM   1. Pneumonia of right middle lobe due to infectious organism J18.1 486   2. COPD exacerbation J44.1 491.21   3. Respiratory distress R06.00 786.09   4. Hypoxia R09.02 799.02   5. Tachycardia R00.0 785.0   6. Fever, unspecified fever cause R50.9 780.60   7. Severe sepsis A41.9 038.9    R65.20 995.92   8. Impaired mobility and ADLs Z74.09 799.89     Patient Active Problem List   Diagnosis   • Malignant neoplasm of sigmoid colon   • Colon polyps   • Anticoagulated by anticoagulation treatment   • COPD exacerbation   • Acute on chronic respiratory failure with hypoxia and hypercapnia   • Diabetes mellitus   • History of colon cancer   • Coronary artery disease   • Chronic respiratory acidosis   • Pneumonia of right middle lobe due to infectious organism     Past Medical History:   Diagnosis Date   • Arthritis    • CAD (coronary artery disease)    • Colon cancer 2016   • COPD (chronic obstructive pulmonary disease)    • Diabetes mellitus    • HLD (hyperlipidemia)    • Hypertension      Past Surgical History:   Procedure Laterality Date   • CARDIAC SURGERY     • CARPAL TUNNEL RELEASE     • COLON RESECTION WITH COLOSTOMY     • COLON SURGERY     • COLONOSCOPY  2016   • COLONOSCOPY N/A 10/12/2017    Procedure: COLONOSCOPY WITH ANESTHESIA;  Surgeon: Yessenia Gregg MD;  Location: [facility];  Service:    • CORONARY ANGIOPLASTY WITH STENT PLACEMENT     • HERNIA REPAIR     • ROTATOR CUFF REPAIR            OT ASSESSMENT FLOWSHEET (last 72 hours)      OT Evaluation       18 1000 18 1119 18 0100          Rehab Evaluation    Document Type evaluation   See MAR  -TR         Subjective Information agree to therapy;complains of;dyspnea;weakness;pain;dizziness;numbness   B hands and feet numb, neuropathy   -TR        Patient Effort, Rehab Treatment good  -TR        Symptoms Noted During/After Treatment shortness of breath  -TR        General Information    Patient Profile Review yes  -TR        Onset of Illness/Injury or Date of Surgery Date 01/18/18  -TR        Referring Physician Dr. Jorge  -TR        General Observations Seated in chair. O2 NC 6L, colostomy, alert, oriented.   -TR        Pertinent History Of Current Problem Recent stay at [facility] 12/29-1/2. Admitted to ER with cough and SOB for 2 weeks. CXR: R upper lobe pneumonia. Dx: COPD exacerbation, hypoxia, tachycardia, fever, severe sepsis, HTN.   -TR        Precautions/Limitations fall precautions;oxygen therapy device and L/min   colostomy  -TR        Prior Level of Function independent:;all household mobility;ADL's;cooking;cleaning;dependent:;driving;shopping  -TR        Equipment Currently Used at Home bipap/ cpap;nebulizer;cane, straight;oxygen;respiratory supplies;colostomy/ostomy supplies;walker, rolling;wheelchair;shower chair  -TR bipap/ cpap;nebulizer;cane, straight;oxygen;respiratory supplies;colostomy/ostomy supplies;walker, rolling;wheelchair  -CE colostomy/ostomy supplies;glucometer;bipap/ cpap;oxygen  -MG      Plans/Goals Discussed With patient;agreed upon  -TR        Risks Reviewed patient:;LOB;dizziness;increased discomfort;change in vital signs  -TR        Benefits Reviewed patient:;improve function;increase independence;increase strength;increase balance;decrease pain;increase knowledge  -TR        Barriers to Rehab medically complex;visual deficit  -TR        Living Environment    Lives With alone  -TR alone  -CE child(francesca), adult  -MG      Living Arrangements house  -TR house  -CE house  -MG      Home Accessibility bed and bath on same level   walk in shower  -TR  stairs within home  -MG      Transportation  Available  car;family or friend will provide  -CE       Living Environment Comment No stairs, built in shower seat.   -TR        Clinical Impression    Date of Referral to OT 01/20/18  -TR        OT Diagnosis Decreased ADL  -TR        Impairments Found (describe specific impairments) aerobic capacity/endurance;gait, locomotion, and balance;muscle performance;ROM;ventilation and respiration/gas exchange  -TR        Patient/Family Goals Statement D/C to rehab facility.  -TR        Criteria for Skilled Therapeutic Interventions Met yes;treatment indicated  -TR        Rehab Potential good, to achieve stated therapy goals  -TR        Therapy Frequency 3-5 times/wk  -TR        Predicted Duration of Therapy Intervention (days/wks) Until d/c from facility.  -TR        Anticipated Equipment Needs At Discharge --   None  -TR        Anticipated Discharge Disposition skilled nursing facility;inpatient rehabilitation facility;home with assist;home with home health   Depending on progress.  -TR        Functional Level Prior    Ambulation   0-->independent  -MG      Transferring   0-->independent  -MG      Toileting   0-->independent  -MG      Bathing   0-->independent  -MG      Dressing   0-->independent  -MG      Eating   0-->independent  -MG      Communication   0-->understands/communicates without difficulty  -MG      Swallowing   0-->swallows foods/liquids without difficulty  -MG      Prior Functional Level Comment   gets around at home with no assistance  -MG      Vital Signs    Pretreatment Heart Rate (beats/min) 62  -TR        Intratreatment Heart Rate (beats/min) 74  -TR        Posttreatment Heart Rate (beats/min) 61  -TR        Pre SpO2 (%) 89  -TR        O2 Delivery Pre Treatment supplemental O2   6L NC  -TR        Intra SpO2 (%) 86  -TR        O2 Delivery Intra Treatment supplemental O2  -TR        Post SpO2 (%) 90  -TR        O2 Delivery Post Treatment supplemental O2  -TR        Pre Patient Position Sitting  -TR         Intra Patient Position Standing  -TR        Post Patient Position Sitting  -TR        Pain Assessment    Pain Assessment 0-10  -TR        Pain Score 6  -TR        Pain Type Chronic pain  -TR        Pain Location Back  -TR        Pain Orientation Lower  -TR        Pain Radiating Towards B hips and legs  -TR        Pain Intervention(s) Repositioned;Ambulation/increased activity  -TR        Response to Interventions Tolerated  -TR        Vision Assessment/Intervention    Visual Impairment visual impairment, bilaterally  -TR        Visual Impairment Comment R eye blind. L eye impaired.   -TR        Visual Perception/Processing Comment Reports L eye can only see up to 3 ft away.   -TR        Cognitive Assessment/Intervention    Current Cognitive/Communication Assessment functional  -TR        Orientation Status oriented x 4  -TR        Follows Commands/Answers Questions able to follow multi-step instructions;100% of the time  -TR        Personal Safety mild impairment  -TR        Personal Safety Interventions gait belt;fall prevention program maintained;nonskid shoes/slippers when out of bed;supervised activity  -TR        ROM (Range of Motion)    General ROM Detail B UE AROM WFL  -TR        MMT (Manual Muscle Testing)    General MMT Assessment Detail B UE 4/5  -TR        Bed Mobility, Assessment/Treatment    Bed Mobility, Comment Up in chair.  -TR        Transfer Assessment/Treatment    Transfers, Sit-Stand [place] contact guard assist  -TR        Transfers, Stand-Sit [place] contact guard assist  -TR        Transfer, Impairments impaired balance;strength decreased  -TR        Functional Mobility    Functional Mobility- Ind. Level contact guard assist  -TR        Functional Mobility-Distance (Feet) 20  -TR        Functional Mobility- Safety Issues supplemental O2  -TR        Functional Mobility- Comment In room.   -TR        Upper Body Dressing Assessment/Training    UB Dressing Assess/Train, Comment  Expected level: Set-up.   -TR        Lower Body Dressing Assessment/Training    LB Dressing Assess/Train, Clothing Type donning:;socks   Simulated  -TR        LB Dressing Assess/Train, Position sitting  -TR        LB Dressing Assess/Train, Crane maximum assist (25% patient effort)  -TR        LB Dressing Assess/Train, Impairments decreased flexibility;pain  -TR        Motor Skills/Interventions    Additional Documentation Balance Skills Training (Group)  -TR        Balance Skills Training    Sitting-Level of Assistance Distant supervision  -TR        Sitting-Balance Support Feet supported  -TR        Standing-Level of Assistance Contact guard  -TR        Static Standing Balance Support No upper extremity supported  -TR        Gait Balance-Level of Assistance Contact guard  -TR        Gait Balance Support No upper extremity supported  -TR        Sensory Assessment/Intervention    Sensory Impairment --   Light touch WFL.   -TR        General Therapy Interventions    Planned Therapy Interventions activity intolerance;adaptive equipment training;ADL retraining;balance training;bed mobility training;energy conservation;home exercise program;ROM (Range of Motion);strengthening;transfer training  -TR        Positioning and Restraints    Pre-Treatment Position sitting in chair/recliner  -TR        Post Treatment Position chair  -TR        In Chair sitting;call light within reach;encouraged to call for assist;notified nsg  -TR          User Key  (r) = Recorded By, (t) = Taken By, (c) = Cosigned By    Initials Name Effective Dates    CE RUBA Salgado 09/15/16 -     SAIGE Salomon/L 06/22/15 -     MG Clary Shirley RN 07/13/17 -            Occupational Therapy Education     Title: PT OT SLP Therapies (Active)     Topic: Occupational Therapy (Active)     Point: ADL training (Done)    Description: Instruct learner(s) on proper safety adaptation and remediation techniques during self care or  transfers.   Instruct in proper use of assistive devices.    Learning Progress Summary    Learner Readiness Method Response Comment Documented by Status   Patient Acceptance E,D VU,NR Education provided on purpose of OT eval, impairments found, need for continued intervention and d/c planning. TR 01/21/18 1040 Done               Point: Precautions (Done)    Description: Instruct learner(s) on prescribed precautions during self-care and functional transfers.    Learning Progress Summary    Learner Readiness Method Response Comment Documented by Status   Patient Acceptance E,D VU,NR Education provided on purpose of OT eval, impairments found, need for continued intervention and d/c planning. TR 01/21/18 1040 Done                      User Key     Initials Effective Dates Name Provider Type Discipline    TR 06/22/15 -  Bonnie Gifford, OTR/L Occupational Therapist OT                  OT Recommendation and Plan  Anticipated Equipment Needs At Discharge:  (None)  Anticipated Discharge Disposition: skilled nursing facility, inpatient rehabilitation facility, home with assist, home with home health (Depending on progress.)  Planned Therapy Interventions: activity intolerance, adaptive equipment training, ADL retraining, balance training, bed mobility training, energy conservation, home exercise program, ROM (Range of Motion), strengthening, transfer training  Therapy Frequency: 3-5 times/wk  Plan of Care Review  Plan Of Care Reviewed With: patient  Progress: improving  Outcome Summary/Follow up Plan: OT Eval completed. Pt displays decreased UE strength, endurance, standing balance and activity tolerance with ADL. Set-up for UB self care. Mod to Max A for LB self care. OT will address these deficits. Anticipated d/c is SNF vs inpatient rehab. Pt would benefit from caregiver assist and home health if he does return home alone. No AE/AD needs.            OT Goals       01/21/18 1041          Transfer Training OT LTG     Transfer Training OT LTG, Date Established 01/21/18  -TR      Transfer Training OT LTG, Time to Achieve by discharge  -TR      Transfer Training OT LTG, Activity Type bed to chair /chair to bed;walk-in shower;shower chair;sit to stand/stand to sit;toilet  -TR      Transfer Training OT LTG, [place] Level conditional independence  -TR      Patient Education OT LTG    Patient Education OT LTG, Date Established 01/21/18  -TR      Patient Education OT LTG, Time to Achieve by discharge  -TR      Patient Education OT LTG, Education Type HEP;home safety;adaptive equipment mgmt;energy conservation;work simplification;adaptive breathing  -TR      Patient Education OT LTG, Education Understanding independent;demonstrates adequately  -TR      ADL OT LTG    ADL OT LTG, Date Established 01/21/18  -TR      ADL OT LTG, Time to Achieve by discharge  -TR      ADL OT LTG, Activity Type ADL skills  -TR      ADL OT LTG, [place] Level contact guard  -TR      ADL OT LTG, Additional Goal LB bathing, LB dressing and toileting using AE PRN.   -TR      Activity Tolerance OT LTG    Activity Tolerance Goal OT LTG, Date Established 01/21/18  -TR      Activity Tolerance Goal OT LTG, Time to Achieve by discharge  -TR      Activity Tolerance Goal OT LTG, Activity Level 10 min activity  -TR      Activity Tolerance Goal OT LTG, Additional Goal with 02 sat >88%.   -TR        User Key  (r) = Recorded By, (t) = Taken By, (c) = Cosigned By    Initials Name Provider Type    TR Bonnie Gifford, OTR/L Occupational Therapist                Outcome Measures       01/21/18 1000          How much help from another is currently needed...    Putting on and taking off regular lower body clothing? 2  -TR      Bathing (including washing, rinsing, and drying) 2  -TR      Toileting (which includes using toilet bed pan or urinal) 3  -TR      Putting on and taking off regular upper body clothing 3  -TR      Taking care of personal grooming (such as brushing  teeth) 3  -TR      Eating meals 4  -TR      Score 17  -TR      Functional Assessment    Outcome Measure Options AM-PAC 6 Clicks Daily Activity (OT)  -TR        User Key  (r) = Recorded By, (t) = Taken By, (c) = Cosigned By    Initials Name Provider Type    TR Bonnie Gifford OTR/L Occupational Therapist          Time Calculation:   OT Start Time: 0958  OT Stop Time: 1036  OT Time Calculation (min): 38 min    Therapy Charges for Today     Code Description Service Date Service Provider Modifiers Qty    68075351189 HC OT SELFCARE CURRENT 1/21/2018 Bonnie Gifford OTR/L GO, CK 1    42607269274 HC OT SELFCARE PROJECTED 1/21/2018 SAIGE Mccauley/L GO, CJ 1    [id number]  OT EVAL MOD COMPLEXITY 3 1/21/2018 Bonnie Gifford OTR/L GO, KX 1          OT G-codes  OT Professional Judgement Used?: Yes  OT Functional Scales Options: AM-PAC 6 Clicks Daily Activity (OT)  Score: 17  Functional Limitation: Self care  Self Care Current Status (): At least 40 percent but less than 60 percent impaired, limited or restricted  Self Care Goal Status (): At least 20 percent but less than 40 percent impaired, limited or restricted    SAIGE Hoover/TIM  1/21/2018

## 2018-01-21 NOTE — PLAN OF CARE
Problem: Patient Care Overview (Adult)  Goal: Plan of Care Review  Outcome: Ongoing (interventions implemented as appropriate)   01/21/18 0450   Coping/Psychosocial Response Interventions   Plan Of Care Reviewed With patient   Patient Care Overview   Progress improving     Goal: Adult Individualization and Mutuality  Outcome: Ongoing (interventions implemented as appropriate)   01/21/18 0450   Individualization   Patient Specific Goals Improved breathing.   Patient Specific Interventions Pt. wears bipap at night.       Problem: Infection, Risk/Actual (Adult)  Goal: Infection Prevention/Resolution  Outcome: Ongoing (interventions implemented as appropriate)   01/21/18 0450   Infection, Risk/Actual (Adult)   Infection Prevention/Resolution making progress toward outcome       Problem: Respiratory Insufficiency (Adult)  Goal: Acid/Base Balance  Outcome: Ongoing (interventions implemented as appropriate)   01/21/18 0450   Respiratory Insufficiency (Adult)   Acid/Base Balance making progress toward outcome     Goal: Effective Ventilation  Outcome: Ongoing (interventions implemented as appropriate)   01/21/18 0450   Respiratory Insufficiency (Adult)   Effective Ventilation making progress toward outcome

## 2018-01-21 NOTE — PROGRESS NOTES
[facility] Medicine Services  INPATIENT PROGRESS NOTE    Length of Stay: 3  Date of Admission: 1/18/2018  Primary Care Physician: Jarod Tillman MD    Subjective   Chief Complaint: Difficulty urinating  HPI   Did ok overnight.    Having issues with urinary retention now.  He has a history of BPH.    Breathing has improved.  Not requiring Bipap.  He is currently on 6L NC.  He wears 3 at home.  He is frustrated that he is still requiring more oxygen than usual.    His potassium is low at 3.4 today.  He is eating and drinking well.  No diarrhea or vomiting.    Cultures negative to date, will DC Cefepime and Vancomycin        Review of Systems   Constitutional: Negative for fatigue and fever.   HENT: Negative for congestion and ear pain.    Eyes: Negative for redness and visual disturbance.   Respiratory: Positive for cough and shortness of breath. Negative for wheezing.    Cardiovascular: Negative for chest pain and palpitations.   Gastrointestinal: Negative for abdominal pain, diarrhea, nausea and vomiting.   Endocrine: Negative for cold intolerance and heat intolerance.   Genitourinary: Negative for dysuria and frequency.   Musculoskeletal: Negative for arthralgias and back pain.   Skin: Negative for rash and wound.   Neurological: Positive for weakness. Negative for dizziness and headaches.   Psychiatric/Behavioral: Negative for confusion. The patient is not nervous/anxious.       All pertinent negatives and positives are as above. All other systems have been reviewed and are negative unless otherwise stated.     Objective    Temp:  [97.6 °F (36.4 °C)-98.4 °F (36.9 °C)] 98 °F (36.7 °C)  Heart Rate:  [50-69] 61  Resp:  [18-24] 20  BP: (113-130)/(63-72) 130/72  Physical Exam   Constitutional: He is oriented to person, place, and time. He appears well-developed and well-nourished.   HENT:   Head: Normocephalic and atraumatic.   Right Ear: External ear normal.   Left Ear: External  ear normal.   Nose: Nose normal.   Mouth/Throat: Oropharynx is clear and moist.   Eyes: Conjunctivae and EOM are normal. Pupils are equal, round, and reactive to light. Right eye exhibits no discharge. Left eye exhibits no discharge. No scleral icterus.   Neck: Normal range of motion. Neck supple. No tracheal deviation present. No thyromegaly present.   Cardiovascular: Normal rate, regular rhythm, normal heart sounds and intact distal pulses.  Exam reveals no gallop and no friction rub.    No murmur heard.  Pulmonary/Chest: Effort normal. No stridor. No respiratory distress. He has decreased breath sounds. He has no wheezes. He has rhonchi. He has no rales. He exhibits no tenderness.   Abdominal: Soft. Bowel sounds are normal. He exhibits no distension and no mass. There is no tenderness. There is no rebound and no guarding. No hernia.   Musculoskeletal: Normal range of motion. He exhibits no edema or deformity.   Lymphadenopathy:     He has no cervical adenopathy.   Neurological: He is alert and oriented to person, place, and time. He has normal reflexes. He displays normal reflexes. No cranial nerve deficit. He exhibits normal muscle tone. Coordination normal.   Skin: Skin is warm and dry. No rash noted. No erythema. No pallor.   Psychiatric: He has a normal mood and affect. His behavior is normal. Judgment and thought content normal.   Vitals reviewed.        Results Review:  I have reviewed the labs, radiology results, and diagnostic studies.    Laboratory Data:     Results from last 7 days  Lab Units 01/21/18  0501 01/20/18  0837 01/19/18  0233   WBC 10*3/mm3 12.81* 12.54* 17.24*   HEMOGLOBIN g/dL 13.3* 14.1 14.0   HEMATOCRIT % 42.3 44.2 43.5   PLATELETS 10*3/mm3 115* 85* 100*          Results from last 7 days  Lab Units 01/21/18  0549 01/20/18  0837 01/19/18  0233   SODIUM mmol/L 144 139 141   POTASSIUM mmol/L 3.4* 4.2 4.2   CHLORIDE mmol/L 110 106 104   CO2 mmol/L 23.0* 23.0* 29.0   BUN mg/dL 19 19 8    CREATININE mg/dL 0.56 0.56 0.65   CALCIUM mg/dL 8.6 8.4 7.9*   BILIRUBIN mg/dL 0.3 0.4 1.3*   ALK PHOS U/L 69 67 71   ALT (SGPT) U/L 25 29 30   AST (SGOT) U/L 26 27 30   GLUCOSE mg/dL 150* 316* 289*       Culture Data:   Blood Culture   Date Value Ref Range Status   01/18/2018 No growth at 2 days  Preliminary   01/18/2018 No growth at 2 days  Preliminary     Respiratory Culture   Date Value Ref Range Status   01/20/2018 No growth at 24 hours  Preliminary       Radiology Data:   Imaging Results (last 24 hours)     ** No results found for the last 24 hours. **          I have reviewed the patient current medications.     Assessment/Plan     1.  Sepsis   -IVF  -Levaquin  -Cultures negative at 3+ days  -Telemetry      2.  HAP  -Levaquin  -Cultures pending  -Nebs  -Steroids       3.  Acute Hypoxic and hypercapnic respiratory failure  -Bipap  -Wean as tolerated  -Continue to treat pneumonia  -Nebs  -Steroids increased  -Telemetry  -continuous pulse ox      4.  COPD with acute exacerbation  -IV Abx  -Nebs  -Steroids   -Continuous pulse-ox  -Telemetry      5.  Colon Cancer      6.  CAD  -Atenolol held given sepsis, HR now in 60's, will hold for right now  -Plavix  -Losartan  -Imdur held given sepsis      7.  T2DM  -Levemir  -SSI  -Metformin held given Lactic Acidosis  -Continue to check FSBS  -Hypoglycemia protocol       8.  HLD  -Statin      9.  HTN  - BP meds held given sepsis      10.  Arthritis      11.  Protein malnutrition  -dietary consult      12.  Diabetic Neuropathy  -Lyrica  -Effexor      13.  BPH  -Flomax      14.  GERD  -Pepcid      15.  Hypothyroidism  -Synthroid     16.  Lactic Acidosis  -Resolved  -Monitor  -IVF  -Telemetry     17.  Urinary Retention  -Flomax increased  -Increase Bethanechol  -Add Proscar  -Continue to do bladder scanning/in and out cath as needed  -Would prefer to avoid anchoring a jones if possible as it would provide a potential second source of infection            Discharge Planning:  I expect the patient to be discharged to home in 2-3 days    Satinder Jorge MD   01/21/18   2:44 PM

## 2018-01-21 NOTE — SIGNIFICANT NOTE
I walked with pt today we walked from the end of the 90 hoover to the end of the 80 hoover X2 on 6L O2, pt O2 sat remained 90-92 during entire event. His baseline has normally been 86-88% resting in chair on 6L O2. He is continuing to get 125 sol-med IV. He wears 3L O2 at home.

## 2018-01-21 NOTE — PLAN OF CARE
Problem: Patient Care Overview (Adult)  Goal: Plan of Care Review  Outcome: Ongoing (interventions implemented as appropriate)   01/21/18 1512   Coping/Psychosocial Response Interventions   Plan Of Care Reviewed With patient   Patient Care Overview   Progress improving   Outcome Evaluation   Outcome Summary/Follow up Plan pt is able to void with hesitation and slight burning at start. Would appreciate for PT to see. Needs O2 walks documented. After abx infusion, I will walk with him today. Pt no c/o pain. colostomy emptied X2 once by myself with large output. VSS cont to monitor       Problem: Infection, Risk/Actual (Adult)  Goal: Infection Prevention/Resolution  Outcome: Ongoing (interventions implemented as appropriate)      Problem: Respiratory Insufficiency (Adult)  Goal: Acid/Base Balance  Outcome: Ongoing (interventions implemented as appropriate)    Goal: Effective Ventilation  Outcome: Ongoing (interventions implemented as appropriate)

## 2018-01-21 NOTE — PLAN OF CARE
Problem: Patient Care Overview (Adult)  Goal: Plan of Care Review  Outcome: Ongoing (interventions implemented as appropriate)   01/21/18 1041   Coping/Psychosocial Response Interventions   Plan Of Care Reviewed With patient   Patient Care Overview   Progress improving   Outcome Evaluation   Outcome Summary/Follow up Plan OT Eval completed. Pt displays decreased UE strength, endurance, standing balance and activity tolerance with ADL. Set-up for UB self care. Mod to Max A for LB self care. CGA for transfers and functional mobility in room of 20 ft. OT will address these deficits. Anticipated d/c is SNF vs inpatient rehab. Pt would benefit from caregiver assist and home health if he does return home alone. No AE/AD needs.        Problem: Inpatient Occupational Therapy  Goal: Transfer Training Goal 1 LTG- OT  Outcome: Ongoing (interventions implemented as appropriate)   01/21/18 1041   Transfer Training OT LTG   Transfer Training OT LTG, Date Established 01/21/18   Transfer Training OT LTG, Time to Achieve by discharge   Transfer Training OT LTG, Activity Type bed to chair /chair to bed;walk-in shower;shower chair;sit to stand/stand to sit;toilet   Transfer Training OT LTG, [place] Level conditional independence     Goal: Patient Education Goal LTG- OT  Outcome: Ongoing (interventions implemented as appropriate)   01/21/18 1041   Patient Education OT LTG   Patient Education OT LTG, Date Established 01/21/18   Patient Education OT LTG, Time to Achieve by discharge   Patient Education OT LTG, Education Type HEP;home safety;adaptive equipment mgmt;energy conservation;work simplification;adaptive breathing   Patient Education OT LTG, Education Understanding independent;demonstrates adequately     Goal: ADL Goal LTG- OT  Outcome: Ongoing (interventions implemented as appropriate)   01/21/18 1041   ADL OT LTG   ADL OT LTG, Date Established 01/21/18   ADL OT LTG, Time to Achieve by discharge   ADL OT LTG, Activity Type  ADL skills   ADL OT LTG, [place] Level contact guard   ADL OT LTG, Additional Goal LB bathing, LB dressing and toileting using AE PRN.      Goal: Activity Tolerance Goal LTG- OT  Outcome: Ongoing (interventions implemented as appropriate)   01/21/18 1041   Activity Tolerance OT LTG   Activity Tolerance Goal OT LTG, Date Established 01/21/18   Activity Tolerance Goal OT LTG, Time to Achieve by discharge   Activity Tolerance Goal OT LTG, Activity Level 10 min activity   Activity Tolerance Goal OT LTG, Additional Goal with 02 sat >88%.

## 2018-01-22 ENCOUNTER — TRANSCRIBE ORDERS (OUTPATIENT)
Dept: ADMINISTRATIVE | Facility: HOSPITAL | Age: 58
End: 2018-01-22

## 2018-01-22 DIAGNOSIS — J44.9 CHRONIC OBSTRUCTIVE PULMONARY DISEASE, UNSPECIFIED COPD TYPE (HCC): Primary | ICD-10-CM

## 2018-01-22 LAB
ALBUMIN SERPL-MCNC: 2.9 G/DL (ref 3.5–5)
ALBUMIN/GLOB SERPL: 1.2 G/DL (ref 1.1–2.5)
ALP SERPL-CCNC: 67 U/L (ref 24–120)
ALT SERPL W P-5'-P-CCNC: 31 U/L (ref 0–54)
ANION GAP SERPL CALCULATED.3IONS-SCNC: 13 MMOL/L (ref 4–13)
AST SERPL-CCNC: 34 U/L (ref 7–45)
BACTERIA SPEC RESP CULT: ABNORMAL
BASOPHILS # BLD AUTO: 0.02 10*3/MM3 (ref 0–0.2)
BASOPHILS NFR BLD AUTO: 0.2 % (ref 0–2)
BILIRUB SERPL-MCNC: 0.5 MG/DL (ref 0.1–1)
BUN BLD-MCNC: 15 MG/DL (ref 5–21)
BUN/CREAT SERPL: 29.4 (ref 7–25)
CALCIUM SPEC-SCNC: 8.7 MG/DL (ref 8.4–10.4)
CHLORIDE SERPL-SCNC: 105 MMOL/L (ref 98–110)
CO2 SERPL-SCNC: 23 MMOL/L (ref 24–31)
CREAT BLD-MCNC: 0.51 MG/DL (ref 0.5–1.4)
DEPRECATED RDW RBC AUTO: 47.8 FL (ref 40–54)
EOSINOPHIL # BLD AUTO: 0 10*3/MM3 (ref 0–0.7)
EOSINOPHIL NFR BLD AUTO: 0 % (ref 0–4)
ERYTHROCYTE [DISTWIDTH] IN BLOOD BY AUTOMATED COUNT: 16.2 % (ref 12–15)
GFR SERPL CREATININE-BSD FRML MDRD: >150 ML/MIN/1.73
GLOBULIN UR ELPH-MCNC: 2.5 GM/DL
GLUCOSE BLD-MCNC: 161 MG/DL (ref 70–100)
GLUCOSE BLDC GLUCOMTR-MCNC: 144 MG/DL (ref 70–130)
GLUCOSE BLDC GLUCOMTR-MCNC: 207 MG/DL (ref 70–130)
GLUCOSE BLDC GLUCOMTR-MCNC: 219 MG/DL (ref 70–130)
GLUCOSE BLDC GLUCOMTR-MCNC: 280 MG/DL (ref 70–130)
GRAM STN SPEC: ABNORMAL
HCT VFR BLD AUTO: 43.1 % (ref 40–52)
HGB BLD-MCNC: 14.4 G/DL (ref 14–18)
IMM GRANULOCYTES # BLD: 0.09 10*3/MM3 (ref 0–0.03)
IMM GRANULOCYTES NFR BLD: 0.9 % (ref 0–5)
LYMPHOCYTES # BLD AUTO: 1.58 10*3/MM3 (ref 0.72–4.86)
LYMPHOCYTES NFR BLD AUTO: 15.7 % (ref 15–45)
MCH RBC QN AUTO: 27.3 PG (ref 28–32)
MCHC RBC AUTO-ENTMCNC: 33.4 G/DL (ref 33–36)
MCV RBC AUTO: 81.8 FL (ref 82–95)
MONOCYTES # BLD AUTO: 0.23 10*3/MM3 (ref 0.19–1.3)
MONOCYTES NFR BLD AUTO: 2.3 % (ref 4–12)
NEUTROPHILS # BLD AUTO: 8.14 10*3/MM3 (ref 1.87–8.4)
NEUTROPHILS NFR BLD AUTO: 80.9 % (ref 39–78)
NRBC BLD MANUAL-RTO: 0 /100 WBC (ref 0–0)
PLATELET # BLD AUTO: 124 10*3/MM3 (ref 130–400)
PMV BLD AUTO: 9.5 FL (ref 6–12)
POTASSIUM BLD-SCNC: 3.1 MMOL/L (ref 3.5–5.3)
PROT SERPL-MCNC: 5.4 G/DL (ref 6.3–8.7)
RBC # BLD AUTO: 5.27 10*6/MM3 (ref 4.8–5.9)
SODIUM BLD-SCNC: 141 MMOL/L (ref 135–145)
WBC NRBC COR # BLD: 10.06 10*3/MM3 (ref 4.8–10.8)

## 2018-01-22 PROCEDURE — 82962 GLUCOSE BLOOD TEST: CPT

## 2018-01-22 PROCEDURE — 80053 COMPREHEN METABOLIC PANEL: CPT | Performed by: FAMILY MEDICINE

## 2018-01-22 PROCEDURE — 94660 CPAP INITIATION&MGMT: CPT

## 2018-01-22 PROCEDURE — 94799 UNLISTED PULMONARY SVC/PX: CPT

## 2018-01-22 PROCEDURE — 63710000001 INSULIN LISPRO (HUMAN) PER 5 UNITS: Performed by: FAMILY MEDICINE

## 2018-01-22 PROCEDURE — 63710000001 INSULIN DETEMIR PER 5 UNITS: Performed by: FAMILY MEDICINE

## 2018-01-22 PROCEDURE — 25010000002 METHYLPREDNISOLONE PER 125 MG: Performed by: FAMILY MEDICINE

## 2018-01-22 PROCEDURE — 85025 COMPLETE CBC W/AUTO DIFF WBC: CPT | Performed by: FAMILY MEDICINE

## 2018-01-22 RX ORDER — POTASSIUM CHLORIDE 750 MG/1
40 CAPSULE, EXTENDED RELEASE ORAL ONCE
Status: COMPLETED | OUTPATIENT
Start: 2018-01-22 | End: 2018-01-22

## 2018-01-22 RX ORDER — LEVOFLOXACIN 750 MG/1
750 TABLET ORAL EVERY 24 HOURS
Status: DISCONTINUED | OUTPATIENT
Start: 2018-01-22 | End: 2018-01-23 | Stop reason: HOSPADM

## 2018-01-22 RX ORDER — METHYLPREDNISOLONE SODIUM SUCCINATE 125 MG/2ML
80 INJECTION, POWDER, LYOPHILIZED, FOR SOLUTION INTRAMUSCULAR; INTRAVENOUS EVERY 6 HOURS
Status: DISCONTINUED | OUTPATIENT
Start: 2018-01-22 | End: 2018-01-23 | Stop reason: HOSPADM

## 2018-01-22 RX ADMIN — GUAIFENESIN 1200 MG: 600 TABLET, EXTENDED RELEASE ORAL at 09:20

## 2018-01-22 RX ADMIN — INSULIN LISPRO 8 UNITS: 100 INJECTION, SOLUTION INTRAVENOUS; SUBCUTANEOUS at 20:41

## 2018-01-22 RX ADMIN — METHYLPREDNISOLONE SODIUM SUCCINATE 125 MG: 125 INJECTION, POWDER, FOR SOLUTION INTRAMUSCULAR; INTRAVENOUS at 05:14

## 2018-01-22 RX ADMIN — IPRATROPIUM BROMIDE AND ALBUTEROL SULFATE 3 ML: 2.5; .5 SOLUTION RESPIRATORY (INHALATION) at 07:18

## 2018-01-22 RX ADMIN — BUDESONIDE 0.25 MG: 0.25 INHALANT RESPIRATORY (INHALATION) at 07:18

## 2018-01-22 RX ADMIN — METHYLPREDNISOLONE SODIUM SUCCINATE 80 MG: 125 INJECTION, POWDER, FOR SOLUTION INTRAMUSCULAR; INTRAVENOUS at 17:48

## 2018-01-22 RX ADMIN — IPRATROPIUM BROMIDE AND ALBUTEROL SULFATE 3 ML: 2.5; .5 SOLUTION RESPIRATORY (INHALATION) at 20:22

## 2018-01-22 RX ADMIN — ARFORMOTEROL TARTRATE 15 MCG: 15 SOLUTION RESPIRATORY (INHALATION) at 20:22

## 2018-01-22 RX ADMIN — ATENOLOL 25 MG: 25 TABLET ORAL at 20:42

## 2018-01-22 RX ADMIN — POTASSIUM CHLORIDE 40 MEQ: 750 CAPSULE, EXTENDED RELEASE ORAL at 13:45

## 2018-01-22 RX ADMIN — DESMOPRESSIN ACETATE 40 MG: 0.2 TABLET ORAL at 20:42

## 2018-01-22 RX ADMIN — LOSARTAN POTASSIUM 100 MG: 50 TABLET ORAL at 09:20

## 2018-01-22 RX ADMIN — PREGABALIN 150 MG: 75 CAPSULE ORAL at 09:31

## 2018-01-22 RX ADMIN — BETHANECHOL CHLORIDE 25 MG: 25 TABLET ORAL at 09:20

## 2018-01-22 RX ADMIN — INSULIN LISPRO 8 UNITS: 100 INJECTION, SOLUTION INTRAVENOUS; SUBCUTANEOUS at 17:48

## 2018-01-22 RX ADMIN — TAMSULOSIN HYDROCHLORIDE 0.8 MG: 0.4 CAPSULE ORAL at 09:20

## 2018-01-22 RX ADMIN — NICOTINE 1 PATCH: 14 PATCH, EXTENDED RELEASE TRANSDERMAL at 09:21

## 2018-01-22 RX ADMIN — FAMOTIDINE 20 MG: 20 TABLET, FILM COATED ORAL at 20:42

## 2018-01-22 RX ADMIN — GUAIFENESIN 1200 MG: 600 TABLET, EXTENDED RELEASE ORAL at 20:42

## 2018-01-22 RX ADMIN — LEVOTHYROXINE SODIUM 50 MCG: 50 TABLET ORAL at 05:14

## 2018-01-22 RX ADMIN — CLOPIDOGREL 75 MG: 75 TABLET, FILM COATED ORAL at 09:20

## 2018-01-22 RX ADMIN — IPRATROPIUM BROMIDE AND ALBUTEROL SULFATE 3 ML: 2.5; .5 SOLUTION RESPIRATORY (INHALATION) at 15:40

## 2018-01-22 RX ADMIN — LEVOFLOXACIN 750 MG: 750 TABLET, FILM COATED ORAL at 20:42

## 2018-01-22 RX ADMIN — IPRATROPIUM BROMIDE AND ALBUTEROL SULFATE 3 ML: 2.5; .5 SOLUTION RESPIRATORY (INHALATION) at 11:23

## 2018-01-22 RX ADMIN — FAMOTIDINE 20 MG: 20 TABLET, FILM COATED ORAL at 09:20

## 2018-01-22 RX ADMIN — FINASTERIDE 5 MG: 5 TABLET, FILM COATED ORAL at 09:20

## 2018-01-22 RX ADMIN — PREGABALIN 150 MG: 75 CAPSULE ORAL at 20:42

## 2018-01-22 RX ADMIN — ATENOLOL 25 MG: 25 TABLET ORAL at 09:21

## 2018-01-22 RX ADMIN — SPIRONOLACTONE 25 MG: 25 TABLET ORAL at 09:20

## 2018-01-22 RX ADMIN — BUDESONIDE 0.25 MG: 0.25 INHALANT RESPIRATORY (INHALATION) at 20:22

## 2018-01-22 RX ADMIN — INSULIN DETEMIR 160 UNITS: 100 INJECTION, SOLUTION SUBCUTANEOUS at 09:10

## 2018-01-22 RX ADMIN — ARFORMOTEROL TARTRATE 15 MCG: 15 SOLUTION RESPIRATORY (INHALATION) at 07:27

## 2018-01-22 RX ADMIN — INSULIN LISPRO 12 UNITS: 100 INJECTION, SOLUTION INTRAVENOUS; SUBCUTANEOUS at 13:44

## 2018-01-22 NOTE — PROGRESS NOTES
Continued Stay Note  [facility]     Patient Name: Sai Adam  MRN: [id number]  Today's Date: 1/22/2018    Admit Date: 1/18/2018          Discharge Plan       01/22/18 0951    Case Management/Social Work Plan    Plan Home Health requested    Additional Comments Pt will return home alone as before. He says he is getting around the same as before coming here.  Pt is interested in HH, if ordered will send orders to [facility] along with D/C meds [phone].  Pt says he has needed DME.               Discharge Codes     None            SURYA Lemons

## 2018-01-22 NOTE — PROGRESS NOTES
[facility] Medicine Services  INPATIENT PROGRESS NOTE    Length of Stay: 4  Date of Admission: 1/18/2018  Primary Care Physician: Jarod Tillman MD    Subjective   Chief Complaint: Shortness of breath    HPI   Patient still on 6 L.  Patient has been ambulatory in the hallway.  Patient states he is breathing better.  Patient states urination is better.  Discussed patient will continue to wean down oxygen..    Review of Systems   Constitutional: Positive for activity change, appetite change and fatigue. Negative for chills and fever.   HENT: Negative for hearing loss, nosebleeds, tinnitus and trouble swallowing.    Eyes: Negative for visual disturbance.   Respiratory: Positive for cough, shortness of breath and wheezing. Negative for chest tightness.    Cardiovascular: Negative for chest pain, palpitations and leg swelling.   Gastrointestinal: Negative for abdominal distention, abdominal pain, blood in stool, constipation, diarrhea, nausea and vomiting.   Endocrine: Negative for cold intolerance, heat intolerance, polydipsia, polyphagia and polyuria.   Genitourinary: Negative for decreased urine volume, difficulty urinating, dysuria, flank pain, frequency and hematuria.   Musculoskeletal: Negative for arthralgias, joint swelling and myalgias.   Skin: Negative for rash.   Allergic/Immunologic: Negative for immunocompromised state.   Neurological: Positive for weakness. Negative for dizziness, syncope, light-headedness and headaches.   Hematological: Negative for adenopathy. Does not bruise/bleed easily.   Psychiatric/Behavioral: Negative for confusion and sleep disturbance. The patient is not nervous/anxious.           All pertinent negatives and positives are as above. All other systems have been reviewed and are negative unless otherwise stated.     Objective    Temp:  [97.3 °F (36.3 °C)-98.8 °F (37.1 °C)] 97.3 °F (36.3 °C)  Heart Rate:  [51-71] 65  Resp:  [16-22] 20  BP:  (124-141)/(68-79) 126/69    Intake/Output Summary (Last 24 hours) at 01/22/18 1302  Last data filed at 01/22/18 0946   Gross per 24 hour   Intake            903.6 ml   Output             3275 ml   Net          -2371.4 ml     Physical Exam   Constitutional: He is oriented to person, place, and time. He appears well-developed and well-nourished.   HENT:   Head: Normocephalic and atraumatic.   Eyes: Conjunctivae and EOM are normal. Pupils are equal, round, and reactive to light.   Neck: Neck supple. No JVD present. No thyromegaly present.   Cardiovascular: Normal rate, regular rhythm, normal heart sounds and intact distal pulses.  Exam reveals no gallop and no friction rub.    No murmur heard.  Pulmonary/Chest: Effort normal. No respiratory distress. He has wheezes. He has no rales. He exhibits no tenderness.   Abdominal: Soft. Bowel sounds are normal. He exhibits no distension. There is no tenderness. There is no rebound and no guarding.   Musculoskeletal: Normal range of motion. He exhibits no edema, tenderness or deformity.   Lymphadenopathy:     He has no cervical adenopathy.   Neurological: He is alert and oriented to person, place, and time. He displays normal reflexes. No cranial nerve deficit. He exhibits normal muscle tone.   Skin: Skin is warm and dry. No rash noted.   Psychiatric: He has a normal mood and affect. His behavior is normal. Judgment and thought content normal.   Nursing note and vitals reviewed.      Results Review:  Lab Results (last 24 hours)     Procedure Component Value Units Date/Time    POC Glucose Once [[id number]]  (Abnormal) Collected:  01/21/18 1636    Specimen:  Blood Updated:  01/21/18 1648     Glucose 225 (H) mg/dL       : [id number] Justus Cuello ID: [id number]       POC Glucose Once [[id number]]  (Abnormal) Collected:  01/21/18 2002    Specimen:  Blood Updated:  01/21/18 2014     Glucose 242 (H) mg/dL       : [id number] Mohinder ShannonMeter ID: [id number]       Blood  Culture With LINETTE - Blood, [[id number]]  (Normal) Collected:  01/18/18 2220    Specimen:  Blood from Arm, Left Updated:  01/21/18 2304     Blood Culture No growth at 3 days    Blood Culture With LINETTE - Blood, [[id number]]  (Normal) Collected:  01/18/18 2225    Specimen:  Blood from Hand, Left Updated:  01/21/18 2304     Blood Culture No growth at 3 days    CBC & Differential [[id number]] Collected:  01/22/18 0534    Specimen:  Blood Updated:  01/22/18 0606    Narrative:       The following orders were created for panel order CBC & Differential.  Procedure                               Abnormality         Status                     ---------                               -----------         ------                     CBC Auto Differential[[id number]]        Abnormal            Final result                 Please view results for these tests on the individual orders.    CBC Auto Differential [[id number]]  (Abnormal) Collected:  01/22/18 0534    Specimen:  Blood Updated:  01/22/18 0606     WBC 10.06 10*3/mm3      RBC 5.27 10*6/mm3      Hemoglobin 14.4 g/dL      Hematocrit 43.1 %      MCV 81.8 (L) fL      MCH 27.3 (L) pg      MCHC 33.4 g/dL      RDW 16.2 (H) %      RDW-SD 47.8 fl      MPV 9.5 fL      Platelets 124 (L) 10*3/mm3      Neutrophil % 80.9 (H) %      Lymphocyte % 15.7 %      Monocyte % 2.3 (L) %      Eosinophil % 0.0 %      Basophil % 0.2 %      Immature Grans % 0.9 %      Neutrophils, Absolute 8.14 10*3/mm3      Lymphocytes, Absolute 1.58 10*3/mm3      Monocytes, Absolute 0.23 10*3/mm3      Eosinophils, Absolute 0.00 10*3/mm3      Basophils, Absolute 0.02 10*3/mm3      Immature Grans, Absolute 0.09 (H) 10*3/mm3      nRBC 0.0 /100 WBC     Comprehensive Metabolic Panel [[id number]]  (Abnormal) Collected:  01/22/18 0534    Specimen:  Blood Updated:  01/22/18 0617     Glucose 161 (H) mg/dL      BUN 15 mg/dL      Creatinine 0.51 mg/dL      Sodium 141 mmol/L      Potassium 3.1 (L) mmol/L      Chloride 105 mmol/L      CO2  23.0 (L) mmol/L      Calcium 8.7 mg/dL      Total Protein 5.4 (L) g/dL      Albumin 2.90 (L) g/dL      ALT (SGPT) 31 U/L      AST (SGOT) 34 U/L      Alkaline Phosphatase 67 U/L      Total Bilirubin 0.5 mg/dL      eGFR Non African Amer >150 mL/min/1.73      Globulin 2.5 gm/dL      A/G Ratio 1.2 g/dL      BUN/Creatinine Ratio 29.4 (H)     Anion Gap 13.0 mmol/L     POC Glucose Once [[id number]]  (Abnormal) Collected:  01/22/18 0725    Specimen:  Blood Updated:  01/22/18 0754     Glucose 144 (H) mg/dL       : [id number] Lyndon PamelaMeter ID: [id number]       Respiratory Culture - Sputum, Throat [[id number]]  (Abnormal) Collected:  01/20/18 1257    Specimen:  Sputum from Throat Updated:  01/22/18 0947     Respiratory Culture --      Rare Candida albicans (A)      No Normal Respiratory Marti (A)     Gram Stain Result Greater than 25 WBCs per low power field      Few (2+) Epithelial cells per low power field      No organisms seen    POC Glucose Once [[id number]]  (Abnormal) Collected:  01/22/18 1122    Specimen:  Blood Updated:  01/22/18 1142     Glucose 280 (H) mg/dL       : [id number] LyndonBlackaeon InternationalMeter ID: [id number]              Cultures:  Blood Culture   Date Value Ref Range Status   01/18/2018 No growth at 3 days  Preliminary   01/18/2018 No growth at 3 days  Preliminary     Respiratory Culture   Date Value Ref Range Status   01/20/2018 Rare Candida albicans (A)  Final   01/20/2018 No Normal Respiratory Marti (A)  Final       Radiology Data:    Imaging Results (last 24 hours)     ** No results found for the last 24 hours. **          Allergies   Allergen Reactions   • Tetanus Toxoids Shortness Of Breath and Swelling   • Lamotrigine      Unknown     • Lisinopril Hives   • Methadone Swelling   • Penicillins Rash   • Tramadol Rash       Scheduled meds:     arformoterol 15 mcg Nebulization BID - RT   atenolol 25 mg Oral Q12H   atorvastatin 40 mg Oral Nightly   bethanechol 25 mg Oral TID   budesonide 0.25 mg  Nebulization BID - RT   clopidogrel 75 mg Oral Daily   famotidine 20 mg Oral BID   finasteride 5 mg Oral Daily   guaiFENesin 1,200 mg Oral Q12H   insulin detemir 160 Units Subcutaneous Daily   insulin lispro 4-24 Units Subcutaneous 4x Daily With Meals & Nightly   ipratropium-albuterol 3 mL Nebulization 4x Daily - RT   levoFLOXacin 750 mg Oral Q24H   levothyroxine 50 mcg Oral Q AM   losartan 100 mg Oral Q24H   methylPREDNISolone sodium succinate 125 mg Intravenous Q6H   nicotine 1 patch Transdermal Q24H   pregabalin 150 mg Oral Q12H   spironolactone 25 mg Oral Daily   tamsulosin 0.8 mg Oral Daily   venlafaxine  mg Oral Daily       PRN meds:  •  acetaminophen  •  benzonatate  •  dextrose  •  dextrose  •  dextrose  •  dextrose  •  glucagon (human recombinant)  •  glucagon (human recombinant)  •  ipratropium-albuterol  •  ondansetron  •  sodium chloride  •  Insert peripheral IV **AND** sodium chloride    Assessment/Plan     Active Problems:    Pneumonia of right middle lobe due to infectious organism      Plan:  Pneumonia/COPD exacerbation-continue Levaquin.  Duo nebs.  Wean down Solu-Medrol.  Brovana.  Mucinex.    Acute hypoxic and hypercapnic respiratory failure- continue BiPAP.  Patient will 6 L, continue to wean down if able.    Diabetes-continue Levemir.  Sliding scale.  Metformin held due to lactic acidosis.    Hyperlipidemia-Lipitor    Hypokalemia-by mouth potassium.  Magnesium    CAD-Plavix    Hypertension- atenolol and Cozaar and spironolactone    Neuropathy-continue Lyrica and Effexor    Benign prostate hypertrophy/urinary tension-Flomax and Proscar.    Reflux-Pepcid    Hypothyroidism-Synthroid    Smoking.  Discussed patient about cutting back and stopping.  Nicotine patch.    Blood culture no growth in 3 days, regular Candida albicans.    Discharge plannin-5 days    Dimitri Guevara MD   18   1:02 PM

## 2018-01-22 NOTE — PLAN OF CARE
Problem: Patient Care Overview (Adult)  Goal: Plan of Care Review  Outcome: Ongoing (interventions implemented as appropriate)   01/22/18 0441   Coping/Psychosocial Response Interventions   Plan Of Care Reviewed With patient   Patient Care Overview   Progress improving   Outcome Evaluation   Outcome Summary/Follow up Plan Patient continues to have some difficulty initiating urination. VSS and patient O2 sat WNL on 6L NC. continue to monitor.       Problem: Infection, Risk/Actual (Adult)  Goal: Infection Prevention/Resolution  Outcome: Ongoing (interventions implemented as appropriate)      Problem: Respiratory Insufficiency (Adult)  Goal: Acid/Base Balance  Outcome: Ongoing (interventions implemented as appropriate)    Goal: Effective Ventilation  Outcome: Ongoing (interventions implemented as appropriate)

## 2018-01-22 NOTE — PROGRESS NOTES
Pharmacy Dosing Service  Automatic IV to PO Conversion  Levaquin    Assessment/Action/Plan:  Patient meets criteria listed below. Levaquin 750 mg IV every 24 hours has been changed to Levaquin 750 mg PO every 24 hours.     Subjective:  Sai Adam is a 57 y.o. male who meets the following criteria for IV to PO therapy conversion     Policy Criteria:  · Tolerating oral fluids or 40ml/hour of enteral nutrition and oral route not otherwise compromised  · Receiving other oral medications on a scheduled basis  · Afebrile (temperature less than 100.4 degrees F) for at least 24 hours  · WBC within/decreasing toward normal range    Additional Factors Considered:  • Anti-emetic usage  • Patient disposition per documentation  • Disease states or conditions contraindicating oral conversion    Objective:  Lab Results   Component Value Date    WBC 10.06 01/22/2018     Temp Readings from Last 1 Encounters:   01/22/18 97.5 °F (36.4 °C) (Tympanic)     Diet Order   Procedures   • Diet Regular; Consistent Carbohydrate       Active Medications    Current Facility-Administered Medications:   •  acetaminophen (TYLENOL) tablet 650 mg, 650 mg, Oral, Q4H PRN, Hany Valdez MD  •  arformoterol (BROVANA) nebulizer solution 15 mcg, 15 mcg, Nebulization, BID - RT, Hany Valdez MD, 15 mcg at 01/22/18 0727  •  atenolol (TENORMIN) tablet 25 mg, 25 mg, Oral, Q12H, Hany Valdez MD, 25 mg at 01/21/18 2049  •  atorvastatin (LIPITOR) tablet 40 mg, 40 mg, Oral, Nightly, Satinder Jorge MD, 40 mg at 01/21/18 2050  •  benzonatate (TESSALON) capsule 100 mg, 100 mg, Oral, TID PRN, Hany Valdez MD  •  bethanechol (URECHOLINE) tablet 25 mg, 25 mg, Oral, TID, Satinder Jorge MD, 25 mg at 01/21/18 2050  •  budesonide (PULMICORT) nebulizer solution 0.25 mg, 0.25 mg, Nebulization, BID - RT, Hany Valdez MD, 0.25 mg at 01/22/18 0718  •  clopidogrel (PLAVIX) tablet 75 mg, 75 mg, Oral, Daily, Hany Valdez MD, 75 mg  at 01/21/18 0845  •  dextrose (D50W) solution 25 g, 25 g, Intravenous, Q15 Min PRN, Hany Valdez MD  •  dextrose (D50W) solution 25 g, 25 g, Intravenous, Q15 Min PRN, Satinder Jorge MD  •  dextrose (GLUTOSE) oral gel 15 g, 15 g, Oral, Q15 Min PRN, Hany Valdez MD  •  dextrose (GLUTOSE) oral gel 15 g, 15 g, Oral, Q15 Min PRN, Satinder Jorge MD  •  famotidine (PEPCID) tablet 20 mg, 20 mg, Oral, BID, Satinder Jorge MD, 20 mg at 01/21/18 2050  •  finasteride (PROSCAR) tablet 5 mg, 5 mg, Oral, Daily, Satinder Jorge MD, 5 mg at 01/21/18 1520  •  glucagon (human recombinant) (GLUCAGEN DIAGNOSTIC) injection 1 mg, 1 mg, Subcutaneous, Q15 Min PRN, Hany Valdez MD  •  glucagon (human recombinant) (GLUCAGEN DIAGNOSTIC) injection 1 mg, 1 mg, Subcutaneous, Q15 Min PRN, Satinder Jorge MD  •  guaiFENesin (MUCINEX) 12 hr tablet 1,200 mg, 1,200 mg, Oral, Q12H, Hany Valdez MD, 1,200 mg at 01/21/18 2049  •  insulin detemir (LEVEMIR) injection 160 Units, 160 Units, Subcutaneous, Daily, Hany Valdez MD, 160 Units at 01/21/18 0819  •  insulin lispro (humaLOG) injection 4-24 Units, 4-24 Units, Subcutaneous, 4x Daily With Meals & Nightly, Satinder Jorge MD, 8 Units at 01/21/18 2050  •  ipratropium-albuterol (DUO-NEB) nebulizer solution 3 mL, 3 mL, Nebulization, Q4H PRN, Hany Valdez MD  •  ipratropium-albuterol (DUO-NEB) nebulizer solution 3 mL, 3 mL, Nebulization, 4x Daily - RT, Satinder Jorge MD, 3 mL at 01/22/18 0718  •  levoFLOXacin (LEVAQUIN) tablet 750 mg, 750 mg, Oral, Q24H, Dimitri Guevara MD  •  levothyroxine (SYNTHROID, LEVOTHROID) tablet 50 mcg, 50 mcg, Oral, Q AM, Hany Valdez MD, 50 mcg at 01/22/18 0514  •  losartan (COZAAR) tablet 100 mg, 100 mg, Oral, Q24H, Satinder Jorge MD, 100 mg at 01/21/18 1521  •  methylPREDNISolone sodium succinate (SOLU-Medrol) injection 125 mg, 125 mg, Intravenous, Q6H, Satinder Jorge MD, 125 mg at  01/22/18 0514  •  nicotine (NICODERM CQ) 14 MG/24HR patch 1 patch, 1 patch, Transdermal, Q24H, Satinder Jorge MD, 1 patch at 01/21/18 1042  •  ondansetron (ZOFRAN) injection 4 mg, 4 mg, Intravenous, Q6H PRN, Hany Valdez MD  •  pregabalin (LYRICA) capsule 150 mg, 150 mg, Oral, Q12H, Hany Valdez MD, 150 mg at 01/21/18 2056  •  sodium chloride 0.9 % flush 1-10 mL, 1-10 mL, Intravenous, PRN, Hany Valdez MD  •  Insert peripheral IV, , , Once **AND** sodium chloride 0.9 % flush 10 mL, 10 mL, Intravenous, PRN, Hany Valdez MD  •  spironolactone (ALDACTONE) tablet 25 mg, 25 mg, Oral, Daily, Hany Valdez MD, 25 mg at 01/21/18 0845  •  tamsulosin (FLOMAX) 24 hr capsule 0.8 mg, 0.8 mg, Oral, Daily, Satinder Jorge MD  •  venlafaxine XR (EFFEXOR-XR) 24 hr capsule 225 mg, 225 mg, Oral, Daily, Hany Valdez MD, 225 mg at 01/21/18 0844      TORRES Wright, Pharm.D.    01/22/188:30 AM

## 2018-01-22 NOTE — PROGRESS NOTES
Pt given handout on Pneumonia.  We discussed the ways to avoid getting pneumonia again.  We talked about nutrition and pulmonary hygiene.  Pt is compliant with respiratory equipment at home.  Instructed pt to call respiratory if he had any questions.

## 2018-01-23 VITALS
HEART RATE: 71 BPM | RESPIRATION RATE: 18 BRPM | HEIGHT: 67 IN | SYSTOLIC BLOOD PRESSURE: 125 MMHG | WEIGHT: 234 LBS | OXYGEN SATURATION: 91 % | BODY MASS INDEX: 36.73 KG/M2 | TEMPERATURE: 98.6 F | DIASTOLIC BLOOD PRESSURE: 74 MMHG

## 2018-01-23 LAB
ALBUMIN SERPL-MCNC: 2.9 G/DL (ref 3.5–5)
ALBUMIN/GLOB SERPL: 1.1 G/DL (ref 1.1–2.5)
ALP SERPL-CCNC: 71 U/L (ref 24–120)
ALT SERPL W P-5'-P-CCNC: 33 U/L (ref 0–54)
ANION GAP SERPL CALCULATED.3IONS-SCNC: 11 MMOL/L (ref 4–13)
AST SERPL-CCNC: 33 U/L (ref 7–45)
BACTERIA SPEC AEROBE CULT: NORMAL
BACTERIA SPEC AEROBE CULT: NORMAL
BASOPHILS # BLD AUTO: 0.02 10*3/MM3 (ref 0–0.2)
BASOPHILS NFR BLD AUTO: 0.2 % (ref 0–2)
BILIRUB SERPL-MCNC: 0.4 MG/DL (ref 0.1–1)
BUN BLD-MCNC: 14 MG/DL (ref 5–21)
BUN/CREAT SERPL: 26.4 (ref 7–25)
CALCIUM SPEC-SCNC: 8.6 MG/DL (ref 8.4–10.4)
CHLORIDE SERPL-SCNC: 100 MMOL/L (ref 98–110)
CO2 SERPL-SCNC: 31 MMOL/L (ref 24–31)
CREAT BLD-MCNC: 0.53 MG/DL (ref 0.5–1.4)
DEPRECATED RDW RBC AUTO: 48.4 FL (ref 40–54)
EOSINOPHIL # BLD AUTO: 0 10*3/MM3 (ref 0–0.7)
EOSINOPHIL NFR BLD AUTO: 0 % (ref 0–4)
ERYTHROCYTE [DISTWIDTH] IN BLOOD BY AUTOMATED COUNT: 15.9 % (ref 12–15)
GFR SERPL CREATININE-BSD FRML MDRD: >150 ML/MIN/1.73
GLOBULIN UR ELPH-MCNC: 2.6 GM/DL
GLUCOSE BLD-MCNC: 165 MG/DL (ref 70–100)
GLUCOSE BLDC GLUCOMTR-MCNC: 145 MG/DL (ref 70–130)
GLUCOSE BLDC GLUCOMTR-MCNC: 359 MG/DL (ref 70–130)
HCT VFR BLD AUTO: 44.6 % (ref 40–52)
HGB BLD-MCNC: 14.2 G/DL (ref 14–18)
IMM GRANULOCYTES # BLD: 0.17 10*3/MM3 (ref 0–0.03)
IMM GRANULOCYTES NFR BLD: 1.8 % (ref 0–5)
LYMPHOCYTES # BLD AUTO: 1.29 10*3/MM3 (ref 0.72–4.86)
LYMPHOCYTES NFR BLD AUTO: 13.8 % (ref 15–45)
MAGNESIUM SERPL-MCNC: 1.9 MG/DL (ref 1.4–2.2)
MCH RBC QN AUTO: 26.5 PG (ref 28–32)
MCHC RBC AUTO-ENTMCNC: 31.8 G/DL (ref 33–36)
MCV RBC AUTO: 83.2 FL (ref 82–95)
MONOCYTES # BLD AUTO: 0.36 10*3/MM3 (ref 0.19–1.3)
MONOCYTES NFR BLD AUTO: 3.8 % (ref 4–12)
NEUTROPHILS # BLD AUTO: 7.52 10*3/MM3 (ref 1.87–8.4)
NEUTROPHILS NFR BLD AUTO: 80.4 % (ref 39–78)
NRBC BLD MANUAL-RTO: 0 /100 WBC (ref 0–0)
PLATELET # BLD AUTO: 147 10*3/MM3 (ref 130–400)
PMV BLD AUTO: 9.3 FL (ref 6–12)
POTASSIUM BLD-SCNC: 2.8 MMOL/L (ref 3.5–5.3)
POTASSIUM BLD-SCNC: 3.2 MMOL/L (ref 3.5–5.3)
PROT SERPL-MCNC: 5.5 G/DL (ref 6.3–8.7)
RBC # BLD AUTO: 5.36 10*6/MM3 (ref 4.8–5.9)
SODIUM BLD-SCNC: 142 MMOL/L (ref 135–145)
WBC NRBC COR # BLD: 9.36 10*3/MM3 (ref 4.8–10.8)

## 2018-01-23 PROCEDURE — 94799 UNLISTED PULMONARY SVC/PX: CPT

## 2018-01-23 PROCEDURE — G8979 MOBILITY GOAL STATUS: HCPCS

## 2018-01-23 PROCEDURE — 63710000001 INSULIN LISPRO (HUMAN) PER 5 UNITS: Performed by: FAMILY MEDICINE

## 2018-01-23 PROCEDURE — 63710000001 INSULIN DETEMIR PER 5 UNITS: Performed by: FAMILY MEDICINE

## 2018-01-23 PROCEDURE — 94660 CPAP INITIATION&MGMT: CPT

## 2018-01-23 PROCEDURE — G8980 MOBILITY D/C STATUS: HCPCS

## 2018-01-23 PROCEDURE — 82962 GLUCOSE BLOOD TEST: CPT

## 2018-01-23 PROCEDURE — 97161 PT EVAL LOW COMPLEX 20 MIN: CPT

## 2018-01-23 PROCEDURE — 83735 ASSAY OF MAGNESIUM: CPT | Performed by: FAMILY MEDICINE

## 2018-01-23 PROCEDURE — 25010000002 METHYLPREDNISOLONE PER 125 MG: Performed by: FAMILY MEDICINE

## 2018-01-23 PROCEDURE — 80053 COMPREHEN METABOLIC PANEL: CPT | Performed by: FAMILY MEDICINE

## 2018-01-23 PROCEDURE — 85025 COMPLETE CBC W/AUTO DIFF WBC: CPT | Performed by: FAMILY MEDICINE

## 2018-01-23 PROCEDURE — G8978 MOBILITY CURRENT STATUS: HCPCS

## 2018-01-23 PROCEDURE — 84132 ASSAY OF SERUM POTASSIUM: CPT | Performed by: INTERNAL MEDICINE

## 2018-01-23 PROCEDURE — 97535 SELF CARE MNGMENT TRAINING: CPT

## 2018-01-23 RX ORDER — POTASSIUM CHLORIDE 750 MG/1
40 CAPSULE, EXTENDED RELEASE ORAL EVERY 4 HOURS
Status: COMPLETED | OUTPATIENT
Start: 2018-01-23 | End: 2018-01-23

## 2018-01-23 RX ORDER — BUDESONIDE 0.25 MG/2ML
0.25 INHALANT ORAL
Qty: 2 EACH | Refills: 2 | Status: ON HOLD | OUTPATIENT
Start: 2018-01-23 | End: 2020-01-14

## 2018-01-23 RX ORDER — LEVOFLOXACIN 500 MG/1
500 TABLET, FILM COATED ORAL DAILY
Qty: 4 TABLET | Refills: 0 | Status: ON HOLD | OUTPATIENT
Start: 2018-01-23 | End: 2020-01-14

## 2018-01-23 RX ORDER — ARFORMOTEROL TARTRATE 15 UG/2ML
15 SOLUTION RESPIRATORY (INHALATION)
Qty: 120 ML | Refills: 2 | Status: ON HOLD | OUTPATIENT
Start: 2018-01-23 | End: 2020-01-14

## 2018-01-23 RX ORDER — IPRATROPIUM BROMIDE AND ALBUTEROL SULFATE 2.5; .5 MG/3ML; MG/3ML
3 SOLUTION RESPIRATORY (INHALATION) EVERY 4 HOURS PRN
Qty: 360 ML | Refills: 2 | Status: ON HOLD | OUTPATIENT
Start: 2018-01-23 | End: 2020-01-14

## 2018-01-23 RX ORDER — METHYLPREDNISOLONE 4 MG/1
TABLET ORAL
Qty: 21 TABLET | Refills: 0 | Status: ON HOLD | OUTPATIENT
Start: 2018-01-23 | End: 2020-01-14

## 2018-01-23 RX ORDER — NICOTINE 21 MG/24HR
1 PATCH, TRANSDERMAL 24 HOURS TRANSDERMAL EVERY 24 HOURS
Qty: 30 PATCH | Refills: 0 | Status: ON HOLD | OUTPATIENT
Start: 2018-01-24 | End: 2020-01-14

## 2018-01-23 RX ORDER — BENZONATATE 100 MG/1
200 CAPSULE ORAL 3 TIMES DAILY PRN
Qty: 30 CAPSULE | Refills: 0 | Status: ON HOLD | OUTPATIENT
Start: 2018-01-23 | End: 2020-01-14

## 2018-01-23 RX ORDER — ATORVASTATIN CALCIUM 40 MG/1
40 TABLET, FILM COATED ORAL NIGHTLY
Qty: 30 TABLET | Refills: 2 | Status: ON HOLD | OUTPATIENT
Start: 2018-01-23 | End: 2020-01-14

## 2018-01-23 RX ORDER — FINASTERIDE 5 MG/1
5 TABLET, FILM COATED ORAL DAILY
Qty: 30 TABLET | Refills: 2 | Status: SHIPPED | OUTPATIENT
Start: 2018-01-24

## 2018-01-23 RX ADMIN — METHYLPREDNISOLONE SODIUM SUCCINATE 80 MG: 125 INJECTION, POWDER, FOR SOLUTION INTRAMUSCULAR; INTRAVENOUS at 12:58

## 2018-01-23 RX ADMIN — ARFORMOTEROL TARTRATE 15 MCG: 15 SOLUTION RESPIRATORY (INHALATION) at 07:53

## 2018-01-23 RX ADMIN — POTASSIUM CHLORIDE 40 MEQ: 750 CAPSULE, EXTENDED RELEASE ORAL at 06:47

## 2018-01-23 RX ADMIN — POTASSIUM CHLORIDE 40 MEQ: 750 CAPSULE, EXTENDED RELEASE ORAL at 11:27

## 2018-01-23 RX ADMIN — IPRATROPIUM BROMIDE AND ALBUTEROL SULFATE 3 ML: 2.5; .5 SOLUTION RESPIRATORY (INHALATION) at 07:53

## 2018-01-23 RX ADMIN — VENLAFAXINE HYDROCHLORIDE 225 MG: 75 CAPSULE, EXTENDED RELEASE ORAL at 09:10

## 2018-01-23 RX ADMIN — METHYLPREDNISOLONE SODIUM SUCCINATE 80 MG: 125 INJECTION, POWDER, FOR SOLUTION INTRAMUSCULAR; INTRAVENOUS at 05:40

## 2018-01-23 RX ADMIN — FAMOTIDINE 20 MG: 20 TABLET, FILM COATED ORAL at 09:10

## 2018-01-23 RX ADMIN — SPIRONOLACTONE 25 MG: 25 TABLET ORAL at 09:10

## 2018-01-23 RX ADMIN — TAMSULOSIN HYDROCHLORIDE 0.8 MG: 0.4 CAPSULE ORAL at 09:10

## 2018-01-23 RX ADMIN — ATENOLOL 25 MG: 25 TABLET ORAL at 09:10

## 2018-01-23 RX ADMIN — LOSARTAN POTASSIUM 100 MG: 50 TABLET ORAL at 09:10

## 2018-01-23 RX ADMIN — NICOTINE 1 PATCH: 14 PATCH, EXTENDED RELEASE TRANSDERMAL at 09:12

## 2018-01-23 RX ADMIN — GUAIFENESIN 1200 MG: 600 TABLET, EXTENDED RELEASE ORAL at 09:10

## 2018-01-23 RX ADMIN — CLOPIDOGREL 75 MG: 75 TABLET, FILM COATED ORAL at 09:10

## 2018-01-23 RX ADMIN — LEVOTHYROXINE SODIUM 50 MCG: 50 TABLET ORAL at 05:40

## 2018-01-23 RX ADMIN — INSULIN LISPRO 20 UNITS: 100 INJECTION, SOLUTION INTRAVENOUS; SUBCUTANEOUS at 12:58

## 2018-01-23 RX ADMIN — INSULIN DETEMIR 160 UNITS: 100 INJECTION, SOLUTION SUBCUTANEOUS at 09:08

## 2018-01-23 RX ADMIN — IPRATROPIUM BROMIDE AND ALBUTEROL SULFATE 3 ML: 2.5; .5 SOLUTION RESPIRATORY (INHALATION) at 11:33

## 2018-01-23 RX ADMIN — FINASTERIDE 5 MG: 5 TABLET, FILM COATED ORAL at 09:10

## 2018-01-23 RX ADMIN — BUDESONIDE 0.25 MG: 0.25 INHALANT RESPIRATORY (INHALATION) at 07:53

## 2018-01-23 RX ADMIN — METHYLPREDNISOLONE SODIUM SUCCINATE 80 MG: 125 INJECTION, POWDER, FOR SOLUTION INTRAMUSCULAR; INTRAVENOUS at 00:05

## 2018-01-23 RX ADMIN — PREGABALIN 150 MG: 75 CAPSULE ORAL at 09:10

## 2018-01-23 NOTE — PLAN OF CARE
Problem: Patient Care Overview (Adult)  Goal: Plan of Care Review  Outcome: Ongoing (interventions implemented as appropriate)   01/23/18 1501   Coping/Psychosocial Response Interventions   Plan Of Care Reviewed With patient   Patient Care Overview   Progress no change   Outcome Evaluation   Outcome Summary/Follow up Plan patient up in chair this shift. c/o generalized pain. patient is eager to go home and wanting to go home today.,      Goal: Adult Individualization and Mutuality  Outcome: Ongoing (interventions implemented as appropriate)    Goal: Discharge Needs Assessment  Outcome: Ongoing (interventions implemented as appropriate)      Problem: Infection, Risk/Actual (Adult)  Goal: Infection Prevention/Resolution  Outcome: Ongoing (interventions implemented as appropriate)      Problem: Respiratory Insufficiency (Adult)  Goal: Acid/Base Balance  Outcome: Ongoing (interventions implemented as appropriate)    Goal: Effective Ventilation  Outcome: Ongoing (interventions implemented as appropriate)

## 2018-01-23 NOTE — PLAN OF CARE
Problem: Patient Care Overview (Adult)  Goal: Plan of Care Review  Outcome: Ongoing (interventions implemented as appropriate)   01/23/18 0184   Coping/Psychosocial Response Interventions   Plan Of Care Reviewed With patient   Outcome Evaluation   Outcome Summary/Follow up Plan PT eval complete. pt independent with transfers, supervision to ambulate in halls due to impaired vision. pt states no mobility concerns for return home. No skilled PT needs identified at this time. Anticipate D/C home.

## 2018-01-23 NOTE — THERAPY DISCHARGE NOTE
Acute Care - Physical Therapy Initial Eval/Discharge  [facility]     Patient Name: Sai Adam  : 1960  MRN: [id number]  Today's Date: 2018   Onset of Illness/Injury or Date of Surgery Date: 18  Date of Referral to PT: 18  Referring Physician: Dr Jorge      Admit Date: 2018    Visit Dx:    ICD-10-CM ICD-9-CM   1. Pneumonia of right middle lobe due to infectious organism J18.1 486   2. COPD exacerbation J44.1 491.21   3. Respiratory distress R06.00 786.09   4. Hypoxia R09.02 799.02   5. Tachycardia R00.0 785.0   6. Fever, unspecified fever cause R50.9 780.60   7. Severe sepsis A41.9 038.9    R65.20 995.92   8. Impaired mobility and ADLs Z74.09 799.89     Patient Active Problem List   Diagnosis   • Malignant neoplasm of sigmoid colon   • Colon polyps   • Anticoagulated by anticoagulation treatment   • COPD exacerbation   • Acute on chronic respiratory failure with hypoxia and hypercapnia   • Diabetes mellitus   • History of colon cancer   • Coronary artery disease   • Chronic respiratory acidosis   • Pneumonia of right middle lobe due to infectious organism     Past Medical History:   Diagnosis Date   • Arthritis    • CAD (coronary artery disease)    • Colon cancer 2016   • COPD (chronic obstructive pulmonary disease)    • Diabetes mellitus    • HLD (hyperlipidemia)    • Hypertension      Past Surgical History:   Procedure Laterality Date   • CARDIAC SURGERY     • CARPAL TUNNEL RELEASE     • COLON RESECTION WITH COLOSTOMY     • COLON SURGERY     • COLONOSCOPY  2016   • COLONOSCOPY N/A 10/12/2017    Procedure: COLONOSCOPY WITH ANESTHESIA;  Surgeon: Yessenia Gregg MD;  Location: [facility];  Service:    • CORONARY ANGIOPLASTY WITH STENT PLACEMENT     • HERNIA REPAIR     • ROTATOR CUFF REPAIR            PT ASSESSMENT (last 72 hours)      PT Evaluation       18 1322 18 1003    Rehab Evaluation    Document Type evaluation   see MAR  - therapy note (daily  note)  -CJ    Subjective Information agree to therapy;complains of;pain  -PB agree to therapy;complains of;weakness;fatigue  -CJ    Patient Effort, Rehab Treatment  good  -CJ    Symptoms Noted During/After Treatment  shortness of breath  -CJ    General Information    Patient Profile Review yes  -PB     Onset of Illness/Injury or Date of Surgery Date 01/18/18  -PB     Referring Physician Dr Jorge  -PB     General Observations awake and alert in chair, on 4L O2/NC  -PB     Pertinent History Of Current Problem Recent stay at [facility] 12/29-1/2. Admitted to ER with cough and SOB for 2 weeks. CXR: R upper lobe pneumonia. Dx: COPD exacerbation, hypoxia, tachycardia, fever, severe sepsis, HTN.  -PB     Precautions/Limitations fall precautions;oxygen therapy device and L/min   legally blind; colostomy  -PB fall precautions;oxygen therapy device and L/min   Legally blind!  -CJ    Prior Level of Function independent:;all household mobility;community mobility;ADL's;cooking;cleaning;dependent:;driving;shopping  -PB     Equipment Currently Used at Home bipap/ cpap;nebulizer;cane, straight;oxygen;respiratory supplies;colostomy/ostomy supplies;walker, rolling;wheelchair;shower chair  -PB     Plans/Goals Discussed With patient;agreed upon  -PB     Risks Reviewed patient:;nausea/vomiting;LOB;dizziness;increased discomfort;change in vital signs  -PB     Benefits Reviewed patient:;improve function;increase independence;increase strength;increase balance  -PB     Barriers to Rehab visual deficit  -PB     Living Environment    Lives With alone  -PB     Living Arrangements house  -PB     Home Accessibility bed and bath on same level   walk in shower with built in seat  -PB     Clinical Impression    Date of Referral to PT 01/21/18  -PB     Patient/Family Goals Statement return home  -PB     Criteria for Skilled Therapeutic Interventions Met no;current level of function same as previous level of function  -PB     Vital Signs    Pre SpO2 (%) 94  " -PB     O2 Delivery Pre Treatment supplemental O2  -PB     Intra SpO2 (%) 89  -PB     O2 Delivery Intra Treatment supplemental O2  -PB     Post SpO2 (%) 93  -PB     O2 Delivery Post Treatment supplemental O2  -PB     Pre Patient Position Sitting  -PB     Intra Patient Position Standing  -PB     Post Patient Position Sitting  -PB     Pain Assessment    Pain Assessment 0-10  -PB No/denies pain  -CJ    Pain Score 6  -PB     Pain Type Chronic pain  -PB     Pain Location Back  -PB     Pain Orientation Lower  -PB     Pain Intervention(s) Repositioned;Ambulation/increased activity  -PB     Response to Interventions tolerated  -PB     Vision Assessment/Intervention    Visual Impairment visual impairment, bilaterally  -PB     Visual Impairment Comment R eye completely, L eye \"5 feet\" per pt  -PB     Cognitive Assessment/Intervention    Current Cognitive/Communication Assessment functional  -PB     Orientation Status oriented x 4  -PB     Follows Commands/Answers Questions 100% of the time;able to follow multi-step instructions  -PB     Personal Safety WNL/WFL  -PB     Personal Safety Interventions gait belt;nonskid shoes/slippers when out of bed;supervised activity;fall prevention program maintained  -PB     ROM (Range of Motion)    General ROM no range of motion deficits identified  -PB     MMT (Manual Muscle Testing)    General MMT Assessment Detail BLE 4+/5  -PB     Bed Mobility, Assessment/Treatment    Bed Mob, Supine to Sit, [place]  verbal cues required;conditional independence  -CJ    Bed Mob, Sit to Supine, [place]  verbal cues required;conditional independence  -CJ    Bed Mobility, Comment up in chair  -PB     Transfer Assessment/Treatment    Transfers, Sit-Stand [place] independent  -PB verbal cues required;conditional independence  -CJ    Transfers, Stand-Sit [place] independent  -PB verbal cues required;conditional independence  -CJ    Walk-In Shower Transfer, [place]  verbal cues " required;supervision required  -CJ    Gait Assessment/Treatment    Gait, [place] Level supervision required  -PB     Gait, Distance (Feet) 600  -PB     Gait, Gait Pattern Analysis swing-through gait  -PB     Gait, Gait Deviations allie decreased  -PB     Motor Skills/Interventions    Additional Documentation Balance Skills Training (Group)  -PB     Balance Skills Training    Sitting-Level of Assistance Independent  -PB     Sitting-Balance Support Feet supported  -PB     Standing-Level of Assistance Close supervision  -PB     Static Standing Balance Support No upper extremity supported  -PB     Gait Balance-Level of Assistance Close supervision   for obstacles due to impaired vision  -PB     Gait Balance Support No upper extremity supported  -PB     Positioning and Restraints    Pre-Treatment Position sitting in chair/recliner  -PB in bed  -CJ    Post Treatment Position chair  -PB bed  -CJ    In Bed notified nsg;sitting;call light within reach;encouraged to call for assist  -PB call light within reach;encouraged to call for assist;with nsg;side rails up x1;fowlers  -      01/21/18 1000       Rehab Evaluation    Document Type evaluation   See MAR  -TR     Subjective Information agree to therapy;complains of;dyspnea;weakness;pain;dizziness;numbness   B hands and feet numb, neuropathy   -TR     Patient Effort, Rehab Treatment good  -TR     Symptoms Noted During/After Treatment shortness of breath  -TR     General Information    Patient Profile Review yes  -TR     Onset of Illness/Injury or Date of Surgery Date 01/18/18  -TR     Referring Physician Dr. Jorge  -TR     General Observations Seated in chair. O2 NC 6L, colostomy, alert, oriented.   -TR     Pertinent History Of Current Problem Recent stay at [facility] 12/29-1/2. Admitted to ER with cough and SOB for 2 weeks. CXR: R upper lobe pneumonia. Dx: COPD exacerbation, hypoxia, tachycardia, fever, severe sepsis, HTN.   -TR     Precautions/Limitations fall  precautions;oxygen therapy device and L/min   colostomy  -TR     Prior Level of Function independent:;all household mobility;ADL's;cooking;cleaning;dependent:;driving;shopping  -TR     Equipment Currently Used at Home bipap/ cpap;nebulizer;cane, straight;oxygen;respiratory supplies;colostomy/ostomy supplies;walker, rolling;wheelchair;shower chair  -TR     Plans/Goals Discussed With patient;agreed upon  -TR     Risks Reviewed patient:;LOB;dizziness;increased discomfort;change in vital signs  -TR     Benefits Reviewed patient:;improve function;increase independence;increase strength;increase balance;decrease pain;increase knowledge  -TR     Barriers to Rehab medically complex;visual deficit  -TR     Living Environment    Lives With alone  -TR     Living Arrangements house  -TR     Home Accessibility bed and bath on same level   walk in shower  -TR     Living Environment Comment No stairs, built in shower seat.   -TR     Vital Signs    Pretreatment Heart Rate (beats/min) 62  -TR     Intratreatment Heart Rate (beats/min) 74  -TR     Posttreatment Heart Rate (beats/min) 61  -TR     Pre SpO2 (%) 89  -TR     O2 Delivery Pre Treatment supplemental O2   6L NC  -TR     Intra SpO2 (%) 86  -TR     O2 Delivery Intra Treatment supplemental O2  -TR     Post SpO2 (%) 90  -TR     O2 Delivery Post Treatment supplemental O2  -TR     Pre Patient Position Sitting  -TR     Intra Patient Position Standing  -TR     Post Patient Position Sitting  -TR     Pain Assessment    Pain Assessment 0-10  -TR     Pain Score 6  -TR     Pain Type Chronic pain  -TR     Pain Location Back  -TR     Pain Orientation Lower  -TR     Pain Radiating Towards B hips and legs  -TR     Pain Intervention(s) Repositioned;Ambulation/increased activity  -TR     Response to Interventions Tolerated  -TR     Vision Assessment/Intervention    Visual Impairment visual impairment, bilaterally  -TR     Visual Impairment Comment R eye blind. L eye impaired.   -TR     Visual  Perception/Processing Comment Reports L eye can only see up to 3 ft away.   -TR     Cognitive Assessment/Intervention    Current Cognitive/Communication Assessment functional  -TR     Orientation Status oriented x 4  -TR     Follows Commands/Answers Questions able to follow multi-step instructions;100% of the time  -TR     Personal Safety mild impairment  -TR     Personal Safety Interventions gait belt;fall prevention program maintained;nonskid shoes/slippers when out of bed;supervised activity  -TR     ROM (Range of Motion)    General ROM Detail B UE AROM WFL  -TR     MMT (Manual Muscle Testing)    General MMT Assessment Detail B UE 4/5  -TR     Bed Mobility, Assessment/Treatment    Bed Mobility, Comment Up in chair.  -TR     Transfer Assessment/Treatment    Transfers, Sit-Stand [place] contact guard assist  -TR     Transfers, Stand-Sit [place] contact guard assist  -TR     Transfer, Impairments impaired balance;strength decreased  -TR     Motor Skills/Interventions    Additional Documentation Balance Skills Training (Group)  -TR     Balance Skills Training    Sitting-Level of Assistance Distant supervision  -TR     Sitting-Balance Support Feet supported  -TR     Standing-Level of Assistance Contact guard  -TR     Static Standing Balance Support No upper extremity supported  -TR     Gait Balance-Level of Assistance Contact guard  -TR     Gait Balance Support No upper extremity supported  -TR     Sensory Assessment/Intervention    Sensory Impairment --   Light touch WFL.   -TR     Positioning and Restraints    Pre-Treatment Position sitting in chair/recliner  -TR     Post Treatment Position chair  -TR     In Chair sitting;call light within reach;encouraged to call for assist;notified nsg  -TR       User Key  (r) = Recorded By, (t) = Taken By, (c) = Cosigned By    Initials Name Provider Type    SNEHA Harvey/TIM Occupational Therapy Assistant    RAGHU Denise PT DPT Physical Therapist     CARYL Gifford, OTR/L Occupational Therapist          Physical Therapy Education     Title: PT OT SLP Therapies (Active)     Topic: Physical Therapy (Resolved)     Point: Mobility training (Resolved)    Learning Progress Summary    Learner Readiness Method Response Comment Documented by Status   Patient Acceptance E VU benefits of continued activity and safety concerns  01/23/18 1356 Done                      User Key     Initials Effective Dates Name Provider Type Discipline     08/02/16 -  Frandy Denise, PT DPT Physical Therapist PT                PT Recommendation and Plan  Anticipated Discharge Disposition: home  PT Frequency: evaluation only  Plan of Care Review  Plan Of Care Reviewed With: patient  Outcome Summary/Follow up Plan: PT eval complete. pt independent with transfers, supervision to ambulate in halls due to impaired vision. pt states no mobility concerns for return home. No skilled PT needs identified at this time. Anticipate D/C home.               Outcome Measures       01/23/18 1322 01/23/18 1003 01/21/18 1000    How much help from another person do you currently need...    Turning from your back to your side while in flat bed without using bedrails? 4  -PB      Moving from lying on back to sitting on the side of a flat bed without bedrails? 4  -PB      Moving to and from a bed to a chair (including a wheelchair)? 4  -PB      Standing up from a chair using your arms (e.g., wheelchair, bedside chair)? 4  -PB      Climbing 3-5 steps with a railing? 3  -PB      To walk in hospital room? 3  -PB      AM-PAC 6 Clicks Score 22  -PB      How much help from another is currently needed...    Putting on and taking off regular lower body clothing?  3  -CJ 2  -TR    Bathing (including washing, rinsing, and drying)  3  -CJ 2  -TR    Toileting (which includes using toilet bed pan or urinal)  3  -CJ 3  -TR    Putting on and taking off regular upper body clothing  3  -CJ 3  -TR    Taking care of  personal grooming (such as brushing teeth)  3  - 3  -TR    Eating meals  4  - 4  -TR    Score  19  - 17  -TR    Functional Assessment    Outcome Measure Options AM-PAC 6 Clicks Basic Mobility (PT)  -PB AM-PAC 6 Clicks Daily Activity (OT)  -CJ AM-PAC 6 Clicks Daily Activity (OT)  -TR      User Key  (r) = Recorded By, (t) = Taken By, (c) = Cosigned By    Initials Name Provider Type     Devin Vizcarra, [place]/L Occupational Therapy Assistant    PB Frandy Denise, PT DPT Physical Therapist    TR Bonnie Gifford, OTR/L Occupational Therapist           Time Calculation:         PT Charges       01/23/18 1357          Time Calculation    Start Time 1322   additional time 2408-2917  -PB      Stop Time 1354  -PB      Time Calculation (min) 32 min  -PB      PT Received On 01/23/18  -PB        User Key  (r) = Recorded By, (t) = Taken By, (c) = Cosigned By    Initials Name Provider Type    PB Frandy Denise, PT DPT Physical Therapist          Therapy Charges for Today     Code Description Service Date Service Provider Modifiers Qty    80300424934 HC PT MOBILITY CURRENT 1/23/2018 Frandy Denise, PT DPT GP,  1    62204471698 HC PT MOBILITY PROJECTED 1/23/2018 Frandy Denise, PT DPT GP,  1    [id number] HC PT MOBILITY DISCHARGE 1/23/2018 Frandy Denise, PT DPT ,  1    [id number] HC PT EVAL LOW COMPLEXITY 3 1/23/2018 Frandy Denise, PT DPT GP, KX 1          PT G-Codes  Outcome Measure Options: AM-PAC 6 Clicks Basic Mobility (PT)  Score: 22  Functional Limitation: Mobility: Walking and moving around  Mobility: Walking and Moving Around Current Status (): At least 20 percent but less than 40 percent impaired, limited or restricted  Mobility: Walking and Moving Around Goal Status (): At least 20 percent but less than 40 percent impaired, limited or restricted  Mobility: Walking and Moving Around Discharge Status (): At least 20 percent but less than 40 percent impaired, limited or  restricted    PT Discharge Summary  Anticipated Discharge Disposition: home  Reason for Discharge: At baseline function  Discharge Destination: Home    Frandy Denise, PT DPT  1/23/2018

## 2018-01-23 NOTE — PROGRESS NOTES
Continued Stay Note  [facility]     Patient Name: Sai Adam  MRN: [id number]  Today's Date: 1/23/2018    Admit Date: 1/18/2018          Discharge Plan       01/23/18 1006    Case Management/Social Work Plan    Plan Home Health requested     Additional Comments Spoke with Rajwinder from [facility] [phone] re: bipap, informed pt was using here and she says she will work on getting this for pt, will call if anything else needed from hospital.  Informed her he wanted HH at home, will need HH orders so these can be sent to them.  Will follow.               Discharge Codes     None            SURYA Lemons

## 2018-01-23 NOTE — PLAN OF CARE
Problem: Patient Care Overview (Adult)  Goal: Plan of Care Review  Outcome: Ongoing (interventions implemented as appropriate)   01/23/18 0454   Coping/Psychosocial Response Interventions   Plan Of Care Reviewed With patient   Patient Care Overview   Progress improving   Outcome Evaluation   Outcome Summary/Follow up Plan Patient urinating without difficulty this shift and o2 sats WNL on 5L NC. Heart rate did yovana again to 37 but with immediate recovery, maintains in 40s with sleep. Continue to monitor.       Problem: Infection, Risk/Actual (Adult)  Goal: Infection Prevention/Resolution  Outcome: Ongoing (interventions implemented as appropriate)      Problem: Respiratory Insufficiency (Adult)  Goal: Acid/Base Balance  Outcome: Ongoing (interventions implemented as appropriate)    Goal: Effective Ventilation  Outcome: Ongoing (interventions implemented as appropriate)

## 2018-01-23 NOTE — DISCHARGE PLACEMENT REQUEST
"Beatrice Mccullough [phone]  Sai Adam (57 y.o. Male)     Date of Birth Social Security Number Address Home Phone MRN    1960  [address] [facility] [phone] [id number]    Anabaptist Marital Status          Moravian        Admission Date Admission Type Admitting Provider Attending Provider Department, Room/Bed    1/18/18 Emergency Dimitri Guevara MD Truong, Khai C, MD [address], 491/1    Discharge Date Discharge Disposition Discharge Destination                      Attending Provider: Dimitri Guevara MD     Allergies:  Tetanus Toxoids, Lamotrigine, Lisinopril, Methadone, Penicillins, Tramadol    Isolation:  None   Infection:  None   Code Status:  FULL    Ht:  170.2 cm (67\")   Wt:  106 kg (234 lb)    Admission Cmt:  None   Principal Problem:  None                Active Insurance as of 1/18/2018     Primary Coverage     Payor Plan Insurance Group Employer/Plan Group    [employer] [id number]      Payor Plan Address Payor Plan Phone Number Effective From Effective To    [employer] [phone] 10/25/2017     [address] [place] [address]       Subscriber Name Subscriber Birth Date Member ID       SAI ADAM 1960 [id number]                 Emergency Contacts      (Rel.) Home Phone Work Phone Mobile Phone    Una Adam (Spouse) [phone] -- --    JagjitAntonio (Son) [phone] -- --    BrendenKeith (Son) [phone] -- --               History & Physical      Hany Valdez MD at 1/18/2018 10:42 PM              [facility] Medicine Services  HISTORY AND PHYSICAL    Date of Admission: 1/18/2018  Primary Care Physician: Jarod Tillman MD    Subjective     Chief Complaint: Shortness of breath and respiratory distress    History of Present Illness       57-year-old male with past medical history of arthritis, CAD, colon cancer, COPD, diabetes mellitus " type II, hyperlipidemia and hypertension was brought into the ER for respiratory distress and progressively worsening shortness of breath.  According to patient's family, patient has been very confused today and was noted to be grasping for breath.  Patient was recently discharged after being treated for COPD exacerbation.  Today in the ER patient was found to be febrile.  During initial evaluation in the ER patient was noted to be hypoxic with 79% oxygen saturation so an ABG was performed.  His ABG was noted to be pH 7.319, PCO2 66.6, PO2 41.1, HCO3 34.2, base excess 5.4.  Patient was started on BiPAP in the ER.  His chest x-ray was noted for right-sided pneumonia.  Vitals were indicated above possible underlying sepsis with pneumonia.  Patient will be admitted in the unit for further evaluation and treatment.    [facility].  [facility] will [facility] if family requests.      Critical care assessment time: 60 minutes    Review of Systems     Otherwise complete ROS reviewed and negative except as mentioned in the HPI.    Past Medical History:   Past Medical History:   Diagnosis Date   • Arthritis    • CAD (coronary artery disease)    • Colon cancer 11/2016   • COPD (chronic obstructive pulmonary disease)    • Diabetes mellitus    • HLD (hyperlipidemia)    • Hypertension      Past Surgical History:  Past Surgical History:   Procedure Laterality Date   • CARDIAC SURGERY     • CARPAL TUNNEL RELEASE     • COLON RESECTION WITH COLOSTOMY     • COLON SURGERY     • COLONOSCOPY  05/27/2016   • COLONOSCOPY N/A 10/12/2017    Procedure: COLONOSCOPY WITH ANESTHESIA;  Surgeon: Yessenia Gregg MD;  Location: [facility] ENDOSCOPY;  Service:    • CORONARY ANGIOPLASTY WITH STENT PLACEMENT     • HERNIA REPAIR     • ROTATOR CUFF REPAIR       Social History:  reports that he has been smoking.  He has a 40.00 pack-year smoking history. He has never used smokeless tobacco. He reports that he does  not drink alcohol or use illicit drugs.    Family History: family history includes Arthritis in his father; Cancer in his brother; Diabetes in his brother; Heart disease in his father; Hepatitis in his brother; Hypertension in his brother and mother; Stroke in his mother.      Allergies:  Allergies   Allergen Reactions   • Tetanus Toxoids Shortness Of Breath and Swelling   • Lamotrigine      Unknown     • Lisinopril Hives   • Methadone Swelling   • Penicillins Rash   • Tramadol Rash     Medications:  Prior to Admission medications    Medication Sig Start Date End Date Taking? Authorizing Provider   albuterol (PROVENTIL) (2.5 MG/3ML) 0.083% nebulizer solution Take 2.5 mg by nebulization Every 6 (Six) Hours As Needed for Wheezing.    Historical Provider, MD   atenolol (TENORMIN) 25 MG tablet Take 1 tablet by mouth Every 12 (Twelve) Hours.  Patient taking differently: Take 100 mg by mouth Every 12 (Twelve) Hours. 11/28/17   Ernie Cornejo,    atropine 1 % ophthalmic solution Administer 1 drop to the right eye 2 (Two) Times a Day.    Historical Provider, MD   budesonide-formoterol (SYMBICORT) 160-4.5 MCG/ACT inhaler Inhale 2 puffs 2 (Two) Times a Day. 1/2/18   MARYLOU Wilson   Cholecalciferol (VITAMIN D) 2000 units tablet Take 4,000 Units by mouth Daily.    Historical Provider, MD   clopidogrel (PLAVIX) 75 MG tablet Take 75 mg by mouth Daily.    Historical Provider, MD   cyclobenzaprine (FLEXERIL) 10 MG tablet Take 5 mg by mouth 3 (Three) Times a Day As Needed for Muscle Spasms.    Historical Provider, MD   docusate sodium (COLACE) 100 MG capsule Take 100 mg by mouth 2 (Two) Times a Day As Needed for Constipation.    Historical Provider, MD   dorzolamide (TRUSOPT) 2 % ophthalmic solution Administer 1 drop into the left eye 2 (Two) Times a Day.    Historical Provider, MD   doxazosin (CARDURA) 4 MG tablet Take 4 mg by mouth Every Night.    Historical Provider, MD   guaiFENesin (MUCINEX) 600 MG 12 hr tablet  Take 2 tablets by mouth Every 12 (Twelve) Hours. 1/2/18   MARYLOU Wilson   HYDROcodone-acetaminophen (NORCO) 5-325 MG per tablet Take 1 tablet by mouth Every 6 (Six) Hours As Needed for Moderate Pain .    Historical Provider, MD   insulin aspart (novoLOG) 100 UNIT/ML injection Inject 40 Units under the skin 3 (Three) Times a Day Before Meals.    Historical Provider, MD   insulin glargine (LANTUS) 100 UNIT/ML injection Inject 160 Units under the skin Daily.    Historical Provider, MD   isosorbide mononitrate (IMDUR) 60 MG 24 hr tablet Take 90 mg by mouth Daily.    Historical Provider, MD   levothyroxine (SYNTHROID, LEVOTHROID) 50 MCG tablet Take 50 mcg by mouth Daily.    Historical Provider, MD   losartan (COZAAR) 100 MG tablet Take 100 mg by mouth Daily.    Historical Provider, MD   metFORMIN (GLUCOPHAGE) 500 MG tablet Take 1,000 mg by mouth 2 (Two) Times a Day With Meals.    Historical Provider, MD   mometasone (ASMANEX TWISTHALER) inhaler 220 mcg/inhalation Inhale 2 puffs 2 (Two) Times a Day.    Historical Provider, MD   NIFEdipine XL (PROCARDIA XL) 30 MG 24 hr tablet Take 30 mg by mouth Daily.    Historical Provider, MD   nitroglycerin (NITROSTAT) 0.4 MG SL tablet Place 0.4 mg under the tongue Every 5 (Five) Minutes As Needed for Chest Pain.    Historical Provider, MD   OLANZapine (zyPREXA) 10 MG tablet Take 1 tablet by mouth Every Night.  Patient taking differently: Take 20 mg by mouth Every Night. 11/28/17   Ernie Cornejo DO   potassium chloride (K-DUR,KLOR-CON) 20 MEQ CR tablet Take 40 mEq by mouth 2 (Two) Times a Day.    Historical Provider, MD   predniSONE (DELTASONE) 20 MG tablet Take 1 tablet by mouth 2 (Two) Times a Day With Meals. Take 20 mg BID for 3 days, 20 mg daily x 3 days, 10 mg daily for 3 days then stop 1/2/18   MARYLOU Wilson   pregabalin (LYRICA) 150 MG capsule Take 1 capsule by mouth Every 12 (Twelve) Hours.  Patient taking differently: Take 300 mg by mouth Every 12  (Twelve) Hours. 11/28/17   Ernie Cornejo,    raNITIdine (ZANTAC) 150 MG tablet Take 150 mg by mouth 2 (Two) Times a Day As Needed for Indigestion.    Historical Provider, MD   SAXagliptin (ONGLYZA) 5 MG tablet Take 5 mg by mouth Daily.    Historical Provider, MD   spironolactone (ALDACTONE) 25 MG tablet Take 25 mg by mouth Daily.    Historical Provider, MD   tamsulosin (FLOMAX) 0.4 MG capsule 24 hr capsule Take 1 capsule by mouth Every Night.    Historical Provider, MD   Tiotropium Bromide Monohydrate 2.5 MCG/ACT aerosol solution Inhale 2 puffs Daily.    Historical Provider, MD   venlafaxine XR (EFFEXOR-XR) 75 MG 24 hr capsule Take 225 mg by mouth Daily.    Historical Provider, MD     Objective     Vital Signs: /77  Pulse (!) 132  Temp (!) 103.1 °F (39.5 °C)  Resp 20  SpO2 (!) 78%       Physical Exam   Constitutional:   Sickly   HENT:   Head: Normocephalic.   Eyes: Pupils are equal, round, and reactive to light.   Neck: Normal range of motion.   Cardiovascular: Normal rate, regular rhythm and normal heart sounds.    Pulmonary/Chest: He is in respiratory distress. He has wheezes. He has no rales. He exhibits no tenderness.   Abdominal: Soft. Bowel sounds are normal.   Musculoskeletal: Normal range of motion.   Neurological: He is alert.   Skin: Skin is warm.   Psychiatric: He has a normal mood and affect.             Results Reviewed:  Lab Results (last 24 hours)     Procedure Component Value Units Date/Time    POC Glucose Once [[id number]]  (Abnormal) Collected:  01/18/18 2214    Specimen:  Blood Updated:  01/18/18 2227     Glucose 294 (H) mg/dL       : [id number] Ernesto DavidMeter ID: [id number]       Blood Gas, Arterial [[id number]]  (Abnormal) Collected:  01/18/18 2232    Specimen:  Arterial Blood Updated:  01/18/18 2238     Site Right Radial     Jarad's Test Positive     pH, Arterial 7.319 (L) pH units      pCO2, Arterial 66.6 (H) mm Hg      pO2, Arterial 41.1 (C) mm Hg      HCO3, Arterial  34.2 (H) mmol/L      Base Excess, Arterial 5.4 (H) mmol/L      O2 Saturation, Arterial 75.5 (L) %      Temperature 37.0 C      Barometric Pressure for Blood Gas 759 mmHg      Modality NRB     FIO2 100 %      Flow Rate 15.0 lpm      Ventilator Mode NA     Notified Who DR. DORY MARROQUIN     Notified By [id number]     Notified Time 01/18/2018 22:38     Collected by [id number]        Imaging Results (last 24 hours)     Procedure Component Value Units Date/Time    XR Chest 1 View [[id number]] Updated:  01/18/18 2235        I have personally reviewed and interpreted the radiology studies and ECG obtained at time of admission.     Assessment / Plan     Assessment:     1.  Right-sided pneumonia  2.  Sepsis  3.  Abnormal ABG  4.  Hypoxia  5.  COPD exacerbation  6.  Diabetes mellitus type II  7.  Hypertension    Plan:      -Admit to the unit  -Continue cardiac monitoring  -Continuous pulse ox  -Status post 1 dose IV antibiotic, steroids and IV fluid bolus in the ER  -Continue IV antibiotic  -Continue IV steroid  -Continue breathing treatment  -Continue IV fluid  -Follow-up lactic acid  -Follow-up urinalysis  -Follow-up blood culture  -Follow-up respiratory culture  -Follow-up repeat ABG in 4 hours  -Continue BiPAP  -Readjust BiPAP setting as needed as per ABG  -Continue to monitor vitals  -Follow-up a.m. WBC  -Strict glycemic control  -Continue home insulin therapy  -Continue home medications  -Keep nothing by mouth for now  -GI prophylaxis  -DVT prophylaxis      Code Status: Conditional code     I discussed the patients findings and my recommendations with the patient's RN    Estimated length of stay 2-3 days    Hany Valdez MD   01/18/18   10:42 PM        '     Electronically signed by Hany Valdez MD at 1/18/2018 11:28 PM        Operative/Procedure Notes (last 24 hours) (Notes from 1/22/2018  3:34 PM through 1/23/2018  3:34 PM)     No notes of this type exist for this encounter.        Physician Progress Notes (last 24  hours) (Notes from 2018  3:34 PM through 2018  3:34 PM)     No notes of this type exist for this encounter.        Consult Notes (last 24 hours) (Notes from 2018  3:34 PM through 2018  3:34 PM)     No notes of this type exist for this encounter.           Physical Therapy Notes (last 24 hours) (Notes from 2018  3:34 PM through 2018  3:34 PM)      Frandy Denise, PT DPT at 2018  1:55 PM  Version 1 of 1         Problem: Patient Care Overview (Adult)  Goal: Plan of Care Review  Outcome: Ongoing (interventions implemented as appropriate)   18 1354   Coping/Psychosocial Response Interventions   Plan Of Care Reviewed With patient   Outcome Evaluation   Outcome Summary/Follow up Plan PT eval complete. pt independent with transfers, supervision to ambulate in halls due to impaired vision. pt states no mobility concerns for return home. No skilled PT needs identified at this time. Anticipate D/C home.             Electronically signed by Frandy Denise PT DPT at 2018  1:55 PM      Frandy Denise PT DPT at 2018  1:57 PM  Version 1 of 1         Acute Care - Physical Therapy Initial Eval/Discharge   Fay     Patient Name: Sai Adam  : 1960  MRN: [id number]  Today's Date: 2018   Onset of Illness/Injury or Date of Surgery Date: 18  Date of Referral to PT: 18  Referring Physician: Dr Jorge      Admit Date: 2018    Visit Dx:    ICD-10-CM ICD-9-CM   1. Pneumonia of right middle lobe due to infectious organism J18.1 486   2. COPD exacerbation J44.1 491.21   3. Respiratory distress R06.00 786.09   4. Hypoxia R09.02 799.02   5. Tachycardia R00.0 785.0   6. Fever, unspecified fever cause R50.9 780.60   7. Severe sepsis A41.9 038.9    R65.20 995.92   8. Impaired mobility and ADLs Z74.09 799.89     Patient Active Problem List   Diagnosis   • Malignant neoplasm of sigmoid colon   • Colon polyps   • Anticoagulated by anticoagulation  treatment   • COPD exacerbation   • Acute on chronic respiratory failure with hypoxia and hypercapnia   • Diabetes mellitus   • History of colon cancer   • Coronary artery disease   • Chronic respiratory acidosis   • Pneumonia of right middle lobe due to infectious organism     Past Medical History:   Diagnosis Date   • Arthritis    • CAD (coronary artery disease)    • Colon cancer 11/2016   • COPD (chronic obstructive pulmonary disease)    • Diabetes mellitus    • HLD (hyperlipidemia)    • Hypertension      Past Surgical History:   Procedure Laterality Date   • CARDIAC SURGERY     • CARPAL TUNNEL RELEASE     • COLON RESECTION WITH COLOSTOMY     • COLON SURGERY     • COLONOSCOPY  05/27/2016   • COLONOSCOPY N/A 10/12/2017    Procedure: COLONOSCOPY WITH ANESTHESIA;  Surgeon: Yessenia Gregg MD;  Location: [facility] ENDOSCOPY;  Service:    • CORONARY ANGIOPLASTY WITH STENT PLACEMENT     • HERNIA REPAIR     • ROTATOR CUFF REPAIR            PT ASSESSMENT (last 72 hours)      PT Evaluation       01/23/18 1322 01/23/18 1003    Rehab Evaluation    Document Type evaluation   see MAR  -PB therapy note (daily note)  -CJ    Subjective Information agree to therapy;complains of;pain  -PB agree to therapy;complains of;weakness;fatigue  -CJ    Patient Effort, Rehab Treatment  good  -CJ    Symptoms Noted During/After Treatment  shortness of breath  -CJ    General Information    Patient Profile Review yes  -PB     Onset of Illness/Injury or Date of Surgery Date 01/18/18  -PB     Referring Physician Dr Jorge  -PB     General Observations awake and alert in chair, on 4L O2/NC  -PB     Pertinent History Of Current Problem Recent stay at [facility] 12/29-1/2. Admitted to ER with cough and SOB for 2 weeks. CXR: R upper lobe pneumonia. Dx: COPD exacerbation, hypoxia, tachycardia, fever, severe sepsis, HTN.  -PB     Precautions/Limitations fall precautions;oxygen therapy device and L/min   legally blind; colostomy  -PB fall precautions;oxygen therapy  "device and L/min   Legally blind!  -CJ    Prior Level of Function independent:;all household mobility;community mobility;ADL's;cooking;cleaning;dependent:;driving;shopping  -PB     Equipment Currently Used at Home bipap/ cpap;nebulizer;cane, straight;oxygen;respiratory supplies;colostomy/ostomy supplies;walker, rolling;wheelchair;shower chair  -PB     Plans/Goals Discussed With patient;agreed upon  -PB     Risks Reviewed patient:;nausea/vomiting;LOB;dizziness;increased discomfort;change in vital signs  -PB     Benefits Reviewed patient:;improve function;increase independence;increase strength;increase balance  -PB     Barriers to Rehab visual deficit  -PB     Living Environment    Lives With alone  -PB     Living Arrangements house  -PB     Home Accessibility bed and bath on same level   walk in shower with built in seat  -PB     Clinical Impression    Date of Referral to PT 01/21/18  -PB     Patient/Family Goals Statement return home  -PB     Criteria for Skilled Therapeutic Interventions Met no;current level of function same as previous level of function  -PB     Vital Signs    Pre SpO2 (%) 94  -PB     O2 Delivery Pre Treatment supplemental O2  -PB     Intra SpO2 (%) 89  -PB     O2 Delivery Intra Treatment supplemental O2  -PB     Post SpO2 (%) 93  -PB     O2 Delivery Post Treatment supplemental O2  -PB     Pre Patient Position Sitting  -PB     Intra Patient Position Standing  -PB     Post Patient Position Sitting  -PB     Pain Assessment    Pain Assessment 0-10  -PB No/denies pain  -CJ    Pain Score 6  -PB     Pain Type Chronic pain  -PB     Pain Location Back  -PB     Pain Orientation Lower  -PB     Pain Intervention(s) Repositioned;Ambulation/increased activity  -PB     Response to Interventions tolerated  -PB     Vision Assessment/Intervention    Visual Impairment visual impairment, bilaterally  -PB     Visual Impairment Comment R eye completely, L eye \"5 feet\" per pt  -PB     Cognitive Assessment/Intervention "    Current Cognitive/Communication Assessment functional  -PB     Orientation Status oriented x 4  -PB     Follows Commands/Answers Questions 100% of the time;able to follow multi-step instructions  -PB     Personal Safety WNL/WFL  -PB     Personal Safety Interventions gait belt;nonskid shoes/slippers when out of bed;supervised activity;fall prevention program maintained  -PB     ROM (Range of Motion)    General ROM no range of motion deficits identified  -PB     MMT (Manual Muscle Testing)    General MMT Assessment Detail BLE 4+/5  -PB     Bed Mobility, Assessment/Treatment    Bed Mob, Supine to Sit, [place]  verbal cues required;conditional independence  -CJ    Bed Mob, Sit to Supine, [place]  verbal cues required;conditional independence  -CJ    Bed Mobility, Comment up in chair  -PB     Transfer Assessment/Treatment    Transfers, Sit-Stand [place] independent  -PB verbal cues required;conditional independence  -CJ    Transfers, Stand-Sit [place] independent  -PB verbal cues required;conditional independence  -CJ    Walk-In Shower Transfer, [place]  verbal cues required;supervision required  -CJ    Gait Assessment/Treatment    Gait, [place] Level supervision required  -PB     Gait, Distance (Feet) 600  -PB     Gait, Gait Pattern Analysis swing-through gait  -PB     Gait, Gait Deviations allie decreased  -PB     Motor Skills/Interventions    Additional Documentation Balance Skills Training (Group)  -PB     Balance Skills Training    Sitting-Level of Assistance Independent  -PB     Sitting-Balance Support Feet supported  -PB     Standing-Level of Assistance Close supervision  -PB     Static Standing Balance Support No upper extremity supported  -PB     Gait Balance-Level of Assistance Close supervision   for obstacles due to impaired vision  -PB     Gait Balance Support No upper extremity supported  -PB     Positioning and Restraints    Pre-Treatment Position sitting in  chair/recliner  -PB in bed  -    Post Treatment Position chair  -PB bed  -CJ    In Bed notified nsg;sitting;call light within reach;encouraged to call for assist  -PB call light within reach;encouraged to call for assist;with nsg;side rails up x1;fowlers  -      01/21/18 1000       Rehab Evaluation    Document Type evaluation   See MAR  -TR     Subjective Information agree to therapy;complains of;dyspnea;weakness;pain;dizziness;numbness   B hands and feet numb, neuropathy   -TR     Patient Effort, Rehab Treatment good  -TR     Symptoms Noted During/After Treatment shortness of breath  -TR     General Information    Patient Profile Review yes  -TR     Onset of Illness/Injury or Date of Surgery Date 01/18/18  -TR     Referring Physician Dr. Jorge  -TR     General Observations Seated in chair. O2 NC 6L, colostomy, alert, oriented.   -TR     Pertinent History Of Current Problem Recent stay at [facility] 12/29-1/2. Admitted to ER with cough and SOB for 2 weeks. CXR: R upper lobe pneumonia. Dx: COPD exacerbation, hypoxia, tachycardia, fever, severe sepsis, HTN.   -TR     Precautions/Limitations fall precautions;oxygen therapy device and L/min   colostomy  -TR     Prior Level of Function independent:;all household mobility;ADL's;cooking;cleaning;dependent:;driving;shopping  -TR     Equipment Currently Used at Home bipap/ cpap;nebulizer;cane, straight;oxygen;respiratory supplies;colostomy/ostomy supplies;walker, rolling;wheelchair;shower chair  -TR     Plans/Goals Discussed With patient;agreed upon  -TR     Risks Reviewed patient:;LOB;dizziness;increased discomfort;change in vital signs  -TR     Benefits Reviewed patient:;improve function;increase independence;increase strength;increase balance;decrease pain;increase knowledge  -TR     Barriers to Rehab medically complex;visual deficit  -TR     Living Environment    Lives With alone  -TR     Living Arrangements house  -TR     Home Accessibility bed and bath on same level    walk in shower  -TR     Living Environment Comment No stairs, built in shower seat.   -TR     Vital Signs    Pretreatment Heart Rate (beats/min) 62  -TR     Intratreatment Heart Rate (beats/min) 74  -TR     Posttreatment Heart Rate (beats/min) 61  -TR     Pre SpO2 (%) 89  -TR     O2 Delivery Pre Treatment supplemental O2   6L NC  -TR     Intra SpO2 (%) 86  -TR     O2 Delivery Intra Treatment supplemental O2  -TR     Post SpO2 (%) 90  -TR     O2 Delivery Post Treatment supplemental O2  -TR     Pre Patient Position Sitting  -TR     Intra Patient Position Standing  -TR     Post Patient Position Sitting  -TR     Pain Assessment    Pain Assessment 0-10  -TR     Pain Score 6  -TR     Pain Type Chronic pain  -TR     Pain Location Back  -TR     Pain Orientation Lower  -TR     Pain Radiating Towards B hips and legs  -TR     Pain Intervention(s) Repositioned;Ambulation/increased activity  -TR     Response to Interventions Tolerated  -TR     Vision Assessment/Intervention    Visual Impairment visual impairment, bilaterally  -TR     Visual Impairment Comment R eye blind. L eye impaired.   -TR     Visual Perception/Processing Comment Reports L eye can only see up to 3 ft away.   -TR     Cognitive Assessment/Intervention    Current Cognitive/Communication Assessment functional  -TR     Orientation Status oriented x 4  -TR     Follows Commands/Answers Questions able to follow multi-step instructions;100% of the time  -TR     Personal Safety mild impairment  -TR     Personal Safety Interventions gait belt;fall prevention program maintained;nonskid shoes/slippers when out of bed;supervised activity  -TR     ROM (Range of Motion)    General ROM Detail B UE AROM WFL  -TR     MMT (Manual Muscle Testing)    General MMT Assessment Detail B UE 4/5  -TR     Bed Mobility, Assessment/Treatment    Bed Mobility, Comment Up in chair.  -TR     Transfer Assessment/Treatment    Transfers, Sit-Stand [place] contact guard assist  -TR      Transfers, Stand-Sit [place] contact guard assist  -TR     Transfer, Impairments impaired balance;strength decreased  -TR     Motor Skills/Interventions    Additional Documentation Balance Skills Training (Group)  -TR     Balance Skills Training    Sitting-Level of Assistance Distant supervision  -TR     Sitting-Balance Support Feet supported  -TR     Standing-Level of Assistance Contact guard  -TR     Static Standing Balance Support No upper extremity supported  -TR     Gait Balance-Level of Assistance Contact guard  -TR     Gait Balance Support No upper extremity supported  -TR     Sensory Assessment/Intervention    Sensory Impairment --   Light touch WFL.   -TR     Positioning and Restraints    Pre-Treatment Position sitting in chair/recliner  -TR     Post Treatment Position chair  -TR     In Chair sitting;call light within reach;encouraged to call for assist;notified nsg  -TR       User Key  (r) = Recorded By, (t) = Taken By, (c) = Cosigned By    Initials Name Provider Type     Devin Vizcarra HOOVER/L Occupational Therapy Assistant    PB Frandy Denise, PT DPT Physical Therapist    TR Bonnie Gifford OTR/L Occupational Therapist          Physical Therapy Education     Title: PT OT SLP Therapies (Active)     Topic: Physical Therapy (Resolved)     Point: Mobility training (Resolved)    Learning Progress Summary    Learner Readiness Method Response Comment Documented by Status   Patient Acceptance E VU benefits of continued activity and safety concerns  01/23/18 1356 Done                      User Key     Initials Effective Dates Name Provider Type Discipline     08/02/16 -  Frandy Denise, PT DPT Physical Therapist PT                PT Recommendation and Plan  Anticipated Discharge Disposition: home  PT Frequency: evaluation only  Plan of Care Review  Plan Of Care Reviewed With: patient  Outcome Summary/Follow up Plan: PT eval complete. pt independent with transfers, supervision to ambulate in  halls due to impaired vision. pt states no mobility concerns for return home. No skilled PT needs identified at this time. Anticipate D/C home.               Outcome Measures       01/23/18 1322 01/23/18 1003 01/21/18 1000    How much help from another person do you currently need...    Turning from your back to your side while in flat bed without using bedrails? 4  -PB      Moving from lying on back to sitting on the side of a flat bed without bedrails? 4  -PB      Moving to and from a bed to a chair (including a wheelchair)? 4  -PB      Standing up from a chair using your arms (e.g., wheelchair, bedside chair)? 4  -PB      Climbing 3-5 steps with a railing? 3  -PB      To walk in hospital room? 3  -PB      AM-PAC 6 Clicks Score 22  -PB      How much help from another is currently needed...    Putting on and taking off regular lower body clothing?  3  - 2  -TR    Bathing (including washing, rinsing, and drying)  3  - 2  -TR    Toileting (which includes using toilet bed pan or urinal)  3  - 3  -TR    Putting on and taking off regular upper body clothing  3  - 3  -TR    Taking care of personal grooming (such as brushing teeth)  3  - 3  -TR    Eating meals  4  - 4  -TR    Score  19  - 17  -TR    Functional Assessment    Outcome Measure Options AM-PAC 6 Clicks Basic Mobility (PT)  -PB AM-PAC 6 Clicks Daily Activity (OT)  -CJ AM-PAC 6 Clicks Daily Activity (OT)  -TR      User Key  (r) = Recorded By, (t) = Taken By, (c) = Cosigned By    Initials Name Provider Type     Devin Vizcarra HOOVER/L Occupational Therapy Assistant    PB Frandy Denise, PT DPT Physical Therapist    TR Bonnie Gifford, VICKEYR/L Occupational Therapist           Time Calculation:         PT Charges       01/23/18 1357          Time Calculation    Start Time 1322   additional time [id number]  -PB      Stop Time 1354  -PB      Time Calculation (min) 32 min  -PB      PT Received On 01/23/18  -PB        User Key  (r) = Recorded By, (t)  = Taken By, (c) = Cosigned By    Initials Name Provider Type    PB Frandy Denise, PT DPT Physical Therapist          Therapy Charges for Today     Code Description Service Date Service Provider Modifiers Qty    04969550496 HC PT MOBILITY CURRENT 1/23/2018 Frandy Denise, PT DPT GP, CJ 1    02223084665 HC PT MOBILITY PROJECTED 1/23/2018 Frandy Denise, PT DPT GP, CJ 1    42834481866 HC PT MOBILITY DISCHARGE 1/23/2018 Frandy Denise, PT DPT GP, CJ 1    [id number] HC PT EVAL LOW COMPLEXITY 3 1/23/2018 Frandy Denise, PT DPT GP, KX 1          PT G-Codes  Outcome Measure Options: AM-PAC 6 Clicks Basic Mobility (PT)  Score: 22  Functional Limitation: Mobility: Walking and moving around  Mobility: Walking and Moving Around Current Status (): At least 20 percent but less than 40 percent impaired, limited or restricted  Mobility: Walking and Moving Around Goal Status (): At least 20 percent but less than 40 percent impaired, limited or restricted  Mobility: Walking and Moving Around Discharge Status (): At least 20 percent but less than 40 percent impaired, limited or restricted    PT Discharge Summary  Anticipated Discharge Disposition: home  Reason for Discharge: At baseline function  Discharge Destination: Home    Frandy Denise PT DPT  1/23/2018          Electronically signed by Frandy Denise PT DPT at 1/23/2018  1:57 PM           Occupational Therapy Notes (last 24 hours) (Notes from 1/22/2018  3:34 PM through 1/23/2018  3:34 PM)      LUIS Robledo at 1/23/2018 11:47 AM  Version 1 of 1         Problem: Patient Care Overview (Adult)  Goal: Plan of Care Review  Outcome: Ongoing (interventions implemented as appropriate)   01/23/18 1147   Coping/Psychosocial Response Interventions   Plan Of Care Reviewed With patient   Patient Care Overview   Progress improving   Outcome Evaluation   Outcome Summary/Follow up Plan Pt. had no issues with performing transfers and adl  bathing/dressing with vc's and cga-min. assist sec. being legally blind!            Electronically signed by LUIS Robledo at 2018 11:48 AM        Speech Language Pathology Notes (last 24 hours) (Notes from 2018  3:34 PM through 2018  3:34 PM)     No notes of this type exist for this encounter.           Respiratory Therapy Notes (last 24 hours) (Notes from 2018  3:34 PM through 2018  3:34 PM)      Lilliana Corona, KAELA at 2018  3:58 PM  Version 1 of 1         Pt given handout on Pneumonia.  We discussed the ways to avoid getting pneumonia again.  We talked about nutrition and pulmonary hygiene.  Pt is compliant with respiratory equipment at home.  Instructed pt to call respiratory if he had any questions.     Electronically signed by Lilliana Corona RRT at 2018  4:01 PM         [address] [address]  Phone:  [phone]  Fax:          Patient:     Sai Adam MRN:  [id number]   [address] [place] [id number] :  1960  SSN:    Phone: [phone] Sex:  M      INS[employer]ANCE [employer] # SUBSCRIBER ID   Primary: [facility] ADMINSTRATION [id number]   [id number]   Admitting Diagnosis: Pneumonia of right middle lobe due to infectious organism [J18.1]  Order Date:  2018               Inpatient Consult to Case Management        (Order ID: [id number])     Diagnosis:         Priority:  Routine Expected Date:   Expiration Date:        Interval:   Count:    Reason for Consult? BIPAP 20/10 AT 50%     Specimen Type:   Specimen Source:   Specimen Taken Date:   Specimen Taken Time:                         Verbal Order Mode: Verbal with readback   Authorizing Provider: Dimitri Guevara MD  Authorizing Provider's NPI: [id number]     Order Entered By: Jess Osorio RN 2018  3:01 PM     Electronically signed by:           Referral to Home Health [REF34] (Order [id number])   Outpatient  Referral   Date: 1/23/2018 Department: [address] Ordering/Authorizing: Dimitri Guevara MD   Order History   Outpatient   Date/Time Action Taken User Additional Information   01/23/18 1540 Sign Dimitri Guevara MD    Order Details   Frequency Duration Priority Order Class   None None Routine Internal Referral   Start Date/Time   Start Date   01/23/18   Order Questions   Question Answer Comment   Face to Face Visit Date 1/23/2018    Follow-up Provider for Plan of Care? I treated the patient in an acute care facility and will not continue treatment after discharge.    Follow-up Provider STEPH JACOBO    Reason/Clinical Findings Weakness, medication management, smoking, COPD    Describe mobility limitations that make leaving home difficult Weakness/abnormal gait    Nursing/Therapeutic Services Requested Skilled Nursing     Physical Therapy    Skilled nursing orders: Medication education     Pain management     O2 instruction     COPD management     Cardiopulmonary assessments    PT orders: Therapeutic exercise     Gait Training     Transfer training     Strengthening     Home safety assessment    Weight Bearing Status As Tolerated    Frequency: 1 Week 1    Source Order Set / Preference List   Order Set    IP GEN EXPRESS DISCHARGE [[id number]]   Clinical Indications      ICD-10-CM ICD-9-CM   Pneumonia of right middle lobe due to infectious organism  - Primary     J18.1 486   Respiratory distress     R06.00 786.09   Chronic respiratory acidosis     E87.2 276.2   Reprint Order Requisition   AMB REFERRAL TO HOME HEALTH (Order #[id number]) on 1/23/18   Encounter-Level Cardiology Documents:   There are no encounter-level cardiology documents.   Encounter   View Encounter   Order Provider Info       Office phone Pager E-mail   Ordering User Dimitri Guevara [phone] -- --   Authorizing Provider Dimitri Guevara [phone] -- --   Attending Provider Dimitri Guevara [phone] -- --   Linked Charges    No charges linked to this procedure   Order Transmittal Tracking   AMB REFERRAL TO HOME HEALTH (Order #[id number]) on 1/23/18   Authorized by:  Dimitri Guevara MD  (NPI: [id number])

## 2018-01-23 NOTE — PROGRESS NOTES
Continued Stay Note  [facility]     Patient Name: Sai Adam  MRN: [id number]  Today's Date: 1/23/2018    Admit Date: 1/18/2018          Discharge Plan       01/23/18 1547    Case Management/Social Work Plan    [facility]    Additional Comments Faxed and left message for Rajwinder at [facility] of need for HH and need for bipap with settings.  Waiting on DC summary to be faxed to include meds when available, fax [phone].  Informed RN in case this SW is gone when complete.              Discharge Codes     None        Expected Discharge Date and Time     Expected Discharge Date Expected Discharge Time    Jan 23, 2018             SURYA Lemons

## 2018-01-23 NOTE — PLAN OF CARE
Problem: Infection, Risk/Actual (Adult)  Goal: Infection Prevention/Resolution  Outcome: Ongoing (interventions implemented as appropriate)      Problem: Respiratory Insufficiency (Adult)  Goal: Effective Ventilation  Outcome: Ongoing (interventions implemented as appropriate)

## 2018-01-23 NOTE — DISCHARGE SUMMARY
[facility] ER Medicine Services  DISCHARGE SUMMARY       Date of Admission: 1/18/2018  Date of Discharge:  1/23/2018  Primary Care Physician: Jarod Tillman MD    Presenting Problem/History of Present Illness:  Pneumonia of right middle lobe due to infectious organism [J18.1]     Final Discharge Diagnoses:  Hospital Problem List     Pneumonia of right middle lobe due to infectious organism          Pertinent Test Results:   IMPRESSION:Chest x-ray  1. Right upper lobe pneumonia.    Chief Complaint on Day of Discharge: none    History of Present Illness on Day of Discharge:   57-year-old male with past medical history of arthritis, CAD, colon cancer, COPD, diabetes mellitus type II, hyperlipidemia and hypertension was brought into the ER for respiratory distress and progressively worsening shortness of breath.  According to patient's family, patient has been very confused today and was noted to be grasping for breath.  Patient was recently discharged after being treated for COPD exacerbation.  Today in the ER patient was found to be febrile.  During initial evaluation in the ER patient was noted to be hypoxic with 79% oxygen saturation so an ABG was performed.  His ABG was noted to be pH 7.319, PCO2 66.6, PO2 41.1, HCO3 34.2, base excess 5.4.  Patient was started on BiPAP in the ER.  His chest x-ray was noted for right-sided pneumonia.  Vitals were indicated above possible underlying sepsis with pneumonia.  Patient will be admitted in the unit for further evaluation and treatment.    Hospital Course:  The patient is a 57 y.o. male who [facility] with pneumonia, altered mental status, sepsis, acute respiratory failure.  Patient was then sent to ICU.  Initial antibiotic was vancomycin, Levaquin, Maxipime.    Duo nebs, IV steroids and BiPAP.  Patient breathing began to improve with BiPAP.  Lungs improved very slowly.  It takes a couple days, patient's symptoms  "began to improve.  Initially, patient was on 6 L of oxygen and BiPAP at night.  Patient was then transferred to the medical floor.  Wean down steroids.  Patient has been ambulating with assistance.  Patient still remained shortness of breath.  Patient's currently on's 4 L of oxygen.  Vital signs stable, afebrile.  Diabetes, fairly well controlled.  Patient go home with home health.  Continue CPAP at home.  Follow his primary care doctor 1 week.  Patient also need to stop smoking.  Patient understood.  Benign prostate hypertrophy/urinary tension-Flomax and Proscar.     Condition on Discharge:  stable    Physical Exam on Discharge:  /74 (BP Location: Left arm, Patient Position: Lying)  Pulse 71  Temp 98.6 °F (37 °C) (Tympanic)   Resp 18  Ht 170.2 cm (67\")  Wt 106 kg (234 lb) Comment: different measurement tool than last  SpO2 91%  BMI 36.65 kg/m2  Physical Exam   Constitutional: He is oriented to person, place, and time. He appears well-developed and well-nourished.   HENT:   Head: Normocephalic and atraumatic.   Eyes: Conjunctivae and EOM are normal. Pupils are equal, round, and reactive to light.   Neck: Neck supple. No JVD present. No thyromegaly present.   Cardiovascular: Normal rate, regular rhythm, normal heart sounds and intact distal pulses.  Exam reveals no gallop and no friction rub.    No murmur heard.  Pulmonary/Chest: Effort normal and breath sounds normal. No respiratory distress. He has no wheezes. He has no rales. He exhibits no tenderness.   Patient's on oxygen 4 L   Abdominal: Soft. Bowel sounds are normal. He exhibits no distension. There is no tenderness. There is no rebound and no guarding.   Musculoskeletal: Normal range of motion. He exhibits no edema, tenderness or deformity.   Lymphadenopathy:     He has no cervical adenopathy.   Neurological: He is alert and oriented to person, place, and time. He displays normal reflexes. No cranial nerve deficit. He exhibits normal muscle tone. "   Skin: Skin is warm and dry. No rash noted.   Psychiatric: He has a normal mood and affect. His behavior is normal. Judgment and thought content normal.         Discharge Disposition:  Home or Self Care    Discharge Medications:   KiaSai LACIE   Home Medication Instructions EDER:[id number]    Printed on:01/23/18 1971   Medication Information                      arformoterol (BROVANA) 15 MCG/2ML nebulizer solution  Take 2 mL by nebulization 2 (Two) Times a Day. 2 boxes             atenolol (TENORMIN) 25 MG tablet  Take 1 tablet by mouth Every 12 (Twelve) Hours.             atorvastatin (LIPITOR) 40 MG tablet  Take 1 tablet by mouth Every Night.             atropine 1 % ophthalmic solution  Administer 1 drop to the right eye 2 (Two) Times a Day.             benzonatate (TESSALON) 100 MG capsule  Take 2 capsules by mouth 3 (Three) Times a Day As Needed for Cough.             budesonide (PULMICORT) 0.25 MG/2ML nebulizer solution  Take 2 mL by nebulization 2 (Two) Times a Day.             Cholecalciferol (VITAMIN D) 2000 units tablet  Take 4,000 Units by mouth Daily.             clopidogrel (PLAVIX) 75 MG tablet  Take 75 mg by mouth Daily.             docusate sodium (COLACE) 100 MG capsule  Take 100 mg by mouth 2 (Two) Times a Day As Needed for Constipation.             dorzolamide (TRUSOPT) 2 % ophthalmic solution  Administer 1 drop into the left eye 2 (Two) Times a Day.             finasteride (PROSCAR) 5 MG tablet  Take 1 tablet by mouth Daily.             guaiFENesin (MUCINEX) 600 MG 12 hr tablet  Take 2 tablets by mouth Every 12 (Twelve) Hours.             insulin aspart (novoLOG) 100 UNIT/ML injection  Inject 40 Units under the skin 3 (Three) Times a Day Before Meals.             insulin glargine (LANTUS) 100 UNIT/ML injection  Inject 160 Units under the skin Daily.             ipratropium-albuterol (DUONEB) 0.5-2.5 mg/mL nebulizer  Take 3 mL by nebulization Every 4 (Four) Hours As Needed for Wheezing or  Shortness of Air.             isosorbide mononitrate (IMDUR) 60 MG 24 hr tablet  Take 90 mg by mouth Daily.             levoFLOXacin (LEVAQUIN) 500 MG tablet  Take 1 tablet by mouth Daily.             levothyroxine (SYNTHROID, LEVOTHROID) 50 MCG tablet  Take 50 mcg by mouth Daily.             losartan (COZAAR) 100 MG tablet  Take 100 mg by mouth Daily.             metFORMIN (GLUCOPHAGE) 500 MG tablet  Take 1,000 mg by mouth 2 (Two) Times a Day With Meals.             MethylPREDNISolone (MEDROL, SHELL,) 4 MG tablet  Take as directed on package instructions.             nicotine (NICODERM CQ) 14 MG/24HR patch  Place 1 patch on the skin Daily.             nitroglycerin (NITROSTAT) 0.4 MG SL tablet  Place 0.4 mg under the tongue Every 5 (Five) Minutes As Needed for Chest Pain.             potassium chloride (K-DUR,KLOR-CON) 20 MEQ CR tablet  Take 40 mEq by mouth 2 (Two) Times a Day.             pregabalin (LYRICA) 150 MG capsule  Take 1 capsule by mouth Every 12 (Twelve) Hours.             raNITIdine (ZANTAC) 150 MG tablet  Take 150 mg by mouth 2 (Two) Times a Day As Needed for Indigestion.             SAXagliptin (ONGLYZA) 5 MG tablet  Take 5 mg by mouth Daily.             spironolactone (ALDACTONE) 25 MG tablet  Take 25 mg by mouth Daily.             tamsulosin (FLOMAX) 0.4 MG capsule 24 hr capsule  Take 1 capsule by mouth Every Night.             venlafaxine XR (EFFEXOR-XR) 75 MG 24 hr capsule  Take 225 mg by mouth Daily.                 Discharge Diet:   Diet Instructions     Advance Diet As Tolerated                     Activity at Discharge:   Activity Instructions     Activity as Tolerated                     Discharge Care Plan/Instructions:   Home health at home.  Follow with PCP 1 week.  Continue CPAP at home and duo nebs at home.    Follow-up Appointments:   Future Appointments  Date Time Provider Department Center   2/7/2018 10:00 AM  [facility] PULM LAB ROOM 1  [facility]   Follow-up PCP 1 week.    Dimitri MENG  MD Ismael  01/23/18  3:41 PM    Time: Greater than 30 minutes

## 2018-01-23 NOTE — PLAN OF CARE
Problem: Patient Care Overview (Adult)  Goal: Plan of Care Review  Outcome: Ongoing (interventions implemented as appropriate)   01/23/18 4580   Coping/Psychosocial Response Interventions   Plan Of Care Reviewed With patient   Patient Care Overview   Progress improving   Outcome Evaluation   Outcome Summary/Follow up Plan Follow-up with pt. Reported his appetite was good and he has been drinking his Glucerna supplements. P.O. intake is 100% at this time. No other nutrition needs noted. Cont to follow.

## 2018-01-23 NOTE — PLAN OF CARE
Problem: Patient Care Overview (Adult)  Goal: Plan of Care Review  Outcome: Ongoing (interventions implemented as appropriate)   01/23/18 1147   Coping/Psychosocial Response Interventions   Plan Of Care Reviewed With patient   Patient Care Overview   Progress improving   Outcome Evaluation   Outcome Summary/Follow up Plan Pt. had no issues with performing transfers and adl bathing/dressing with vc's and cga-min. assist sec. being legally blind!

## 2018-01-24 ENCOUNTER — APPOINTMENT (OUTPATIENT)
Dept: PULMONOLOGY | Facility: HOSPITAL | Age: 58
End: 2018-01-24

## 2018-01-24 NOTE — PLAN OF CARE
Problem: Inpatient Occupational Therapy  Goal: Transfer Training Goal 1 LTG- OT  Outcome: Outcome(s) achieved Date Met: 01/24/18 01/21/18 1041 01/24/18 0625   Transfer Training OT LTG   Transfer Training OT LTG, Date Established 01/21/18 --    Transfer Training OT LTG, Time to Achieve by discharge --    Transfer Training OT LTG, Activity Type bed to chair /chair to bed;walk-in shower;shower chair;sit to stand/stand to sit;toilet --    Transfer Training OT LTG, [place] Level conditional independence --    Transfer Training OT LTG, Date Goal Reviewed --  01/24/18   Transfer Training OT LTG, Outcome --  goal met   Transfer Training OT LTG, Reason Goal Not Met --  discharged from facility     Goal: Patient Education Goal LTG- OT  Outcome: Outcome(s) achieved Date Met: 01/24/18 01/21/18 1041 01/24/18 0625   Patient Education OT LTG   Patient Education OT LTG, Date Established 01/21/18 --    Patient Education OT LTG, Time to Achieve by discharge --    Patient Education OT LTG, Education Type HEP;home safety;adaptive equipment mgmt;energy conservation;work simplification;adaptive breathing --    Patient Education OT LTG, Education Understanding independent;demonstrates adequately --    Patient Education OT LTG, Date Goal Reviewed --  01/24/18   Patient Education OT LTG Outcome --  goal met   Patient Education OT LTG, Reason Goal Not Met --  discharged from facility     Goal: ADL Goal LTG- OT  Outcome: Unable to achieve outcome(s) by discharge Date Met: 01/24/18 01/21/18 1041 01/24/18 0625   ADL OT LTG   ADL OT LTG, Date Established 01/21/18 --    ADL OT LTG, Time to Achieve by discharge --    ADL OT LTG, Activity Type ADL skills --    ADL OT LTG, [place] Level contact guard --    ADL OT LTG, Additional Goal LB bathing, LB dressing and toileting using AE PRN.  --    ADL OT LTG, Date Goal Reviewed --  01/24/18   ADL OT LTG, Outcome --  goal not met   ADL OT LTG, Reason Goal Not Met --  discharged from  facility     Goal: Activity Tolerance Goal LTG- OT  Outcome: Outcome(s) achieved Date Met: 01/24/18 01/21/18 1041 01/24/18 0625   Activity Tolerance OT LTG   Activity Tolerance Goal OT LTG, Date Established 01/21/18 --    Activity Tolerance Goal OT LTG, Time to Achieve by discharge --    Activity Tolerance Goal OT LTG, Activity Level 10 min activity --    Activity Tolerance Goal OT LTG, Additional Goal with 02 sat >88%.  --    Activity Tolerance Goal OT LTG, Date Goal Reviewed --  01/24/18   Activity Tolerance Goal OT LTG, Outcome --  goal met   Activity Tolerance Goal OT LTG, Reason Goal Not Met --  discharged from facility

## 2018-01-24 NOTE — PAYOR COMM NOTE
"DC HOME 1-23-18  UR PHONE     Sai Aguilera (57 y.o. Male)     Date of Birth Social Security Number Address Home Phone MRN    1960  [address] [place] [id number] [phone] [id number]    Bahai Marital Status          Yarsani        Admission Date Admission Type Admitting Provider Attending Provider Department, Room/Bed    1/18/18 Emergency Dimitri Guevara MD  [facility] 4C, 491/1    Discharge Date Discharge Disposition Discharge Destination        1/23/2018 Home or Self Care Home            Attending Provider: (none)    Allergies:  Tetanus Toxoids, Lamotrigine, Lisinopril, Methadone, Penicillins, Tramadol    Isolation:  None   Infection:  None   Code Status:  Prior    Ht:  170.2 cm (67\")   Wt:  106 kg (234 lb)    Admission Cmt:  None   Principal Problem:  None                Active Insurance as of 1/18/2018     Primary Coverage     Payor Plan Insurance Group Employer/Plan Group    [id number]      Payor Plan Address Payor Plan Phone Number Effective From Effective To    [employer] [phone] 10/25/2017     [address] [place] [address]       Subscriber Name Subscriber Birth Date Member ID       SAI AGUILERA 1960 [id number]                 Emergency Contacts      (Rel.) Home Phone Work Phone Mobile Phone    Una Aguilera (Spouse) [phone] -- --    JagjitAntonio (Son) [phone] -- --    BrendenKeith (Son) [phone] -- --               Discharge Summary      Dimitri Guevara MD at 1/23/2018  3:40 PM              [facility] Medicine Services  DISCHARGE SUMMARY       Date of Admission: 1/18/2018  Date of Discharge:  1/23/2018  Primary Care Physician: Jarod Tillman MD    Presenting Problem/History of Present Illness:  Pneumonia of right middle lobe due to infectious organism [J18.1]     Final Discharge Diagnoses:  Hospital Problem List     " Pneumonia of right middle lobe due to infectious organism          Pertinent Test Results:   IMPRESSION:Chest x-ray  1. Right upper lobe pneumonia.    Chief Complaint on Day of Discharge: none    History of Present Illness on Day of Discharge:   57-year-old male with past medical history of arthritis, CAD, colon cancer, COPD, diabetes mellitus type II, hyperlipidemia and hypertension was brought into the ER for respiratory distress and progressively worsening shortness of breath.  According to patient's family, patient has been very confused today and was noted to be grasping for breath.  Patient was recently discharged after being treated for COPD exacerbation.  Today in the ER patient was found to be febrile.  During initial evaluation in the ER patient was noted to be hypoxic with 79% oxygen saturation so an ABG was performed.  His ABG was noted to be pH 7.319, PCO2 66.6, PO2 41.1, HCO3 34.2, base excess 5.4.  Patient was started on BiPAP in the ER.  His chest x-ray was noted for right-sided pneumonia.  Vitals were indicated above possible underlying sepsis with pneumonia.  Patient will be admitted in the unit for further evaluation and treatment.    Hospital Course:  The patient is a 57 y.o. male who presented to [facility] with pneumonia, altered mental status, sepsis, acute respiratory failure.  Patient was then sent to ICU.  Initial antibiotic was vancomycin, Levaquin, Maxipime.    Duo nebs, IV steroids and BiPAP.  Patient breathing began to improve with BiPAP.  Lungs improved very slowly.  It takes a couple days, patient's symptoms began to improve.  Initially, patient was on 6 L of oxygen and BiPAP at night.  Patient was then transferred to the medical floor.  Wean down steroids.  Patient has been ambulating with assistance.  Patient still remained shortness of breath.  Patient's currently on's 4 L of oxygen.  Vital signs stable, afebrile.  Diabetes, fairly well controlled.  Patient go home with  "home health.  Continue CPAP at home.  Follow his primary care doctor 1 week.  Patient also need to stop smoking.  Patient understood.  Benign prostate hypertrophy/urinary tension-Flomax and Proscar.     Condition on Discharge:  stable    Physical Exam on Discharge:  /74 (BP Location: Left arm, Patient Position: Lying)  Pulse 71  Temp 98.6 °F (37 °C) (Tympanic)   Resp 18  Ht 170.2 cm (67\")  Wt 106 kg (234 lb) Comment: different measurement tool than last  SpO2 91%  BMI 36.65 kg/m2  Physical Exam   Constitutional: He is oriented to person, place, and time. He appears well-developed and well-nourished.   HENT:   Head: Normocephalic and atraumatic.   Eyes: Conjunctivae and EOM are normal. Pupils are equal, round, and reactive to light.   Neck: Neck supple. No JVD present. No thyromegaly present.   Cardiovascular: Normal rate, regular rhythm, normal heart sounds and intact distal pulses.  Exam reveals no gallop and no friction rub.    No murmur heard.  Pulmonary/Chest: Effort normal and breath sounds normal. No respiratory distress. He has no wheezes. He has no rales. He exhibits no tenderness.   Patient's on oxygen 4 L   Abdominal: Soft. Bowel sounds are normal. He exhibits no distension. There is no tenderness. There is no rebound and no guarding.   Musculoskeletal: Normal range of motion. He exhibits no edema, tenderness or deformity.   Lymphadenopathy:     He has no cervical adenopathy.   Neurological: He is alert and oriented to person, place, and time. He displays normal reflexes. No cranial nerve deficit. He exhibits normal muscle tone.   Skin: Skin is warm and dry. No rash noted.   Psychiatric: He has a normal mood and affect. His behavior is normal. Judgment and thought content normal.         Discharge Disposition:  Home or Self Care    Discharge Medications:   Sai Adam   Home Medication Instructions EDER:[id number]    Printed on:01/23/18 8329   Medication Information                    "   arformoterol (BROVANA) 15 MCG/2ML nebulizer solution  Take 2 mL by nebulization 2 (Two) Times a Day. 2 boxes             atenolol (TENORMIN) 25 MG tablet  Take 1 tablet by mouth Every 12 (Twelve) Hours.             atorvastatin (LIPITOR) 40 MG tablet  Take 1 tablet by mouth Every Night.             atropine 1 % ophthalmic solution  Administer 1 drop to the right eye 2 (Two) Times a Day.             benzonatate (TESSALON) 100 MG capsule  Take 2 capsules by mouth 3 (Three) Times a Day As Needed for Cough.             budesonide (PULMICORT) 0.25 MG/2ML nebulizer solution  Take 2 mL by nebulization 2 (Two) Times a Day.             Cholecalciferol (VITAMIN D) 2000 units tablet  Take 4,000 Units by mouth Daily.             clopidogrel (PLAVIX) 75 MG tablet  Take 75 mg by mouth Daily.             docusate sodium (COLACE) 100 MG capsule  Take 100 mg by mouth 2 (Two) Times a Day As Needed for Constipation.             dorzolamide (TRUSOPT) 2 % ophthalmic solution  Administer 1 drop into the left eye 2 (Two) Times a Day.             finasteride (PROSCAR) 5 MG tablet  Take 1 tablet by mouth Daily.             guaiFENesin (MUCINEX) 600 MG 12 hr tablet  Take 2 tablets by mouth Every 12 (Twelve) Hours.             insulin aspart (novoLOG) 100 UNIT/ML injection  Inject 40 Units under the skin 3 (Three) Times a Day Before Meals.             insulin glargine (LANTUS) 100 UNIT/ML injection  Inject 160 Units under the skin Daily.             ipratropium-albuterol (DUONEB) 0.5-2.5 mg/mL nebulizer  Take 3 mL by nebulization Every 4 (Four) Hours As Needed for Wheezing or Shortness of Air.             isosorbide mononitrate (IMDUR) 60 MG 24 hr tablet  Take 90 mg by mouth Daily.             levoFLOXacin (LEVAQUIN) 500 MG tablet  Take 1 tablet by mouth Daily.             levothyroxine (SYNTHROID, LEVOTHROID) 50 MCG tablet  Take 50 mcg by mouth Daily.             losartan (COZAAR) 100 MG tablet  Take 100 mg by mouth Daily.              metFORMIN (GLUCOPHAGE) 500 MG tablet  Take 1,000 mg by mouth 2 (Two) Times a Day With Meals.             MethylPREDNISolone (MEDROL, SHELL,) 4 MG tablet  Take as directed on package instructions.             nicotine (NICODERM CQ) 14 MG/24HR patch  Place 1 patch on the skin Daily.             nitroglycerin (NITROSTAT) 0.4 MG SL tablet  Place 0.4 mg under the tongue Every 5 (Five) Minutes As Needed for Chest Pain.             potassium chloride (K-DUR,KLOR-CON) 20 MEQ CR tablet  Take 40 mEq by mouth 2 (Two) Times a Day.             pregabalin (LYRICA) 150 MG capsule  Take 1 capsule by mouth Every 12 (Twelve) Hours.             raNITIdine (ZANTAC) 150 MG tablet  Take 150 mg by mouth 2 (Two) Times a Day As Needed for Indigestion.             SAXagliptin (ONGLYZA) 5 MG tablet  Take 5 mg by mouth Daily.             spironolactone (ALDACTONE) 25 MG tablet  Take 25 mg by mouth Daily.             tamsulosin (FLOMAX) 0.4 MG capsule 24 hr capsule  Take 1 capsule by mouth Every Night.             venlafaxine XR (EFFEXOR-XR) 75 MG 24 hr capsule  Take 225 mg by mouth Daily.                 Discharge Diet:   Diet Instructions     Advance Diet As Tolerated                     Activity at Discharge:   Activity Instructions     Activity as Tolerated                     Discharge Care Plan/Instructions:   Home health at home.  Follow with PCP 1 week.  Continue CPAP at home and duo nebs at home.    Follow-up Appointments:   Future Appointments  Date Time Provider Department Center   2/7/2018 10:00 AM  [facility] PULM LAB ROOM 1  [facility]   Follow-up PCP 1 week.    Dimitri Guevara MD  01/23/18  3:41 PM    Time: Greater than 30 minutes         Electronically signed by Dimitri Guevara MD at 1/23/2018  3:50 PM

## 2018-01-24 NOTE — THERAPY DISCHARGE NOTE
Acute Care - Occupational Therapy Discharge Summary  [facility]     Patient Name: Sai Adam  : 1960  MRN: [id number]    Today's Date: 2018  Onset of Illness/Injury or Date of Surgery Date: 18    Date of Referral to OT: 18  Referring Physician: Dr Jorge      Admit Date: 2018        OT Recommendation and Plan    Visit Dx:    ICD-10-CM ICD-9-CM   1. Pneumonia of right middle lobe due to infectious organism J18.1 486   2. COPD exacerbation J44.1 491.21   3. Respiratory distress R06.00 786.09   4. Hypoxia R09.02 799.02   5. Tachycardia R00.0 785.0   6. Fever, unspecified fever cause R50.9 780.60   7. Severe sepsis A41.9 038.9    R65.20 995.92   8. Impaired mobility and ADLs Z74.09 799.89   9. Chronic respiratory acidosis E87.2 276.2                     OT Goals       18 0625 18 1041       Transfer Training OT LTG    Transfer Training OT LTG, Date Established  18  -     Transfer Training OT LTG, Time to Achieve  by discharge  -TR     Transfer Training OT LTG, Activity Type  bed to chair /chair to bed;walk-in shower;shower chair;sit to stand/stand to sit;toilet  -TR     Transfer Training OT LTG, [place] Level  conditional independence  -TR     Transfer Training OT LTG, Date Goal Reviewed 18  -      Transfer Training OT LTG, Outcome goal met  -      Transfer Training OT LTG, Reason Goal Not Met discharged from facility  -      Patient Education OT LTG    Patient Education OT LTG, Date Established  18  -     Patient Education OT LTG, Time to Achieve  by discharge  -TR     Patient Education OT LTG, Education Type  HEP;home safety;adaptive equipment mgmt;energy conservation;work simplification;adaptive breathing  -TR     Patient Education OT LTG, Education Understanding  independent;demonstrates adequately  -TR     Patient Education OT LTG, Date Goal Reviewed 18  -      Patient Education OT LTG Outcome goal met  -      Patient  Education OT LTG, Reason Goal Not Met discharged from facility  -      ADL OT LTG    ADL OT LTG, Date Established  01/21/18  -TR     ADL OT LTG, Time to Achieve  by discharge  -TR     ADL OT LTG, Activity Type  ADL skills  -TR     ADL OT LTG, [place] Level  contact guard  -TR     ADL OT LTG, Additional Goal  LB bathing, LB dressing and toileting using AE PRN.   -TR     ADL OT LTG, Date Goal Reviewed 01/24/18  -      ADL OT LTG, Outcome goal not met  -      ADL OT LTG, Reason Goal Not Met discharged from facility  -      Activity Tolerance OT LTG    Activity Tolerance Goal OT LTG, Date Established  01/21/18  -TR     Activity Tolerance Goal OT LTG, Time to Achieve  by discharge  -TR     Activity Tolerance Goal OT LTG, Activity Level  10 min activity  -TR     Activity Tolerance Goal OT LTG, Additional Goal  with 02 sat >88%.   -TR     Activity Tolerance Goal OT LTG, Date Goal Reviewed 01/24/18  -      Activity Tolerance Goal OT LTG, Outcome goal met  -      Activity Tolerance Goal OT LTG, Reason Goal Not Met discharged from facility  -        User Key  (r) = Recorded By, (t) = Taken By, (c) = Cosigned By    Initials Name Provider Type     SNEHA Robledo/TIM Occupational Therapy Assistant    SAIGE Salomon/L Occupational Therapist                Outcome Measures       01/23/18 1322 01/23/18 1003 01/21/18 1000    How much help from another person do you currently need...    Turning from your back to your side while in flat bed without using bedrails? 4  -PB      Moving from lying on back to sitting on the side of a flat bed without bedrails? 4  -PB      Moving to and from a bed to a chair (including a wheelchair)? 4  -PB      Standing up from a chair using your arms (e.g., wheelchair, bedside chair)? 4  -PB      Climbing 3-5 steps with a railing? 3  -PB      To walk in hospital room? 3  -PB      AM-PAC 6 Clicks Score 22  -PB      How much help from another is currently needed...     Putting on and taking off regular lower body clothing?  3  - 2  -TR    Bathing (including washing, rinsing, and drying)  3  - 2  -TR    Toileting (which includes using toilet bed pan or urinal)  3  - 3  -TR    Putting on and taking off regular upper body clothing  3  - 3  -TR    Taking care of personal grooming (such as brushing teeth)  3  - 3  -TR    Eating meals  4  - 4  -TR    Score  19  -CJ 17  -TR    Functional Assessment    Outcome Measure Options AM-PAC 6 Clicks Basic Mobility (PT)  -PB AM-PAC 6 Clicks Daily Activity (OT)  -CJ AM-PAC 6 Clicks Daily Activity (OT)  -TR      User Key  (r) = Recorded By, (t) = Taken By, (c) = Cosigned By    Initials Name Provider Type     SNEHA Robledo/TIM Occupational Therapy Assistant    PB Frandy Denise, PT DPT Physical Therapist    TR Bonnie Gifford OTR/L Occupational Therapist          Therapy Charges for Today     Code Description Service Date Service Provider Modifiers Qty    [id number] HC OT SELF CARE/MGMT/TRAIN EA 15 MIN 1/23/2018 SNEHA Robledo/L GO, KX 3          OT Discharge Summary  Reason for Discharge: Discharge from facility  Outcomes Achieved: Refer to plan of care for updates on goals achieved  Discharge Destination: Home with home health      SNEHA Ahumada/TIM  1/24/2018

## 2018-02-07 ENCOUNTER — HOSPITAL ENCOUNTER (OUTPATIENT)
Dept: PULMONOLOGY | Facility: HOSPITAL | Age: 58
Discharge: HOME | End: 2018-02-07
Admitting: FAMILY MEDICINE

## 2018-02-07 DIAGNOSIS — J44.9 CHRONIC OBSTRUCTIVE PULMONARY DISEASE, UNSPECIFIED COPD TYPE (HCC): ICD-10-CM

## 2018-02-07 PROCEDURE — 94060 EVALUATION OF WHEEZING: CPT

## 2018-02-07 PROCEDURE — 94726 PLETHYSMOGRAPHY LUNG VOLUMES: CPT

## 2018-02-07 PROCEDURE — 94729 DIFFUSING CAPACITY: CPT

## 2018-02-07 RX ORDER — ALBUTEROL SULFATE 2.5 MG/3ML
2.5 SOLUTION RESPIRATORY (INHALATION) ONCE
Status: COMPLETED | OUTPATIENT
Start: 2018-02-07 | End: 2018-02-07

## 2018-02-07 RX ADMIN — ALBUTEROL SULFATE 2.5 MG: 2.5 SOLUTION RESPIRATORY (INHALATION) at 10:45

## 2018-02-26 ENCOUNTER — TRANSCRIBE ORDERS (OUTPATIENT)
Dept: ADMINISTRATIVE | Facility: HOSPITAL | Age: 58
End: 2018-02-26

## 2018-02-26 DIAGNOSIS — C18.7 MALIGNANT NEOPLASM OF SIGMOID COLON (HCC): Primary | ICD-10-CM

## 2018-03-05 ENCOUNTER — HOSPITAL ENCOUNTER (OUTPATIENT)
Dept: CT IMAGING | Facility: HOSPITAL | Age: 58
End: 2018-03-05

## 2018-03-05 ENCOUNTER — HOSPITAL ENCOUNTER (OUTPATIENT)
Dept: CT IMAGING | Facility: HOSPITAL | Age: 58
Discharge: HOME | End: 2018-03-05

## 2018-03-05 DIAGNOSIS — C18.7 MALIGNANT NEOPLASM OF SIGMOID COLON (HCC): ICD-10-CM

## 2018-03-08 ENCOUNTER — HOSPITAL ENCOUNTER (OUTPATIENT)
Dept: GENERAL RADIOLOGY | Facility: HOSPITAL | Age: 58
Discharge: HOME | End: 2018-03-08

## 2018-03-08 ENCOUNTER — TRANSCRIBE ORDERS (OUTPATIENT)
Dept: ADMINISTRATIVE | Facility: HOSPITAL | Age: 58
End: 2018-03-08

## 2018-03-08 ENCOUNTER — HOSPITAL ENCOUNTER (OUTPATIENT)
Dept: CT IMAGING | Facility: HOSPITAL | Age: 58
Discharge: HOME | End: 2018-03-08

## 2018-03-08 ENCOUNTER — HOSPITAL ENCOUNTER (OUTPATIENT)
Dept: CT IMAGING | Facility: HOSPITAL | Age: 58
Discharge: HOME | End: 2018-03-08
Admitting: PHYSICIAN ASSISTANT

## 2018-03-08 DIAGNOSIS — J18.9 PNEUMONIA DUE TO INFECTIOUS ORGANISM, UNSPECIFIED LATERALITY, UNSPECIFIED PART OF LUNG: ICD-10-CM

## 2018-03-08 DIAGNOSIS — J18.9 PNEUMONIA DUE TO INFECTIOUS ORGANISM, UNSPECIFIED LATERALITY, UNSPECIFIED PART OF LUNG: Primary | ICD-10-CM

## 2018-03-08 PROCEDURE — 78815 PET IMAGE W/CT SKULL-THIGH: CPT

## 2018-03-08 PROCEDURE — 71046 X-RAY EXAM CHEST 2 VIEWS: CPT

## 2018-03-08 PROCEDURE — 0 FLUDEOXYGLUCOSE F18 SOLUTION: Performed by: PHYSICIAN ASSISTANT

## 2018-03-08 PROCEDURE — A9552 F18 FDG: HCPCS | Performed by: PHYSICIAN ASSISTANT

## 2018-03-08 RX ADMIN — FLUDEOXYGLUCOSE F18 1 DOSE: 300 INJECTION INTRAVENOUS at 14:00

## 2018-10-16 ENCOUNTER — TRANSCRIBE ORDERS (OUTPATIENT)
Dept: ADMINISTRATIVE | Facility: HOSPITAL | Age: 58
End: 2018-10-16

## 2018-10-16 DIAGNOSIS — G47.33 OSA (OBSTRUCTIVE SLEEP APNEA): Primary | ICD-10-CM

## 2018-12-17 ENCOUNTER — HOSPITAL ENCOUNTER (OUTPATIENT)
Dept: SLEEP MEDICINE | Facility: HOSPITAL | Age: 58
Discharge: HOME | End: 2018-12-17
Admitting: INTERNAL MEDICINE

## 2018-12-17 VITALS
OXYGEN SATURATION: 92 % | WEIGHT: 210 LBS | DIASTOLIC BLOOD PRESSURE: 79 MMHG | RESPIRATION RATE: 16 BRPM | SYSTOLIC BLOOD PRESSURE: 132 MMHG | BODY MASS INDEX: 32.96 KG/M2 | HEART RATE: 89 BPM | HEIGHT: 67 IN

## 2018-12-17 DIAGNOSIS — G47.33 OSA (OBSTRUCTIVE SLEEP APNEA): ICD-10-CM

## 2018-12-17 PROCEDURE — 95811 POLYSOM 6/>YRS CPAP 4/> PARM: CPT

## 2018-12-17 PROCEDURE — 95811 POLYSOM 6/>YRS CPAP 4/> PARM: CPT | Performed by: PSYCHIATRY & NEUROLOGY

## 2019-01-18 ENCOUNTER — HOSPITAL ENCOUNTER (EMERGENCY)
Facility: HOSPITAL | Age: 59
Discharge: HOME | End: 2019-01-18
Attending: EMERGENCY MEDICINE | Admitting: EMERGENCY MEDICINE

## 2019-01-18 ENCOUNTER — APPOINTMENT (OUTPATIENT)
Dept: GENERAL RADIOLOGY | Facility: HOSPITAL | Age: 59
End: 2019-01-18

## 2019-01-18 VITALS
DIASTOLIC BLOOD PRESSURE: 68 MMHG | RESPIRATION RATE: 25 BRPM | TEMPERATURE: 98.2 F | SYSTOLIC BLOOD PRESSURE: 138 MMHG | BODY MASS INDEX: 32.8 KG/M2 | HEART RATE: 100 BPM | HEIGHT: 67 IN | WEIGHT: 209 LBS | OXYGEN SATURATION: 89 %

## 2019-01-18 DIAGNOSIS — R06.00 DYSPNEA, UNSPECIFIED TYPE: Primary | ICD-10-CM

## 2019-01-18 PROCEDURE — 99283 EMERGENCY DEPT VISIT LOW MDM: CPT

## 2019-01-18 PROCEDURE — 71045 X-RAY EXAM CHEST 1 VIEW: CPT

## 2019-01-18 PROCEDURE — 93010 ELECTROCARDIOGRAM REPORT: CPT | Performed by: INTERNAL MEDICINE

## 2019-01-18 PROCEDURE — 93005 ELECTROCARDIOGRAM TRACING: CPT | Performed by: EMERGENCY MEDICINE

## 2019-01-18 NOTE — ED PROVIDER NOTES
Subjective   [facility].  He says he went there for routine checkup and when he got there they checked his pulse ox and it was in the low 80s but that was because his oxygen was on a pulse therapy.  When he was switched to a continuous oxygen source of his pulse ox sudheer to the 80-89% which she says is normal for him testicles.  Them and they called an ambulance and required him to come here.  He says he was told that they heard something in his lungs with the paramedics did not.  He does have a little cough that is no worse than usual.        History provided by:  Patient   used: No    Shortness of Breath   Severity:  Mild  Onset quality:  Sudden  Duration:  1 hour  Timing:  Constant  Progression:  Unchanged  Chronicity:  Chronic  Context: not activity, not animal exposure, not emotional upset, not fumes, not known allergens, not occupational exposure, not pollens, not smoke exposure, not strong odors, not URI and not weather changes    Relieved by:  Nothing  Worsened by:  Nothing  Ineffective treatments:  None tried  Associated symptoms: cough    Associated symptoms: no abdominal pain, no chest pain, no claudication, no diaphoresis, no ear pain, no fever, no headaches, no hemoptysis, no neck pain, no PND, no rash, no sore throat, no sputum production, no syncope, no swollen glands, no vomiting and no wheezing    Risk factors: no recent alcohol use, no family hx of DVT, no hx of cancer, no hx of PE/DVT, no obesity, no oral contraceptive use, no prolonged immobilization, no recent surgery and no tobacco use        Review of Systems   Constitutional: Negative.  Negative for diaphoresis and fever.   HENT: Negative.  Negative for ear pain and sore throat.    Respiratory: Positive for cough and shortness of breath. Negative for hemoptysis, sputum production and wheezing.    Cardiovascular: Negative.  Negative for chest pain, claudication, syncope and PND.    Gastrointestinal: Negative.  Negative for abdominal pain and vomiting.   Genitourinary: Negative.    Musculoskeletal: Negative.  Negative for neck pain.   Skin: Negative.  Negative for rash.   Neurological: Negative.  Negative for headaches.   Hematological: Negative.    Psychiatric/Behavioral: Negative.    All other systems reviewed and are negative.      Past Medical History:   Diagnosis Date   • Arthritis    • CAD (coronary artery disease)    • Colon cancer (CMS/HCC) 11/2016   • COPD (chronic obstructive pulmonary disease) (CMS/HCC)    • Diabetes mellitus (CMS/HCC)    • HLD (hyperlipidemia)    • Hypertension        Allergies   Allergen Reactions   • Tetanus Toxoids Shortness Of Breath and Swelling   • Lamotrigine      Unknown     • Lisinopril Hives   • Methadone Swelling   • Penicillins Rash   • Tramadol Rash       Past Surgical History:   Procedure Laterality Date   • CARDIAC SURGERY     • CARPAL TUNNEL RELEASE     • COLON RESECTION WITH COLOSTOMY     • COLON SURGERY     • COLONOSCOPY  05/27/2016   • COLONOSCOPY N/A 10/12/2017    Procedure: COLONOSCOPY WITH ANESTHESIA;  Surgeon: Yessenia Gregg MD;  Location: [facility] ENDOSCOPY;  Service:    • CORONARY ANGIOPLASTY WITH STENT PLACEMENT     • HERNIA REPAIR     • ROTATOR CUFF REPAIR         Family History   Problem Relation Age of Onset   • Stroke Mother    • Hypertension Mother    • Arthritis Father    • Heart disease Father    • Cancer Brother    • Diabetes Brother    • Hepatitis Brother    • Hypertension Brother        Social History     Socioeconomic History   • Marital status:      Spouse name: Not on file   • Number of children: Not on file   • Years of education: Not on file   • Highest education level: Not on file   Tobacco Use   • Smoking status: Current Every Day Smoker     Packs/day: 1.00     Years: 40.00     Pack years: 40.00   • Smokeless tobacco: Never Used   Substance and Sexual Activity   • Alcohol use: No   • Drug use: No   • Sexual activity:  Defer       Prior to Admission medications    Medication Sig Start Date End Date Taking? Authorizing Provider   arformoterol (BROVANA) 15 MCG/2ML nebulizer solution Take 2 mL by nebulization 2 (Two) Times a Day. 2 boxes 1/23/18   Dimitri Guevara MD   atenolol (TENORMIN) 25 MG tablet Take 1 tablet by mouth Every 12 (Twelve) Hours.  Patient taking differently: Take 100 mg by mouth Every 12 (Twelve) Hours. 11/28/17   Ernie Cornejo DO   atorvastatin (LIPITOR) 40 MG tablet Take 1 tablet by mouth Every Night. 1/23/18   Dimitri Guevara MD   atropine 1 % ophthalmic solution Administer 1 drop to the right eye 2 (Two) Times a Day.    Erin Ambrocio MD   benzonatate (TESSALON) 100 MG capsule Take 2 capsules by mouth 3 (Three) Times a Day As Needed for Cough. 1/23/18   Dimitri Guevara MD   budesonide (PULMICORT) 0.25 MG/2ML nebulizer solution Take 2 mL by nebulization 2 (Two) Times a Day. 1/23/18   Dimitri Guevara MD   Cholecalciferol (VITAMIN D) 2000 units tablet Take 4,000 Units by mouth Daily.    ProviderErin MD   clopidogrel (PLAVIX) 75 MG tablet Take 75 mg by mouth Daily.    Erin Ambrocio MD   docusate sodium (COLACE) 100 MG capsule Take 100 mg by mouth 2 (Two) Times a Day As Needed for Constipation.    Erin Ambrocio MD   dorzolamide (TRUSOPT) 2 % ophthalmic solution Administer 1 drop into the left eye 2 (Two) Times a Day.    Erin Ambrocio MD   finasteride (PROSCAR) 5 MG tablet Take 1 tablet by mouth Daily. 1/24/18   Dimitri Guevara MD   guaiFENesin (MUCINEX) 600 MG 12 hr tablet Take 2 tablets by mouth Every 12 (Twelve) Hours. 1/2/18   Leonel Roth APRN   insulin aspart (novoLOG) 100 UNIT/ML injection Inject 40 Units under the skin 3 (Three) Times a Day Before Meals.    Erin Ambrocio MD   insulin glargine (LANTUS) 100 UNIT/ML injection Inject 160 Units under the skin Daily.    Erin Ambrocio MD   ipratropium-albuterol (DUONEB) 0.5-2.5 mg/mL nebulizer Take  3 mL by nebulization Every 4 (Four) Hours As Needed for Wheezing or Shortness of Air. 1/23/18   Dimitri Guevara MD   isosorbide mononitrate (IMDUR) 60 MG 24 hr tablet Take 90 mg by mouth Daily.    Erin Ambrocio MD   levoFLOXacin (LEVAQUIN) 500 MG tablet Take 1 tablet by mouth Daily. 1/23/18   Dimitri Guevara MD   levothyroxine (SYNTHROID, LEVOTHROID) 50 MCG tablet Take 50 mcg by mouth Daily.    Erin Ambrocio MD   losartan (COZAAR) 100 MG tablet Take 100 mg by mouth Daily.    Erin Ambrocio MD   metFORMIN (GLUCOPHAGE) 500 MG tablet Take 1,000 mg by mouth 2 (Two) Times a Day With Meals.    Erin Ambrocio MD   MethylPREDNISolone (MEDROL, SHELL,) 4 MG tablet Take as directed on package instructions. 1/23/18   Dimitri Guevara MD   nicotine (NICODERM CQ) 14 MG/24HR patch Place 1 patch on the skin Daily. 1/24/18   Dimitri Guevara MD   nitroglycerin (NITROSTAT) 0.4 MG SL tablet Place 0.4 mg under the tongue Every 5 (Five) Minutes As Needed for Chest Pain.    Erin Ambrocio MD   potassium chloride (K-DUR,KLOR-CON) 20 MEQ CR tablet Take 40 mEq by mouth 2 (Two) Times a Day.    Erin Ambrocio MD   pregabalin (LYRICA) 150 MG capsule Take 1 capsule by mouth Every 12 (Twelve) Hours.  Patient taking differently: Take 300 mg by mouth Every 12 (Twelve) Hours. 11/28/17   Ernie Cornejo DO   raNITIdine (ZANTAC) 150 MG tablet Take 150 mg by mouth 2 (Two) Times a Day As Needed for Indigestion.    Erin Ambrocio MD   SAXagliptin (ONGLYZA) 5 MG tablet Take 5 mg by mouth Daily.    Erin Ambrocio MD   spironolactone (ALDACTONE) 25 MG tablet Take 25 mg by mouth Daily.    Erin Ambrocio MD   tamsulosin (FLOMAX) 0.4 MG capsule 24 hr capsule Take 1 capsule by mouth Every Night.    Erin Ambrocio MD   venlafaxine XR (EFFEXOR-XR) 75 MG 24 hr capsule Take 225 mg by mouth Daily.    Provider, Historical, MD       Medications - No data to display    Vitals:    01/18/19 8380    BP: 138/68   Pulse: 100   Resp:    Temp:    SpO2: (!) 89%         Objective   Physical Exam   Constitutional: He is oriented to person, place, and time. He appears well-developed and well-nourished.   HENT:   Head: Normocephalic and atraumatic.   Cardiovascular: Normal rate and regular rhythm.   Pulmonary/Chest: Effort normal. He has decreased breath sounds in the right middle field and the left middle field.   Abdominal: Soft. Bowel sounds are normal.   Musculoskeletal: Normal range of motion.   Neurological: He is alert and oriented to person, place, and time.   Skin: Skin is warm.   Psychiatric: He has a normal mood and affect. His behavior is normal.   Nursing note and vitals reviewed.      Procedures         Lab Results (last 24 hours)     ** No results found for the last 24 hours. **          XR Chest 1 View   Final Result   No active disease is seen.           This report was finalized on 01/18/2019 14:33 by Dr. Ervin Lyles MD.          ED Course  ED Course as of Jan 18 1504   Fri Jan 18, 2019   1503 Told the patient and his x-ray was read as okay and his EKG did not show any acute changes.  He says the 88 pulse ox is what he normally always is at home and this is not unusual for him and he does not feel any more short of breath usual.  Mr. Lynch is stable for discharge.  [TR]      ED Course User Index  [TR] Nicola Carrero Jr., MD          MDM  Number of Diagnoses or Management Options  Dyspnea, unspecified type: new and requires workup     Amount and/or Complexity of Data Reviewed  Clinical lab tests: ordered and reviewed  Tests in the radiology section of CPT®: ordered and reviewed    Risk of Complications, Morbidity, and/or Mortality  Presenting problems: moderate  Diagnostic procedures: moderate  Management options: moderate    Patient Progress  Patient progress: stable      Final diagnoses:   Dyspnea, unspecified type          Nicola Carrero Jr., MD  01/18/19 1504       Nicola Carrero Jr.,  MD  01/18/19 1500

## 2020-01-13 ENCOUNTER — APPOINTMENT (OUTPATIENT)
Dept: GENERAL RADIOLOGY | Facility: HOSPITAL | Age: 60
End: 2020-01-13

## 2020-01-13 ENCOUNTER — HOSPITAL ENCOUNTER (INPATIENT)
Facility: HOSPITAL | Age: 60
LOS: 4 days | Discharge: HOME | End: 2020-01-17
Attending: EMERGENCY MEDICINE | Admitting: INTERNAL MEDICINE

## 2020-01-13 DIAGNOSIS — Z74.09 IMPAIRED MOBILITY: ICD-10-CM

## 2020-01-13 DIAGNOSIS — J44.1 COPD EXACERBATION (HCC): ICD-10-CM

## 2020-01-13 DIAGNOSIS — J96.02 ACUTE RESPIRATORY FAILURE WITH HYPERCAPNIA (HCC): Primary | ICD-10-CM

## 2020-01-13 PROBLEM — G92.8 TOXIC METABOLIC ENCEPHALOPATHY: Status: ACTIVE | Noted: 2020-01-13

## 2020-01-13 LAB
A-A DO2: ABNORMAL
ALBUMIN SERPL-MCNC: 4.1 G/DL (ref 3.5–5.2)
ALBUMIN/GLOB SERPL: 1.4 G/DL
ALP SERPL-CCNC: 115 U/L (ref 39–117)
ALT SERPL W P-5'-P-CCNC: 11 U/L (ref 1–41)
ANION GAP SERPL CALCULATED.3IONS-SCNC: 6 MMOL/L (ref 5–15)
ARTERIAL PATENCY WRIST A: ABNORMAL
ARTERIAL PATENCY WRIST A: POSITIVE
AST SERPL-CCNC: 24 U/L (ref 1–40)
ATMOSPHERIC PRESS: 760 MMHG
ATMOSPHERIC PRESS: 761 MMHG
BASE EXCESS BLDA CALC-SCNC: 10 MMOL/L (ref 0–2)
BASE EXCESS BLDA CALC-SCNC: 8.6 MMOL/L (ref 0–2)
BASOPHILS # BLD AUTO: 0.06 10*3/MM3 (ref 0–0.2)
BASOPHILS NFR BLD AUTO: 0.5 % (ref 0–1.5)
BDY SITE: ABNORMAL
BDY SITE: ABNORMAL
BILIRUB SERPL-MCNC: 0.5 MG/DL (ref 0.2–1.2)
BODY TEMPERATURE: 37 C
BODY TEMPERATURE: 37 C
BUN BLD-MCNC: 5 MG/DL (ref 8–23)
BUN/CREAT SERPL: 8.2 (ref 7–25)
CALCIUM SPEC-SCNC: 8.8 MG/DL (ref 8.6–10.5)
CHLORIDE SERPL-SCNC: 94 MMOL/L (ref 98–107)
CO2 SERPL-SCNC: 37 MMOL/L (ref 22–29)
COHGB MFR BLD: 3.7 % (ref 0–5)
CREAT BLD-MCNC: 0.61 MG/DL (ref 0.76–1.27)
D-LACTATE SERPL-SCNC: 1.4 MMOL/L (ref 0.5–2)
DEPRECATED RDW RBC AUTO: 44 FL (ref 37–54)
EOSINOPHIL # BLD AUTO: 0 10*3/MM3 (ref 0–0.4)
EOSINOPHIL NFR BLD AUTO: 0 % (ref 0.3–6.2)
EPAP: 4
ERYTHROCYTE [DISTWIDTH] IN BLOOD BY AUTOMATED COUNT: 12.8 % (ref 12.3–15.4)
GAS FLOW AIRWAY: 5 LPM
GFR SERPL CREATININE-BSD FRML MDRD: 134 ML/MIN/1.73
GLOBULIN UR ELPH-MCNC: 3 GM/DL
GLUCOSE BLD-MCNC: 300 MG/DL (ref 65–99)
GLUCOSE BLDC GLUCOMTR-MCNC: 221 MG/DL (ref 70–130)
GLUCOSE BLDC GLUCOMTR-MCNC: 238 MG/DL (ref 70–130)
GLUCOSE BLDC GLUCOMTR-MCNC: 274 MG/DL (ref 70–130)
HCO3 BLDA-SCNC: 40.2 MMOL/L (ref 20–26)
HCO3 BLDA-SCNC: 40.7 MMOL/L (ref 20–26)
HCT VFR BLD AUTO: 45.8 % (ref 37.5–51)
HCT VFR BLD CALC: 42.9 % (ref 38–51)
HGB BLD-MCNC: 14.1 G/DL (ref 13–17.7)
HGB BLDA-MCNC: 14 G/DL (ref 14–18)
HOLD SPECIMEN: NORMAL
HOROWITZ INDEX BLD+IHG-RTO: 45 %
IMM GRANULOCYTES # BLD AUTO: 0.19 10*3/MM3 (ref 0–0.05)
IMM GRANULOCYTES NFR BLD AUTO: 1.5 % (ref 0–0.5)
IPAP: 19
LYMPHOCYTES # BLD AUTO: 1.47 10*3/MM3 (ref 0.7–3.1)
LYMPHOCYTES NFR BLD AUTO: 11.6 % (ref 19.6–45.3)
Lab: ABNORMAL
MCH RBC QN AUTO: 28.6 PG (ref 26.6–33)
MCHC RBC AUTO-ENTMCNC: 30.8 G/DL (ref 31.5–35.7)
MCV RBC AUTO: 92.9 FL (ref 79–97)
METHGB BLD QL: 0.2 % (ref 0–3)
MODALITY: ABNORMAL
MODALITY: ABNORMAL
MONOCYTES # BLD AUTO: 0.88 10*3/MM3 (ref 0.1–0.9)
MONOCYTES NFR BLD AUTO: 6.9 % (ref 5–12)
NEUTROPHILS # BLD AUTO: 10.07 10*3/MM3 (ref 1.7–7)
NEUTROPHILS NFR BLD AUTO: 79.5 % (ref 42.7–76)
NOTE: ABNORMAL
NOTIFIED BY: ABNORMAL
NOTIFIED BY: ABNORMAL
NOTIFIED WHO: ABNORMAL
NOTIFIED WHO: ABNORMAL
NRBC BLD AUTO-RTO: 0 /100 WBC (ref 0–0.2)
OXYHGB MFR BLDV: 86.1 % (ref 94–99)
PCO2 BLDA: 87 MM HG (ref 35–45)
PCO2 BLDA: 95.8 MM HG (ref 35–45)
PCO2 TEMP ADJ BLD: ABNORMAL MM[HG]
PH BLDA: 7.23 PH UNITS (ref 7.35–7.45)
PH BLDA: 7.28 PH UNITS (ref 7.35–7.45)
PH, TEMP CORRECTED: ABNORMAL
PLATELET # BLD AUTO: 150 10*3/MM3 (ref 140–450)
PMV BLD AUTO: 9.1 FL (ref 6–12)
PO2 BLDA: 59.5 MM HG (ref 83–108)
PO2 BLDA: 61.4 MM HG (ref 83–108)
PO2 TEMP ADJ BLD: ABNORMAL MM[HG]
POTASSIUM BLD-SCNC: 4.3 MMOL/L (ref 3.5–5.2)
POTASSIUM BLDA-SCNC: 4.2 MMOL/L (ref 3.5–5.2)
PROT SERPL-MCNC: 7.1 G/DL (ref 6–8.5)
RBC # BLD AUTO: 4.93 10*6/MM3 (ref 4.14–5.8)
SAO2 % BLDCOA: 89.6 % (ref 94–99)
SAO2 % BLDCOA: 91.7 % (ref 94–99)
SODIUM BLD-SCNC: 137 MMOL/L (ref 136–145)
SODIUM BLDA-SCNC: 141 MMOL/L (ref 136–145)
VENTILATOR MODE: ABNORMAL
VENTILATOR MODE: ABNORMAL
WBC NRBC COR # BLD: 12.67 10*3/MM3 (ref 3.4–10.8)
WHOLE BLOOD HOLD SPECIMEN: NORMAL
WHOLE BLOOD HOLD SPECIMEN: NORMAL

## 2020-01-13 PROCEDURE — 25010000002 ENOXAPARIN PER 10 MG: Performed by: INTERNAL MEDICINE

## 2020-01-13 PROCEDURE — 63710000001 INSULIN DETEMIR PER 5 UNITS: Performed by: INTERNAL MEDICINE

## 2020-01-13 PROCEDURE — 82962 GLUCOSE BLOOD TEST: CPT

## 2020-01-13 PROCEDURE — 94799 UNLISTED PULMONARY SVC/PX: CPT

## 2020-01-13 PROCEDURE — 94660 CPAP INITIATION&MGMT: CPT

## 2020-01-13 PROCEDURE — 36600 WITHDRAWAL OF ARTERIAL BLOOD: CPT

## 2020-01-13 PROCEDURE — 82805 BLOOD GASES W/O2 SATURATION: CPT

## 2020-01-13 PROCEDURE — 82803 BLOOD GASES ANY COMBINATION: CPT

## 2020-01-13 PROCEDURE — 25010000002 AZITHROMYCIN PER 500 MG: Performed by: INTERNAL MEDICINE

## 2020-01-13 PROCEDURE — 94640 AIRWAY INHALATION TREATMENT: CPT

## 2020-01-13 PROCEDURE — 99284 EMERGENCY DEPT VISIT MOD MDM: CPT

## 2020-01-13 PROCEDURE — 85025 COMPLETE CBC W/AUTO DIFF WBC: CPT | Performed by: EMERGENCY MEDICINE

## 2020-01-13 PROCEDURE — 71045 X-RAY EXAM CHEST 1 VIEW: CPT

## 2020-01-13 PROCEDURE — 36415 COLL VENOUS BLD VENIPUNCTURE: CPT

## 2020-01-13 PROCEDURE — 93005 ELECTROCARDIOGRAM TRACING: CPT | Performed by: EMERGENCY MEDICINE

## 2020-01-13 PROCEDURE — 63710000001 INSULIN REGULAR HUMAN PER 5 UNITS: Performed by: INTERNAL MEDICINE

## 2020-01-13 PROCEDURE — 93010 ELECTROCARDIOGRAM REPORT: CPT | Performed by: INTERNAL MEDICINE

## 2020-01-13 PROCEDURE — 99285 EMERGENCY DEPT VISIT HI MDM: CPT

## 2020-01-13 PROCEDURE — 25010000002 METHYLPREDNISOLONE PER 125 MG: Performed by: EMERGENCY MEDICINE

## 2020-01-13 PROCEDURE — 80053 COMPREHEN METABOLIC PANEL: CPT | Performed by: EMERGENCY MEDICINE

## 2020-01-13 PROCEDURE — 25010000002 METHYLPREDNISOLONE PER 40 MG: Performed by: INTERNAL MEDICINE

## 2020-01-13 PROCEDURE — 87040 BLOOD CULTURE FOR BACTERIA: CPT | Performed by: EMERGENCY MEDICINE

## 2020-01-13 PROCEDURE — 83050 HGB METHEMOGLOBIN QUAN: CPT

## 2020-01-13 PROCEDURE — 83605 ASSAY OF LACTIC ACID: CPT | Performed by: EMERGENCY MEDICINE

## 2020-01-13 PROCEDURE — 82375 ASSAY CARBOXYHB QUANT: CPT

## 2020-01-13 RX ORDER — ALBUTEROL SULFATE 2.5 MG/3ML
2.5 SOLUTION RESPIRATORY (INHALATION) EVERY 4 HOURS PRN
Status: DISCONTINUED | OUTPATIENT
Start: 2020-01-13 | End: 2020-01-17 | Stop reason: HOSPADM

## 2020-01-13 RX ORDER — IPRATROPIUM BROMIDE AND ALBUTEROL SULFATE 2.5; .5 MG/3ML; MG/3ML
3 SOLUTION RESPIRATORY (INHALATION) ONCE
Status: DISCONTINUED | OUTPATIENT
Start: 2020-01-13 | End: 2020-01-15

## 2020-01-13 RX ORDER — SODIUM CHLORIDE 0.9 % (FLUSH) 0.9 %
10 SYRINGE (ML) INJECTION AS NEEDED
Status: DISCONTINUED | OUTPATIENT
Start: 2020-01-13 | End: 2020-01-17 | Stop reason: HOSPADM

## 2020-01-13 RX ORDER — METHYLPREDNISOLONE SODIUM SUCCINATE 40 MG/ML
40 INJECTION, POWDER, LYOPHILIZED, FOR SOLUTION INTRAMUSCULAR; INTRAVENOUS EVERY 6 HOURS
Status: DISCONTINUED | OUTPATIENT
Start: 2020-01-13 | End: 2020-01-15

## 2020-01-13 RX ORDER — CLOPIDOGREL BISULFATE 75 MG/1
75 TABLET ORAL DAILY
Status: DISCONTINUED | OUTPATIENT
Start: 2020-01-14 | End: 2020-01-17 | Stop reason: HOSPADM

## 2020-01-13 RX ORDER — BUDESONIDE 0.25 MG/2ML
0.25 INHALANT ORAL
Status: DISCONTINUED | OUTPATIENT
Start: 2020-01-13 | End: 2020-01-17 | Stop reason: HOSPADM

## 2020-01-13 RX ORDER — SPIRONOLACTONE 25 MG/1
25 TABLET ORAL DAILY
Status: DISCONTINUED | OUTPATIENT
Start: 2020-01-14 | End: 2020-01-17 | Stop reason: HOSPADM

## 2020-01-13 RX ORDER — ATORVASTATIN CALCIUM 40 MG/1
40 TABLET, FILM COATED ORAL NIGHTLY
Status: DISCONTINUED | OUTPATIENT
Start: 2020-01-13 | End: 2020-01-17 | Stop reason: HOSPADM

## 2020-01-13 RX ORDER — PREGABALIN 75 MG/1
150 CAPSULE ORAL EVERY 12 HOURS SCHEDULED
Status: DISCONTINUED | OUTPATIENT
Start: 2020-01-14 | End: 2020-01-14

## 2020-01-13 RX ORDER — DOCUSATE SODIUM 100 MG/1
100 CAPSULE, LIQUID FILLED ORAL 2 TIMES DAILY PRN
Status: DISCONTINUED | OUTPATIENT
Start: 2020-01-13 | End: 2020-01-17 | Stop reason: HOSPADM

## 2020-01-13 RX ORDER — NICOTINE POLACRILEX 4 MG
15 LOZENGE BUCCAL
Status: DISCONTINUED | OUTPATIENT
Start: 2020-01-13 | End: 2020-01-17 | Stop reason: HOSPADM

## 2020-01-13 RX ORDER — LOSARTAN POTASSIUM 50 MG/1
50 TABLET ORAL DAILY
Status: DISCONTINUED | OUTPATIENT
Start: 2020-01-14 | End: 2020-01-17 | Stop reason: HOSPADM

## 2020-01-13 RX ORDER — LEVOTHYROXINE SODIUM 0.05 MG/1
50 TABLET ORAL
Status: DISCONTINUED | OUTPATIENT
Start: 2020-01-14 | End: 2020-01-17 | Stop reason: HOSPADM

## 2020-01-13 RX ORDER — BENZONATATE 100 MG/1
200 CAPSULE ORAL 3 TIMES DAILY PRN
Status: DISCONTINUED | OUTPATIENT
Start: 2020-01-13 | End: 2020-01-17 | Stop reason: HOSPADM

## 2020-01-13 RX ORDER — GUAIFENESIN 600 MG/1
1200 TABLET, EXTENDED RELEASE ORAL EVERY 12 HOURS SCHEDULED
Status: DISCONTINUED | OUTPATIENT
Start: 2020-01-13 | End: 2020-01-14

## 2020-01-13 RX ORDER — ONDANSETRON 2 MG/ML
4 INJECTION INTRAMUSCULAR; INTRAVENOUS EVERY 6 HOURS PRN
Status: DISCONTINUED | OUTPATIENT
Start: 2020-01-13 | End: 2020-01-17 | Stop reason: HOSPADM

## 2020-01-13 RX ORDER — DEXTROSE MONOHYDRATE 25 G/50ML
25 INJECTION, SOLUTION INTRAVENOUS
Status: DISCONTINUED | OUTPATIENT
Start: 2020-01-13 | End: 2020-01-17 | Stop reason: HOSPADM

## 2020-01-13 RX ORDER — IPRATROPIUM BROMIDE AND ALBUTEROL SULFATE 2.5; .5 MG/3ML; MG/3ML
3 SOLUTION RESPIRATORY (INHALATION)
Status: DISCONTINUED | OUTPATIENT
Start: 2020-01-13 | End: 2020-01-17 | Stop reason: HOSPADM

## 2020-01-13 RX ORDER — SODIUM CHLORIDE 0.9 % (FLUSH) 0.9 %
10 SYRINGE (ML) INJECTION EVERY 12 HOURS SCHEDULED
Status: DISCONTINUED | OUTPATIENT
Start: 2020-01-13 | End: 2020-01-17 | Stop reason: HOSPADM

## 2020-01-13 RX ORDER — ATROPINE SULFATE 10 MG/ML
1 SOLUTION/ DROPS OPHTHALMIC 2 TIMES DAILY
Status: DISCONTINUED | OUTPATIENT
Start: 2020-01-13 | End: 2020-01-17 | Stop reason: HOSPADM

## 2020-01-13 RX ORDER — ACETAMINOPHEN 325 MG/1
650 TABLET ORAL EVERY 4 HOURS PRN
Status: DISCONTINUED | OUTPATIENT
Start: 2020-01-13 | End: 2020-01-17 | Stop reason: HOSPADM

## 2020-01-13 RX ORDER — METHYLPREDNISOLONE SODIUM SUCCINATE 125 MG/2ML
125 INJECTION, POWDER, LYOPHILIZED, FOR SOLUTION INTRAMUSCULAR; INTRAVENOUS ONCE
Status: COMPLETED | OUTPATIENT
Start: 2020-01-13 | End: 2020-01-13

## 2020-01-13 RX ORDER — DORZOLAMIDE HCL 20 MG/ML
1 SOLUTION/ DROPS OPHTHALMIC 2 TIMES DAILY
Status: DISCONTINUED | OUTPATIENT
Start: 2020-01-13 | End: 2020-01-17 | Stop reason: HOSPADM

## 2020-01-13 RX ORDER — FINASTERIDE 5 MG/1
5 TABLET, FILM COATED ORAL DAILY
Status: DISCONTINUED | OUTPATIENT
Start: 2020-01-14 | End: 2020-01-17 | Stop reason: HOSPADM

## 2020-01-13 RX ORDER — IPRATROPIUM BROMIDE AND ALBUTEROL SULFATE 2.5; .5 MG/3ML; MG/3ML
3 SOLUTION RESPIRATORY (INHALATION) ONCE
Status: COMPLETED | OUTPATIENT
Start: 2020-01-13 | End: 2020-01-13

## 2020-01-13 RX ORDER — FAMOTIDINE 10 MG/ML
20 INJECTION, SOLUTION INTRAVENOUS EVERY 12 HOURS SCHEDULED
Status: DISCONTINUED | OUTPATIENT
Start: 2020-01-13 | End: 2020-01-14 | Stop reason: ALTCHOICE

## 2020-01-13 RX ORDER — ACETAMINOPHEN 650 MG/1
650 SUPPOSITORY RECTAL EVERY 4 HOURS PRN
Status: DISCONTINUED | OUTPATIENT
Start: 2020-01-13 | End: 2020-01-17 | Stop reason: HOSPADM

## 2020-01-13 RX ORDER — VENLAFAXINE HYDROCHLORIDE 75 MG/1
225 CAPSULE, EXTENDED RELEASE ORAL DAILY
Status: DISCONTINUED | OUTPATIENT
Start: 2020-01-14 | End: 2020-01-17 | Stop reason: HOSPADM

## 2020-01-13 RX ORDER — TAMSULOSIN HYDROCHLORIDE 0.4 MG/1
0.4 CAPSULE ORAL NIGHTLY
Status: DISCONTINUED | OUTPATIENT
Start: 2020-01-13 | End: 2020-01-17 | Stop reason: HOSPADM

## 2020-01-13 RX ORDER — ATENOLOL 25 MG/1
25 TABLET ORAL EVERY 12 HOURS SCHEDULED
Status: DISCONTINUED | OUTPATIENT
Start: 2020-01-13 | End: 2020-01-14

## 2020-01-13 RX ADMIN — METHYLPREDNISOLONE SODIUM SUCCINATE 40 MG: 40 INJECTION, POWDER, LYOPHILIZED, FOR SOLUTION INTRAMUSCULAR; INTRAVENOUS at 21:59

## 2020-01-13 RX ADMIN — ATENOLOL 25 MG: 25 TABLET ORAL at 22:24

## 2020-01-13 RX ADMIN — DORZOLAMIDE HYDROCHLORIDE 1 DROP: 20 SOLUTION OPHTHALMIC at 21:59

## 2020-01-13 RX ADMIN — SODIUM CHLORIDE, PRESERVATIVE FREE 10 ML: 5 INJECTION INTRAVENOUS at 22:01

## 2020-01-13 RX ADMIN — IPRATROPIUM BROMIDE AND ALBUTEROL SULFATE 3 ML: 2.5; .5 SOLUTION RESPIRATORY (INHALATION) at 21:35

## 2020-01-13 RX ADMIN — METHYLPREDNISOLONE SODIUM SUCCINATE 125 MG: 125 INJECTION, POWDER, FOR SOLUTION INTRAMUSCULAR; INTRAVENOUS at 10:54

## 2020-01-13 RX ADMIN — GUAIFENESIN 1200 MG: 600 TABLET, EXTENDED RELEASE ORAL at 21:58

## 2020-01-13 RX ADMIN — IPRATROPIUM BROMIDE AND ALBUTEROL SULFATE 3 ML: 2.5; .5 SOLUTION RESPIRATORY (INHALATION) at 10:44

## 2020-01-13 RX ADMIN — FAMOTIDINE 20 MG: 10 INJECTION, SOLUTION INTRAVENOUS at 21:59

## 2020-01-13 RX ADMIN — METHYLPREDNISOLONE SODIUM SUCCINATE 40 MG: 40 INJECTION, POWDER, LYOPHILIZED, FOR SOLUTION INTRAMUSCULAR; INTRAVENOUS at 17:54

## 2020-01-13 RX ADMIN — INSULIN DETEMIR 30 UNITS: 100 INJECTION, SOLUTION SUBCUTANEOUS at 21:59

## 2020-01-13 RX ADMIN — AZITHROMYCIN MONOHYDRATE 500 MG: 500 INJECTION, POWDER, LYOPHILIZED, FOR SOLUTION INTRAVENOUS at 15:55

## 2020-01-13 RX ADMIN — ATORVASTATIN CALCIUM 40 MG: 40 TABLET, FILM COATED ORAL at 21:58

## 2020-01-13 RX ADMIN — BUDESONIDE 0.25 MG: 0.25 INHALANT RESPIRATORY (INHALATION) at 21:35

## 2020-01-13 RX ADMIN — TAMSULOSIN HYDROCHLORIDE 0.4 MG: 0.4 CAPSULE ORAL at 21:59

## 2020-01-13 RX ADMIN — ENOXAPARIN SODIUM 40 MG: 40 INJECTION SUBCUTANEOUS at 17:54

## 2020-01-13 RX ADMIN — INSULIN HUMAN 5 UNITS: 100 INJECTION, SOLUTION PARENTERAL at 18:43

## 2020-01-13 RX ADMIN — ATROPINE SULFATE 1 DROP: 10 SOLUTION/ DROPS OPHTHALMIC at 21:59

## 2020-01-13 NOTE — PROGRESS NOTES
Discharge Planning Assessment   [place]     Patient Name: Sai Adam  MRN: [id number]  Today's Date: 1/13/2020    Admit Date: 1/13/2020    Discharge Needs Assessment    No documentation.       Discharge Plan     Row Name 01/13/20 1220       Plan    Plan  Faxed clinical to [facility]      Row Name 01/13/20 1405      Juju Lambert RN

## 2020-01-13 NOTE — ED PROVIDER NOTES
Subjective   Family says the patient has become progressively more short of breath over the last couple days.  He has had increased cough with some sputum production.  He has had some chills but no definite fever.  He has a long history of COPD and is normally using a BiPAP but only at night.  They say he has been using that consistently but seems to be getting worse.      History provided by:  Relative  History limited by:  Mental status change and acuity of condition   used: No    Shortness of Breath   Severity:  Severe  Onset quality:  Gradual  Duration:  2 days  Timing:  Constant  Progression:  Worsening  Chronicity:  Recurrent  Context: URI    Context: not activity, not animal exposure, not emotional upset, not fumes, not known allergens, not occupational exposure, not pollens, not smoke exposure, not strong odors and not weather changes    Relieved by:  Nothing  Worsened by:  Nothing  Ineffective treatments:  Oxygen  Associated symptoms: cough and sputum production    Associated symptoms: no abdominal pain, no chest pain, no claudication, no diaphoresis, no ear pain, no fever, no headaches, no hemoptysis, no neck pain, no PND, no rash, no sore throat, no syncope, no swollen glands, no vomiting and no wheezing    Risk factors: tobacco use    Risk factors: no recent alcohol use, no family hx of DVT, no hx of cancer, no hx of PE/DVT, no obesity, no oral contraceptive use, no prolonged immobilization and no recent surgery        Review of Systems   Constitutional: Negative.  Negative for diaphoresis and fever.   HENT: Negative.  Negative for ear pain and sore throat.    Respiratory: Positive for cough, sputum production and shortness of breath. Negative for hemoptysis and wheezing.    Cardiovascular: Negative.  Negative for chest pain, claudication, syncope and PND.   Gastrointestinal: Negative.  Negative for abdominal pain and vomiting.   Genitourinary: Negative.    Musculoskeletal: Negative.   Negative for neck pain.   Skin: Negative.  Negative for rash.   Neurological: Negative.  Negative for headaches.   Psychiatric/Behavioral: Negative.    All other systems reviewed and are negative.      Past Medical History:   Diagnosis Date   • Arthritis    • CAD (coronary artery disease)    • Colon cancer (CMS/HCC) 11/2016   • COPD (chronic obstructive pulmonary disease) (CMS/HCC)    • Diabetes mellitus (CMS/[facility])    • HLD (hyperlipidemia)    • Hypertension        Allergies   Allergen Reactions   • Tetanus Toxoids Shortness Of Breath and Swelling   • Lamotrigine      Unknown     • Lisinopril Hives   • Methadone Swelling   • Penicillins Rash   • Tramadol Rash       Past Surgical History:   Procedure Laterality Date   • CARDIAC SURGERY     • CARPAL TUNNEL RELEASE     • COLON RESECTION WITH COLOSTOMY     • COLON SURGERY     • COLONOSCOPY  05/27/2016   • COLONOSCOPY N/A 10/12/2017    Procedure: COLONOSCOPY WITH ANESTHESIA;  Surgeon: Yessenia Gregg MD;  Location: [facility] ENDOSCOPY;  Service:    • CORONARY ANGIOPLASTY WITH STENT PLACEMENT     • HERNIA REPAIR     • ROTATOR CUFF REPAIR         Family History   Problem Relation Age of Onset   • Stroke Mother    • Hypertension Mother    • Arthritis Father    • Heart disease Father    • Cancer Brother    • Diabetes Brother    • Hepatitis Brother    • Hypertension Brother        Social History     Socioeconomic History   • Marital status:      Spouse name: Not on file   • Number of children: Not on file   • Years of education: Not on file   • Highest education level: Not on file   Tobacco Use   • Smoking status: Current Every Day Smoker     Packs/day: 1.00     Years: 40.00     Pack years: 40.00   • Smokeless tobacco: Never Used   Substance and Sexual Activity   • Alcohol use: No   • Drug use: No   • Sexual activity: Defer       Prior to Admission medications    Medication Sig Start Date End Date Taking? Authorizing Provider   arformoterol (BROVANA) 15 MCG/2ML nebulizer  solution Take 2 mL by nebulization 2 (Two) Times a Day. 2 boxes 1/23/18   Dimitri Guevara MD   atenolol (TENORMIN) 25 MG tablet Take 1 tablet by mouth Every 12 (Twelve) Hours.  Patient taking differently: Take 100 mg by mouth Every 12 (Twelve) Hours. 11/28/17   Ernie Cornejo DO   atorvastatin (LIPITOR) 40 MG tablet Take 1 tablet by mouth Every Night. 1/23/18   Dimitri Guevara MD   atropine 1 % ophthalmic solution Administer 1 drop to the right eye 2 (Two) Times a Day.    Erin Ambrocio MD   benzonatate (TESSALON) 100 MG capsule Take 2 capsules by mouth 3 (Three) Times a Day As Needed for Cough. 1/23/18   Dimitri Guevara MD   budesonide (PULMICORT) 0.25 MG/2ML nebulizer solution Take 2 mL by nebulization 2 (Two) Times a Day. 1/23/18   Dimitri Guevara MD   Cholecalciferol (VITAMIN D) 2000 units tablet Take 4,000 Units by mouth Daily.    Erin Ambrocio MD   clopidogrel (PLAVIX) 75 MG tablet Take 75 mg by mouth Daily.    Erin Ambrocio MD   docusate sodium (COLACE) 100 MG capsule Take 100 mg by mouth 2 (Two) Times a Day As Needed for Constipation.    Erin Ambrocio MD   dorzolamide (TRUSOPT) 2 % ophthalmic solution Administer 1 drop into the left eye 2 (Two) Times a Day.    Erin Ambrocio MD   finasteride (PROSCAR) 5 MG tablet Take 1 tablet by mouth Daily. 1/24/18   Dimitri Guevara MD   guaiFENesin (MUCINEX) 600 MG 12 hr tablet Take 2 tablets by mouth Every 12 (Twelve) Hours. 1/2/18   Leonel Roth APRN   insulin aspart (novoLOG) 100 UNIT/ML injection Inject 40 Units under the skin 3 (Three) Times a Day Before Meals.    Erin Ambrocio MD   insulin glargine (LANTUS) 100 UNIT/ML injection Inject 160 Units under the skin Daily.    Erin Ambrocio MD   ipratropium-albuterol (DUONEB) 0.5-2.5 mg/mL nebulizer Take 3 mL by nebulization Every 4 (Four) Hours As Needed for Wheezing or Shortness of Air. 1/23/18   Dimitri Guevara MD   isosorbide mononitrate (IMDUR) 60 MG 24  hr tablet Take 90 mg by mouth Daily.    Erin Ambrocio MD   levoFLOXacin (LEVAQUIN) 500 MG tablet Take 1 tablet by mouth Daily. 1/23/18   Dimitri Guevara MD   levothyroxine (SYNTHROID, LEVOTHROID) 50 MCG tablet Take 50 mcg by mouth Daily.    Erin Ambrocio MD   losartan (COZAAR) 100 MG tablet Take 100 mg by mouth Daily.    Erin Ambrocio MD   metFORMIN (GLUCOPHAGE) 500 MG tablet Take 1,000 mg by mouth 2 (Two) Times a Day With Meals.    Erin Ambrocio MD   MethylPREDNISolone (MEDROL, SHELL,) 4 MG tablet Take as directed on package instructions. 1/23/18   Dimitri Guevara MD   nicotine (NICODERM CQ) 14 MG/24HR patch Place 1 patch on the skin Daily. 1/24/18   Dimitri Guevara MD   nitroglycerin (NITROSTAT) 0.4 MG SL tablet Place 0.4 mg under the tongue Every 5 (Five) Minutes As Needed for Chest Pain.    Erin Ambrocio MD   potassium chloride (K-DUR,KLOR-CON) 20 MEQ CR tablet Take 40 mEq by mouth 2 (Two) Times a Day.    Erin Ambrocio MD   pregabalin (LYRICA) 150 MG capsule Take 1 capsule by mouth Every 12 (Twelve) Hours.  Patient taking differently: Take 300 mg by mouth Every 12 (Twelve) Hours. 11/28/17   Ernie Cornejo DO   raNITIdine (ZANTAC) 150 MG tablet Take 150 mg by mouth 2 (Two) Times a Day As Needed for Indigestion.    Erin Ambrocio MD   SAXagliptin (ONGLYZA) 5 MG tablet Take 5 mg by mouth Daily.    Erin Ambrocio MD   spironolactone (ALDACTONE) 25 MG tablet Take 25 mg by mouth Daily.    Erin Ambrocio MD   tamsulosin (FLOMAX) 0.4 MG capsule 24 hr capsule Take 1 capsule by mouth Every Night.    Erin Ambrocio MD   venlafaxine XR (EFFEXOR-XR) 75 MG 24 hr capsule Take 225 mg by mouth Daily.    Erin Ambrocio MD       Medications   sodium chloride 0.9 % flush 10 mL (has no administration in time range)   ipratropium-albuterol (DUO-NEB) nebulizer solution 3 mL (has no administration in time range)   ipratropium-albuterol (DUO-NEB)  nebulizer solution 3 mL (3 mL Nebulization Given 1/13/20 1044)   methylPREDNISolone sodium succinate (SOLU-Medrol) injection 125 mg (125 mg Intravenous Given 1/13/20 1054)       Vitals:    01/13/20 1052   BP:    Pulse: 108   Resp:    Temp:    SpO2: 91%         Objective   Physical Exam   Constitutional: He appears well-developed and well-nourished.   HENT:   Head: Atraumatic.   Neck: Normal range of motion. Neck supple.   Cardiovascular: Regular rhythm.   Patient is mildly tachycardic but it seems to be a sinus rhythm.   Pulmonary/Chest: Accessory muscle usage present. Tachypnea noted. He is in respiratory distress. He has decreased breath sounds in the right middle field and the left middle field. He has wheezes in the right middle field and the left middle field.   Abdominal: Soft. Bowel sounds are normal.   Musculoskeletal: Normal range of motion.   Neurological:   Lethargic but nonfocal.   Skin: Skin is warm and dry.   Psychiatric:   Nonverbal.   Nursing note and vitals reviewed.      Critical Care  Performed by: Nicola Carrero Jr., MD  Authorized by: Nicola Carrero Jr., MD     Critical care provider statement:     Critical care time (minutes):  30    Critical care start time:  1/13/2020 11:00 AM    Critical care end time:  1/13/2020 11:30 AM    Critical care time was exclusive of:  Separately billable procedures and treating other patients    Critical care was necessary to treat or prevent imminent or life-threatening deterioration of the following conditions:  Respiratory failure    Critical care was time spent personally by me on the following activities:  Blood draw for specimens, development of treatment plan with patient or surrogate, discussions with primary provider, evaluation of patient's response to treatment, examination of patient, interpretation of cardiac output measurements, obtaining history from patient or surrogate, ordering and performing treatments and interventions, ordering and  review of laboratory studies, ordering and review of radiographic studies, pulse oximetry and re-evaluation of patient's condition    I assumed direction of critical care for this patient from another provider in my specialty: no               Lab Results (last 24 hours)     Procedure Component Value Units Date/Time    Blood Gas, Arterial With Co-Ox [[id number]]  (Abnormal) Collected:  01/13/20 1000    Specimen:  Arterial Blood Updated:  01/13/20 1007     Site Right Brachial     Jarad's Test N/A     pH, Arterial 7.231 pH units      Comment: 84 Value below reference range        pCO2, Arterial 95.8 mm Hg      Comment: 86 Value above critical limit        pO2, Arterial 59.5 mm Hg      Comment: 84 Value below reference range        HCO3, Arterial 40.2 mmol/L      Comment: 83 Value above reference range        Base Excess, Arterial 8.6 mmol/L      Comment: 83 Value above reference range        O2 Saturation, Arterial 89.6 %      Comment: 84 Value below reference range        Hemoglobin, Blood Gas 14.0 g/dL      Hematocrit, Blood Gas 42.9 %      Oxyhemoglobin 86.1 %      Comment: 84 Value below reference range        Methemoglobin 0.20 %      Carboxyhemoglobin 3.7 %      A-a Gradiant --     Comment: UNABLE TO CALCULATE        Temperature 37.0 C      Sodium, Arterial 141 mmol/L      Potassium, Arterial 4.2 mmol/L      Barometric Pressure for Blood Gas 761 mmHg      Modality Nasal Cannula     Flow Rate 5.0 lpm      Ventilator Mode NA     Note --     Notified Who DR SCHULTZ     Notified By [id number]     Notified Time 01/13/2020 10:09     Collected by [id number]     Comment: Meter: [id number]     :  [id number]        pH, Temp Corrected --     pCO2, Temperature Corrected --     pO2, Temperature Corrected --    [place] Blood Culture Bottle Set [[id number]] Collected:  01/13/20 1005    Specimen:  Blood from Hand, Right Updated:  01/13/20 1116     Extra Tube Hold for add-ons.     Comment: Auto resulted.       CBC &  Differential [[id number]] Collected:  01/13/20 1005    Specimen:  Blood Updated:  01/13/20 1036    Narrative:       The following orders were created for panel order CBC & Differential.  Procedure                               Abnormality         Status                     ---------                               -----------         ------                     CBC Auto Differential[[id number]]        Abnormal            Final result                 Please view results for these tests on the individual orders.    Comprehensive Metabolic Panel [[id number]]  (Abnormal) Collected:  01/13/20 1005    Specimen:  Blood Updated:  01/13/20 1055     Glucose 300 mg/dL      BUN 5 mg/dL      Creatinine 0.61 mg/dL      Sodium 137 mmol/L      Potassium 4.3 mmol/L      Chloride 94 mmol/L      CO2 37.0 mmol/L      Calcium 8.8 mg/dL      Total Protein 7.1 g/dL      Albumin 4.10 g/dL      ALT (SGPT) 11 U/L      AST (SGOT) 24 U/L      Alkaline Phosphatase 115 U/L      Total Bilirubin 0.5 mg/dL      eGFR Non African Amer 134 mL/min/1.73      Globulin 3.0 gm/dL      A/G Ratio 1.4 g/dL      BUN/Creatinine Ratio 8.2     Anion Gap 6.0 mmol/L     Narrative:       GFR Normal >60  Chronic Kidney Disease <60  Kidney Failure <15      CBC Auto Differential [[id number]]  (Abnormal) Collected:  01/13/20 1005    Specimen:  Blood Updated:  01/13/20 1036     WBC 12.67 10*3/mm3      RBC 4.93 10*6/mm3      Hemoglobin 14.1 g/dL      Hematocrit 45.8 %      MCV 92.9 fL      MCH 28.6 pg      MCHC 30.8 g/dL      RDW 12.8 %      RDW-SD 44.0 fl      MPV 9.1 fL      Platelets 150 10*3/mm3      Neutrophil % 79.5 %      Lymphocyte % 11.6 %      Monocyte % 6.9 %      Eosinophil % 0.0 %      Basophil % 0.5 %      Immature Grans % 1.5 %      Neutrophils, Absolute 10.07 10*3/mm3      Lymphocytes, Absolute 1.47 10*3/mm3      Monocytes, Absolute 0.88 10*3/mm3      Eosinophils, Absolute 0.00 10*3/mm3      Basophils, Absolute 0.06 10*3/mm3      Immature Grans, Absolute 0.19  10*3/mm3      nRBC 0.0 /100 WBC     Blood Gas, Arterial [[id number]]  (Abnormal) Collected:  01/13/20 1050    Specimen:  Arterial Blood Updated:  01/13/20 1122     Site Right Radial     Jarad's Test Positive     pH, Arterial 7.278 pH units      Comment: 84 Value below reference range        pCO2, Arterial 87.0 mm Hg      Comment: 86 Value above critical limit        pO2, Arterial 61.4 mm Hg      Comment: 84 Value below reference range        HCO3, Arterial 40.7 mmol/L      Comment: 83 Value above reference range        Base Excess, Arterial 10.0 mmol/L      Comment: 83 Value above reference range        O2 Saturation, Arterial 91.7 %      Comment: 84 Value below reference range        Temperature 37.0 C      Barometric Pressure for Blood Gas 760 mmHg      Modality BiPap     FIO2 45 %      Ventilator Mode BiPAP     IPAP 19     Comment: Meter: [id number]     :  [id number]        EPAP 4     Notified Who DR SCHULTZ     Notified By [id number]     Notified Time 01/13/2020 11:14     Collected by [id number]    Blood Culture - Blood, Hand, Right [[id number]] Collected:  01/13/20 1052    Specimen:  Blood from Hand, Right Updated:  01/13/20 1126    Lactic Acid, Plasma [[id number]]  (Normal) Collected:  01/13/20 1052    Specimen:  Blood Updated:  01/13/20 1128     Lactate 1.4 mmol/L           XR Chest 1 View   Final Result   Opacities along the right minor fissure may reflect fluid or   atelectasis. Bibasilar atelectasis is evident.    This report was finalized on 01/13/2020 10:56 by Dr. Zia Machuca MD.          ED Course  ED Course as of Jan 13 1140   Mon Jan 13, 2020   1137 I told the family of the findings most pacifically on the blood gases of respiratory failure.  I told him that the CO2 does not begin to blow off he may have to be intubated but right now organ to continue with BiPAP.  However he will require admission for further stabilization.  I have spoken with Dr. Rausch and he has accepted.    [TR]   1139 Patient  clinically does not have sepsis and abnormal vital signs are related to his underlying respiratory failure.    [TR]      ED Course User Index  [TR] Nicola Carrero Jr., MD          MDM  Number of Diagnoses or Management Options  Acute respiratory failure with hypercapnia (CMS/HCC): new and requires workup     Amount and/or Complexity of Data Reviewed  Clinical lab tests: ordered and reviewed  Tests in the radiology section of CPT®: ordered and reviewed  Tests in the medicine section of CPT®: ordered and reviewed  Discuss the patient with other providers: yes    Risk of Complications, Morbidity, and/or Mortality  Presenting problems: high  Diagnostic procedures: high  Management options: high    Critical Care  Total time providing critical care: 30-74 minutes    Patient Progress  Patient progress: improved      Final diagnoses:   Acute respiratory failure with hypercapnia (CMS/HCC)          Nicola Carrero Jr., MD  01/13/20 1212

## 2020-01-13 NOTE — H&P
[facility]  HISTORY AND PHYSICAL    Date of Admission: 1/13/2020  Primary Care Physician: Jarod Tillman MD    Subjective     Chief Complaint: Altered mental status    History of Present Illness  This is a 59-year-old male with chronic hypoxic respiratory failure on 5 L O2 at baseline, sleep apnea on NIPPV at home, COPD CAD post CABG, diabetes.  [facility]  He has had a cough for several days at home but denied any overt shortness of breath or fevers.  Was seen normal last night.  This morning was altered and hard to wake up.  Presented into the ER was found to be in hypercarbic respiratory failure with a CO2 of 96 and a pH of 7.23.  PO2 was 59.  Patient was placed on BiPAP and worked up in the ER.  Chest imaging negative for any overt pneumonia.  He received steroids and nebulizers.  Repeat ABG improved to pH of 7.28 with a CO2 of 87.  Patient starting to wake up.  He is able to answer questions at this time.  Says he feels okay.  Denies any chest pain.  Again no fever chills.  No abdominal pain, nausea, vomiting, diarrhea.  No focal weakness.  We have been asked to admit for continued care.        Review of Systems   Otherwise complete ROS reviewed and negative except as mentioned in the HPI.    Past Medical History:   Past Medical History:   Diagnosis Date   • Arthritis    • CAD (coronary artery disease)    • Colon cancer (CMS/HCC) 11/2016   • COPD (chronic obstructive pulmonary disease) (CMS/[facility])    • Diabetes mellitus (CMS/[facility])    • HLD (hyperlipidemia)    • Hypertension      Past Surgical History:  Past Surgical History:   Procedure Laterality Date   • CARDIAC SURGERY     • CARPAL TUNNEL RELEASE     • COLON RESECTION WITH COLOSTOMY     • COLON SURGERY     • COLONOSCOPY  05/27/2016   • COLONOSCOPY N/A 10/12/2017    Procedure: COLONOSCOPY WITH ANESTHESIA;  Surgeon: Yessenia Gregg MD;  Location: [facility] ENDOSCOPY;  Service:    • CORONARY  ANGIOPLASTY WITH STENT PLACEMENT     • HERNIA REPAIR     • ROTATOR CUFF REPAIR       Social History:  reports that he has been smoking. He has a 40.00 pack-year smoking history. He has never used smokeless tobacco. He reports that he does not drink alcohol or use drugs.    Family History: family history includes Arthritis in his father; Cancer in his brother; Diabetes in his brother; Heart disease in his father; Hepatitis in his brother; Hypertension in his brother and mother; Stroke in his mother.       Allergies:  Allergies   Allergen Reactions   • Tetanus Toxoids Shortness Of Breath and Swelling   • Lamotrigine      Unknown     • Lisinopril Hives   • Methadone Swelling   • Penicillins Rash   • Tramadol Rash     Medications:  Prior to Admission medications    Medication Sig Start Date End Date Taking? Authorizing Provider   arformoterol (BROVANA) 15 MCG/2ML nebulizer solution Take 2 mL by nebulization 2 (Two) Times a Day. 2 boxes 1/23/18   Dimitri Guevara MD   atenolol (TENORMIN) 25 MG tablet Take 1 tablet by mouth Every 12 (Twelve) Hours.  Patient taking differently: Take 100 mg by mouth Every 12 (Twelve) Hours. 11/28/17   Ernie Cornejo DO   atorvastatin (LIPITOR) 40 MG tablet Take 1 tablet by mouth Every Night. 1/23/18   Dimitri Guevara MD   atropine 1 % ophthalmic solution Administer 1 drop to the right eye 2 (Two) Times a Day.    ProviderErin MD   benzonatate (TESSALON) 100 MG capsule Take 2 capsules by mouth 3 (Three) Times a Day As Needed for Cough. 1/23/18   Dimitri Guevara MD   budesonide (PULMICORT) 0.25 MG/2ML nebulizer solution Take 2 mL by nebulization 2 (Two) Times a Day. 1/23/18   Dimitri Guevara MD   Cholecalciferol (VITAMIN D) 2000 units tablet Take 4,000 Units by mouth Daily.    Erin Ambrocio MD   clopidogrel (PLAVIX) 75 MG tablet Take 75 mg by mouth Daily.    Erin Ambrocio MD   docusate sodium (COLACE) 100 MG capsule Take 100 mg by mouth 2 (Two) Times a Day As  Needed for Constipation.    Erin Ambrocio MD   dorzolamide (TRUSOPT) 2 % ophthalmic solution Administer 1 drop into the left eye 2 (Two) Times a Day.    Erin Ambrocio MD   finasteride (PROSCAR) 5 MG tablet Take 1 tablet by mouth Daily. 1/24/18   Dimitri Guevara MD   guaiFENesin (MUCINEX) 600 MG 12 hr tablet Take 2 tablets by mouth Every 12 (Twelve) Hours. 1/2/18   Leonel Roth APRN   insulin aspart (novoLOG) 100 UNIT/ML injection Inject 40 Units under the skin 3 (Three) Times a Day Before Meals.    Erin Ambrocio MD   insulin glargine (LANTUS) 100 UNIT/ML injection Inject 160 Units under the skin Daily.    Erin Ambrocio MD   ipratropium-albuterol (DUONEB) 0.5-2.5 mg/mL nebulizer Take 3 mL by nebulization Every 4 (Four) Hours As Needed for Wheezing or Shortness of Air. 1/23/18   Dimitri Guevara MD   isosorbide mononitrate (IMDUR) 60 MG 24 hr tablet Take 90 mg by mouth Daily.    Erin Ambrocio MD   levoFLOXacin (LEVAQUIN) 500 MG tablet Take 1 tablet by mouth Daily. 1/23/18   Dimitri Guevara MD   levothyroxine (SYNTHROID, LEVOTHROID) 50 MCG tablet Take 50 mcg by mouth Daily.    Erin Ambrocio MD   losartan (COZAAR) 100 MG tablet Take 100 mg by mouth Daily.    Erin Ambrocio MD   metFORMIN (GLUCOPHAGE) 500 MG tablet Take 1,000 mg by mouth 2 (Two) Times a Day With Meals.    Erin Ambrocio MD   MethylPREDNISolone (MEDROL, SHELL,) 4 MG tablet Take as directed on package instructions. 1/23/18   Dimitri Guevara MD   nicotine (NICODERM CQ) 14 MG/24HR patch Place 1 patch on the skin Daily. 1/24/18   Dimitri Guevara MD   nitroglycerin (NITROSTAT) 0.4 MG SL tablet Place 0.4 mg under the tongue Every 5 (Five) Minutes As Needed for Chest Pain.    Erin Ambrocio MD   potassium chloride (K-DUR,KLOR-CON) 20 MEQ CR tablet Take 40 mEq by mouth 2 (Two) Times a Day.    Erin Ambrocio MD   pregabalin (LYRICA) 150 MG capsule Take 1 capsule by mouth Every 12  "(Twelve) Hours.  Patient taking differently: Take 300 mg by mouth Every 12 (Twelve) Hours. 11/28/17   Ernie Cornejo DO   raNITIdine (ZANTAC) 150 MG tablet Take 150 mg by mouth 2 (Two) Times a Day As Needed for Indigestion.    Erin Ambrocio MD   SAXagliptin (ONGLYZA) 5 MG tablet Take 5 mg by mouth Daily.    Erin Ambrocio MD   spironolactone (ALDACTONE) 25 MG tablet Take 25 mg by mouth Daily.    Erin Ambrocio MD   tamsulosin (FLOMAX) 0.4 MG capsule 24 hr capsule Take 1 capsule by mouth Every Night.    Erin Ambrocio MD   venlafaxine XR (EFFEXOR-XR) 75 MG 24 hr capsule Take 225 mg by mouth Daily.    Erin Ambrocio MD     Objective     Vital Signs: /68 (BP Location: Left arm, Patient Position: Sitting)   Pulse 108   Temp 100.1 °F (37.8 °C) (Oral)   Resp 26   Ht 170.2 cm (67\")   Wt 77.1 kg (170 lb)   SpO2 91%   BMI 26.63 kg/m²   Physical Exam  GEN: Awake, alert, interactive, in NAD  HEENT: PERRLA, EOMI, Anicteric, Trachea midline  Lungs: diminished bs b/l w/ end exp wheezing, no rales  Heart: tachycardic, regular rhythm, +S1/s2  ABD: soft, nt/nd, +BS, no guarding/rebound. + ostomy LLQ  Extremities: atraumatic, no cyanosis, no edema  Skin: no rashes or lesions  Neuro: AAOx3, no focal deficits  Psych: normal mood & affect        Results Reviewed:  Lab Results (last 24 hours)     Procedure Component Value Units Date/Time    Blood Culture - Blood, Hand, Right [[id number]] Collected:  01/13/20 1052    Specimen:  Blood from Hand, Right Updated:  01/13/20 1233    Lactic Acid, Plasma [[id number]]  (Normal) Collected:  01/13/20 1052    Specimen:  Blood Updated:  01/13/20 1128     Lactate 1.4 mmol/L     Blood Culture - Blood, Hand, Right [[id number]] Collected:  01/13/20 1052    Specimen:  Blood from Hand, Right Updated:  01/13/20 1126    Blood Gas, Arterial [[id number]]  (Abnormal) Collected:  01/13/20 1050    Specimen:  Arterial Blood Updated:  01/13/20 1122     Site Right " Radial     Jarad's Test Positive     pH, Arterial 7.278 pH units      Comment: 84 Value below reference range        pCO2, Arterial 87.0 mm Hg      Comment: 86 Value above critical limit        pO2, Arterial 61.4 mm Hg      Comment: 84 Value below reference range        HCO3, Arterial 40.7 mmol/L      Comment: 83 Value above reference range        Base Excess, Arterial 10.0 mmol/L      Comment: 83 Value above reference range        O2 Saturation, Arterial 91.7 %      Comment: 84 Value below reference range        Temperature 37.0 C      Barometric Pressure for Blood Gas 760 mmHg      Modality BiPap     FIO2 45 %      Ventilator Mode BiPAP     IPAP 19     Comment: Meter: [id number]     :  [id number]        EPAP 4     Notified Who DR SCHULTZ     Notified By [id number]     Notified Time 01/13/2020 11:14     Collected by [id number]    [place] Draw [[id number]] Collected:  01/13/20 1005    Specimen:  Blood Updated:  01/13/20 1116    Narrative:       The following orders were created for panel order [place] Draw.  Procedure                               Abnormality         Status                     ---------                               -----------         ------                     Light Blue Top[[id number]]                                   Final result               Green Top (Gel)[[id number]]                                  Final result               Lavender Top[[id number]]                                     Final result               Red Top[[id number]]                                          Final result               [place] Blood Culture Scott...[[id number]]                      Final result                 Please view results for these tests on the individual orders.    Light Blue Top [[id number]] Collected:  01/13/20 1005    Specimen:  Blood Updated:  01/13/20 1116     Extra Tube hold for add-on     Comment: Auto resulted       [place] Blood Culture Bottle Set [[id number]] Collected:  01/13/20 1005    Specimen:   Blood from Hand, Right Updated:  01/13/20 1116     Extra Tube Hold for add-ons.     Comment: Auto resulted.       Green Top (Gel) [[id number]] Collected:  01/13/20 1005    Specimen:  Blood Updated:  01/13/20 1116     Extra Tube Hold for add-ons.     Comment: Auto resulted.       Red Top [[id number]] Collected:  01/13/20 1005    Specimen:  Blood Updated:  01/13/20 1115     Extra Tube Hold for add-ons.     Comment: Auto resulted.       Lavender Top [[id number]] Collected:  01/13/20 1005    Specimen:  Blood Updated:  01/13/20 1115     Extra Tube hold for add-on     Comment: Auto resulted       Comprehensive Metabolic Panel [[id number]]  (Abnormal) Collected:  01/13/20 1005    Specimen:  Blood Updated:  01/13/20 1055     Glucose 300 mg/dL      BUN 5 mg/dL      Creatinine 0.61 mg/dL      Sodium 137 mmol/L      Potassium 4.3 mmol/L      Chloride 94 mmol/L      CO2 37.0 mmol/L      Calcium 8.8 mg/dL      Total Protein 7.1 g/dL      Albumin 4.10 g/dL      ALT (SGPT) 11 U/L      AST (SGOT) 24 U/L      Alkaline Phosphatase 115 U/L      Total Bilirubin 0.5 mg/dL      eGFR Non African Amer 134 mL/min/1.73      Globulin 3.0 gm/dL      A/G Ratio 1.4 g/dL      BUN/Creatinine Ratio 8.2     Anion Gap 6.0 mmol/L     Narrative:       GFR Normal >60  Chronic Kidney Disease <60  Kidney Failure <15      CBC & Differential [[id number]] Collected:  01/13/20 1005    Specimen:  Blood Updated:  01/13/20 1036    Narrative:       The following orders were created for panel order CBC & Differential.  Procedure                               Abnormality         Status                     ---------                               -----------         ------                     CBC Auto Differential[[id number]]        Abnormal            Final result                 Please view results for these tests on the individual orders.    CBC Auto Differential [[id number]]  (Abnormal) Collected:  01/13/20 1005    Specimen:  Blood Updated:  01/13/20 1036     WBC 12.67  10*3/mm3      RBC 4.93 10*6/mm3      Hemoglobin 14.1 g/dL      Hematocrit 45.8 %      MCV 92.9 fL      MCH 28.6 pg      MCHC 30.8 g/dL      RDW 12.8 %      RDW-SD 44.0 fl      MPV 9.1 fL      Platelets 150 10*3/mm3      Neutrophil % 79.5 %      Lymphocyte % 11.6 %      Monocyte % 6.9 %      Eosinophil % 0.0 %      Basophil % 0.5 %      Immature Grans % 1.5 %      Neutrophils, Absolute 10.07 10*3/mm3      Lymphocytes, Absolute 1.47 10*3/mm3      Monocytes, Absolute 0.88 10*3/mm3      Eosinophils, Absolute 0.00 10*3/mm3      Basophils, Absolute 0.06 10*3/mm3      Immature Grans, Absolute 0.19 10*3/mm3      nRBC 0.0 /100 WBC     Blood Gas, Arterial With Co-Ox [[id number]]  (Abnormal) Collected:  01/13/20 1000    Specimen:  Arterial Blood Updated:  01/13/20 1007     Site Right Brachial     Jarad's Test N/A     pH, Arterial 7.231 pH units      Comment: 84 Value below reference range        pCO2, Arterial 95.8 mm Hg      Comment: 86 Value above critical limit        pO2, Arterial 59.5 mm Hg      Comment: 84 Value below reference range        HCO3, Arterial 40.2 mmol/L      Comment: 83 Value above reference range        Base Excess, Arterial 8.6 mmol/L      Comment: 83 Value above reference range        O2 Saturation, Arterial 89.6 %      Comment: 84 Value below reference range        Hemoglobin, Blood Gas 14.0 g/dL      Hematocrit, Blood Gas 42.9 %      Oxyhemoglobin 86.1 %      Comment: 84 Value below reference range        Methemoglobin 0.20 %      Carboxyhemoglobin 3.7 %      A-a Gradiant --     Comment: UNABLE TO CALCULATE        Temperature 37.0 C      Sodium, Arterial 141 mmol/L      Potassium, Arterial 4.2 mmol/L      Barometric Pressure for Blood Gas 761 mmHg      Modality Nasal Cannula     Flow Rate 5.0 lpm      Ventilator Mode NA     Note --     Notified Who DR SCHULTZ     Notified By [id number]     Notified Time 01/13/2020 10:09     Collected by [id number]     Comment: Meter: [id number]     :  [id number]         pH, Temp Corrected --     pCO2, Temperature Corrected --     pO2, Temperature Corrected --        Imaging Results (Last 24 Hours)     Procedure Component Value Units Date/Time    XR Chest 1 View [[id number]] Collected:  01/13/20 1052     Updated:  01/13/20 1059    Narrative:       EXAMINATION: XR CHEST 1 VW- 1/13/2020 10:52 AM CST     HISTORY: Shortness of breath     COMPARISON: 01/18/2019     FINDINGS:  Heart and mediastinal contours are stable. There has been prior median  sternotomy. Upright approach Port-A-Cath is in place with tip projecting  over the mid to distal SVC. There is atelectasis in the lung bases. The  pulmonary vasculature is prominent. Opacities are noted along the right  minor fissure. There is pleural thickening on the right, similar to  prior exams.       Impression:       Opacities along the right minor fissure may reflect fluid or  atelectasis. Bibasilar atelectasis is evident.   This report was finalized on 01/13/2020 10:56 by Dr. Zia Machuca MD.        I have personally reviewed and interpreted the radiology studies and ECG obtained at time of admission.     Assessment / Plan     Assessment:   Active Hospital Problems    Diagnosis   • **Acute on chronic respiratory failure with hypoxia and hypercapnia (CMS/HCC)   • Toxic metabolic encephalopathy   • COPD exacerbation (CMS/HCC)   • Diabetes mellitus (CMS/HCC)   • Coronary artery disease   • History of colon cancer         Plan:   #1 acute on chronic hypercarbic and hypoxic respiratory failure -in the setting of COPD exacerbation.  Admit per COPD protocol.  Duo nebs around-the-clock.  Steroids 40 IV every 6.  Check sputum culture.  IV azithromycin.  Continue BiPAP.  N.p.o. for now.  Admit to ICU for low-dose monitoring current presentation.    #2 toxic metabolic encephalopathy -in the setting of hypercarbic respiratory failure.  His mental status improving with BiPAP.  He is nonfocal on exam.  Continue to monitor.    #3 diabetes,  insulin-dependent -on large doses of insulin at home.  Sugar 300 on arrival.  He will be getting steroids.  Will admit with Levemir nightly and sliding scale for now as he is n.p.o.  Hypoglycemia protocol in place.  Monitor closely.    #4 CAD -with history of CABG.  No active chest pain or issues.  Resume home meds as able    #5 history of colon cancer -status post resection with ostomy creation.  No recent issues.  Ostomy care.      Code Status: Full Code     I discussed the patient's findings and my recommendations with the patient and family at bedside    Estimated length of stay 4-5 days    Dhruv Rausch DO   01/13/20   12:47 PM

## 2020-01-13 NOTE — PROGRESS NOTES
Discharge Planning Assessment   Juan     Patient Name: Sai Adam  MRN: [id number]  Today's Date: 1/13/2020    Admit Date: 1/13/2020    Discharge Needs Assessment    No documentation.       Discharge Plan     Row Name 01/13/20 1132       Plan    Plan  Spoke to Sandrine BENSON Sai to notifiy him that pt is going to be transferred and is unstable for transport.  BIPAP and ED MD said may have to be intubated.  Was told to admit here, of course, per approval Dr Joshi.  Updated Dr Carrero.       Juju Lambert, RN

## 2020-01-14 PROBLEM — E83.39 HYPOPHOSPHATEMIA: Status: ACTIVE | Noted: 2020-01-14

## 2020-01-14 LAB
ALBUMIN SERPL-MCNC: 3.9 G/DL (ref 3.5–5.2)
ALBUMIN/GLOB SERPL: 1.3 G/DL
ALP SERPL-CCNC: 94 U/L (ref 39–117)
ALT SERPL W P-5'-P-CCNC: 10 U/L (ref 1–41)
ANION GAP SERPL CALCULATED.3IONS-SCNC: 6 MMOL/L (ref 5–15)
ARTERIAL PATENCY WRIST A: POSITIVE
AST SERPL-CCNC: 22 U/L (ref 1–40)
ATMOSPHERIC PRESS: 756 MMHG
BASE EXCESS BLDA CALC-SCNC: 11.2 MMOL/L (ref 0–2)
BASOPHILS # BLD AUTO: 0.02 10*3/MM3 (ref 0–0.2)
BASOPHILS NFR BLD AUTO: 0.2 % (ref 0–1.5)
BDY SITE: ABNORMAL
BILIRUB SERPL-MCNC: 0.4 MG/DL (ref 0.2–1.2)
BODY TEMPERATURE: 37 C
BUN BLD-MCNC: 17 MG/DL (ref 8–23)
BUN/CREAT SERPL: 27 (ref 7–25)
CALCIUM SPEC-SCNC: 9.3 MG/DL (ref 8.6–10.5)
CHLORIDE SERPL-SCNC: 94 MMOL/L (ref 98–107)
CO2 SERPL-SCNC: 38 MMOL/L (ref 22–29)
CREAT BLD-MCNC: 0.63 MG/DL (ref 0.76–1.27)
DEPRECATED RDW RBC AUTO: 41.8 FL (ref 37–54)
EOSINOPHIL # BLD AUTO: 0 10*3/MM3 (ref 0–0.4)
EOSINOPHIL NFR BLD AUTO: 0 % (ref 0.3–6.2)
EPAP: 4
ERYTHROCYTE [DISTWIDTH] IN BLOOD BY AUTOMATED COUNT: 12.7 % (ref 12.3–15.4)
GFR SERPL CREATININE-BSD FRML MDRD: 129 ML/MIN/1.73
GLOBULIN UR ELPH-MCNC: 3 GM/DL
GLUCOSE BLD-MCNC: 222 MG/DL (ref 65–99)
GLUCOSE BLDC GLUCOMTR-MCNC: 201 MG/DL (ref 70–130)
GLUCOSE BLDC GLUCOMTR-MCNC: 231 MG/DL (ref 70–130)
GLUCOSE BLDC GLUCOMTR-MCNC: 236 MG/DL (ref 70–130)
GLUCOSE BLDC GLUCOMTR-MCNC: 246 MG/DL (ref 70–130)
GLUCOSE BLDC GLUCOMTR-MCNC: 270 MG/DL (ref 70–130)
GLUCOSE BLDC GLUCOMTR-MCNC: 282 MG/DL (ref 70–130)
HCO3 BLDA-SCNC: 38.8 MMOL/L (ref 20–26)
HCT VFR BLD AUTO: 42.1 % (ref 37.5–51)
HGB BLD-MCNC: 13.3 G/DL (ref 13–17.7)
HOROWITZ INDEX BLD+IHG-RTO: 45 %
IMM GRANULOCYTES # BLD AUTO: 0.07 10*3/MM3 (ref 0–0.05)
IMM GRANULOCYTES NFR BLD AUTO: 0.6 % (ref 0–0.5)
IPAP: 22
LYMPHOCYTES # BLD AUTO: 1.75 10*3/MM3 (ref 0.7–3.1)
LYMPHOCYTES NFR BLD AUTO: 15.3 % (ref 19.6–45.3)
Lab: ABNORMAL
MAGNESIUM SERPL-MCNC: 2 MG/DL (ref 1.6–2.4)
MCH RBC QN AUTO: 28.5 PG (ref 26.6–33)
MCHC RBC AUTO-ENTMCNC: 31.6 G/DL (ref 31.5–35.7)
MCV RBC AUTO: 90.1 FL (ref 79–97)
MODALITY: ABNORMAL
MONOCYTES # BLD AUTO: 0.46 10*3/MM3 (ref 0.1–0.9)
MONOCYTES NFR BLD AUTO: 4 % (ref 5–12)
NEUTROPHILS # BLD AUTO: 9.15 10*3/MM3 (ref 1.7–7)
NEUTROPHILS NFR BLD AUTO: 79.9 % (ref 42.7–76)
NRBC BLD AUTO-RTO: 0 /100 WBC (ref 0–0.2)
PCO2 BLDA: 62.8 MM HG (ref 35–45)
PH BLDA: 7.4 PH UNITS (ref 7.35–7.45)
PHOSPHATE SERPL-MCNC: 1.6 MG/DL (ref 2.5–4.5)
PLATELET # BLD AUTO: 156 10*3/MM3 (ref 140–450)
PMV BLD AUTO: 9.4 FL (ref 6–12)
PO2 BLDA: 58.9 MM HG (ref 83–108)
POTASSIUM BLD-SCNC: 4.2 MMOL/L (ref 3.5–5.2)
PROT SERPL-MCNC: 6.9 G/DL (ref 6–8.5)
RBC # BLD AUTO: 4.67 10*6/MM3 (ref 4.14–5.8)
SAO2 % BLDCOA: 93.3 % (ref 94–99)
SET MECH RESP RATE: 25
SODIUM BLD-SCNC: 138 MMOL/L (ref 136–145)
VENTILATOR MODE: ABNORMAL
WBC NRBC COR # BLD: 11.45 10*3/MM3 (ref 3.4–10.8)

## 2020-01-14 PROCEDURE — 94799 UNLISTED PULMONARY SVC/PX: CPT

## 2020-01-14 PROCEDURE — 63710000001 INSULIN LISPRO (HUMAN) PER 5 UNITS: Performed by: INTERNAL MEDICINE

## 2020-01-14 PROCEDURE — 94660 CPAP INITIATION&MGMT: CPT

## 2020-01-14 PROCEDURE — 85025 COMPLETE CBC W/AUTO DIFF WBC: CPT | Performed by: INTERNAL MEDICINE

## 2020-01-14 PROCEDURE — 99406 BEHAV CHNG SMOKING 3-10 MIN: CPT

## 2020-01-14 PROCEDURE — 25010000002 AZITHROMYCIN PER 500 MG: Performed by: INTERNAL MEDICINE

## 2020-01-14 PROCEDURE — 94770: CPT

## 2020-01-14 PROCEDURE — 63710000001 INSULIN DETEMIR PER 5 UNITS: Performed by: INTERNAL MEDICINE

## 2020-01-14 PROCEDURE — 63710000001 INSULIN REGULAR HUMAN PER 5 UNITS: Performed by: INTERNAL MEDICINE

## 2020-01-14 PROCEDURE — 82962 GLUCOSE BLOOD TEST: CPT

## 2020-01-14 PROCEDURE — 25010000002 ENOXAPARIN PER 10 MG: Performed by: INTERNAL MEDICINE

## 2020-01-14 PROCEDURE — 80053 COMPREHEN METABOLIC PANEL: CPT | Performed by: INTERNAL MEDICINE

## 2020-01-14 PROCEDURE — 83735 ASSAY OF MAGNESIUM: CPT | Performed by: INTERNAL MEDICINE

## 2020-01-14 PROCEDURE — 94664 DEMO&/EVAL PT USE INHALER: CPT

## 2020-01-14 PROCEDURE — 84100 ASSAY OF PHOSPHORUS: CPT | Performed by: INTERNAL MEDICINE

## 2020-01-14 PROCEDURE — 25010000002 METHYLPREDNISOLONE PER 40 MG: Performed by: INTERNAL MEDICINE

## 2020-01-14 RX ORDER — HYDROCORTISONE ACETATE 25 MG/1
25 SUPPOSITORY RECTAL 2 TIMES DAILY PRN
COMMUNITY

## 2020-01-14 RX ORDER — PREGABALIN 150 MG/1
300 CAPSULE ORAL 2 TIMES DAILY
COMMUNITY
End: 2020-08-14 | Stop reason: DRUGHIGH

## 2020-01-14 RX ORDER — GUAIFENESIN 400 MG/1
800 TABLET ORAL EVERY 12 HOURS
COMMUNITY

## 2020-01-14 RX ORDER — CARBOXYMETHYLCELLULOSE SODIUM 5 MG/ML
1 SOLUTION/ DROPS OPHTHALMIC 4 TIMES DAILY PRN
COMMUNITY

## 2020-01-14 RX ORDER — FOLIC ACID 1 MG/1
1 TABLET ORAL DAILY
COMMUNITY

## 2020-01-14 RX ORDER — WATER / MINERAL OIL / WHITE PETROLATUM 16 OZ
CREAM TOPICAL AS NEEDED
COMMUNITY

## 2020-01-14 RX ORDER — ECHINACEA PURPUREA EXTRACT 125 MG
2 TABLET ORAL AS NEEDED
COMMUNITY

## 2020-01-14 RX ORDER — OLANZAPINE 10 MG/1
10 TABLET ORAL NIGHTLY
COMMUNITY

## 2020-01-14 RX ORDER — MULTIPLE VITAMINS W/ MINERALS TAB 9MG-400MCG
1 TAB ORAL DAILY
COMMUNITY

## 2020-01-14 RX ORDER — FUROSEMIDE 20 MG/1
20 TABLET ORAL DAILY
COMMUNITY

## 2020-01-14 RX ORDER — GUAIFENESIN 600 MG/1
600 TABLET, EXTENDED RELEASE ORAL EVERY 12 HOURS SCHEDULED
Status: DISCONTINUED | OUTPATIENT
Start: 2020-01-14 | End: 2020-01-17 | Stop reason: HOSPADM

## 2020-01-14 RX ORDER — ATENOLOL 100 MG/1
100 TABLET ORAL EVERY 12 HOURS SCHEDULED
Status: DISCONTINUED | OUTPATIENT
Start: 2020-01-14 | End: 2020-01-16

## 2020-01-14 RX ORDER — PREGABALIN 100 MG/1
300 CAPSULE ORAL EVERY 12 HOURS SCHEDULED
Status: DISCONTINUED | OUTPATIENT
Start: 2020-01-14 | End: 2020-01-17 | Stop reason: HOSPADM

## 2020-01-14 RX ORDER — DULOXETIN HYDROCHLORIDE 30 MG/1
30 CAPSULE, DELAYED RELEASE ORAL DAILY
COMMUNITY

## 2020-01-14 RX ORDER — CYCLOBENZAPRINE HCL 10 MG
10 TABLET ORAL
Status: ON HOLD | COMMUNITY
End: 2020-11-18 | Stop reason: SDUPTHER

## 2020-01-14 RX ORDER — POLYETHYLENE GLYCOL 3350 17 G/17G
17 POWDER, FOR SOLUTION ORAL DAILY
COMMUNITY

## 2020-01-14 RX ORDER — MORPHINE SULFATE 15 MG/1
15 TABLET ORAL DAILY PRN
COMMUNITY
End: 2020-08-17

## 2020-01-14 RX ORDER — FAMOTIDINE 20 MG/1
20 TABLET, FILM COATED ORAL EVERY 12 HOURS SCHEDULED
Status: DISCONTINUED | OUTPATIENT
Start: 2020-01-14 | End: 2020-01-17 | Stop reason: HOSPADM

## 2020-01-14 RX ORDER — ACETAMINOPHEN 325 MG/1
650 TABLET ORAL EVERY 6 HOURS PRN
COMMUNITY
End: 2020-08-14

## 2020-01-14 RX ORDER — GINSENG 100 MG
CAPSULE ORAL DAILY
COMMUNITY

## 2020-01-14 RX ORDER — TRAZODONE HYDROCHLORIDE 100 MG/1
100 TABLET ORAL NIGHTLY
COMMUNITY
End: 2020-01-17 | Stop reason: HOSPADM

## 2020-01-14 RX ORDER — LORATADINE 10 MG/1
10 TABLET ORAL DAILY
COMMUNITY

## 2020-01-14 RX ORDER — ALBUTEROL SULFATE 2.5 MG/3ML
2.5 SOLUTION RESPIRATORY (INHALATION) EVERY 6 HOURS PRN
COMMUNITY

## 2020-01-14 RX ORDER — DOXAZOSIN MESYLATE 4 MG/1
4 TABLET ORAL NIGHTLY
COMMUNITY
End: 2020-01-17 | Stop reason: HOSPADM

## 2020-01-14 RX ORDER — NIFEDIPINE 30 MG/1
30 TABLET, EXTENDED RELEASE ORAL DAILY
COMMUNITY

## 2020-01-14 RX ORDER — NALOXONE HYDROCHLORIDE 4 MG/.1ML
1 SPRAY NASAL AS NEEDED
COMMUNITY

## 2020-01-14 RX ORDER — ALBUTEROL SULFATE 90 UG/1
2 AEROSOL, METERED RESPIRATORY (INHALATION) 4 TIMES DAILY PRN
COMMUNITY

## 2020-01-14 RX ADMIN — INSULIN HUMAN 5 UNITS: 100 INJECTION, SOLUTION PARENTERAL at 05:58

## 2020-01-14 RX ADMIN — INSULIN LISPRO 10 UNITS: 100 INJECTION, SOLUTION INTRAVENOUS; SUBCUTANEOUS at 13:04

## 2020-01-14 RX ADMIN — METHYLPREDNISOLONE SODIUM SUCCINATE 40 MG: 40 INJECTION, POWDER, LYOPHILIZED, FOR SOLUTION INTRAMUSCULAR; INTRAVENOUS at 20:10

## 2020-01-14 RX ADMIN — INSULIN HUMAN 5 UNITS: 100 INJECTION, SOLUTION PARENTERAL at 13:03

## 2020-01-14 RX ADMIN — TAMSULOSIN HYDROCHLORIDE 0.4 MG: 0.4 CAPSULE ORAL at 20:10

## 2020-01-14 RX ADMIN — ATORVASTATIN CALCIUM 40 MG: 40 TABLET, FILM COATED ORAL at 20:10

## 2020-01-14 RX ADMIN — LEVOTHYROXINE SODIUM 50 MCG: 50 TABLET ORAL at 05:58

## 2020-01-14 RX ADMIN — SODIUM CHLORIDE, PRESERVATIVE FREE 10 ML: 5 INJECTION INTRAVENOUS at 20:10

## 2020-01-14 RX ADMIN — FINASTERIDE 5 MG: 5 TABLET, FILM COATED ORAL at 10:28

## 2020-01-14 RX ADMIN — SPIRONOLACTONE 25 MG: 25 TABLET ORAL at 10:29

## 2020-01-14 RX ADMIN — INSULIN HUMAN 8 UNITS: 100 INJECTION, SOLUTION PARENTERAL at 00:06

## 2020-01-14 RX ADMIN — ENOXAPARIN SODIUM 40 MG: 40 INJECTION SUBCUTANEOUS at 17:51

## 2020-01-14 RX ADMIN — IPRATROPIUM BROMIDE AND ALBUTEROL SULFATE 3 ML: 2.5; .5 SOLUTION RESPIRATORY (INHALATION) at 01:03

## 2020-01-14 RX ADMIN — ATROPINE SULFATE 1 DROP: 10 SOLUTION/ DROPS OPHTHALMIC at 20:10

## 2020-01-14 RX ADMIN — METHYLPREDNISOLONE SODIUM SUCCINATE 40 MG: 40 INJECTION, POWDER, LYOPHILIZED, FOR SOLUTION INTRAMUSCULAR; INTRAVENOUS at 17:50

## 2020-01-14 RX ADMIN — METHYLPREDNISOLONE SODIUM SUCCINATE 40 MG: 40 INJECTION, POWDER, LYOPHILIZED, FOR SOLUTION INTRAMUSCULAR; INTRAVENOUS at 04:03

## 2020-01-14 RX ADMIN — INSULIN LISPRO 10 UNITS: 100 INJECTION, SOLUTION INTRAVENOUS; SUBCUTANEOUS at 17:53

## 2020-01-14 RX ADMIN — DORZOLAMIDE HYDROCHLORIDE 1 DROP: 20 SOLUTION OPHTHALMIC at 20:10

## 2020-01-14 RX ADMIN — SODIUM PHOSPHATE, MONOBASIC, MONOHYDRATE 30 MMOL: 276; 142 INJECTION, SOLUTION INTRAVENOUS at 05:58

## 2020-01-14 RX ADMIN — METHYLPREDNISOLONE SODIUM SUCCINATE 40 MG: 40 INJECTION, POWDER, LYOPHILIZED, FOR SOLUTION INTRAMUSCULAR; INTRAVENOUS at 10:22

## 2020-01-14 RX ADMIN — BUDESONIDE 0.25 MG: 0.25 INHALANT RESPIRATORY (INHALATION) at 19:14

## 2020-01-14 RX ADMIN — GUAIFENESIN 600 MG: 600 TABLET, EXTENDED RELEASE ORAL at 20:09

## 2020-01-14 RX ADMIN — INSULIN HUMAN 5 UNITS: 100 INJECTION, SOLUTION PARENTERAL at 17:51

## 2020-01-14 RX ADMIN — ATENOLOL 100 MG: 100 TABLET ORAL at 20:09

## 2020-01-14 RX ADMIN — ATROPINE SULFATE 1 DROP: 10 SOLUTION/ DROPS OPHTHALMIC at 10:32

## 2020-01-14 RX ADMIN — IPRATROPIUM BROMIDE AND ALBUTEROL SULFATE 3 ML: 2.5; .5 SOLUTION RESPIRATORY (INHALATION) at 19:15

## 2020-01-14 RX ADMIN — CLOPIDOGREL BISULFATE 75 MG: 75 TABLET, FILM COATED ORAL at 10:29

## 2020-01-14 RX ADMIN — FAMOTIDINE 20 MG: 20 TABLET, FILM COATED ORAL at 20:09

## 2020-01-14 RX ADMIN — BUDESONIDE 0.25 MG: 0.25 INHALANT RESPIRATORY (INHALATION) at 09:36

## 2020-01-14 RX ADMIN — PREGABALIN 150 MG: 75 CAPSULE ORAL at 10:30

## 2020-01-14 RX ADMIN — DORZOLAMIDE HYDROCHLORIDE 1 DROP: 20 SOLUTION OPHTHALMIC at 10:32

## 2020-01-14 RX ADMIN — IPRATROPIUM BROMIDE AND ALBUTEROL SULFATE 3 ML: 2.5; .5 SOLUTION RESPIRATORY (INHALATION) at 12:13

## 2020-01-14 RX ADMIN — PREGABALIN 300 MG: 100 CAPSULE ORAL at 20:10

## 2020-01-14 RX ADMIN — IPRATROPIUM BROMIDE AND ALBUTEROL SULFATE 3 ML: 2.5; .5 SOLUTION RESPIRATORY (INHALATION) at 06:21

## 2020-01-14 RX ADMIN — GUAIFENESIN 1200 MG: 600 TABLET, EXTENDED RELEASE ORAL at 10:28

## 2020-01-14 RX ADMIN — FAMOTIDINE 20 MG: 10 INJECTION, SOLUTION INTRAVENOUS at 10:31

## 2020-01-14 RX ADMIN — AZITHROMYCIN MONOHYDRATE 500 MG: 500 INJECTION, POWDER, LYOPHILIZED, FOR SOLUTION INTRAVENOUS at 17:50

## 2020-01-14 RX ADMIN — INSULIN DETEMIR 40 UNITS: 100 INJECTION, SOLUTION SUBCUTANEOUS at 20:10

## 2020-01-14 NOTE — PLAN OF CARE
Problem: Patient Care Overview  Goal: Plan of Care Review  Outcome: Ongoing (interventions implemented as appropriate)  Flowsheets (Taken 1/14/2020 0326)  Progress: no change  Plan of Care Reviewed With: patient  Outcome Summary: Pt transferred from CCU. No c/o pain this shift. Bipap worn throughout shift, VSS. IV steroids given. AccuChek with Levemir and SSI coverage. Safety maintained. Continue to monitor.

## 2020-01-14 NOTE — PAYOR COMM NOTE
"[facility]  MAT[place],  [phone]  OR   FAX   [phone]    Sai Adam (59 y.o. Male)     Date of Birth Social Security Number Address Home Phone MRN    1960  [address] [phone] [id number]    Sabianist Marital Status          P[employer]ism        Admission Date Admission Type Admitting Provider Attending Provider Department, Room/Bed    1/13/20 Emergency Dhruv Rausch DO Demeo, Michael F, DO [facility] 4C, 496/1    Discharge Date Discharge Disposition Discharge Destination                       Attending Provider:  Dhruv Rausch DO    Allergies:  Tetanus Toxoids, Lamotrigine, Lisinopril, Methadone, Penicillins, Tramadol    Isolation:  None   Infection:  None   Code Status:  CPR    Ht:  170.2 cm (67\")   Wt:  65.3 kg (143 lb 14.4 oz)    Admission Cmt:  None   Principal Problem:  Acute on chronic respiratory failure with hypoxia and hypercapnia (CMS/HCC) [J96.21,J96.22]                 Active Insurance as of 1/13/2020     Primary Coverage     Payor Plan Insurance Group Employer/Plan Group    [employer] [id number]      Payor Plan Address Payor Plan Phone Number Payor Plan Fax Number Effective Dates    [employer] 04 [phone]  12/1/2014 - None Entered    [address] [id number]       Subscriber Name Subscriber Birth Date Member ID       SAI ADAM 1960 [id number]                 Emergency Contacts      (Rel.) Home Phone Work Phone Mobile Phone    Keith Wilcox (Son) [phone] -- --    JagjitAntonio (Son) [phone] -- --    NUBIA RIDLEY (Daughter) -- -- [phone]               History & Physical      Dhruv Rausch DO at 01/13/20 1247              [facility]  HISTORY AND PHYSICAL    Date of Admission: 1/13/2020  Primary Care Physician: Jarod Tillman MD    Subjective     Chief Complaint: Altered mental " status    History of Present Illness  This is a 59-year-old male with chronic hypoxic respiratory failure on 5 L O2 at baseline, sleep apnea on NIPPV at home, COPD CAD post CABG, diabetes.  [facility]  He has had a cough for several days at home but denied any overt shortness of breath or fevers.  Was seen normal last night.  This morning was altered and hard to wake up.  Presented into the ER was found to be in hypercarbic respiratory failure with a CO2 of 96 and a pH of 7.23.  PO2 was 59.  Patient was placed on BiPAP and worked up in the ER.  Chest imaging negative for any overt pneumonia.  He received steroids and nebulizers.  Repeat ABG improved to pH of 7.28 with a CO2 of 87.  Patient starting to wake up.  He is able to answer questions at this time.  Says he feels okay.  Denies any chest pain.  Again no fever chills.  No abdominal pain, nausea, vomiting, diarrhea.  No focal weakness.  We have been asked to admit for continued care.        Review of Systems   Otherwise complete ROS reviewed and negative except as mentioned in the HPI.    Past Medical History:   Past Medical History:   Diagnosis Date   • Arthritis    • CAD (coronary artery disease)    • Colon cancer (CMS/HCC) 11/2016   • COPD (chronic obstructive pulmonary disease) (CMS/HCC)    • Diabetes mellitus (CMS/[facility])    • HLD (hyperlipidemia)    • Hypertension      Past Surgical History:  Past Surgical History:   Procedure Laterality Date   • CARDIAC SURGERY     • CARPAL TUNNEL RELEASE     • COLON RESECTION WITH COLOSTOMY     • COLON SURGERY     • COLONOSCOPY  05/27/2016   • COLONOSCOPY N/A 10/12/2017    Procedure: COLONOSCOPY WITH ANESTHESIA;  Surgeon: Yessenia Gregg MD;  Location: [facility] ENDOSCOPY;  Service:    • CORONARY ANGIOPLASTY WITH STENT PLACEMENT     • HERNIA REPAIR     • ROTATOR CUFF REPAIR       Social History:  reports that he has been smoking. He has a 40.00 pack-year smoking history. He has never used smokeless tobacco. He  reports that he does not drink alcohol or use drugs.    Family History: family history includes Arthritis in his father; Cancer in his brother; Diabetes in his brother; Heart disease in his father; Hepatitis in his brother; Hypertension in his brother and mother; Stroke in his mother.       Allergies:  Allergies   Allergen Reactions   • Tetanus Toxoids Shortness Of Breath and Swelling   • Lamotrigine      Unknown     • Lisinopril Hives   • Methadone Swelling   • Penicillins Rash   • Tramadol Rash     Medications:  Prior to Admission medications    Medication Sig Start Date End Date Taking? Authorizing Provider   arformoterol (BROVANA) 15 MCG/2ML nebulizer solution Take 2 mL by nebulization 2 (Two) Times a Day. 2 boxes 1/23/18   Dimitri Guevara MD   atenolol (TENORMIN) 25 MG tablet Take 1 tablet by mouth Every 12 (Twelve) Hours.  Patient taking differently: Take 100 mg by mouth Every 12 (Twelve) Hours. 11/28/17   Ernie Cornejo DO   atorvastatin (LIPITOR) 40 MG tablet Take 1 tablet by mouth Every Night. 1/23/18   Dimitri Guevara MD   atropine 1 % ophthalmic solution Administer 1 drop to the right eye 2 (Two) Times a Day.    ProviderErin MD   benzonatate (TESSALON) 100 MG capsule Take 2 capsules by mouth 3 (Three) Times a Day As Needed for Cough. 1/23/18   Dimitri Guevara MD   budesonide (PULMICORT) 0.25 MG/2ML nebulizer solution Take 2 mL by nebulization 2 (Two) Times a Day. 1/23/18   Dimitri Guevara MD   Cholecalciferol (VITAMIN D) 2000 units tablet Take 4,000 Units by mouth Daily.    Erin Ambrocio MD   clopidogrel (PLAVIX) 75 MG tablet Take 75 mg by mouth Daily.    Erin Ambrocio MD   docusate sodium (COLACE) 100 MG capsule Take 100 mg by mouth 2 (Two) Times a Day As Needed for Constipation.    Erin Ambrocio MD   dorzolamide (TRUSOPT) 2 % ophthalmic solution Administer 1 drop into the left eye 2 (Two) Times a Day.    Erin Ambrocio MD   finasteride (PROSCAR) 5 MG tablet  Take 1 tablet by mouth Daily. 1/24/18   Dimitri Guevara MD   guaiFENesin (MUCINEX) 600 MG 12 hr tablet Take 2 tablets by mouth Every 12 (Twelve) Hours. 1/2/18   Leonel Roth APRN   insulin aspart (novoLOG) 100 UNIT/ML injection Inject 40 Units under the skin 3 (Three) Times a Day Before Meals.    Erin Ambrocio MD   insulin glargine (LANTUS) 100 UNIT/ML injection Inject 160 Units under the skin Daily.    Erin Ambrocio MD   ipratropium-albuterol (DUONEB) 0.5-2.5 mg/mL nebulizer Take 3 mL by nebulization Every 4 (Four) Hours As Needed for Wheezing or Shortness of Air. 1/23/18   Dimitri Guevara MD   isosorbide mononitrate (IMDUR) 60 MG 24 hr tablet Take 90 mg by mouth Daily.    Erin Ambrocio MD   levoFLOXacin (LEVAQUIN) 500 MG tablet Take 1 tablet by mouth Daily. 1/23/18   Dimitri Guevara MD   levothyroxine (SYNTHROID, LEVOTHROID) 50 MCG tablet Take 50 mcg by mouth Daily.    Erin Ambrocio MD   losartan (COZAAR) 100 MG tablet Take 100 mg by mouth Daily.    Erin Ambrocio MD   metFORMIN (GLUCOPHAGE) 500 MG tablet Take 1,000 mg by mouth 2 (Two) Times a Day With Meals.    Erin Ambrocio MD   MethylPREDNISolone (MEDROL, SHELL,) 4 MG tablet Take as directed on package instructions. 1/23/18   Dimitri Guevara MD   nicotine (NICODERM CQ) 14 MG/24HR patch Place 1 patch on the skin Daily. 1/24/18   Dimitri Guevara MD   nitroglycerin (NITROSTAT) 0.4 MG SL tablet Place 0.4 mg under the tongue Every 5 (Five) Minutes As Needed for Chest Pain.    Erin Ambrocio MD   potassium chloride (K-DUR,KLOR-CON) 20 MEQ CR tablet Take 40 mEq by mouth 2 (Two) Times a Day.    Erin Ambrocio MD   pregabalin (LYRICA) 150 MG capsule Take 1 capsule by mouth Every 12 (Twelve) Hours.  Patient taking differently: Take 300 mg by mouth Every 12 (Twelve) Hours. 11/28/17   Cornejo, Ernie S, DO   raNITIdine (ZANTAC) 150 MG tablet Take 150 mg by mouth 2 (Two) Times a Day As Needed for  "Indigestion.    Erin Ambrocio MD   SAXagliptin (ONGLYZA) 5 MG tablet Take 5 mg by mouth Daily.    Erin Ambrocio MD   spironolactone (ALDACTONE) 25 MG tablet Take 25 mg by mouth Daily.    Erin Ambrocio MD   tamsulosin (FLOMAX) 0.4 MG capsule 24 hr capsule Take 1 capsule by mouth Every Night.    Erin Ambrocio MD   venlafaxine XR (EFFEXOR-XR) 75 MG 24 hr capsule Take 225 mg by mouth Daily.    Erin Ambrocoi MD     Objective     Vital Signs: /68 (BP Location: Left arm, Patient Position: Sitting)   Pulse 108   Temp 100.1 °F (37.8 °C) (Oral)   Resp 26   Ht 170.2 cm (67\")   Wt 77.1 kg (170 lb)   SpO2 91%   BMI 26.63 kg/m²    Physical Exam  GEN: Awake, alert, interactive, in NAD  HEENT: PERRLA, EOMI, Anicteric, Trachea midline  Lungs: diminished bs b/l w/ end exp wheezing, no rales  Heart: tachycardic, regular rhythm, +S1/s2  ABD: soft, nt/nd, +BS, no guarding/rebound. + ostomy LLQ  Extremities: atraumatic, no cyanosis, no edema  Skin: no rashes or lesions  Neuro: AAOx3, no focal deficits  Psych: normal mood & affect        Results Reviewed:  Lab Results (last 24 hours)     Procedure Component Value Units Date/Time    Blood Culture - Blood, Hand, Right [[id number]] Collected:  01/13/20 1052    Specimen:  Blood from Hand, Right Updated:  01/13/20 1233    Lactic Acid, Plasma [[id number]]  (Normal) Collected:  01/13/20 1052    Specimen:  Blood Updated:  01/13/20 1128     Lactate 1.4 mmol/L     Blood Culture - Blood, Hand, Right [[id number]] Collected:  01/13/20 1052    Specimen:  Blood from Hand, Right Updated:  01/13/20 1126    Blood Gas, Arterial [[id number]]  (Abnormal) Collected:  01/13/20 1050    Specimen:  Arterial Blood Updated:  01/13/20 1122     Site Right Radial     Jarad's Test Positive     pH, Arterial 7.278 pH units      Comment: 84 Value below reference range        pCO2, Arterial 87.0 mm Hg      Comment: 86 Value above critical limit        pO2, Arterial 61.4 mm Hg "      Comment: 84 Value below reference range        HCO3, Arterial 40.7 mmol/L      Comment: 83 Value above reference range        Base Excess, Arterial 10.0 mmol/L      Comment: 83 Value above reference range        O2 Saturation, Arterial 91.7 %      Comment: 84 Value below reference range        Temperature 37.0 C      Barometric Pressure for Blood Gas 760 mmHg      Modality BiPap     FIO2 45 %      Ventilator Mode BiPAP     IPAP 19     Comment: Meter: [id number]     :  [id number]        EPA 4     Notified Who DR SCHULTZ     Notified By [id number]     Notified Time 01/13/2020 11:14     Collected by [id number]    [facility] Draw [[id number]] Collected:  01/13/20 1005    Specimen:  Blood Updated:  01/13/20 1116    Narrative:       The following orders were created for panel order [facility] Draw.  Procedure                               Abnormality         Status                     ---------                               -----------         ------                     Light Blue Top[[id number]]                                   Final result               Green Top (Gel)[[id number]]                                  Final result               Lavender Top[[id number]]                                     Final result               Red Top[[id number]]                                          Final result               [facility] Blood Culture Scott...[[id number]]                      Final result                 Please view results for these tests on the individual orders.    Light Blue Top [[id number]] Collected:  01/13/20 1005    Specimen:  Blood Updated:  01/13/20 1116     Extra Tube hold for add-on     Comment: Auto resulted       [facility] Blood Culture Bottle Set [[id number]] Collected:  01/13/20 1005    Specimen:  Blood from Hand, Right Updated:  01/13/20 1116     Extra Tube Hold for add-ons.     Comment: Auto resulted.       Green Top (Gel) [[id number]] Collected:  01/13/20 1005    Specimen:  Blood Updated:  01/13/20 1116      Extra Tube Hold for add-ons.     Comment: Auto resulted.       Red Top [[id number]] Collected:  01/13/20 1005    Specimen:  Blood Updated:  01/13/20 1115     Extra Tube Hold for add-ons.     Comment: Auto resulted.       Lavender Top [[id number]] Collected:  01/13/20 1005    Specimen:  Blood Updated:  01/13/20 1115     Extra Tube hold for add-on     Comment: Auto resulted       Comprehensive Metabolic Panel [[id number]]  (Abnormal) Collected:  01/13/20 1005    Specimen:  Blood Updated:  01/13/20 1055     Glucose 300 mg/dL      BUN 5 mg/dL      Creatinine 0.61 mg/dL      Sodium 137 mmol/L      Potassium 4.3 mmol/L      Chloride 94 mmol/L      CO2 37.0 mmol/L      Calcium 8.8 mg/dL      Total Protein 7.1 g/dL      Albumin 4.10 g/dL      ALT (SGPT) 11 U/L      AST (SGOT) 24 U/L      Alkaline Phosphatase 115 U/L      Total Bilirubin 0.5 mg/dL      eGFR Non African Amer 134 mL/min/1.73      Globulin 3.0 gm/dL      A/G Ratio 1.4 g/dL      BUN/Creatinine Ratio 8.2     Anion Gap 6.0 mmol/L     Narrative:       GFR Normal >60  Chronic Kidney Disease <60  Kidney Failure <15      CBC & Differential [[id number]] Collected:  01/13/20 1005    Specimen:  Blood Updated:  01/13/20 1036    Narrative:       The following orders were created for panel order CBC & Differential.  Procedure                               Abnormality         Status                     ---------                               -----------         ------                     CBC Auto Differential[[id number]]        Abnormal            Final result                 Please view results for these tests on the individual orders.    CBC Auto Differential [[id number]]  (Abnormal) Collected:  01/13/20 1005    Specimen:  Blood Updated:  01/13/20 1036     WBC 12.67 10*3/mm3      RBC 4.93 10*6/mm3      Hemoglobin 14.1 g/dL      Hematocrit 45.8 %      MCV 92.9 fL      MCH 28.6 pg      MCHC 30.8 g/dL      RDW 12.8 %      RDW-SD 44.0 fl      MPV 9.1 fL      Platelets 150 10*3/mm3       Neutrophil % 79.5 %      Lymphocyte % 11.6 %      Monocyte % 6.9 %      Eosinophil % 0.0 %      Basophil % 0.5 %      Immature Grans % 1.5 %      Neutrophils, Absolute 10.07 10*3/mm3      Lymphocytes, Absolute 1.47 10*3/mm3      Monocytes, Absolute 0.88 10*3/mm3      Eosinophils, Absolute 0.00 10*3/mm3      Basophils, Absolute 0.06 10*3/mm3      Immature Grans, Absolute 0.19 10*3/mm3      nRBC 0.0 /100 WBC     Blood Gas, Arterial With Co-Ox [[id number]]  (Abnormal) Collected:  01/13/20 1000    Specimen:  Arterial Blood Updated:  01/13/20 1007     Site Right Brachial     Jarad's Test N/A     pH, Arterial 7.231 pH units      Comment: 84 Value below reference range        pCO2, Arterial 95.8 mm Hg      Comment: 86 Value above critical limit        pO2, Arterial 59.5 mm Hg      Comment: 84 Value below reference range        HCO3, Arterial 40.2 mmol/L      Comment: 83 Value above reference range        Base Excess, Arterial 8.6 mmol/L      Comment: 83 Value above reference range        O2 Saturation, Arterial 89.6 %      Comment: 84 Value below reference range        Hemoglobin, Blood Gas 14.0 g/dL      Hematocrit, Blood Gas 42.9 %      Oxyhemoglobin 86.1 %      Comment: 84 Value below reference range        Methemoglobin 0.20 %      Carboxyhemoglobin 3.7 %      A-a Gradiant --     Comment: UNABLE TO CALCULATE        Temperature 37.0 C      Sodium, Arterial 141 mmol/L      Potassium, Arterial 4.2 mmol/L      Barometric Pressure for Blood Gas 761 mmHg      Modality Nasal Cannula     Flow Rate 5.0 lpm      Ventilator Mode NA     Note --     Notified Who DR SCHULTZ     Notified By [id number]     Notified Time 01/13/2020 10:09     Collected by [id number]     Comment: Meter: [id number]     :  [id number]        pH, Temp Corrected --     pCO2, Temperature Corrected --     pO2, Temperature Corrected --        Imaging Results (Last 24 Hours)     Procedure Component Value Units Date/Time    XR Chest 1 View [[id number]]  Collected:  01/13/20 1052     Updated:  01/13/20 1059    Narrative:       EXAMINATION: XR CHEST 1 VW- 1/13/2020 10:52 AM CST     HISTORY: Shortness of breath     COMPARISON: 01/18/2019     FINDINGS:  Heart and mediastinal contours are stable. There has been prior median  sternotomy. Upright approach Port-A-Cath is in place with tip projecting  over the mid to distal SVC. There is atelectasis in the lung bases. The  pulmonary vasculature is prominent. Opacities are noted along the right  minor fissure. There is pleural thickening on the right, similar to  prior exams.       Impression:       Opacities along the right minor fissure may reflect fluid or  atelectasis. Bibasilar atelectasis is evident.   This report was finalized on 01/13/2020 10:56 by Dr. Zia Machuca MD.        I have personally reviewed and interpreted the radiology studies and ECG obtained at time of admission.     Assessment / Plan     Assessment:   Active Hospital Problems    Diagnosis   • **Acute on chronic respiratory failure with hypoxia and hypercapnia (CMS/HCC)   • Toxic metabolic encephalopathy   • COPD exacerbation (CMS/HCC)   • Diabetes mellitus (CMS/HCC)   • Coronary artery disease   • History of colon cancer         Plan:   #1 acute on chronic hypercarbic and hypoxic respiratory failure -in the setting of COPD exacerbation.  Admit per COPD protocol.  Duo nebs around-the-clock.  Steroids 40 IV every 6.  Check sputum culture.  IV azithromycin.  Continue BiPAP.  N.p.o. for now.  Admit to ICU for low-dose monitoring current presentation.    #2 toxic metabolic encephalopathy -in the setting of hypercarbic respiratory failure.  His mental status improving with BiPAP.  He is nonfocal on exam.  Continue to monitor.    #3 diabetes, insulin-dependent -on large doses of insulin at home.  Sugar 300 on arrival.  He will be getting steroids.  Will admit with Levemir nightly and sliding scale for now as he is n.p.o.  Hypoglycemia protocol in place.   Monitor closely.    #4 CAD -with history of CABG.  No active chest pain or issues.  Resume home meds as able    #5 history of colon cancer -status post resection with ostomy creation.  No recent issues.  Ostomy care.      Code Status: Full Code     I discussed the patient's findings and my recommendations with the patient and family at bedside    Estimated length of stay 4-5 days    Dhruv Rausch DO   01/13/20   12:47 PM              Electronically signed by Dhruv Rausch DO at 01/13/20 1255          Emergency Department Notes      Lennie Medellin, RN at 01/13/20 1012        Pt placed on bipap at this time     Lennie Medellin RN  01/13/20 1028      Electronically signed by Lennie Medellin RN at 01/13/20 1028     Nicola Carrero Jr., MD at 01/13/20 1125      Procedure Orders    1. Critical Care [[id number]] ordered by Nicola Carrero Jr., MD at 01/13/20 1140                Subjective   Family says the patient has become progressively more short of breath over the last couple days.  He has had increased cough with some sputum production.  He has had some chills but no definite fever.  He has a long history of COPD and is normally using a BiPAP but only at night.  They say he has been using that consistently but seems to be getting worse.      History provided by:  Relative  History limited by:  Mental status change and acuity of condition   used: No    Shortness of Breath   Severity:  Severe  Onset quality:  Gradual  Duration:  2 days  Timing:  Constant  Progression:  Worsening  Chronicity:  Recurrent  Context: URI    Context: not activity, not animal exposure, not emotional upset, not fumes, not known allergens, not occupational exposure, not pollens, not smoke exposure, not strong odors and not weather changes    Relieved by:  Nothing  Worsened by:  Nothing  Ineffective treatments:  Oxygen  Associated symptoms: cough and sputum production    Associated symptoms: no abdominal  pain, no chest pain, no claudication, no diaphoresis, no ear pain, no fever, no headaches, no hemoptysis, no neck pain, no PND, no rash, no sore throat, no syncope, no swollen glands, no vomiting and no wheezing    Risk factors: tobacco use    Risk factors: no recent alcohol use, no family hx of DVT, no hx of cancer, no hx of PE/DVT, no obesity, no oral contraceptive use, no prolonged immobilization and no recent surgery        Review of Systems   Constitutional: Negative.  Negative for diaphoresis and fever.   HENT: Negative.  Negative for ear pain and sore throat.    Respiratory: Positive for cough, sputum production and shortness of breath. Negative for hemoptysis and wheezing.    Cardiovascular: Negative.  Negative for chest pain, claudication, syncope and PND.   Gastrointestinal: Negative.  Negative for abdominal pain and vomiting.   Genitourinary: Negative.    Musculoskeletal: Negative.  Negative for neck pain.   Skin: Negative.  Negative for rash.   Neurological: Negative.  Negative for headaches.   Psychiatric/Behavioral: Negative.    All other systems reviewed and are negative.      Past Medical History:   Diagnosis Date   • Arthritis    • CAD (coronary artery disease)    • Colon cancer (CMS/[facility]) 11/2016   • COPD (chronic obstructive pulmonary disease) (CMS/[facility])    • Diabetes mellitus (CMS/[facility])    • HLD (hyperlipidemia)    • Hypertension        Allergies   Allergen Reactions   • Tetanus Toxoids Shortness Of Breath and Swelling   • Lamotrigine      Unknown     • Lisinopril Hives   • Methadone Swelling   • Penicillins Rash   • Tramadol Rash       Past Surgical History:   Procedure Laterality Date   • CARDIAC SURGERY     • CARPAL TUNNEL RELEASE     • COLON RESECTION WITH COLOSTOMY     • COLON SURGERY     • COLONOSCOPY  05/27/2016   • COLONOSCOPY N/A 10/12/2017    Procedure: COLONOSCOPY WITH ANESTHESIA;  Surgeon: Yessenia Gregg MD;  Location: [facility] ENDOSCOPY;  Service:    • CORONARY ANGIOPLASTY WITH STENT  PLACEMENT     • HERNIA REPAIR     • ROTATOR CUFF REPAIR         Family History   Problem Relation Age of Onset   • Stroke Mother    • Hypertension Mother    • Arthritis Father    • Heart disease Father    • Cancer Brother    • Diabetes Brother    • Hepatitis Brother    • Hypertension Brother        Social History     Socioeconomic History   • Marital status:      Spouse name: Not on file   • Number of children: Not on file   • Years of education: Not on file   • Highest education level: Not on file   Tobacco Use   • Smoking status: Current Every Day Smoker     Packs/day: 1.00     Years: 40.00     Pack years: 40.00   • Smokeless tobacco: Never Used   Substance and Sexual Activity   • Alcohol use: No   • Drug use: No   • Sexual activity: Defer       Prior to Admission medications    Medication Sig Start Date End Date Taking? Authorizing Provider   arformoterol (BROVANA) 15 MCG/2ML nebulizer solution Take 2 mL by nebulization 2 (Two) Times a Day. 2 boxes 1/23/18   Dimitri Guevara MD   atenolol (TENORMIN) 25 MG tablet Take 1 tablet by mouth Every 12 (Twelve) Hours.  Patient taking differently: Take 100 mg by mouth Every 12 (Twelve) Hours. 11/28/17   Ernie Cornejo DO   atorvastatin (LIPITOR) 40 MG tablet Take 1 tablet by mouth Every Night. 1/23/18   Dimitri Guevara MD   atropine 1 % ophthalmic solution Administer 1 drop to the right eye 2 (Two) Times a Day.    ProviderErin MD   benzonatate (TESSALON) 100 MG capsule Take 2 capsules by mouth 3 (Three) Times a Day As Needed for Cough. 1/23/18   Dimitri Guevara MD   budesonide (PULMICORT) 0.25 MG/2ML nebulizer solution Take 2 mL by nebulization 2 (Two) Times a Day. 1/23/18   Dimitri Guevara MD   Cholecalciferol (VITAMIN D) 2000 units tablet Take 4,000 Units by mouth Daily.    Erin Ambrocio MD   clopidogrel (PLAVIX) 75 MG tablet Take 75 mg by mouth Daily.    Erin Ambrocio MD   docusate sodium (COLACE) 100 MG capsule Take 100 mg by mouth  2 (Two) Times a Day As Needed for Constipation.    Erin Ambrocio MD   dorzolamide (TRUSOPT) 2 % ophthalmic solution Administer 1 drop into the left eye 2 (Two) Times a Day.    Erin Ambrocio MD   finasteride (PROSCAR) 5 MG tablet Take 1 tablet by mouth Daily. 1/24/18   Dimitri Guevara MD   guaiFENesin (MUCINEX) 600 MG 12 hr tablet Take 2 tablets by mouth Every 12 (Twelve) Hours. 1/2/18   Leonel Roth APRN   insulin aspart (novoLOG) 100 UNIT/ML injection Inject 40 Units under the skin 3 (Three) Times a Day Before Meals.    Erin Ambrocio MD   insulin glargine (LANTUS) 100 UNIT/ML injection Inject 160 Units under the skin Daily.    Erin Ambrocio MD   ipratropium-albuterol (DUONEB) 0.5-2.5 mg/mL nebulizer Take 3 mL by nebulization Every 4 (Four) Hours As Needed for Wheezing or Shortness of Air. 1/23/18   Dimitri Guevara MD   isosorbide mononitrate (IMDUR) 60 MG 24 hr tablet Take 90 mg by mouth Daily.    Erin Ambrocio MD   levoFLOXacin (LEVAQUIN) 500 MG tablet Take 1 tablet by mouth Daily. 1/23/18   Dimitri Guevara MD   levothyroxine (SYNTHROID, LEVOTHROID) 50 MCG tablet Take 50 mcg by mouth Daily.    Erin Ambrocio MD   losartan (COZAAR) 100 MG tablet Take 100 mg by mouth Daily.    Erin Ambrocio MD   metFORMIN (GLUCOPHAGE) 500 MG tablet Take 1,000 mg by mouth 2 (Two) Times a Day With Meals.    Erin Ambrocio MD   MethylPREDNISolone (MEDROL, SHELL,) 4 MG tablet Take as directed on package instructions. 1/23/18   Dimitri Guevara MD   nicotine (NICODERM CQ) 14 MG/24HR patch Place 1 patch on the skin Daily. 1/24/18   Dimitri Guevara MD   nitroglycerin (NITROSTAT) 0.4 MG SL tablet Place 0.4 mg under the tongue Every 5 (Five) Minutes As Needed for Chest Pain.    Erin Ambrocio MD   potassium chloride (K-DUR,KLOR-CON) 20 MEQ CR tablet Take 40 mEq by mouth 2 (Two) Times a Day.    Provider, MD Erin   pregabalin (LYRICA) 150 MG capsule Take 1 capsule  by mouth Every 12 (Twelve) Hours.  Patient taking differently: Take 300 mg by mouth Every 12 (Twelve) Hours. 11/28/17   Ernie Cornejo DO   raNITIdine (ZANTAC) 150 MG tablet Take 150 mg by mouth 2 (Two) Times a Day As Needed for Indigestion.    Erin Ambrocio MD   SAXagliptin (ONGLYZA) 5 MG tablet Take 5 mg by mouth Daily.    Erin Ambrocio MD   spironolactone (ALDACTONE) 25 MG tablet Take 25 mg by mouth Daily.    Erin Ambrocio MD   tamsulosin (FLOMAX) 0.4 MG capsule 24 hr capsule Take 1 capsule by mouth Every Night.    Erin Ambrocio MD   venlafaxine XR (EFFEXOR-XR) 75 MG 24 hr capsule Take 225 mg by mouth Daily.    Erin Ambrocio MD       Medications   sodium chloride 0.9 % flush 10 mL (has no administration in time range)   ipratropium-albuterol (DUO-NEB) nebulizer solution 3 mL (has no administration in time range)   ipratropium-albuterol (DUO-NEB) nebulizer solution 3 mL (3 mL Nebulization Given 1/13/20 1044)   methylPREDNISolone sodium succinate (SOLU-Medrol) injection 125 mg (125 mg Intravenous Given 1/13/20 1054)       Vitals:    01/13/20 1052   BP:    Pulse: 108   Resp:    Temp:    SpO2: 91%         Objective   Physical Exam   Constitutional: He appears well-developed and well-nourished.   HENT:   Head: Atraumatic.   Neck: Normal range of motion. Neck supple.   Cardiovascular: Regular rhythm.   Patient is mildly tachycardic but it seems to be a sinus rhythm.   Pulmonary/Chest: Accessory muscle usage present. Tachypnea noted. He is in respiratory distress. He has decreased breath sounds in the right middle field and the left middle field. He has wheezes in the right middle field and the left middle field.   Abdominal: Soft. Bowel sounds are normal.   Musculoskeletal: Normal range of motion.   Neurological:   Lethargic but nonfocal.   Skin: Skin is warm and dry.   Psychiatric:   Nonverbal.   Nursing note and vitals reviewed.      Critical Care  Performed by: Magan  Nicola WARE Jr., MD  Authorized by: Nicola Carrero Jr., MD     Critical care provider statement:     Critical care time (minutes):  30    Critical care start time:  1/13/2020 11:00 AM    Critical care end time:  1/13/2020 11:30 AM    Critical care time was exclusive of:  Separately billable procedures and treating other patients    Critical care was necessary to treat or prevent imminent or life-threatening deterioration of the following conditions:  Respiratory failure    Critical care was time spent personally by me on the following activities:  Blood draw for specimens, development of treatment plan with patient or surrogate, discussions with primary provider, evaluation of patient's response to treatment, examination of patient, interpretation of cardiac output measurements, obtaining history from patient or surrogate, ordering and performing treatments and interventions, ordering and review of laboratory studies, ordering and review of radiographic studies, pulse oximetry and re-evaluation of patient's condition    I assumed direction of critical care for this patient from another provider in my specialty: no              Lab Results (last 24 hours)     Procedure Component Value Units Date/Time    Blood Gas, Arterial With Co-Ox [[id number]]  (Abnormal) Collected:  01/13/20 1000    Specimen:  Arterial Blood Updated:  01/13/20 1007     Site Right Brachial     Jarad's Test N/A     pH, Arterial 7.231 pH units      Comment: 84 Value below reference range        pCO2, Arterial 95.8 mm Hg      Comment: 86 Value above critical limit        pO2, Arterial 59.5 mm Hg      Comment: 84 Value below reference range        HCO3, Arterial 40.2 mmol/L      Comment: 83 Value above reference range        Base Excess, Arterial 8.6 mmol/L      Comment: 83 Value above reference range        O2 Saturation, Arterial 89.6 %      Comment: 84 Value below reference range        Hemoglobin, Blood Gas 14.0 g/dL      Hematocrit, Blood Gas 42.9  %      Oxyhemoglobin 86.1 %      Comment: 84 Value below reference range        Methemoglobin 0.20 %      Carboxyhemoglobin 3.7 %      A-a Gradiant --     Comment: UNABLE TO CALCULATE        Temperature 37.0 C      Sodium, Arterial 141 mmol/L      Potassium, Arterial 4.2 mmol/L      Barometric Pressure for Blood Gas 761 mmHg      Modality Nasal Cannula     Flow Rate 5.0 lpm      Ventilator Mode NA     Note --     Notified Who DR SCHULTZ     Notified By [id number]     Notified Time 01/13/2020 10:09     Collected by [id number]     Comment: Meter: [id number]     :  [id number]        pH, Temp Corrected --     pCO2, Temperature Corrected --     pO2, Temperature Corrected --    [place] Blood Culture Bottle Set [[id number]] Collected:  01/13/20 1005    Specimen:  Blood from Hand, Right Updated:  01/13/20 1116     Extra Tube Hold for add-ons.     Comment: Auto resulted.       CBC & Differential [[id number]] Collected:  01/13/20 1005    Specimen:  Blood Updated:  01/13/20 1036    Narrative:       The following orders were created for panel order CBC & Differential.  Procedure                               Abnormality         Status                     ---------                               -----------         ------                     CBC Auto Differential[[id number]]        Abnormal            Final result                 Please view results for these tests on the individual orders.    Comprehensive Metabolic Panel [[id number]]  (Abnormal) Collected:  01/13/20 1005    Specimen:  Blood Updated:  01/13/20 1055     Glucose 300 mg/dL      BUN 5 mg/dL      Creatinine 0.61 mg/dL      Sodium 137 mmol/L      Potassium 4.3 mmol/L      Chloride 94 mmol/L      CO2 37.0 mmol/L      Calcium 8.8 mg/dL      Total Protein 7.1 g/dL      Albumin 4.10 g/dL      ALT (SGPT) 11 U/L      AST (SGOT) 24 U/L      Alkaline Phosphatase 115 U/L      Total Bilirubin 0.5 mg/dL      eGFR Non African Amer 134 mL/min/1.73      Globulin 3.0 gm/dL      A/G  Ratio 1.4 g/dL      BUN/Creatinine Ratio 8.2     Anion Gap 6.0 mmol/L     Narrative:       GFR Normal >60  Chronic Kidney Disease <60  Kidney Failure <15      CBC Auto Differential [[id number]]  (Abnormal) Collected:  01/13/20 1005    Specimen:  Blood Updated:  01/13/20 1036     WBC 12.67 10*3/mm3      RBC 4.93 10*6/mm3      Hemoglobin 14.1 g/dL      Hematocrit 45.8 %      MCV 92.9 fL      MCH 28.6 pg      MCHC 30.8 g/dL      RDW 12.8 %      RDW-SD 44.0 fl      MPV 9.1 fL      Platelets 150 10*3/mm3      Neutrophil % 79.5 %      Lymphocyte % 11.6 %      Monocyte % 6.9 %      Eosinophil % 0.0 %      Basophil % 0.5 %      Immature Grans % 1.5 %      Neutrophils, Absolute 10.07 10*3/mm3      Lymphocytes, Absolute 1.47 10*3/mm3      Monocytes, Absolute 0.88 10*3/mm3      Eosinophils, Absolute 0.00 10*3/mm3      Basophils, Absolute 0.06 10*3/mm3      Immature Grans, Absolute 0.19 10*3/mm3      nRBC 0.0 /100 WBC     Blood Gas, Arterial [[id number]]  (Abnormal) Collected:  01/13/20 1050    Specimen:  Arterial Blood Updated:  01/13/20 1122     Site Right Radial     Jarad's Test Positive     pH, Arterial 7.278 pH units      Comment: 84 Value below reference range        pCO2, Arterial 87.0 mm Hg      Comment: 86 Value above critical limit        pO2, Arterial 61.4 mm Hg      Comment: 84 Value below reference range        HCO3, Arterial 40.7 mmol/L      Comment: 83 Value above reference range        Base Excess, Arterial 10.0 mmol/L      Comment: 83 Value above reference range        O2 Saturation, Arterial 91.7 %      Comment: 84 Value below reference range        Temperature 37.0 C      Barometric Pressure for Blood Gas 760 mmHg      Modality BiPap     FIO2 45 %      Ventilator Mode BiPAP     IPAP 19     Comment: Meter: [id number]     :  [id number]        EPAP 4     Notified Who DR SCHULTZ     Notified By [id number]     Notified Time 01/13/2020 11:14     Collected by [id number]    Blood Culture - Blood, Hand, Right  [[id number]] Collected:  01/13/20 1052    Specimen:  Blood from Hand, Right Updated:  01/13/20 1126    Lactic Acid, Plasma [[id number]]  (Normal) Collected:  01/13/20 1052    Specimen:  Blood Updated:  01/13/20 1128     Lactate 1.4 mmol/L           XR Chest 1 View   Final Result   Opacities along the right minor fissure may reflect fluid or   atelectasis. Bibasilar atelectasis is evident.    This report was finalized on 01/13/2020 10:56 by Dr. Zia Machuca MD.          ED Course  ED Course as of Jan 13 1140   Mon Jan 13, 2020   1137 I told the family of the findings most pacifically on the blood gases of respiratory failure.  I told him that the CO2 does not begin to blow off he may have to be intubated but right now organ to continue with BiPAP.  However he will require admission for further stabilization.  I have spoken with Dr. Rausch and he has accepted.    [TR]   1139 Patient clinically does not have sepsis and abnormal vital signs are related to his underlying respiratory failure.    [TR]      ED Course User Index  [TR] Nicola Carrero Jr., MD          MDM  Number of Diagnoses or Management Options  Acute respiratory failure with hypercapnia (CMS/HCC): new and requires workup     Amount and/or Complexity of Data Reviewed  Clinical lab tests: ordered and reviewed  Tests in the radiology section of CPT®:  ordered and reviewed  Tests in the medicine section of CPT®:  ordered and reviewed  Discuss the patient with other providers: yes    Risk of Complications, Morbidity, and/or Mortality  Presenting problems: high  Diagnostic procedures: high  Management options: high    Critical Care  Total time providing critical care: 30-74 minutes    Patient Progress  Patient progress: improved      Final diagnoses:   Acute respiratory failure with hypercapnia (CMS/HCC)          Nicola Carrero Jr., MD  01/13/20 1140      Electronically signed by Nicola Carrero Jr., MD at 01/13/20 1140     Jess Buenrostro, MALIK at 01/13/20  1138        RT called for DuJess Rendon RN  01/13/20 1227      Electronically signed by Jess Buenrostro, RN at 01/13/20 1227         Orders (last 48 hrs)      Start     Ordered    01/14/20 0900  spironolactone (ALDACTONE) tablet 25 mg  Daily      01/13/20 1503    01/14/20 0900  venlafaxine XR (EFFEXOR-XR) 24 hr capsule 225 mg  Daily      01/13/20 1503    01/14/20 0900  pregabalin (LYRICA) capsule 150 mg  Every 12 Hours Scheduled      01/13/20 1503    01/14/20 0900  losartan (COZAAR) tablet 50 mg  Daily      01/13/20 1503    01/14/20 0900  clopidogrel (PLAVIX) tablet 75 mg  Daily      01/13/20 1503    01/14/20 0900  finasteride (PROSCAR) tablet 5 mg  Daily      01/13/20 1503    01/14/20 0838  POC Glucose Once  Once      01/14/20 0808    01/14/20 0608  POC Glucose Once  Once      01/14/20 0553    01/14/20 0600  levothyroxine (SYNTHROID, LEVOTHROID) tablet 50 mcg  Every Early Morning      01/13/20 1503    01/14/20 0600  Tobacco Cessation Education  Daily     Comments:  Document in Education Activity    01/13/20 1502    01/14/20 0600  Respiratory Treatment Education (MDI / Spacer / Nebulizer)  Daily     Comments:  Document in Education Activity    01/13/20 1502    01/14/20 0600  COPD Education  Daily     Comments:  Document in Education Activity    01/13/20 1502    01/14/20 0600  Comprehensive Metabolic Panel  Morning Draw      01/13/20 1502    01/14/20 0600  CBC Auto Differential  Morning Draw      01/13/20 1502    01/14/20 0600  Magnesium  Morning Draw      01/13/20 1502    01/14/20 0600  Phosphorus  Morning Draw      01/13/20 1502    01/14/20 0521  Patient Currently On Electrolyte Replacement Protocol - Please Refer to MAR for Protocol Details  Misc Nursing Order (Specify)  Daily     Comments:  Patient Currently On Electrolyte Replacement Protocol - Please Refer to MAR for Protocol Details    01/14/20 0520    01/14/20 0520  potassium phosphate 45 mmol in sodium chloride 0.9 % 500 mL infusion  As Needed       01/14/20 0520    01/14/20 0520  potassium phosphate 30 mmol in sodium chloride 0.9 % 250 mL infusion  As Needed      01/14/20 0520    01/14/20 0520  Potassium Phosphates 15 mmol in sodium chloride 0.9 % 100 mL infusion  As Needed      01/14/20 0520    01/14/20 0520  sodium phosphates 45 mmol in sodium chloride 0.9 % 500 mL IVPB  As Needed      01/14/20 0520    01/14/20 0520  sodium phosphates 30 mmol in sodium chloride 0.9 % 250 mL IVPB  As Needed      01/14/20 0520    01/14/20 0520  sodium phosphates 15 mmol in sodium chloride 0.9 % 250 mL IVPB  As Needed      01/14/20 0520    01/14/20 0012  POC Glucose Once  Once      01/13/20 2358    01/14/20 0000  Vital Signs  Every 4 Hours      01/13/20 2122 01/13/20 2213  POC Glucose Once  Once      01/13/20 2157 01/13/20 2122  Influenza Vac Subunit Quad (FLUCELVAX) injection 0.5 mL  During Hospitalization      01/13/20 2123    01/13/20 2100  tamsulosin (FLOMAX) 24 hr capsule 0.4 mg  Nightly      01/13/20 1503    01/13/20 2100  guaiFENesin (MUCINEX) 12 hr tablet 1,200 mg  Every 12 Hours Scheduled      01/13/20 1503    01/13/20 2100  dorzolamide (TRUSOPT) 2 % ophthalmic solution 1 drop  2 Times Daily      01/13/20 1503    01/13/20 2100  atorvastatin (LIPITOR) tablet 40 mg  Nightly      01/13/20 1503    01/13/20 2100  atenolol (TENORMIN) tablet 25 mg  Every 12 Hours Scheduled      01/13/20 1503    01/13/20 2100  atropine 1 % ophthalmic solution 1 drop  2 Times Daily      01/13/20 1503    01/13/20 2100  sodium chloride 0.9 % flush 10 mL  Every 12 Hours Scheduled      01/13/20 1502    01/13/20 2100  insulin detemir (LEVEMIR) injection 30 Units  Nightly      01/13/20 1502    01/13/20 2100  famotidine (PEPCID) injection 20 mg  Every 12 Hours Scheduled      01/13/20 1503    01/13/20 2049  POC Glucose Once  Once      01/13/20 2037 01/13/20 2002  Transfer Patient  Once      01/13/20 2001 01/13/20 1956  Blood Gas, Arterial  Once      01/13/20 1955 01/13/20 1942  Blood  Gas, Arterial  Once      01/13/20 1941    01/13/20 1930  budesonide (PULMICORT) nebulizer solution 0.25 mg  2 Times Daily - RT      01/13/20 1503    01/13/20 1930  ipratropium-albuterol (DUO-NEB) nebulizer solution 3 mL  Every 6 Hours - RT      01/13/20 1502    01/13/20 1815  POC Glucose Once  Once      01/13/20 1800    01/13/20 1800  POC Glucose Q6H  Every 6 Hours      01/13/20 1502    01/13/20 1800  insulin regular (humuLIN R,novoLIN R) injection 3-14 Units  Every 6 Hours Scheduled      01/13/20 1502    01/13/20 1600  Vital Signs Every Hour and Per Hospital Policy Based on Patient Condition  Every Hour,   Status:  Canceled      01/13/20 1502    01/13/20 1600  Intake and Output  Every Hour      01/13/20 1502    01/13/20 1600  Cough / Deep Breathe  Every 4 Hours      01/13/20 1502    01/13/20 1600  enoxaparin (LOVENOX) syringe 40 mg  Every 24 Hours      01/13/20 1502    01/13/20 1600  Incentive Spirometry  Every 2 Hours While Awake      01/13/20 1502    01/13/20 1600  methylPREDNISolone sodium succinate (SOLU-Medrol) injection 40 mg  Every 6 Hours      01/13/20 1503    01/13/20 1600  AZITHROMYCIN 500 MG/250 ML 0.9% NS IVPB (vial-mate)  Every 24 Hours      01/13/20 1502    01/13/20 1503  Reason for COPD Admission: New Oxygen Requirements; COPD Severity: Unknown  Once      01/13/20 1502    01/13/20 1503  Cardiac Monitoring  Continuous      01/13/20 1502    01/13/20 1503  Continuous Pulse Oximetry  Continuous      01/13/20 1502    01/13/20 1503  Strict Bed Rest  Until Discontinued      01/13/20 1502    01/13/20 1503  Use Mobility Guidelines for Advancement of Activity  Continuous      01/13/20 1502    01/13/20 1503  Height & Weight  Once      01/13/20 1502    01/13/20 1503  Daily Weights  Daily      01/13/20 1502    01/13/20 1503  Document Pulse Oximetry - On Room Air / Home O2 Level  Daily     Comments:  Reapply Oxygen if O2 Sat Drops Below 88%    01/13/20 1502    01/13/20 1503  Insert Peripheral IV  Once       01/13/20 1502    01/13/20 1503  Saline Lock & Maintain IV Access  Continuous      01/13/20 1502    01/13/20 1503  NPO Diet  Diet Effective Now      01/13/20 1502    01/13/20 1503  Respiratory Culture - Sputum, Cough  Once      01/13/20 1502    01/13/20 1503  Do NOT Hold Basal or Correction Scale Insulin When Patient is NPO, Hold Scheduled Mealtime (Bolus) Insulin if NPO  Continuous      01/13/20 1502    01/13/20 1503  Follow [facility] Hypoglycemia Standing Orders For Blood Glucose Less Than 70 mg/dL  Until Discontinued      01/13/20 1502    01/13/20 1503  Hypoglycemia Treatment - Alert Patient That is Not NPO and Can Safely Swallow  Until Discontinued     Comments:  Administer 4 oz Fruit Juice OR 4 oz Regular Soda OR 8 oz Milk OR 15-30 grams (1 tube) of Glucose Gel.  Recheck Blood Glucose 15 Minutes After Ingestion, Repeat Treatment & Continue to Recheck Blood Sugar Every 15 Minutes Until Blood Glucose is 70 mg/dL or Higher.  Once Blood Glucose is 70 mg/dL or Higher and if It Will Be more than 60 Minutes Until the Next Meal, Provide Appropriate Snack (Including Carbohydrate Food) Based on Meal Plan Order. Give Meal Tray As Soon As Possible.    01/13/20 1502    01/13/20 1503  Hypoglycemia Treatment - Patient Has IV Access - Unresponsive, NPO or Unable To Safely Swallow  Until Discontinued     Comments:  Administer 25g (50ml) D50W IV Push.  Recheck Blood Glucose 15 Minutes After Administration, if Blood Glucose Remains Less Than 70 mg/dl, Repeat Treatment   Recheck Blood Glucose 15 Minutes After 2nd Administration, if Blood Glucose Remains Less Than 70 mg/dL After 2nd Dose of D50W, Contact Provider for Further Treatment Orders & Consider Adding IVF With D5W for Maintenance    01/13/20 1502    01/13/20 1503  Hypoglycemia Treatment - Patient Without IV Access - Unresponsive, NPO or Unable To Safely Swallow  Until Discontinued     Comments:  Administer 1mg Glucagon SQ & Establish IV Access.  Turn Patient on Side - Nausea /  Vomiting May Occur.  Recheck Blood Glucose 15 Minutes After Administration.  If Blood Glucose Remains Less Than 70, Administer 25g D50W IV Push (50ml).  Recheck Blood Glucose 15 Minutes After Administration of D50W, if Blood Glucose Remains Less Than 70 mg/dl, Contact Provider for Further Treatment Orders & Consider Adding IVF With D5 for Maintenance    01/13/20 1502    01/13/20 1503  Hypoglycemia Treatment - Document Event & Patient Response to Interventions EMR, Document Medications on MAR  Continuous      01/13/20 1502    01/13/20 1503  Notify Provider - Hypoglycemia Treatment  Until Discontinued      01/13/20 1502    01/13/20 1502  docusate sodium (COLACE) capsule 100 mg  2 Times Daily PRN      01/13/20 1503    01/13/20 1502  benzonatate (TESSALON) capsule 200 mg  3 Times Daily PRN      01/13/20 1503    01/13/20 1502  acetaminophen (TYLENOL) tablet 650 mg  Every 4 Hours PRN      01/13/20 1502    01/13/20 1502  acetaminophen (TYLENOL) suppository 650 mg  Every 4 Hours PRN      01/13/20 1502    01/13/20 1502  sodium chloride 0.9 % flush 10 mL  As Needed      01/13/20 1502    01/13/20 1502  albuterol (PROVENTIL) nebulizer solution 0.083% 2.5 mg/3mL  Every 4 Hours PRN      01/13/20 1502    01/13/20 1502  dextrose (GLUTOSE) oral gel 15 g  Every 15 Minutes PRN      01/13/20 1502    01/13/20 1502  dextrose (D50W) 25 g/ 50mL Intravenous Solution 25 g  Every 15 Minutes PRN      01/13/20 1502    01/13/20 1502  glucagon (human recombinant) (GLUCAGEN DIAGNOSTIC) injection 1 mg  Every 15 Minutes PRN      01/13/20 1502    01/13/20 1502  ondansetron (ZOFRAN) injection 4 mg  Every 6 Hours PRN      01/13/20 1502    01/13/20 1241  Code Status and Medical Interventions:  Continuous      01/13/20 1246    01/13/20 1141  Critical Care  Once     Comments:  This order was created via procedure documentation    01/13/20 1140    01/13/20 1140  Inpatient Admission  Once      01/13/20 1139    01/13/20 1139  ipratropium-albuterol (DUO-NEB)  nebulizer solution 3 mL  Once      01/13/20 1137    01/13/20 1123  Blood Gas, Arterial  Once      01/13/20 1050    01/13/20 1036  ipratropium-albuterol (DUO-NEB) nebulizer solution 3 mL  Once      01/13/20 1034    01/13/20 1036  methylPREDNISolone sodium succinate (SOLU-Medrol) injection 125 mg  Once      01/13/20 1034    01/13/20 1036  CBC Auto Differential  Once      01/13/20 1035    01/13/20 1035  BIPAP  Until Discontinued      01/13/20 1034    01/13/20 1034  CBC & Differential  Once      01/13/20 1034    01/13/20 1034  Comprehensive Metabolic Panel  Once      01/13/20 1034    01/13/20 1034  Blood Culture - Blood,  Once      01/13/20 1034    01/13/20 1034  Blood Culture - Blood,  Once      01/13/20 1034    01/13/20 1034  Lactic Acid, Plasma  Once      01/13/20 1034    01/13/20 1034  XR Chest 1 View  1 Time Imaging      01/13/20 1034    01/13/20 1034  ECG 12 Lead  Once      01/13/20 1034    01/13/20 1034  Insert peripheral IV  Once      01/13/20 1034    01/13/20 1034  Cardiac Monitoring  Once,   Status:  Canceled      01/13/20 1034    01/13/20 1034  Pulse Oximetry, Continuous  Continuous,   Status:  Canceled      01/13/20 1034    01/13/20 1033  sodium chloride 0.9 % flush 10 mL  As Needed      01/13/20 1034    01/13/20 1025  NIPPV (CPAP or BIPAP)  Until Discontinued     Comments:  RT to manage    01/13/20 1025    01/13/20 1008  Blood Gas, Arterial With Co-Ox  Once      01/13/20 1000    01/13/20 1004  ECG 12 Lead  Once      01/13/20 1003    01/13/20 1004  [place] Draw  STAT      01/13/20 1003    01/13/20 1004  Light Blue Top  PROCEDURE ONCE      01/13/20 1003    01/13/20 1004  Green Top (Gel)  PROCEDURE ONCE      01/13/20 1003    01/13/20 1004  Lavender Top  PROCEDURE ONCE      01/13/20 1003    01/13/20 1004  Red Top  PROCEDURE ONCE      01/13/20 1003    01/13/20 1004  [place] Blood Culture Bottle Set  PROCEDURE ONCE      01/13/20 1003    01/13/20 1003  Blood Gas, Arterial With Co-Ox  STAT      01/13/20 1003    --   SCANNED - TELEMETRY        01/13/20 0000    --  SCANNED EKG      01/13/20 0000

## 2020-01-14 NOTE — PROGRESS NOTES
[facility] Medicine Services  INPATIENT PROGRESS NOTE    Patient Name: Sai Adam  Date of Admission: 1/13/2020  Today's Date: 01/14/20  Length of Stay: 1  Primary Care Physician: Jarod Tillman MD    Subjective   Chief Complaint: Follow-up respiratory failure    HPI   Patient seen and examined.  He is off BiPAP and doing some pursed lip breathing.  He says that is baseline for him.  His breathing overall is not yet back to baseline but he feels a lot better than yesterday.  Normal mentation.  Denies chest pain.  Denies any abdominal pain or nausea/vomiting.  Wanting to eat.  No acute overnight events noted.        Review of Systems   All pertinent negatives and positives are as above. All other systems have been reviewed and are negative unless otherwise stated.     Objective    Temp:  [98 °F (36.7 °C)-100.1 °F (37.8 °C)] 98 °F (36.7 °C)  Heart Rate:  [] 61  Resp:  [20-30] 25  BP: (126-146)/(68-86) 140/71  Physical Exam  GEN: Awake, alert, interactive, purse lip breathing but speaking in full sentences, no accessory muscle use  HEENT: PERRLA, EOMI, Anicteric, Trachea midline  Lungs: improved air flow today but still tight/diminished. No wheezing currently  Heart: RRR, +S1/s2, no rub  ABD: soft, nt/nd, +BS, no guarding/rebound  Extremities: atraumatic, no cyanosis, no edema  Skin: no rashes or lesions  Neuro: AAOx3, no focal deficits  Psych: normal mood & affect        Results Review:  I have reviewed the labs, radiology results, and diagnostic studies.    Laboratory Data:   Results from last 7 days   Lab Units 01/14/20  0416 01/13/20  1005   WBC 10*3/mm3 11.45* 12.67*   HEMOGLOBIN g/dL 13.3 14.1   HEMATOCRIT % 42.1 45.8   PLATELETS 10*3/mm3 156 150        Results from last 7 days   Lab Units 01/14/20  0416 01/13/20  1005 01/13/20  1000   SODIUM mmol/L 138 137  --    SODIUM, ARTERIAL mmol/L  --   --  141   POTASSIUM mmol/L 4.2 4.3  --    CHLORIDE mmol/L 94* 94*  --     CO2 mmol/L 38.0* 37.0*  --    BUN mg/dL 17 5*  --    CREATININE mg/dL 0.63* 0.61*  --    CALCIUM mg/dL 9.3 8.8  --    BILIRUBIN mg/dL 0.4 0.5  --    ALK PHOS U/L 94 115  --    ALT (SGPT) U/L 10 11  --    AST (SGOT) U/L 22 24  --    GLUCOSE mg/dL 222* 300*  --        Culture Data:   Blood Culture   Date Value Ref Range Status   01/13/2020 No growth at less than 24 hours  Preliminary   01/13/2020 No growth at less than 24 hours  Preliminary       Radiology Data:   Imaging Results (Last 24 Hours)     Procedure Component Value Units Date/Time    XR Chest 1 View [[id number]] Collected:  01/13/20 1052     Updated:  01/13/20 1059    Narrative:       EXAMINATION: XR CHEST 1 VW- 1/13/2020 10:52 AM CST     HISTORY: Shortness of breath     COMPARISON: 01/18/2019     FINDINGS:  Heart and mediastinal contours are stable. There has been prior median  sternotomy. Upright approach Port-A-Cath is in place with tip projecting  over the mid to distal SVC. There is atelectasis in the lung bases. The  pulmonary vasculature is prominent. Opacities are noted along the right  minor fissure. There is pleural thickening on the right, similar to  prior exams.       Impression:       Opacities along the right minor fissure may reflect fluid or  atelectasis. Bibasilar atelectasis is evident.   This report was finalized on 01/13/2020 10:56 by Dr. Zia Machuca MD.          I have reviewed the patient's current medications.     Assessment/Plan     Active Hospital Problems    Diagnosis   • **Acute on chronic respiratory failure with hypoxia and hypercapnia (CMS/HCC)   • Hypophosphatemia   • Toxic metabolic encephalopathy   • COPD exacerbation (CMS/HCC)   • Diabetes mellitus (CMS/HCC)   • Coronary artery disease   • History of colon cancer     #1 acute on chronic hypercarbic and hypoxic respiratory failure -in setting of COPD exacerbation.  Doing better today now off BiPAP with improved blood gas.  Can make BiPAP PRN for naps and nightly.   Continue steroids 40 IV every 6 hours.  Continue IV azithromycin.  Continue nebulizers.  Currently on 6 L, wean to baseline home 5 as able.  Goal SPO2 88 to 92%.    #2 toxic metabolic encephalopathy -in the setting of hypercarbia.  This has resolved with BiPAP and normalization of ABG.  No neuro deficits.    #3 COPD exacerbation -as above.  Not ready for steroid wean yet.    #4 hypophosphatemia -replace with sodium Ebro.  Recheck and continue placement protocol.    #5 CAD -with history of CABG.  No active chest pain or issues.    #6 insulin-dependent diabetes -takes 160 of Lantus at home.  His sugar was 200 this morning on only 30 of Levemir despite IV steroids.  Suspect he is not diet compliant.  He will be resuming diet today.  Will increase Levemir to 40 nightly.  Add pre-meal lispro 10 units.  Continue sliding scale and hypoglycemia protocol.  Will get diabetic/nutrition educator.    #7 history of colon cancer -status post ostomy creation previous day.  No active issues.  Continue ostomy care.    Discharge Planning: I expect the patient to be discharged to home in 3 to 4 days.    Dhruv Rausch DO   01/14/20   9:27 AM

## 2020-01-14 NOTE — PROGRESS NOTES
Discharge Planning Assessment  [facility]     Patient Name: Sai Adam  MRN: [id number]  Today's Date: 1/14/2020    Admit Date: 1/13/2020    Discharge Needs Assessment     Row Name 01/14/20 1352       Living Environment    Lives With  alone    Current Living Arrangements  home/apartment/condo    Primary Care Provided by  self;child(francesca)    Provides Primary Care For  no one, unable/limited ability to care for self    Family Caregiver if Needed  child(francesca), adult    Quality of Family Relationships  supportive;involved;helpful    Able to Return to Prior Arrangements  yes       Resource/Environmental Concerns    Resource/Environmental Concerns  none    Transportation Concerns  car, none       Transition Planning    Patient/Family Anticipates Transition to  home    Patient/Family Anticipated Services at Transition  home health care    Transportation Anticipated  family or friend will provide       Discharge Needs Assessment    Readmission Within the Last 30 Days  no previous admission in last 30 days    Concerns to be Addressed  basic needs    Equipment Currently Used at Home  nebulizer;oxygen;walker, rolling;other (see comments);bipap/cpap has scooter    Anticipated Changes Related to Illness  none    Discharge Facility/Level of Care Needs  home with home health    Current Discharge Risk  chronically ill;lives alone        Discharge Plan     Row Name 01/14/20 4149       Plan    Plan  [facility]    Patient/Family in Agreement with Plan  yes    Plan Comments  Spoke with pt to assess for home needs. Pt lives at home alone and plans same.  Pt is followed by the [facility].  Pt has PCP/RX coverage, will have a ride home.  [facility], [facility] signed.  Pt has needed DME to include o2 that was set [facility] via [facility].  Pt might need HH and if so will need order/request will be [facility] to [facility] for [facility]. Will follow.         Destination      Coordination has not been started for this  encounter.      Durable Medical Equipment      Coordination has not been started for this encounter.      Dialysis/Infusion      Coordination has not been started for this encounter.      Home Medical Care      Coordination has not been started for this encounter.      Therapy      Coordination has not been started for this encounter.      Community Resources      Coordination has not been started for this encounter.          Demographic Summary    No documentation.       Functional Status    No documentation.       Psychosocial    No documentation.       Abuse/Neglect    No documentation.       Legal    No documentation.       Substance Abuse    No documentation.       Patient Forms    No documentation.           SURYA Lemons

## 2020-01-14 NOTE — DISCHARGE PLACEMENT REQUEST
"STEPHANIE LU RN CASE MANAGER  ATTEN: [facility]      Sai Adam (59 y.o. Male)     Date of Birth Social Security Number Address Home Phone MRN    [id number]  [id number] [facility] [address] [phone] [id number]    Gnosticism Marital Status          Uatsdin        Admission Date Admission Type Admitting Provider Attending Provider Department, Room/Bed    1/13/20 Emergency Dhruv Rausch DO Demeo, Michael F, DO [facility] 4C, 496/1    Discharge Date Discharge Disposition Discharge Destination                       Attending Provider:  Dhruv Rausch DO    Allergies:  Tetanus Toxoids, Lamotrigine, Lisinopril, Methadone, Penicillins, Tramadol    Isolation:  None   Infection:  None   Code Status:  CPR    Ht:  170.2 cm (67\")   Wt:  65.3 kg (143 lb 14.4 oz)    Admission Cmt:  None   Principal Problem:  Acute on chronic respiratory failure with hypoxia and hypercapnia (CMS/HCC) [J96.21,J96.22]                 Active Insurance as of 1/13/2020     Primary Coverage     Payor Plan Insurance Group Employer/Plan Group    [facility] DEPT [id number]      Payor Plan Address Payor Plan Phone Number Payor Plan Fax Number Effective Dates    [employer] 04 [phone]  12/1/2014 - None Entered    [facility] [address]       Subscriber Name Subscriber Birth Date Member ID       SAI ADAM 1960 [id number]                 Emergency Contacts      (Rel.) Home Phone Work Phone Mobile Phone    Keith Wilcox (Son) [phone] -- --    Antonio Danielson (Son) [phone] -- --    NUBIA RIDLEY (Daughter) -- -- [phone]               History & Physical      Dhruv Rausch DO at 01/13/20 1247              [facility] Services  HISTORY AND PHYSICAL    Date of Admission: 1/13/2020  Primary Care Physician: Jarod Tillman MD    Subjective     Chief Complaint: Altered mental " status    History of Present Illness  This is a 59-year-old male with chronic hypoxic respiratory failure on 5 L O2 at baseline, sleep apnea on NIPPV at home, COPD CAD post CABG, diabetes.  [facility]  He has had a cough for several days at home but denied any overt shortness of breath or fevers.  Was seen normal last night.  This morning was altered and hard to wake up.  Presented into the ER was found to be in hypercarbic respiratory failure with a CO2 of 96 and a pH of 7.23.  PO2 was 59.  Patient was placed on BiPAP and worked up in the ER.  Chest imaging negative for any overt pneumonia.  He received steroids and nebulizers.  Repeat ABG improved to pH of 7.28 with a CO2 of 87.  Patient starting to wake up.  He is able to answer questions at this time.  Says he feels okay.  Denies any chest pain.  Again no fever chills.  No abdominal pain, nausea, vomiting, diarrhea.  No focal weakness.  We have been asked to admit for continued care.        Review of Systems   Otherwise complete ROS reviewed and negative except as mentioned in the HPI.    Past Medical History:   Past Medical History:   Diagnosis Date   • Arthritis    • CAD (coronary artery disease)    • Colon cancer (CMS/HCC) 11/2016   • COPD (chronic obstructive pulmonary disease) (CMS/HCC)    • Diabetes mellitus (CMS/[facility])    • HLD (hyperlipidemia)    • Hypertension      Past Surgical History:  Past Surgical History:   Procedure Laterality Date   • CARDIAC SURGERY     • CARPAL TUNNEL RELEASE     • COLON RESECTION WITH COLOSTOMY     • COLON SURGERY     • COLONOSCOPY  05/27/2016   • COLONOSCOPY N/A 10/12/2017    Procedure: COLONOSCOPY WITH ANESTHESIA;  Surgeon: Yessenia Gregg MD;  Location: [facility] ENDOSCOPY;  Service:    • CORONARY ANGIOPLASTY WITH STENT PLACEMENT     • HERNIA REPAIR     • ROTATOR CUFF REPAIR       Social History:  reports that he has been smoking. He has a 40.00 pack-year smoking history. He has never used smokeless tobacco. He  reports that he does not drink alcohol or use drugs.    Family History: family history includes Arthritis in his father; Cancer in his brother; Diabetes in his brother; Heart disease in his father; Hepatitis in his brother; Hypertension in his brother and mother; Stroke in his mother.       Allergies:  Allergies   Allergen Reactions   • Tetanus Toxoids Shortness Of Breath and Swelling   • Lamotrigine      Unknown     • Lisinopril Hives   • Methadone Swelling   • Penicillins Rash   • Tramadol Rash     Medications:  Prior to Admission medications    Medication Sig Start Date End Date Taking? Authorizing Provider   arformoterol (BROVANA) 15 MCG/2ML nebulizer solution Take 2 mL by nebulization 2 (Two) Times a Day. 2 boxes 1/23/18   Dimitri Guevara MD   atenolol (TENORMIN) 25 MG tablet Take 1 tablet by mouth Every 12 (Twelve) Hours.  Patient taking differently: Take 100 mg by mouth Every 12 (Twelve) Hours. 11/28/17   Ernie Cornejo DO   atorvastatin (LIPITOR) 40 MG tablet Take 1 tablet by mouth Every Night. 1/23/18   Dimitri Guevara MD   atropine 1 % ophthalmic solution Administer 1 drop to the right eye 2 (Two) Times a Day.    ProviderErin MD   benzonatate (TESSALON) 100 MG capsule Take 2 capsules by mouth 3 (Three) Times a Day As Needed for Cough. 1/23/18   Dimitri Guevara MD   budesonide (PULMICORT) 0.25 MG/2ML nebulizer solution Take 2 mL by nebulization 2 (Two) Times a Day. 1/23/18   Dimitri Guevara MD   Cholecalciferol (VITAMIN D) 2000 units tablet Take 4,000 Units by mouth Daily.    Erin Ambrocio MD   clopidogrel (PLAVIX) 75 MG tablet Take 75 mg by mouth Daily.    Erin Ambrocio MD   docusate sodium (COLACE) 100 MG capsule Take 100 mg by mouth 2 (Two) Times a Day As Needed for Constipation.    Erin Ambrocio MD   dorzolamide (TRUSOPT) 2 % ophthalmic solution Administer 1 drop into the left eye 2 (Two) Times a Day.    Erin Ambrocio MD   finasteride (PROSCAR) 5 MG tablet  Take 1 tablet by mouth Daily. 1/24/18   Dimitri uGevara MD   guaiFENesin (MUCINEX) 600 MG 12 hr tablet Take 2 tablets by mouth Every 12 (Twelve) Hours. 1/2/18   Leonel Roth APRN   insulin aspart (novoLOG) 100 UNIT/ML injection Inject 40 Units under the skin 3 (Three) Times a Day Before Meals.    Erin Ambrocio MD   insulin glargine (LANTUS) 100 UNIT/ML injection Inject 160 Units under the skin Daily.    Erin Ambrocio MD   ipratropium-albuterol (DUONEB) 0.5-2.5 mg/mL nebulizer Take 3 mL by nebulization Every 4 (Four) Hours As Needed for Wheezing or Shortness of Air. 1/23/18   Dimitri Guevara MD   isosorbide mononitrate (IMDUR) 60 MG 24 hr tablet Take 90 mg by mouth Daily.    Erin Ambrocio MD   levoFLOXacin (LEVAQUIN) 500 MG tablet Take 1 tablet by mouth Daily. 1/23/18   Dimitri Guevara MD   levothyroxine (SYNTHROID, LEVOTHROID) 50 MCG tablet Take 50 mcg by mouth Daily.    Erin Ambrocio MD   losartan (COZAAR) 100 MG tablet Take 100 mg by mouth Daily.    Erin Ambrocio MD   metFORMIN (GLUCOPHAGE) 500 MG tablet Take 1,000 mg by mouth 2 (Two) Times a Day With Meals.    Erin Ambrocio MD   MethylPREDNISolone (MEDROL, SHELL,) 4 MG tablet Take as directed on package instructions. 1/23/18   Dimitri Guevara MD   nicotine (NICODERM CQ) 14 MG/24HR patch Place 1 patch on the skin Daily. 1/24/18   Dimitri Guevara MD   nitroglycerin (NITROSTAT) 0.4 MG SL tablet Place 0.4 mg under the tongue Every 5 (Five) Minutes As Needed for Chest Pain.    Erin Ambrocio MD   potassium chloride (K-DUR,KLOR-CON) 20 MEQ CR tablet Take 40 mEq by mouth 2 (Two) Times a Day.    Erin Ambrocio MD   pregabalin (LYRICA) 150 MG capsule Take 1 capsule by mouth Every 12 (Twelve) Hours.  Patient taking differently: Take 300 mg by mouth Every 12 (Twelve) Hours. 11/28/17   Cornejo, Ernie S, DO   raNITIdine (ZANTAC) 150 MG tablet Take 150 mg by mouth 2 (Two) Times a Day As Needed for  "Indigestion.    Erin Ambrocio MD   SAXagliptin (ONGLYZA) 5 MG tablet Take 5 mg by mouth Daily.    Erin Ambrocio MD   spironolactone (ALDACTONE) 25 MG tablet Take 25 mg by mouth Daily.    Erin Ambrocio MD   tamsulosin (FLOMAX) 0.4 MG capsule 24 hr capsule Take 1 capsule by mouth Every Night.    Erin Ambrocio MD   venlafaxine XR (EFFEXOR-XR) 75 MG 24 hr capsule Take 225 mg by mouth Daily.    Erin Ambrocio MD     Objective     Vital Signs: /68 (BP Location: Left arm, Patient Position: Sitting)   Pulse 108   Temp 100.1 °F (37.8 °C) (Oral)   Resp 26   Ht 170.2 cm (67\")   Wt 77.1 kg (170 lb)   SpO2 91%   BMI 26.63 kg/m²    Physical Exam  GEN: Awake, alert, interactive, in NAD  HEENT: PERRLA, EOMI, Anicteric, Trachea midline  Lungs: diminished bs b/l w/ end exp wheezing, no rales  Heart: tachycardic, regular rhythm, +S1/s2  ABD: soft, nt/nd, +BS, no guarding/rebound. + ostomy LLQ  Extremities: atraumatic, no cyanosis, no edema  Skin: no rashes or lesions  Neuro: AAOx3, no focal deficits  Psych: normal mood & affect        Results Reviewed:  Lab Results (last 24 hours)     Procedure Component Value Units Date/Time    Blood Culture - Blood, Hand, Right [[id number]] Collected:  01/13/20 1052    Specimen:  Blood from Hand, Right Updated:  01/13/20 1233    Lactic Acid, Plasma [[id number]]  (Normal) Collected:  01/13/20 1052    Specimen:  Blood Updated:  01/13/20 1128     Lactate 1.4 mmol/L     Blood Culture - Blood, Hand, Right [[id number]] Collected:  01/13/20 1052    Specimen:  Blood from Hand, Right Updated:  01/13/20 1126    Blood Gas, Arterial [[id number]]  (Abnormal) Collected:  01/13/20 1050    Specimen:  Arterial Blood Updated:  01/13/20 1122     Site Right Radial     Jarad's Test Positive     pH, Arterial 7.278 pH units      Comment: 84 Value below reference range        pCO2, Arterial 87.0 mm Hg      Comment: 86 Value above critical limit        pO2, Arterial 61.4 mm Hg "      Comment: 84 Value below reference range        HCO3, Arterial 40.7 mmol/L      Comment: 83 Value above reference range        Base Excess, Arterial 10.0 mmol/L      Comment: 83 Value above reference range        O2 Saturation, Arterial 91.7 %      Comment: 84 Value below reference range        Temperature 37.0 C      Barometric Pressure for Blood Gas 760 mmHg      Modality BiPap     FIO2 45 %      Ventilator Mode BiPAP     IPAP 19     Comment: Meter: [id number]     :  [id number]        EPA 4     Notified Who DR SCHULTZ     Notified By [id number]     Notified Time 01/13/2020 11:14     Collected by [id number]    [facility] Draw [[id number]] Collected:  01/13/20 1005    Specimen:  Blood Updated:  01/13/20 1116    Narrative:       The following orders were created for panel order [facility] Draw.  Procedure                               Abnormality         Status                     ---------                               -----------         ------                     Light Blue Top[[id number]]                                   Final result               Green Top (Gel)[[id number]]                                  Final result               Lavender Top[[id number]]                                     Final result               Red Top[[id number]]                                          Final result               [facility] Blood Culture Scott...[[id number]]                      Final result                 Please view results for these tests on the individual orders.    Light Blue Top [[id number]] Collected:  01/13/20 1005    Specimen:  Blood Updated:  01/13/20 1116     Extra Tube hold for add-on     Comment: Auto resulted       [facility] Blood Culture Bottle Set [[id number]] Collected:  01/13/20 1005    Specimen:  Blood from Hand, Right Updated:  01/13/20 1116     Extra Tube Hold for add-ons.     Comment: Auto resulted.       Green Top (Gel) [[id number]] Collected:  01/13/20 1005    Specimen:  Blood Updated:  01/13/20 1116      Extra Tube Hold for add-ons.     Comment: Auto resulted.       Red Top [[id number]] Collected:  01/13/20 1005    Specimen:  Blood Updated:  01/13/20 1115     Extra Tube Hold for add-ons.     Comment: Auto resulted.       Lavender Top [[id number]] Collected:  01/13/20 1005    Specimen:  Blood Updated:  01/13/20 1115     Extra Tube hold for add-on     Comment: Auto resulted       Comprehensive Metabolic Panel [[id number]]  (Abnormal) Collected:  01/13/20 1005    Specimen:  Blood Updated:  01/13/20 1055     Glucose 300 mg/dL      BUN 5 mg/dL      Creatinine 0.61 mg/dL      Sodium 137 mmol/L      Potassium 4.3 mmol/L      Chloride 94 mmol/L      CO2 37.0 mmol/L      Calcium 8.8 mg/dL      Total Protein 7.1 g/dL      Albumin 4.10 g/dL      ALT (SGPT) 11 U/L      AST (SGOT) 24 U/L      Alkaline Phosphatase 115 U/L      Total Bilirubin 0.5 mg/dL      eGFR Non African Amer 134 mL/min/1.73      Globulin 3.0 gm/dL      A/G Ratio 1.4 g/dL      BUN/Creatinine Ratio 8.2     Anion Gap 6.0 mmol/L     Narrative:       GFR Normal >60  Chronic Kidney Disease <60  Kidney Failure <15      CBC & Differential [[id number]] Collected:  01/13/20 1005    Specimen:  Blood Updated:  01/13/20 1036    Narrative:       The following orders were created for panel order CBC & Differential.  Procedure                               Abnormality         Status                     ---------                               -----------         ------                     CBC Auto Differential[[id number]]        Abnormal            Final result                 Please view results for these tests on the individual orders.    CBC Auto Differential [[id number]]  (Abnormal) Collected:  01/13/20 1005    Specimen:  Blood Updated:  01/13/20 1036     WBC 12.67 10*3/mm3      RBC 4.93 10*6/mm3      Hemoglobin 14.1 g/dL      Hematocrit 45.8 %      MCV 92.9 fL      MCH 28.6 pg      MCHC 30.8 g/dL      RDW 12.8 %      RDW-SD 44.0 fl      MPV 9.1 fL      Platelets 150 10*3/mm3       Neutrophil % 79.5 %      Lymphocyte % 11.6 %      Monocyte % 6.9 %      Eosinophil % 0.0 %      Basophil % 0.5 %      Immature Grans % 1.5 %      Neutrophils, Absolute 10.07 10*3/mm3      Lymphocytes, Absolute 1.47 10*3/mm3      Monocytes, Absolute 0.88 10*3/mm3      Eosinophils, Absolute 0.00 10*3/mm3      Basophils, Absolute 0.06 10*3/mm3      Immature Grans, Absolute 0.19 10*3/mm3      nRBC 0.0 /100 WBC     Blood Gas, Arterial With Co-Ox [[id number]]  (Abnormal) Collected:  01/13/20 1000    Specimen:  Arterial Blood Updated:  01/13/20 1007     Site Right Brachial     Jarad's Test N/A     pH, Arterial 7.231 pH units      Comment: 84 Value below reference range        pCO2, Arterial 95.8 mm Hg      Comment: 86 Value above critical limit        pO2, Arterial 59.5 mm Hg      Comment: 84 Value below reference range        HCO3, Arterial 40.2 mmol/L      Comment: 83 Value above reference range        Base Excess, Arterial 8.6 mmol/L      Comment: 83 Value above reference range        O2 Saturation, Arterial 89.6 %      Comment: 84 Value below reference range        Hemoglobin, Blood Gas 14.0 g/dL      Hematocrit, Blood Gas 42.9 %      Oxyhemoglobin 86.1 %      Comment: 84 Value below reference range        Methemoglobin 0.20 %      Carboxyhemoglobin 3.7 %      A-a Gradiant --     Comment: UNABLE TO CALCULATE        Temperature 37.0 C      Sodium, Arterial 141 mmol/L      Potassium, Arterial 4.2 mmol/L      Barometric Pressure for Blood Gas 761 mmHg      Modality Nasal Cannula     Flow Rate 5.0 lpm      Ventilator Mode NA     Note --     Notified Who DR SCHULTZ     Notified By [id number]     Notified Time 01/13/2020 10:09     Collected by [id number]     Comment: Meter: [id number]     :  [id number]        pH, Temp Corrected --     pCO2, Temperature Corrected --     pO2, Temperature Corrected --        Imaging Results (Last 24 Hours)     Procedure Component Value Units Date/Time    XR Chest 1 View [[id number]]  Collected:  01/13/20 1052     Updated:  01/13/20 1059    Narrative:       EXAMINATION: XR CHEST 1 VW- 1/13/2020 10:52 AM CST     HISTORY: Shortness of breath     COMPARISON: 01/18/2019     FINDINGS:  Heart and mediastinal contours are stable. There has been prior median  sternotomy. Upright approach Port-A-Cath is in place with tip projecting  over the mid to distal SVC. There is atelectasis in the lung bases. The  pulmonary vasculature is prominent. Opacities are noted along the right  minor fissure. There is pleural thickening on the right, similar to  prior exams.       Impression:       Opacities along the right minor fissure may reflect fluid or  atelectasis. Bibasilar atelectasis is evident.   This report was finalized on 01/13/2020 10:56 by Dr. Zia Machuca MD.        I have personally reviewed and interpreted the radiology studies and ECG obtained at time of admission.     Assessment / Plan     Assessment:   Active Hospital Problems    Diagnosis   • **Acute on chronic respiratory failure with hypoxia and hypercapnia (CMS/HCC)   • Toxic metabolic encephalopathy   • COPD exacerbation (CMS/HCC)   • Diabetes mellitus (CMS/HCC)   • Coronary artery disease   • History of colon cancer         Plan:   #1 acute on chronic hypercarbic and hypoxic respiratory failure -in the setting of COPD exacerbation.  Admit per COPD protocol.  Duo nebs around-the-clock.  Steroids 40 IV every 6.  Check sputum culture.  IV azithromycin.  Continue BiPAP.  N.p.o. for now.  Admit to ICU for low-dose monitoring current presentation.    #2 toxic metabolic encephalopathy -in the setting of hypercarbic respiratory failure.  His mental status improving with BiPAP.  He is nonfocal on exam.  Continue to monitor.    #3 diabetes, insulin-dependent -on large doses of insulin at home.  Sugar 300 on arrival.  He will be getting steroids.  Will admit with Levemir nightly and sliding scale for now as he is n.p.o.  Hypoglycemia protocol in place.   Monitor closely.    #4 CAD -with history of CABG.  No active chest pain or issues.  Resume home meds as able    #5 history of colon cancer -status post resection with ostomy creation.  No recent issues.  Ostomy care.      Code Status: Full Code     I discussed the patient's findings and my recommendations with the patient and family at bedside    Estimated length of stay 4-5 days    Dhruv Rausch DO   01/13/20   12:47 PM              Electronically signed by Dhruv Rausch DO at 01/13/20 1255          Emergency Department Notes      Lennie Medellin, RN at 01/13/20 1012        Pt placed on bipap at this time     Lennie Medellin RN  01/13/20 1028      Electronically signed by Lennie Medellin RN at 01/13/20 1028     Nicola Carrero Jr., MD at 01/13/20 1125      Procedure Orders    1. Critical Care [[id number]] ordered by Nicola Carrero Jr., MD at 01/13/20 1140                Subjective   Family says the patient has become progressively more short of breath over the last couple days.  He has had increased cough with some sputum production.  He has had some chills but no definite fever.  He has a long history of COPD and is normally using a BiPAP but only at night.  They say he has been using that consistently but seems to be getting worse.      History provided by:  Relative  History limited by:  Mental status change and acuity of condition   used: No    Shortness of Breath   Severity:  Severe  Onset quality:  Gradual  Duration:  2 days  Timing:  Constant  Progression:  Worsening  Chronicity:  Recurrent  Context: URI    Context: not activity, not animal exposure, not emotional upset, not fumes, not known allergens, not occupational exposure, not pollens, not smoke exposure, not strong odors and not weather changes    Relieved by:  Nothing  Worsened by:  Nothing  Ineffective treatments:  Oxygen  Associated symptoms: cough and sputum production    Associated symptoms: no abdominal  pain, no chest pain, no claudication, no diaphoresis, no ear pain, no fever, no headaches, no hemoptysis, no neck pain, no PND, no rash, no sore throat, no syncope, no swollen glands, no vomiting and no wheezing    Risk factors: tobacco use    Risk factors: no recent alcohol use, no family hx of DVT, no hx of cancer, no hx of PE/DVT, no obesity, no oral contraceptive use, no prolonged immobilization and no recent surgery        Review of Systems   Constitutional: Negative.  Negative for diaphoresis and fever.   HENT: Negative.  Negative for ear pain and sore throat.    Respiratory: Positive for cough, sputum production and shortness of breath. Negative for hemoptysis and wheezing.    Cardiovascular: Negative.  Negative for chest pain, claudication, syncope and PND.   Gastrointestinal: Negative.  Negative for abdominal pain and vomiting.   Genitourinary: Negative.    Musculoskeletal: Negative.  Negative for neck pain.   Skin: Negative.  Negative for rash.   Neurological: Negative.  Negative for headaches.   Psychiatric/Behavioral: Negative.    All other systems reviewed and are negative.      Past Medical History:   Diagnosis Date   • Arthritis    • CAD (coronary artery disease)    • Colon cancer (CMS/[facility]) 11/2016   • COPD (chronic obstructive pulmonary disease) (CMS/[facility])    • Diabetes mellitus (CMS/[facility])    • HLD (hyperlipidemia)    • Hypertension        Allergies   Allergen Reactions   • Tetanus Toxoids Shortness Of Breath and Swelling   • Lamotrigine      Unknown     • Lisinopril Hives   • Methadone Swelling   • Penicillins Rash   • Tramadol Rash       Past Surgical History:   Procedure Laterality Date   • CARDIAC SURGERY     • CARPAL TUNNEL RELEASE     • COLON RESECTION WITH COLOSTOMY     • COLON SURGERY     • COLONOSCOPY  05/27/2016   • COLONOSCOPY N/A 10/12/2017    Procedure: COLONOSCOPY WITH ANESTHESIA;  Surgeon: Yessenia Gregg MD;  Location: [facility] ENDOSCOPY;  Service:    • CORONARY ANGIOPLASTY WITH STENT  PLACEMENT     • HERNIA REPAIR     • ROTATOR CUFF REPAIR         Family History   Problem Relation Age of Onset   • Stroke Mother    • Hypertension Mother    • Arthritis Father    • Heart disease Father    • Cancer Brother    • Diabetes Brother    • Hepatitis Brother    • Hypertension Brother        Social History     Socioeconomic History   • Marital status:      Spouse name: Not on file   • Number of children: Not on file   • Years of education: Not on file   • Highest education level: Not on file   Tobacco Use   • Smoking status: Current Every Day Smoker     Packs/day: 1.00     Years: 40.00     Pack years: 40.00   • Smokeless tobacco: Never Used   Substance and Sexual Activity   • Alcohol use: No   • Drug use: No   • Sexual activity: Defer       Prior to Admission medications    Medication Sig Start Date End Date Taking? Authorizing Provider   arformoterol (BROVANA) 15 MCG/2ML nebulizer solution Take 2 mL by nebulization 2 (Two) Times a Day. 2 boxes 1/23/18   Dimitri Guevara MD   atenolol (TENORMIN) 25 MG tablet Take 1 tablet by mouth Every 12 (Twelve) Hours.  Patient taking differently: Take 100 mg by mouth Every 12 (Twelve) Hours. 11/28/17   Ernie Cornejo DO   atorvastatin (LIPITOR) 40 MG tablet Take 1 tablet by mouth Every Night. 1/23/18   Dimitri Guevara MD   atropine 1 % ophthalmic solution Administer 1 drop to the right eye 2 (Two) Times a Day.    ProviderErin MD   benzonatate (TESSALON) 100 MG capsule Take 2 capsules by mouth 3 (Three) Times a Day As Needed for Cough. 1/23/18   Dimitri Guevara MD   budesonide (PULMICORT) 0.25 MG/2ML nebulizer solution Take 2 mL by nebulization 2 (Two) Times a Day. 1/23/18   Dimitri Guevara MD   Cholecalciferol (VITAMIN D) 2000 units tablet Take 4,000 Units by mouth Daily.    Erin Ambrocio MD   clopidogrel (PLAVIX) 75 MG tablet Take 75 mg by mouth Daily.    Erin Ambrocio MD   docusate sodium (COLACE) 100 MG capsule Take 100 mg by mouth  2 (Two) Times a Day As Needed for Constipation.    Erin Ambrocio MD   dorzolamide (TRUSOPT) 2 % ophthalmic solution Administer 1 drop into the left eye 2 (Two) Times a Day.    Erin Ambrocio MD   finasteride (PROSCAR) 5 MG tablet Take 1 tablet by mouth Daily. 1/24/18   Dimitri Guevara MD   guaiFENesin (MUCINEX) 600 MG 12 hr tablet Take 2 tablets by mouth Every 12 (Twelve) Hours. 1/2/18   Leonel Roth APRN   insulin aspart (novoLOG) 100 UNIT/ML injection Inject 40 Units under the skin 3 (Three) Times a Day Before Meals.    Erin Ambrocio MD   insulin glargine (LANTUS) 100 UNIT/ML injection Inject 160 Units under the skin Daily.    Erin Ambrocio MD   ipratropium-albuterol (DUONEB) 0.5-2.5 mg/mL nebulizer Take 3 mL by nebulization Every 4 (Four) Hours As Needed for Wheezing or Shortness of Air. 1/23/18   Dimitri Guevara MD   isosorbide mononitrate (IMDUR) 60 MG 24 hr tablet Take 90 mg by mouth Daily.    Erin Ambrocio MD   levoFLOXacin (LEVAQUIN) 500 MG tablet Take 1 tablet by mouth Daily. 1/23/18   Dimitri Guevara MD   levothyroxine (SYNTHROID, LEVOTHROID) 50 MCG tablet Take 50 mcg by mouth Daily.    Erin Ambrocio MD   losartan (COZAAR) 100 MG tablet Take 100 mg by mouth Daily.    Erin Ambrocio MD   metFORMIN (GLUCOPHAGE) 500 MG tablet Take 1,000 mg by mouth 2 (Two) Times a Day With Meals.    Erin Ambrocio MD   MethylPREDNISolone (MEDROL, SHELL,) 4 MG tablet Take as directed on package instructions. 1/23/18   Dimitri Guevara MD   nicotine (NICODERM CQ) 14 MG/24HR patch Place 1 patch on the skin Daily. 1/24/18   Dimitri Guevara MD   nitroglycerin (NITROSTAT) 0.4 MG SL tablet Place 0.4 mg under the tongue Every 5 (Five) Minutes As Needed for Chest Pain.    Erin Ambrocio MD   potassium chloride (K-DUR,KLOR-CON) 20 MEQ CR tablet Take 40 mEq by mouth 2 (Two) Times a Day.    Provider, MD Erin   pregabalin (LYRICA) 150 MG capsule Take 1 capsule  by mouth Every 12 (Twelve) Hours.  Patient taking differently: Take 300 mg by mouth Every 12 (Twelve) Hours. 11/28/17   Ernie Cornejo DO   raNITIdine (ZANTAC) 150 MG tablet Take 150 mg by mouth 2 (Two) Times a Day As Needed for Indigestion.    Erin Ambrocio MD   SAXagliptin (ONGLYZA) 5 MG tablet Take 5 mg by mouth Daily.    Erin Ambrocio MD   spironolactone (ALDACTONE) 25 MG tablet Take 25 mg by mouth Daily.    Erin Ambrocio MD   tamsulosin (FLOMAX) 0.4 MG capsule 24 hr capsule Take 1 capsule by mouth Every Night.    Erin Ambrocio MD   venlafaxine XR (EFFEXOR-XR) 75 MG 24 hr capsule Take 225 mg by mouth Daily.    Erin Ambrocio MD       Medications   sodium chloride 0.9 % flush 10 mL (has no administration in time range)   ipratropium-albuterol (DUO-NEB) nebulizer solution 3 mL (has no administration in time range)   ipratropium-albuterol (DUO-NEB) nebulizer solution 3 mL (3 mL Nebulization Given 1/13/20 1044)   methylPREDNISolone sodium succinate (SOLU-Medrol) injection 125 mg (125 mg Intravenous Given 1/13/20 1054)       Vitals:    01/13/20 1052   BP:    Pulse: 108   Resp:    Temp:    SpO2: 91%         Objective   Physical Exam   Constitutional: He appears well-developed and well-nourished.   HENT:   Head: Atraumatic.   Neck: Normal range of motion. Neck supple.   Cardiovascular: Regular rhythm.   Patient is mildly tachycardic but it seems to be a sinus rhythm.   Pulmonary/Chest: Accessory muscle usage present. Tachypnea noted. He is in respiratory distress. He has decreased breath sounds in the right middle field and the left middle field. He has wheezes in the right middle field and the left middle field.   Abdominal: Soft. Bowel sounds are normal.   Musculoskeletal: Normal range of motion.   Neurological:   Lethargic but nonfocal.   Skin: Skin is warm and dry.   Psychiatric:   Nonverbal.   Nursing note and vitals reviewed.      Critical Care  Performed by: Magan  Nicola WARE Jr., MD  Authorized by: Nicola Carrero Jr., MD     Critical care provider statement:     Critical care time (minutes):  30    Critical care start time:  1/13/2020 11:00 AM    Critical care end time:  1/13/2020 11:30 AM    Critical care time was exclusive of:  Separately billable procedures and treating other patients    Critical care was necessary to treat or prevent imminent or life-threatening deterioration of the following conditions:  Respiratory failure    Critical care was time spent personally by me on the following activities:  Blood draw for specimens, development of treatment plan with patient or surrogate, discussions with primary provider, evaluation of patient's response to treatment, examination of patient, interpretation of cardiac output measurements, obtaining history from patient or surrogate, ordering and performing treatments and interventions, ordering and review of laboratory studies, ordering and review of radiographic studies, pulse oximetry and re-evaluation of patient's condition    I assumed direction of critical care for this patient from another provider in my specialty: no              Lab Results (last 24 hours)     Procedure Component Value Units Date/Time    Blood Gas, Arterial With Co-Ox [[id number]]  (Abnormal) Collected:  01/13/20 1000    Specimen:  Arterial Blood Updated:  01/13/20 1007     Site Right Brachial     Jarad's Test N/A     pH, Arterial 7.231 pH units      Comment: 84 Value below reference range        pCO2, Arterial 95.8 mm Hg      Comment: 86 Value above critical limit        pO2, Arterial 59.5 mm Hg      Comment: 84 Value below reference range        HCO3, Arterial 40.2 mmol/L      Comment: 83 Value above reference range        Base Excess, Arterial 8.6 mmol/L      Comment: 83 Value above reference range        O2 Saturation, Arterial 89.6 %      Comment: 84 Value below reference range        Hemoglobin, Blood Gas 14.0 g/dL      Hematocrit, Blood Gas 42.9  %      Oxyhemoglobin 86.1 %      Comment: 84 Value below reference range        Methemoglobin 0.20 %      Carboxyhemoglobin 3.7 %      A-a Gradiant --     Comment: UNABLE TO CALCULATE        Temperature 37.0 C      Sodium, Arterial 141 mmol/L      Potassium, Arterial 4.2 mmol/L      Barometric Pressure for Blood Gas 761 mmHg      Modality Nasal Cannula     Flow Rate 5.0 lpm      Ventilator Mode NA     Note --     Notified Who DR SCHULTZ     Notified By [id number]     Notified Time 01/13/2020 10:09     Collected by [id number]     Comment: Meter: [id number]     :  [id number]        pH, Temp Corrected --     pCO2, Temperature Corrected --     pO2, Temperature Corrected --    [place] Blood Culture Bottle Set [[id number]] Collected:  01/13/20 1005    Specimen:  Blood from Hand, Right Updated:  01/13/20 1116     Extra Tube Hold for add-ons.     Comment: Auto resulted.       CBC & Differential [[id number]] Collected:  01/13/20 1005    Specimen:  Blood Updated:  01/13/20 1036    Narrative:       The following orders were created for panel order CBC & Differential.  Procedure                               Abnormality         Status                     ---------                               -----------         ------                     CBC Auto Differential[[id number]]        Abnormal            Final result                 Please view results for these tests on the individual orders.    Comprehensive Metabolic Panel [[id number]]  (Abnormal) Collected:  01/13/20 1005    Specimen:  Blood Updated:  01/13/20 1055     Glucose 300 mg/dL      BUN 5 mg/dL      Creatinine 0.61 mg/dL      Sodium 137 mmol/L      Potassium 4.3 mmol/L      Chloride 94 mmol/L      CO2 37.0 mmol/L      Calcium 8.8 mg/dL      Total Protein 7.1 g/dL      Albumin 4.10 g/dL      ALT (SGPT) 11 U/L      AST (SGOT) 24 U/L      Alkaline Phosphatase 115 U/L      Total Bilirubin 0.5 mg/dL      eGFR Non African Amer 134 mL/min/1.73      Globulin 3.0 gm/dL      A/G  Ratio 1.4 g/dL      BUN/Creatinine Ratio 8.2     Anion Gap 6.0 mmol/L     Narrative:       GFR Normal >60  Chronic Kidney Disease <60  Kidney Failure <15      CBC Auto Differential [[id number]]  (Abnormal) Collected:  01/13/20 1005    Specimen:  Blood Updated:  01/13/20 1036     WBC 12.67 10*3/mm3      RBC 4.93 10*6/mm3      Hemoglobin 14.1 g/dL      Hematocrit 45.8 %      MCV 92.9 fL      MCH 28.6 pg      MCHC 30.8 g/dL      RDW 12.8 %      RDW-SD 44.0 fl      MPV 9.1 fL      Platelets 150 10*3/mm3      Neutrophil % 79.5 %      Lymphocyte % 11.6 %      Monocyte % 6.9 %      Eosinophil % 0.0 %      Basophil % 0.5 %      Immature Grans % 1.5 %      Neutrophils, Absolute 10.07 10*3/mm3      Lymphocytes, Absolute 1.47 10*3/mm3      Monocytes, Absolute 0.88 10*3/mm3      Eosinophils, Absolute 0.00 10*3/mm3      Basophils, Absolute 0.06 10*3/mm3      Immature Grans, Absolute 0.19 10*3/mm3      nRBC 0.0 /100 WBC     Blood Gas, Arterial [[id number]]  (Abnormal) Collected:  01/13/20 1050    Specimen:  Arterial Blood Updated:  01/13/20 1122     Site Right Radial     Jarad's Test Positive     pH, Arterial 7.278 pH units      Comment: 84 Value below reference range        pCO2, Arterial 87.0 mm Hg      Comment: 86 Value above critical limit        pO2, Arterial 61.4 mm Hg      Comment: 84 Value below reference range        HCO3, Arterial 40.7 mmol/L      Comment: 83 Value above reference range        Base Excess, Arterial 10.0 mmol/L      Comment: 83 Value above reference range        O2 Saturation, Arterial 91.7 %      Comment: 84 Value below reference range        Temperature 37.0 C      Barometric Pressure for Blood Gas 760 mmHg      Modality BiPap     FIO2 45 %      Ventilator Mode BiPAP     IPAP 19     Comment: Meter: [id number]     :  [id number]        EPAP 4     Notified Who DR SCHULTZ     Notified By [id number]     Notified Time 01/13/2020 11:14     Collected by [id number]    Blood Culture - Blood, Hand, Right  [[id number]] Collected:  01/13/20 1052    Specimen:  Blood from Hand, Right Updated:  01/13/20 1126    Lactic Acid, Plasma [[id number]]  (Normal) Collected:  01/13/20 1052    Specimen:  Blood Updated:  01/13/20 1128     Lactate 1.4 mmol/L           XR Chest 1 View   Final Result   Opacities along the right minor fissure may reflect fluid or   atelectasis. Bibasilar atelectasis is evident.    This report was finalized on 01/13/2020 10:56 by Dr. Zia Machuca MD.          ED Course  ED Course as of Jan 13 1140   Mon Jan 13, 2020   1137 I told the family of the findings most pacifically on the blood gases of respiratory failure.  I told him that the CO2 does not begin to blow off he may have to be intubated but right now organ to continue with BiPAP.  However he will require admission for further stabilization.  I have spoken with Dr. Rausch and he has accepted.    [TR]   1139 Patient clinically does not have sepsis and abnormal vital signs are related to his underlying respiratory failure.    [TR]      ED Course User Index  [TR] Nicola Carrero Jr., MD          MDM  Number of Diagnoses or Management Options  Acute respiratory failure with hypercapnia (CMS/HCC): new and requires workup     Amount and/or Complexity of Data Reviewed  Clinical lab tests: ordered and reviewed  Tests in the radiology section of CPT®:  ordered and reviewed  Tests in the medicine section of CPT®:  ordered and reviewed  Discuss the patient with other providers: yes    Risk of Complications, Morbidity, and/or Mortality  Presenting problems: high  Diagnostic procedures: high  Management options: high    Critical Care  Total time providing critical care: 30-74 minutes    Patient Progress  Patient progress: improved      Final diagnoses:   Acute respiratory failure with hypercapnia (CMS/HCC)          Nicola Carrero Jr., MD  01/13/20 1140      Electronically signed by Nicola Carrero Jr., MD at 01/13/20 1140     Jess Buenrostro, MALIK at 01/13/20  1138        RT called for Jess Harmon, RN  01/13/20 1227      Electronically signed by Jess Buenrostro, RN at 01/13/20 1227       Oxygen Therapy (last 2 days)     Date/Time   SpO2   Device (Oxygen Therapy)   Flow (L/min)   Oxygen Concentration (%)   ETCO2 (mmHg)    01/14/20 1046   91   humidified;nasal cannula   6   --   32    01/14/20 0936   94   humidified;nasal cannula   6   --   --    01/14/20 0933   95   humidified;nasal cannula   6   --   --    01/14/20 0826   96   --   --   --   --    01/14/20 0629   95   NPPV/NIV   --   45   --    01/14/20 0621   98   NPPV/NIV   --   45   --    01/14/20 0109   96   NPPV/NIV   --   45   --    01/14/20 0103   98   NPPV/NIV   --   45   --    01/13/20 2144   94   NPPV/NIV   --   45   --    01/13/20 2135   93   NPPV/NIV   --   45   --    01/13/20 2125   --   NPPV/NIV   --   --   --    01/13/20 2113   93   NPPV/NIV   --   --   --    01/13/20 2050   --   NPPV/NIV   --   45   --    01/13/20 2000   91   NPPV/NIV   --   45   --    01/13/20 1900   92   --   --   --   --    01/13/20 1600   92   --   --   --   --    01/13/20 1500   93   --   --   --   --    01/13/20 1400   93   --   --   --   --    01/13/20 1300   91   --   --   --   --    01/13/20 1200   92   --   --   --   --    01/13/20 1052   91   --   --   --   --    01/13/20 1050   91   NPPV/NIV   --   45   --    01/13/20 1044   90   NPPV/NIV   --   45   --    01/13/20 1040   90   --   --   --   --    01/13/20 1020   (!) 89   NPPV/NIV   --   (S) 45   --    01/13/20 1005   (!) 88   NPPV/NIV   --   40   --    01/13/20 0957   (!) 89   nasal cannula   5   --   --               Physician Progress Notes (last 48 hours) (Notes from 01/12/20 1113 through 01/14/20 1113)      Dhruv Rausch DO at 01/14/20 0927              [facility] Medicine Services  INPATIENT PROGRESS NOTE    Patient Name: Sai Adam  Date of Admission: 1/13/2020  Today's Date: 01/14/20  Length of Stay: 1  Primary  Care Physician: Jarod Tillman MD    Subjective   Chief Complaint: Follow-up respiratory failure    HPI   Patient seen and examined.  He is off BiPAP and doing some pursed lip breathing.  He says that is baseline for him.  His breathing overall is not yet back to baseline but he feels a lot better than yesterday.  Normal mentation.  Denies chest pain.  Denies any abdominal pain or nausea/vomiting.  Wanting to eat.  No acute overnight events noted.        Review of Systems   All pertinent negatives and positives are as above. All other systems have been reviewed and are negative unless otherwise stated.     Objective    Temp:  [98 °F (36.7 °C)-100.1 °F (37.8 °C)] 98 °F (36.7 °C)  Heart Rate:  [] 61  Resp:  [20-30] 25  BP: (126-146)/(68-86) 140/71  Physical Exam  GEN: Awake, alert, interactive, purse lip breathing but speaking in full sentences, no accessory muscle use  HEENT: PERRLA, EOMI, Anicteric, Trachea midline  Lungs: improved air flow today but still tight/diminished. No wheezing currently  Heart: RRR, +S1/s2, no rub  ABD: soft, nt/nd, +BS, no guarding/rebound  Extremities: atraumatic, no cyanosis, no edema  Skin: no rashes or lesions  Neuro: AAOx3, no focal deficits  Psych: normal mood & affect        Results Review:  I have reviewed the labs, radiology results, and diagnostic studies.    Laboratory Data:   Results from last 7 days   Lab Units 01/14/20  0416 01/13/20  1005   WBC 10*3/mm3 11.45* 12.67*   HEMOGLOBIN g/dL 13.3 14.1   HEMATOCRIT % 42.1 45.8   PLATELETS 10*3/mm3 156 150        Results from last 7 days   Lab Units 01/14/20  0416 01/13/20  1005 01/13/20  1000   SODIUM mmol/L 138 137  --    SODIUM, ARTERIAL mmol/L  --   --  141   POTASSIUM mmol/L 4.2 4.3  --    CHLORIDE mmol/L 94* 94*  --    CO2 mmol/L 38.0* 37.0*  --    BUN mg/dL 17 5*  --    CREATININE mg/dL 0.63* 0.61*  --    CALCIUM mg/dL 9.3 8.8  --    BILIRUBIN mg/dL 0.4 0.5  --    ALK PHOS U/L 94 115  --    ALT (SGPT) U/L 10 11  --     AST (SGOT) U/L 22 24  --    GLUCOSE mg/dL 222* 300*  --        Culture Data:   Blood Culture   Date Value Ref Range Status   01/13/2020 No growth at less than 24 hours  Preliminary   01/13/2020 No growth at less than 24 hours  Preliminary       Radiology Data:   Imaging Results (Last 24 Hours)     Procedure Component Value Units Date/Time    XR Chest 1 View [[id number]] Collected:  01/13/20 1052     Updated:  01/13/20 1059    Narrative:       EXAMINATION: XR CHEST 1 VW- 1/13/2020 10:52 AM CST     HISTORY: Shortness of breath     COMPARISON: 01/18/2019     FINDINGS:  Heart and mediastinal contours are stable. There has been prior median  sternotomy. Upright approach Port-A-Cath is in place with tip projecting  over the mid to distal SVC. There is atelectasis in the lung bases. The  pulmonary vasculature is prominent. Opacities are noted along the right  minor fissure. There is pleural thickening on the right, similar to  prior exams.       Impression:       Opacities along the right minor fissure may reflect fluid or  atelectasis. Bibasilar atelectasis is evident.   This report was finalized on 01/13/2020 10:56 by Dr. Zia Machuca MD.          I have reviewed the patient's current medications.     Assessment/Plan     Active Hospital Problems    Diagnosis   • **Acute on chronic respiratory failure with hypoxia and hypercapnia (CMS/HCC)   • Hypophosphatemia   • Toxic metabolic encephalopathy   • COPD exacerbation (CMS/HCC)   • Diabetes mellitus (CMS/HCC)   • Coronary artery disease   • History of colon cancer     #1 acute on chronic hypercarbic and hypoxic respiratory failure -in setting of COPD exacerbation.  Doing better today now off BiPAP with improved blood gas.  Can make BiPAP PRN for naps and nightly.  Continue steroids 40 IV every 6 hours.  Continue IV azithromycin.  Continue nebulizers.  Currently on 6 L, wean to baseline home 5 as able.  Goal SPO2 88 to 92%.    #2 toxic metabolic encephalopathy -in the  setting of hypercarbia.  This has resolved with BiPAP and normalization of ABG.  No neuro deficits.    #3 COPD exacerbation -as above.  Not ready for steroid wean yet.    #4 hypophosphatemia -replace with sodium [place].  Recheck and continue placement protocol.    #5 CAD -with history of CABG.  No active chest pain or issues.    #6 insulin-dependent diabetes -takes 160 of Lantus at home.  His sugar was 200 this morning on only 30 of Levemir despite IV steroids.  Suspect he is not diet compliant.  He will be resuming diet today.  Will increase Levemir to 40 nightly.  Add pre-meal lispro 10 units.  Continue sliding scale and hypoglycemia protocol.  Will get diabetic/nutrition educator.    #7 history of colon cancer -status post ostomy creation previous day.  No active issues.  Continue ostomy care.    Discharge Planning: I expect the patient to be discharged to home in 3 to 4 days.    Dhruv Rausch DO   01/14/20   9:27 AM      Electronically signed by Dhruv Rausch DO at 01/14/20 [id number]

## 2020-01-14 NOTE — DISCHARGE PLACEMENT REQUEST
"Beatrice Mccullough [phone]  Sai Adam (59 y.o. Male)     Date of Birth Social Security Number Address Home Phone MRN    1960  [id number] [facility] [facility] [phone] [id number]    Rastafari Marital Status          Anabaptist        Admission Date Admission Type Admitting Provider Attending Provider Department, Room/Bed    1/13/20 Emergency Dhruv Rausch DO Demeo, Michael F, DO [facility] 4C, 496/1    Discharge Date Discharge Disposition Discharge Destination                       Attending Provider:  Dhruv Rausch DO    Allergies:  Tetanus Toxoids, Lamotrigine, Lisinopril, Methadone, Penicillins, Tramadol    Isolation:  None   Infection:  None   Code Status:  CPR    Ht:  170.2 cm (67\")   Wt:  65.3 kg (143 lb 14.4 oz)    Admission Cmt:  None   Principal Problem:  Acute on chronic respiratory failure with hypoxia and hypercapnia (CMS/HCC) [J96.21,J96.22]                 Active Insurance as of 1/13/2020     Primary Coverage     Payor Plan Insurance Group Employer/Plan Group    [id number]     Payor Plan Address Payor Plan Phone Number Payor Plan Fax Number Effective Dates    [address] [phone]  12/1/2014 - None Entered    [facility] [address]       Subscriber Name Subscriber Birth Date Member ID       SAI ADAM 1960 [id number]                 Emergency Contacts      (Rel.) Home Phone Work Phone Mobile Phone    Ketih Wilcox (Son) [phone] -- --    Antonio Danielson (Son) [phone] -- --    NUBIA RIDLEY (Daughter) -- -- [phone]               History & Physical      Dhruv Rausch DO at 01/13/20 [facility]7              [facility] Medicine Services  HISTORY AND PHYSICAL    Date of Admission: 1/13/2020  Primary Care Physician: Jarod Tillman MD    Subjective     Chief Complaint: Altered mental status    History of Present Illness  This is a 59-year-old male " with chronic hypoxic respiratory failure on 5 L O2 at baseline, sleep apnea on NIPPV at home, COPD CAD post CABG, diabetes.  [facility]  He has had a cough for several days at home but denied any overt shortness of breath or fevers.  Was seen normal last night.  This morning was altered and hard to wake up.  Presented into the ER was found to be in hypercarbic respiratory failure with a CO2 of 96 and a pH of 7.23.  PO2 was 59.  Patient was placed on BiPAP and worked up in the ER.  Chest imaging negative for any overt pneumonia.  He received steroids and nebulizers.  Repeat ABG improved to pH of 7.28 with a CO2 of 87.  Patient starting to wake up.  He is able to answer questions at this time.  Says he feels okay.  Denies any chest pain.  Again no fever chills.  No abdominal pain, nausea, vomiting, diarrhea.  No focal weakness.  We have been asked to admit for continued care.        Review of Systems   Otherwise complete ROS reviewed and negative except as mentioned in the HPI.    Past Medical History:   Past Medical History:   Diagnosis Date   • Arthritis    • CAD (coronary artery disease)    • Colon cancer (CMS/HCC) 11/2016   • COPD (chronic obstructive pulmonary disease) (CMS/[facility])    • Diabetes mellitus (CMS/[facility])    • HLD (hyperlipidemia)    • Hypertension      Past Surgical History:  Past Surgical History:   Procedure Laterality Date   • CARDIAC SURGERY     • CARPAL TUNNEL RELEASE     • COLON RESECTION WITH COLOSTOMY     • COLON SURGERY     • COLONOSCOPY  05/27/2016   • COLONOSCOPY N/A 10/12/2017    Procedure: COLONOSCOPY WITH ANESTHESIA;  Surgeon: Yessenia Gregg MD;  Location: [facility] ENDOSCOPY;  Service:    • CORONARY ANGIOPLASTY WITH STENT PLACEMENT     • HERNIA REPAIR     • ROTATOR CUFF REPAIR       Social History:  reports that he has been smoking. He has a 40.00 pack-year smoking history. He has never used smokeless tobacco. He reports that he does not drink alcohol or use drugs.    Family  History: family history includes Arthritis in his father; Cancer in his brother; Diabetes in his brother; Heart disease in his father; Hepatitis in his brother; Hypertension in his brother and mother; Stroke in his mother.       Allergies:  Allergies   Allergen Reactions   • Tetanus Toxoids Shortness Of Breath and Swelling   • Lamotrigine      Unknown     • Lisinopril Hives   • Methadone Swelling   • Penicillins Rash   • Tramadol Rash     Medications:  Prior to Admission medications    Medication Sig Start Date End Date Taking? Authorizing Provider   arformoterol (BROVANA) 15 MCG/2ML nebulizer solution Take 2 mL by nebulization 2 (Two) Times a Day. 2 boxes 1/23/18   Dimitri Guevara MD   atenolol (TENORMIN) 25 MG tablet Take 1 tablet by mouth Every 12 (Twelve) Hours.  Patient taking differently: Take 100 mg by mouth Every 12 (Twelve) Hours. 11/28/17   Ernie Cornejo DO   atorvastatin (LIPITOR) 40 MG tablet Take 1 tablet by mouth Every Night. 1/23/18   Dimitri Guevara MD   atropine 1 % ophthalmic solution Administer 1 drop to the right eye 2 (Two) Times a Day.    Erin Ambrocio MD   benzonatate (TESSALON) 100 MG capsule Take 2 capsules by mouth 3 (Three) Times a Day As Needed for Cough. 1/23/18   Dimitri Guevara MD   budesonide (PULMICORT) 0.25 MG/2ML nebulizer solution Take 2 mL by nebulization 2 (Two) Times a Day. 1/23/18   Dimitri Guevara MD   Cholecalciferol (VITAMIN D) 2000 units tablet Take 4,000 Units by mouth Daily.    Erin Ambrocio MD   clopidogrel (PLAVIX) 75 MG tablet Take 75 mg by mouth Daily.    Erin Ambrocio MD   docusate sodium (COLACE) 100 MG capsule Take 100 mg by mouth 2 (Two) Times a Day As Needed for Constipation.    Erin Ambrocio MD   dorzolamide (TRUSOPT) 2 % ophthalmic solution Administer 1 drop into the left eye 2 (Two) Times a Day.    Erin Ambrocio MD   finasteride (PROSCAR) 5 MG tablet Take 1 tablet by mouth Daily. 1/24/18   Dimitri Guevara MD    guaiFENesin (MUCINEX) 600 MG 12 hr tablet Take 2 tablets by mouth Every 12 (Twelve) Hours. 1/2/18   Leonel Roth APRN   insulin aspart (novoLOG) 100 UNIT/ML injection Inject 40 Units under the skin 3 (Three) Times a Day Before Meals.    Erin Ambrocio MD   insulin glargine (LANTUS) 100 UNIT/ML injection Inject 160 Units under the skin Daily.    Erin Ambrocio MD   ipratropium-albuterol (DUONEB) 0.5-2.5 mg/mL nebulizer Take 3 mL by nebulization Every 4 (Four) Hours As Needed for Wheezing or Shortness of Air. 1/23/18   Dimitri Guevara MD   isosorbide mononitrate (IMDUR) 60 MG 24 hr tablet Take 90 mg by mouth Daily.    Erin Ambrocio MD   levoFLOXacin (LEVAQUIN) 500 MG tablet Take 1 tablet by mouth Daily. 1/23/18   Dimitri Guevara MD   levothyroxine (SYNTHROID, LEVOTHROID) 50 MCG tablet Take 50 mcg by mouth Daily.    Erin Ambrocio MD   losartan (COZAAR) 100 MG tablet Take 100 mg by mouth Daily.    Erin Ambrocio MD   metFORMIN (GLUCOPHAGE) 500 MG tablet Take 1,000 mg by mouth 2 (Two) Times a Day With Meals.    Erin Ambrocio MD   MethylPREDNISolone (MEDROL, SHELL,) 4 MG tablet Take as directed on package instructions. 1/23/18   Dimitri Guevara MD   nicotine (NICODERM CQ) 14 MG/24HR patch Place 1 patch on the skin Daily. 1/24/18   Dimitri Guevara MD   nitroglycerin (NITROSTAT) 0.4 MG SL tablet Place 0.4 mg under the tongue Every 5 (Five) Minutes As Needed for Chest Pain.    Erin Ambrocio MD   potassium chloride (K-DUR,KLOR-CON) 20 MEQ CR tablet Take 40 mEq by mouth 2 (Two) Times a Day.    Erin Ambrocio MD   pregabalin (LYRICA) 150 MG capsule Take 1 capsule by mouth Every 12 (Twelve) Hours.  Patient taking differently: Take 300 mg by mouth Every 12 (Twelve) Hours. 11/28/17   Ernie Cornejo DO   raNITIdine (ZANTAC) 150 MG tablet Take 150 mg by mouth 2 (Two) Times a Day As Needed for Indigestion.    Provider, MD Erin   SAXagliptin (ONGLYZA) 5 MG  "tablet Take 5 mg by mouth Daily.    ProviderErin MD   spironolactone (ALDACTONE) 25 MG tablet Take 25 mg by mouth Daily.    Erin Ambrocio MD   tamsulosin (FLOMAX) 0.4 MG capsule 24 hr capsule Take 1 capsule by mouth Every Night.    Erin Ambrocio MD   venlafaxine XR (EFFEXOR-XR) 75 MG 24 hr capsule Take 225 mg by mouth Daily.    Erin Ambrocio MD     Objective     Vital Signs: /68 (BP Location: Left arm, Patient Position: Sitting)   Pulse 108   Temp 100.1 °F (37.8 °C) (Oral)   Resp 26   Ht 170.2 cm (67\")   Wt 77.1 kg (170 lb)   SpO2 91%   BMI 26.63 kg/m²    Physical Exam  GEN: Awake, alert, interactive, in NAD  HEENT: PERRLA, EOMI, Anicteric, Trachea midline  Lungs: diminished bs b/l w/ end exp wheezing, no rales  Heart: tachycardic, regular rhythm, +S1/s2  ABD: soft, nt/nd, +BS, no guarding/rebound. + ostomy LLQ  Extremities: atraumatic, no cyanosis, no edema  Skin: no rashes or lesions  Neuro: AAOx3, no focal deficits  Psych: normal mood & affect        Results Reviewed:  Lab Results (last 24 hours)     Procedure Component Value Units Date/Time    Blood Culture - Blood, Hand, Right [[id number]] Collected:  01/13/20 1052    Specimen:  Blood from Hand, Right Updated:  01/13/20 1233    Lactic Acid, Plasma [[id number]]  (Normal) Collected:  01/13/20 1052    Specimen:  Blood Updated:  01/13/20 1128     Lactate 1.4 mmol/L     Blood Culture - Blood, Hand, Right [[id number]] Collected:  01/13/20 1052    Specimen:  Blood from Hand, Right Updated:  01/13/20 1126    Blood Gas, Arterial [[id number]]  (Abnormal) Collected:  01/13/20 1050    Specimen:  Arterial Blood Updated:  01/13/20 1122     Site Right Radial     Jarad's Test Positive     pH, Arterial 7.278 pH units      Comment: 84 Value below reference range        pCO2, Arterial 87.0 mm Hg      Comment: 86 Value above critical limit        pO2, Arterial 61.4 mm Hg      Comment: 84 Value below reference range        HCO3, Arterial " 40.7 mmol/L      Comment: 83 Value above reference range        Base Excess, Arterial 10.0 mmol/L      Comment: 83 Value above reference range        O2 Saturation, Arterial 91.7 %      Comment: 84 Value below reference range        Temperature 37.0 C      Barometric Pressure for Blood Gas 760 mmHg      Modality BiPap     FIO2 45 %      Ventilator Mode BiPAP     IPAP 19     Comment: Meter: [id number]     :  [id number]        EPAP 4     Notified Who DR SCHULTZ     Notified By [id number]     Notified Time 01/13/2020 11:14     Collected by [id number]    Carlyle Draw [[id number]] Collected:  01/13/20 1005    Specimen:  Blood Updated:  01/13/20 1116    Narrative:       The following orders were created for panel order Carlyle Draw.  Procedure                               Abnormality         Status                     ---------                               -----------         ------                     Light Blue Top[[id number]]                                   Final result               Green Top (Gel)[[id number]]                                  Final result               Lavender Top[[id number]]                                     Final result               Red Top[[id number]]                                          Final result               Carlyle Blood Culture Scott...[[id number]]                      Final result                 Please view results for these tests on the individual orders.    Light Blue Top [[id number]] Collected:  01/13/20 1005    Specimen:  Blood Updated:  01/13/20 1116     Extra Tube hold for add-on     Comment: Auto resulted       Carlyle Blood Culture Bottle Set [[id number]] Collected:  01/13/20 1005    Specimen:  Blood from Hand, Right Updated:  01/13/20 1116     Extra Tube Hold for add-ons.     Comment: Auto resulted.       Green Top (Gel) [[id number]] Collected:  01/13/20 1005    Specimen:  Blood Updated:  01/13/20 1116     Extra Tube Hold for add-ons.     Comment: Auto resulted.       Red Top  [[id number]] Collected:  01/13/20 1005    Specimen:  Blood Updated:  01/13/20 1115     Extra Tube Hold for add-ons.     Comment: Auto resulted.       Treasure Marrero [[id number]] Collected:  01/13/20 1005    Specimen:  Blood Updated:  01/13/20 1115     Extra Tube hold for add-on     Comment: Auto resulted       Comprehensive Metabolic Panel [[id number]]  (Abnormal) Collected:  01/13/20 1005    Specimen:  Blood Updated:  01/13/20 1055     Glucose 300 mg/dL      BUN 5 mg/dL      Creatinine 0.61 mg/dL      Sodium 137 mmol/L      Potassium 4.3 mmol/L      Chloride 94 mmol/L      CO2 37.0 mmol/L      Calcium 8.8 mg/dL      Total Protein 7.1 g/dL      Albumin 4.10 g/dL      ALT (SGPT) 11 U/L      AST (SGOT) 24 U/L      Alkaline Phosphatase 115 U/L      Total Bilirubin 0.5 mg/dL      eGFR Non African Amer 134 mL/min/1.73      Globulin 3.0 gm/dL      A/G Ratio 1.4 g/dL      BUN/Creatinine Ratio 8.2     Anion Gap 6.0 mmol/L     Narrative:       GFR Normal >60  Chronic Kidney Disease <60  Kidney Failure <15      CBC & Differential [[id number]] Collected:  01/13/20 1005    Specimen:  Blood Updated:  01/13/20 1036    Narrative:       The following orders were created for panel order CBC & Differential.  Procedure                               Abnormality         Status                     ---------                               -----------         ------                     CBC Auto Differential[[id number]]        Abnormal            Final result                 Please view results for these tests on the individual orders.    CBC Auto Differential [[id number]]  (Abnormal) Collected:  01/13/20 1005    Specimen:  Blood Updated:  01/13/20 1036     WBC 12.67 10*3/mm3      RBC 4.93 10*6/mm3      Hemoglobin 14.1 g/dL      Hematocrit 45.8 %      MCV 92.9 fL      MCH 28.6 pg      MCHC 30.8 g/dL      RDW 12.8 %      RDW-SD 44.0 fl      MPV 9.1 fL      Platelets 150 10*3/mm3      Neutrophil % 79.5 %      Lymphocyte % 11.6 %      Monocyte % 6.9 %       Eosinophil % 0.0 %      Basophil % 0.5 %      Immature Grans % 1.5 %      Neutrophils, Absolute 10.07 10*3/mm3      Lymphocytes, Absolute 1.47 10*3/mm3      Monocytes, Absolute 0.88 10*3/mm3      Eosinophils, Absolute 0.00 10*3/mm3      Basophils, Absolute 0.06 10*3/mm3      Immature Grans, Absolute 0.19 10*3/mm3      nRBC 0.0 /100 WBC     Blood Gas, Arterial With Co-Ox [[id number]]  (Abnormal) Collected:  01/13/20 1000    Specimen:  Arterial Blood Updated:  01/13/20 1007     Site Right Brachial     Jarad's Test N/A     pH, Arterial 7.231 pH units      Comment: 84 Value below reference range        pCO2, Arterial 95.8 mm Hg      Comment: 86 Value above critical limit        pO2, Arterial 59.5 mm Hg      Comment: 84 Value below reference range        HCO3, Arterial 40.2 mmol/L      Comment: 83 Value above reference range        Base Excess, Arterial 8.6 mmol/L      Comment: 83 Value above reference range        O2 Saturation, Arterial 89.6 %      Comment: 84 Value below reference range        Hemoglobin, Blood Gas 14.0 g/dL      Hematocrit, Blood Gas 42.9 %      Oxyhemoglobin 86.1 %      Comment: 84 Value below reference range        Methemoglobin 0.20 %      Carboxyhemoglobin 3.7 %      A-a Gradiant --     Comment: UNABLE TO CALCULATE        Temperature 37.0 C      Sodium, Arterial 141 mmol/L      Potassium, Arterial 4.2 mmol/L      Barometric Pressure for Blood Gas 761 mmHg      Modality Nasal Cannula     Flow Rate 5.0 lpm      Ventilator Mode NA     Note --     Notified Who DR SCHULTZ     Notified By [id number]     Notified Time 01/13/2020 10:09     Collected by [id number]     Comment: Meter: [id number]     :  [id number]        pH, Temp Corrected --     pCO2, Temperature Corrected --     pO2, Temperature Corrected --        Imaging Results (Last 24 Hours)     Procedure Component Value Units Date/Time    XR Chest 1 View [[id number]] Collected:  01/13/20 1052     Updated:  01/13/20 1059    Narrative:        EXAMINATION: XR CHEST 1 VW- 1/13/2020 10:52 AM CST     HISTORY: Shortness of breath     COMPARISON: 01/18/2019     FINDINGS:  Heart and mediastinal contours are stable. There has been prior median  sternotomy. Upright approach Port-A-Cath is in place with tip projecting  over the mid to distal SVC. There is atelectasis in the lung bases. The  pulmonary vasculature is prominent. Opacities are noted along the right  minor fissure. There is pleural thickening on the right, similar to  prior exams.       Impression:       Opacities along the right minor fissure may reflect fluid or  atelectasis. Bibasilar atelectasis is evident.   This report was finalized on 01/13/2020 10:56 by Dr. Zia Machuca MD.        I have personally reviewed and interpreted the radiology studies and ECG obtained at time of admission.     Assessment / Plan     Assessment:   Active Hospital Problems    Diagnosis   • **Acute on chronic respiratory failure with hypoxia and hypercapnia (CMS/HCC)   • Toxic metabolic encephalopathy   • COPD exacerbation (CMS/HCC)   • Diabetes mellitus (CMS/HCC)   • Coronary artery disease   • History of colon cancer         Plan:   #1 acute on chronic hypercarbic and hypoxic respiratory failure -in the setting of COPD exacerbation.  Admit per COPD protocol.  Duo nebs around-the-clock.  Steroids 40 IV every 6.  Check sputum culture.  IV azithromycin.  Continue BiPAP.  N.p.o. for now.  Admit to ICU for low-dose monitoring current presentation.    #2 toxic metabolic encephalopathy -in the setting of hypercarbic respiratory failure.  His mental status improving with BiPAP.  He is nonfocal on exam.  Continue to monitor.    #3 diabetes, insulin-dependent -on large doses of insulin at home.  Sugar 300 on arrival.  He will be getting steroids.  Will admit with Levemir nightly and sliding scale for now as he is n.p.o.  Hypoglycemia protocol in place.  Monitor closely.    #4 CAD -with history of CABG.  No active chest pain or  issues.  Resume home meds as able    #5 history of colon cancer -status post resection with ostomy creation.  No recent issues.  Ostomy care.      Code Status: Full Code     I discussed the patient's findings and my recommendations with the patient and family at bedside    Estimated length of stay 4-5 days    Dhruv Rausch DO   01/13/20   12:47 PM              Electronically signed by Dhruv Rausch DO at 01/13/20 5007

## 2020-01-15 LAB
ANION GAP SERPL CALCULATED.3IONS-SCNC: 7 MMOL/L (ref 5–15)
BUN BLD-MCNC: 21 MG/DL (ref 8–23)
BUN/CREAT SERPL: 35.6 (ref 7–25)
CALCIUM SPEC-SCNC: 9.4 MG/DL (ref 8.6–10.5)
CHLORIDE SERPL-SCNC: 98 MMOL/L (ref 98–107)
CO2 SERPL-SCNC: 36 MMOL/L (ref 22–29)
CREAT BLD-MCNC: 0.59 MG/DL (ref 0.76–1.27)
GFR SERPL CREATININE-BSD FRML MDRD: 140 ML/MIN/1.73
GLUCOSE BLD-MCNC: 141 MG/DL (ref 65–99)
GLUCOSE BLDC GLUCOMTR-MCNC: 152 MG/DL (ref 70–130)
GLUCOSE BLDC GLUCOMTR-MCNC: 153 MG/DL (ref 70–130)
GLUCOSE BLDC GLUCOMTR-MCNC: 177 MG/DL (ref 70–130)
GLUCOSE BLDC GLUCOMTR-MCNC: 187 MG/DL (ref 70–130)
PHOSPHATE SERPL-MCNC: 3 MG/DL (ref 2.5–4.5)
POTASSIUM BLD-SCNC: 4.5 MMOL/L (ref 3.5–5.2)
SODIUM BLD-SCNC: 141 MMOL/L (ref 136–145)

## 2020-01-15 PROCEDURE — 87070 CULTURE OTHR SPECIMN AEROBIC: CPT | Performed by: INTERNAL MEDICINE

## 2020-01-15 PROCEDURE — 84100 ASSAY OF PHOSPHORUS: CPT | Performed by: INTERNAL MEDICINE

## 2020-01-15 PROCEDURE — 63710000001 INSULIN REGULAR HUMAN PER 5 UNITS: Performed by: INTERNAL MEDICINE

## 2020-01-15 PROCEDURE — 94664 DEMO&/EVAL PT USE INHALER: CPT

## 2020-01-15 PROCEDURE — 25010000002 ENOXAPARIN PER 10 MG: Performed by: INTERNAL MEDICINE

## 2020-01-15 PROCEDURE — 94799 UNLISTED PULMONARY SVC/PX: CPT

## 2020-01-15 PROCEDURE — 87205 SMEAR GRAM STAIN: CPT | Performed by: INTERNAL MEDICINE

## 2020-01-15 PROCEDURE — 80048 BASIC METABOLIC PNL TOTAL CA: CPT | Performed by: INTERNAL MEDICINE

## 2020-01-15 PROCEDURE — 63710000001 INSULIN DETEMIR PER 5 UNITS: Performed by: INTERNAL MEDICINE

## 2020-01-15 PROCEDURE — 99406 BEHAV CHNG SMOKING 3-10 MIN: CPT

## 2020-01-15 PROCEDURE — 94760 N-INVAS EAR/PLS OXIMETRY 1: CPT

## 2020-01-15 PROCEDURE — 25010000002 AZITHROMYCIN PER 500 MG: Performed by: INTERNAL MEDICINE

## 2020-01-15 PROCEDURE — 63710000001 INSULIN LISPRO (HUMAN) PER 5 UNITS: Performed by: INTERNAL MEDICINE

## 2020-01-15 PROCEDURE — 25010000002 METHYLPREDNISOLONE PER 40 MG: Performed by: INTERNAL MEDICINE

## 2020-01-15 PROCEDURE — 82962 GLUCOSE BLOOD TEST: CPT

## 2020-01-15 RX ORDER — METHYLPREDNISOLONE SODIUM SUCCINATE 40 MG/ML
40 INJECTION, POWDER, LYOPHILIZED, FOR SOLUTION INTRAMUSCULAR; INTRAVENOUS EVERY 12 HOURS
Status: DISCONTINUED | OUTPATIENT
Start: 2020-01-15 | End: 2020-01-17 | Stop reason: HOSPADM

## 2020-01-15 RX ADMIN — INSULIN HUMAN 3 UNITS: 100 INJECTION, SOLUTION PARENTERAL at 08:48

## 2020-01-15 RX ADMIN — INSULIN LISPRO 10 UNITS: 100 INJECTION, SOLUTION INTRAVENOUS; SUBCUTANEOUS at 08:49

## 2020-01-15 RX ADMIN — LEVOTHYROXINE SODIUM 50 MCG: 50 TABLET ORAL at 05:57

## 2020-01-15 RX ADMIN — AZITHROMYCIN MONOHYDRATE 500 MG: 500 INJECTION, POWDER, LYOPHILIZED, FOR SOLUTION INTRAVENOUS at 15:42

## 2020-01-15 RX ADMIN — INSULIN HUMAN 3 UNITS: 100 INJECTION, SOLUTION PARENTERAL at 11:56

## 2020-01-15 RX ADMIN — METHYLPREDNISOLONE SODIUM SUCCINATE 40 MG: 40 INJECTION, POWDER, LYOPHILIZED, FOR SOLUTION INTRAMUSCULAR; INTRAVENOUS at 10:46

## 2020-01-15 RX ADMIN — GUAIFENESIN 600 MG: 600 TABLET, EXTENDED RELEASE ORAL at 21:52

## 2020-01-15 RX ADMIN — PREGABALIN 300 MG: 100 CAPSULE ORAL at 09:51

## 2020-01-15 RX ADMIN — ATROPINE SULFATE 1 DROP: 10 SOLUTION/ DROPS OPHTHALMIC at 21:52

## 2020-01-15 RX ADMIN — DORZOLAMIDE HYDROCHLORIDE 1 DROP: 20 SOLUTION OPHTHALMIC at 21:53

## 2020-01-15 RX ADMIN — VENLAFAXINE HYDROCHLORIDE 225 MG: 75 CAPSULE, EXTENDED RELEASE ORAL at 08:47

## 2020-01-15 RX ADMIN — SODIUM CHLORIDE, PRESERVATIVE FREE 10 ML: 5 INJECTION INTRAVENOUS at 08:49

## 2020-01-15 RX ADMIN — IPRATROPIUM BROMIDE AND ALBUTEROL SULFATE 3 ML: 2.5; .5 SOLUTION RESPIRATORY (INHALATION) at 14:36

## 2020-01-15 RX ADMIN — FAMOTIDINE 20 MG: 20 TABLET, FILM COATED ORAL at 21:52

## 2020-01-15 RX ADMIN — SODIUM CHLORIDE, PRESERVATIVE FREE 10 ML: 5 INJECTION INTRAVENOUS at 21:55

## 2020-01-15 RX ADMIN — BUDESONIDE 0.25 MG: 0.25 INHALANT RESPIRATORY (INHALATION) at 07:30

## 2020-01-15 RX ADMIN — IPRATROPIUM BROMIDE AND ALBUTEROL SULFATE 3 ML: 2.5; .5 SOLUTION RESPIRATORY (INHALATION) at 19:17

## 2020-01-15 RX ADMIN — TAMSULOSIN HYDROCHLORIDE 0.4 MG: 0.4 CAPSULE ORAL at 21:52

## 2020-01-15 RX ADMIN — SPIRONOLACTONE 25 MG: 25 TABLET ORAL at 08:47

## 2020-01-15 RX ADMIN — PREGABALIN 300 MG: 100 CAPSULE ORAL at 21:52

## 2020-01-15 RX ADMIN — BUDESONIDE 0.25 MG: 0.25 INHALANT RESPIRATORY (INHALATION) at 19:17

## 2020-01-15 RX ADMIN — METHYLPREDNISOLONE SODIUM SUCCINATE 40 MG: 40 INJECTION, POWDER, FOR SOLUTION INTRAMUSCULAR; INTRAVENOUS at 21:53

## 2020-01-15 RX ADMIN — INSULIN LISPRO 10 UNITS: 100 INJECTION, SOLUTION INTRAVENOUS; SUBCUTANEOUS at 11:56

## 2020-01-15 RX ADMIN — ENOXAPARIN SODIUM 40 MG: 40 INJECTION SUBCUTANEOUS at 15:42

## 2020-01-15 RX ADMIN — DORZOLAMIDE HYDROCHLORIDE 1 DROP: 20 SOLUTION OPHTHALMIC at 08:48

## 2020-01-15 RX ADMIN — ATENOLOL 100 MG: 100 TABLET ORAL at 21:52

## 2020-01-15 RX ADMIN — INSULIN LISPRO 10 UNITS: 100 INJECTION, SOLUTION INTRAVENOUS; SUBCUTANEOUS at 17:24

## 2020-01-15 RX ADMIN — METHYLPREDNISOLONE SODIUM SUCCINATE 40 MG: 40 INJECTION, POWDER, LYOPHILIZED, FOR SOLUTION INTRAMUSCULAR; INTRAVENOUS at 04:06

## 2020-01-15 RX ADMIN — IPRATROPIUM BROMIDE AND ALBUTEROL SULFATE 3 ML: 2.5; .5 SOLUTION RESPIRATORY (INHALATION) at 00:00

## 2020-01-15 RX ADMIN — INSULIN HUMAN 3 UNITS: 100 INJECTION, SOLUTION PARENTERAL at 17:25

## 2020-01-15 RX ADMIN — IPRATROPIUM BROMIDE AND ALBUTEROL SULFATE 3 ML: 2.5; .5 SOLUTION RESPIRATORY (INHALATION) at 07:30

## 2020-01-15 RX ADMIN — FAMOTIDINE 20 MG: 20 TABLET, FILM COATED ORAL at 08:48

## 2020-01-15 RX ADMIN — ATROPINE SULFATE 1 DROP: 10 SOLUTION/ DROPS OPHTHALMIC at 08:48

## 2020-01-15 RX ADMIN — GUAIFENESIN 600 MG: 600 TABLET, EXTENDED RELEASE ORAL at 08:47

## 2020-01-15 RX ADMIN — CLOPIDOGREL BISULFATE 75 MG: 75 TABLET, FILM COATED ORAL at 08:48

## 2020-01-15 RX ADMIN — ATENOLOL 100 MG: 100 TABLET ORAL at 08:48

## 2020-01-15 RX ADMIN — INSULIN DETEMIR 40 UNITS: 100 INJECTION, SOLUTION SUBCUTANEOUS at 21:50

## 2020-01-15 RX ADMIN — LOSARTAN POTASSIUM 50 MG: 50 TABLET, FILM COATED ORAL at 08:48

## 2020-01-15 RX ADMIN — FINASTERIDE 5 MG: 5 TABLET, FILM COATED ORAL at 08:47

## 2020-01-15 RX ADMIN — ATORVASTATIN CALCIUM 40 MG: 40 TABLET, FILM COATED ORAL at 21:52

## 2020-01-15 NOTE — PROGRESS NOTES
[facility] Medicine Services  INPATIENT PROGRESS NOTE    Patient Name: Sai Adam  Date of Admission: 1/13/2020  Today's Date: 01/15/20  Length of Stay: 2  Primary Care Physician: Jarod Tillman MD    Subjective   Chief Complaint: Follow-up respiratory failure    HPI   Patient seen and examined.  He is feeling better today.  States he has been up walking around the room going to the bathroom but really has not been walking long distances.  His breathing is improved.  Denies chest pain or nausea.  Back on his home chronic 5 L home O2.    ROS:  All pertinent negatives and positives are as above. All other systems have been reviewed and are negative unless otherwise stated.     Objective    Temp:  [97.8 °F (36.6 °C)-98.7 °F (37.1 °C)] 98.2 °F (36.8 °C)  Heart Rate:  [54-99] 59  Resp:  [18-31] 18  BP: (114-168)/(52-71) 134/66  Physical Exam  GEN: Awake, alert, interactive, purse lip breathing but speaking in full sentences, no accessory muscle use  HEENT: PERRLA, EOMI, Anicteric, Trachea midline  Lungs: improved air flow b/l. No wheezing currently  Heart: RRR, +S1/s2, no rub  ABD: soft, nt/nd, +BS, no guarding/rebound  Extremities: atraumatic, no cyanosis, no edema  Skin: no rashes or lesions  Neuro: AAOx3, no focal deficits  Psych: normal mood & affect        Results Review:  I have reviewed the labs, radiology results, and diagnostic studies.    Laboratory Data:   Results from last 7 days   Lab Units 01/14/20  0416 01/13/20  1005   WBC 10*3/mm3 11.45* 12.67*   HEMOGLOBIN g/dL 13.3 14.1   HEMATOCRIT % 42.1 45.8   PLATELETS 10*3/mm3 156 150        Results from last 7 days   Lab Units 01/15/20  0513 01/14/20  0416 01/13/20  1005   SODIUM mmol/L 141 138 137   POTASSIUM mmol/L 4.5 4.2 4.3   CHLORIDE mmol/L 98 94* 94*   CO2 mmol/L 36.0* 38.0* 37.0*   BUN mg/dL 21 17 5*   CREATININE mg/dL 0.59* 0.63* 0.61*   CALCIUM mg/dL 9.4 9.3 8.8   BILIRUBIN mg/dL  --  0.4 0.5   ALK PHOS U/L   --  94 115   ALT (SGPT) U/L  --  10 11   AST (SGOT) U/L  --  22 24   GLUCOSE mg/dL 141* 222* 300*       Culture Data:   Blood Culture   Date Value Ref Range Status   01/13/2020 No growth at less than 24 hours  Preliminary   01/13/2020 No growth at less than 24 hours  Preliminary       Radiology Data:   Imaging Results (Last 24 Hours)     ** No results found for the last 24 hours. **          I have reviewed the patient's current medications.     Assessment/Plan     Active Hospital Problems    Diagnosis   • **Acute on chronic respiratory failure with hypoxia and hypercapnia (CMS/HCC)   • Hypophosphatemia   • Toxic metabolic encephalopathy   • COPD exacerbation (CMS/HCC)   • Diabetes mellitus (CMS/HCC)   • Coronary artery disease   • History of colon cancer     #1 acute on chronic hypercarbic and hypoxic respiratory failure -in setting of COPD exacerbation.  Doing better today and back on baseline O2.  Continue PRN BiPAP for sleep and overnight.  Will decrease steroids to every 12 hours.  Continue nebs and azithromycin.  Ambulate patient every shift.    #2 toxic metabolic encephalopathy -in the setting of hypercarbia.  This has resolved with BiPAP and normalization of ABG.  No neuro deficits.    #3 COPD exacerbation -as above.  Will start to wean steroids today.    #4 hypophosphatemia -improved with replacement    #5 CAD -with history of CABG.  No active chest pain or issues.    #6 insulin-dependent diabetes -takes 160 of Lantus at home.  His sugar was 200 today with only 30 of Levemir despite IV steroids.  Suspect he is not diet compliant.  He was restarted on diet and Levemir was increased to 40 with 10 prandial lispro and sliding scale.  His sugars have been doing very well with this regimen.  Continue along with hypoglycemia protocol.  Nutrition consult.    #7 history of colon cancer -status post ostomy creation previous day.  No active issues.  Continue ostomy care.    Discharge Planning: I expect the patient to  be discharged to home in 2 to 3 days    Dhruv Rausch DO   01/15/20   12:31 PM

## 2020-01-15 NOTE — PLAN OF CARE
Has been able to be off bipap since early this morning. Maintaining O2 sat of 89 to 94 % with o2 at 6 l/m pnc. Frequent non productive cough noted. Stands at bedside to use urinal. Fall interventions effective as evidenced by no falls thus far this admission. Will continue current POC and evaluate effectiveness each shift and PRN.

## 2020-01-15 NOTE — PLAN OF CARE
Problem: Patient Care Overview  Goal: Plan of Care Review  Outcome: Ongoing (interventions implemented as appropriate)  Flowsheets (Taken 1/15/2020 3942)  Progress: improving  Plan of Care Reviewed With: patient  Outcome Summary: No c/o pain this shift. O2 at 5L, NSR per tele, VSS. Wore Bipap while alseep. IV Abx, steroids continue. Levemir given. Safety maintained. Continue to monitor.

## 2020-01-15 NOTE — PLAN OF CARE
Patient denies pain.Bipap at bedside.5L O2 supplemental.Patient in high 80's on 5L continuous. Urinal at bedside.Lovenox for VTE.Safety maintained,VSS, will follow.

## 2020-01-16 LAB
ANION GAP SERPL CALCULATED.3IONS-SCNC: 8 MMOL/L (ref 5–15)
BUN BLD-MCNC: 20 MG/DL (ref 8–23)
BUN/CREAT SERPL: 37 (ref 7–25)
CALCIUM SPEC-SCNC: 9.4 MG/DL (ref 8.6–10.5)
CHLORIDE SERPL-SCNC: 98 MMOL/L (ref 98–107)
CO2 SERPL-SCNC: 35 MMOL/L (ref 22–29)
CREAT BLD-MCNC: 0.54 MG/DL (ref 0.76–1.27)
GFR SERPL CREATININE-BSD FRML MDRD: >150 ML/MIN/1.73
GLUCOSE BLD-MCNC: 203 MG/DL (ref 65–99)
GLUCOSE BLDC GLUCOMTR-MCNC: 140 MG/DL (ref 70–130)
GLUCOSE BLDC GLUCOMTR-MCNC: 147 MG/DL (ref 70–130)
GLUCOSE BLDC GLUCOMTR-MCNC: 200 MG/DL (ref 70–130)
MAGNESIUM SERPL-MCNC: 2.3 MG/DL (ref 1.6–2.4)
PHOSPHATE SERPL-MCNC: 3.6 MG/DL (ref 2.5–4.5)
POTASSIUM BLD-SCNC: 4.2 MMOL/L (ref 3.5–5.2)
SODIUM BLD-SCNC: 141 MMOL/L (ref 136–145)

## 2020-01-16 PROCEDURE — 94799 UNLISTED PULMONARY SVC/PX: CPT

## 2020-01-16 PROCEDURE — 94664 DEMO&/EVAL PT USE INHALER: CPT

## 2020-01-16 PROCEDURE — 84100 ASSAY OF PHOSPHORUS: CPT | Performed by: INTERNAL MEDICINE

## 2020-01-16 PROCEDURE — 63710000001 INSULIN LISPRO (HUMAN) PER 5 UNITS: Performed by: INTERNAL MEDICINE

## 2020-01-16 PROCEDURE — 83735 ASSAY OF MAGNESIUM: CPT | Performed by: INTERNAL MEDICINE

## 2020-01-16 PROCEDURE — 99406 BEHAV CHNG SMOKING 3-10 MIN: CPT

## 2020-01-16 PROCEDURE — 63710000001 INSULIN DETEMIR PER 5 UNITS: Performed by: INTERNAL MEDICINE

## 2020-01-16 PROCEDURE — 94760 N-INVAS EAR/PLS OXIMETRY 1: CPT

## 2020-01-16 PROCEDURE — 94660 CPAP INITIATION&MGMT: CPT

## 2020-01-16 PROCEDURE — 25010000002 ENOXAPARIN PER 10 MG: Performed by: INTERNAL MEDICINE

## 2020-01-16 PROCEDURE — 25010000002 METHYLPREDNISOLONE PER 40 MG: Performed by: INTERNAL MEDICINE

## 2020-01-16 PROCEDURE — 97162 PT EVAL MOD COMPLEX 30 MIN: CPT

## 2020-01-16 PROCEDURE — 63710000001 INSULIN REGULAR HUMAN PER 5 UNITS: Performed by: INTERNAL MEDICINE

## 2020-01-16 PROCEDURE — 80048 BASIC METABOLIC PNL TOTAL CA: CPT | Performed by: INTERNAL MEDICINE

## 2020-01-16 PROCEDURE — 82962 GLUCOSE BLOOD TEST: CPT

## 2020-01-16 PROCEDURE — 25010000002 AZITHROMYCIN PER 500 MG: Performed by: INTERNAL MEDICINE

## 2020-01-16 RX ORDER — ATENOLOL 50 MG/1
50 TABLET ORAL EVERY 12 HOURS SCHEDULED
Status: DISCONTINUED | OUTPATIENT
Start: 2020-01-16 | End: 2020-01-16

## 2020-01-16 RX ORDER — ISOSORBIDE MONONITRATE 60 MG/1
60 TABLET, EXTENDED RELEASE ORAL
Status: DISCONTINUED | OUTPATIENT
Start: 2020-01-16 | End: 2020-01-17 | Stop reason: HOSPADM

## 2020-01-16 RX ORDER — NIFEDIPINE 30 MG/1
30 TABLET, EXTENDED RELEASE ORAL
Status: DISCONTINUED | OUTPATIENT
Start: 2020-01-17 | End: 2020-01-17 | Stop reason: HOSPADM

## 2020-01-16 RX ADMIN — TAMSULOSIN HYDROCHLORIDE 0.4 MG: 0.4 CAPSULE ORAL at 21:22

## 2020-01-16 RX ADMIN — AZITHROMYCIN MONOHYDRATE 500 MG: 500 INJECTION, POWDER, LYOPHILIZED, FOR SOLUTION INTRAVENOUS at 16:31

## 2020-01-16 RX ADMIN — INSULIN LISPRO 10 UNITS: 100 INJECTION, SOLUTION INTRAVENOUS; SUBCUTANEOUS at 09:58

## 2020-01-16 RX ADMIN — INSULIN LISPRO 10 UNITS: 100 INJECTION, SOLUTION INTRAVENOUS; SUBCUTANEOUS at 18:18

## 2020-01-16 RX ADMIN — DORZOLAMIDE HYDROCHLORIDE 1 DROP: 20 SOLUTION OPHTHALMIC at 10:00

## 2020-01-16 RX ADMIN — ATROPINE SULFATE 1 DROP: 10 SOLUTION/ DROPS OPHTHALMIC at 10:00

## 2020-01-16 RX ADMIN — SODIUM CHLORIDE, PRESERVATIVE FREE 10 ML: 5 INJECTION INTRAVENOUS at 21:24

## 2020-01-16 RX ADMIN — ATORVASTATIN CALCIUM 40 MG: 40 TABLET, FILM COATED ORAL at 21:22

## 2020-01-16 RX ADMIN — IPRATROPIUM BROMIDE AND ALBUTEROL SULFATE 3 ML: 2.5; .5 SOLUTION RESPIRATORY (INHALATION) at 11:37

## 2020-01-16 RX ADMIN — METHYLPREDNISOLONE SODIUM SUCCINATE 40 MG: 40 INJECTION, POWDER, FOR SOLUTION INTRAMUSCULAR; INTRAVENOUS at 21:23

## 2020-01-16 RX ADMIN — INSULIN HUMAN 5 UNITS: 100 INJECTION, SOLUTION PARENTERAL at 12:38

## 2020-01-16 RX ADMIN — FINASTERIDE 5 MG: 5 TABLET, FILM COATED ORAL at 10:00

## 2020-01-16 RX ADMIN — CLOPIDOGREL BISULFATE 75 MG: 75 TABLET, FILM COATED ORAL at 09:59

## 2020-01-16 RX ADMIN — SPIRONOLACTONE 25 MG: 25 TABLET ORAL at 09:59

## 2020-01-16 RX ADMIN — ISOSORBIDE MONONITRATE 60 MG: 60 TABLET, EXTENDED RELEASE ORAL at 12:38

## 2020-01-16 RX ADMIN — FAMOTIDINE 20 MG: 20 TABLET, FILM COATED ORAL at 09:58

## 2020-01-16 RX ADMIN — ATROPINE SULFATE 1 DROP: 10 SOLUTION/ DROPS OPHTHALMIC at 21:23

## 2020-01-16 RX ADMIN — PREGABALIN 300 MG: 100 CAPSULE ORAL at 10:03

## 2020-01-16 RX ADMIN — GUAIFENESIN 600 MG: 600 TABLET, EXTENDED RELEASE ORAL at 09:59

## 2020-01-16 RX ADMIN — GUAIFENESIN 600 MG: 600 TABLET, EXTENDED RELEASE ORAL at 21:22

## 2020-01-16 RX ADMIN — METHYLPREDNISOLONE SODIUM SUCCINATE 40 MG: 40 INJECTION, POWDER, FOR SOLUTION INTRAMUSCULAR; INTRAVENOUS at 10:05

## 2020-01-16 RX ADMIN — IPRATROPIUM BROMIDE AND ALBUTEROL SULFATE 3 ML: 2.5; .5 SOLUTION RESPIRATORY (INHALATION) at 06:22

## 2020-01-16 RX ADMIN — DORZOLAMIDE HYDROCHLORIDE 1 DROP: 20 SOLUTION OPHTHALMIC at 21:23

## 2020-01-16 RX ADMIN — INSULIN LISPRO 10 UNITS: 100 INJECTION, SOLUTION INTRAVENOUS; SUBCUTANEOUS at 12:37

## 2020-01-16 RX ADMIN — BUDESONIDE 0.25 MG: 0.25 INHALANT RESPIRATORY (INHALATION) at 19:13

## 2020-01-16 RX ADMIN — SODIUM CHLORIDE, PRESERVATIVE FREE 10 ML: 5 INJECTION INTRAVENOUS at 09:58

## 2020-01-16 RX ADMIN — BUDESONIDE 0.25 MG: 0.25 INHALANT RESPIRATORY (INHALATION) at 06:22

## 2020-01-16 RX ADMIN — ENOXAPARIN SODIUM 40 MG: 40 INJECTION SUBCUTANEOUS at 16:31

## 2020-01-16 RX ADMIN — LEVOTHYROXINE SODIUM 50 MCG: 50 TABLET ORAL at 06:10

## 2020-01-16 RX ADMIN — INSULIN DETEMIR 40 UNITS: 100 INJECTION, SOLUTION SUBCUTANEOUS at 21:25

## 2020-01-16 RX ADMIN — LOSARTAN POTASSIUM 50 MG: 50 TABLET, FILM COATED ORAL at 10:00

## 2020-01-16 RX ADMIN — FAMOTIDINE 20 MG: 20 TABLET, FILM COATED ORAL at 21:22

## 2020-01-16 RX ADMIN — PREGABALIN 300 MG: 100 CAPSULE ORAL at 21:22

## 2020-01-16 RX ADMIN — IPRATROPIUM BROMIDE AND ALBUTEROL SULFATE 3 ML: 2.5; .5 SOLUTION RESPIRATORY (INHALATION) at 00:10

## 2020-01-16 RX ADMIN — IPRATROPIUM BROMIDE AND ALBUTEROL SULFATE 3 ML: 2.5; .5 SOLUTION RESPIRATORY (INHALATION) at 19:14

## 2020-01-16 NOTE — PLAN OF CARE
Problem: Patient Care Overview  Goal: Plan of Care Review  Flowsheets (Taken 1/16/2020 1014)  Outcome Summary: PT eval completed. He presents alert and oriented. He is on 5L/NC with all activity. He was able to ambulate into the hallway with a RW, 250 ft. No sway or LOB. He desat to 85% with gait. Once sitting on the EOB and with a 1-2 minute rest break his O2 sat was up to 90%. PT will continue with gait and endurance. I anticipate he will d.c home with family, consider HH if needed.

## 2020-01-16 NOTE — PROGRESS NOTES
[facility] Medicine Services  INPATIENT PROGRESS NOTE    Patient Name: Sai Adam  Date of Admission: 1/13/2020  Today's Date: 01/16/20  Length of Stay: 3  Primary Care Physician: Jarod Tillman MD    Subjective   Chief Complaint: Follow-up respiratory failure    HPI:  Patient seen and examined, feels better, still has not been up walking much. Denies any dizziness, chest pain, or sob. On his home baseline 5L 02. Overnight was bradycardic, no other events noted.     ROS:  All pertinent negatives and positives are as above. All other systems have been reviewed and are negative unless otherwise stated.     Objective    Temp:  [97.9 °F (36.6 °C)-98.2 °F (36.8 °C)] 98.2 °F (36.8 °C)  Heart Rate:  [47-59] 52  Resp:  [17-25] 25  BP: (118-143)/(64-78) 130/78  Physical Exam  GEN: Awake, alert, interactive, purse lip breathing but speaking in full sentences, no accessory muscle use  HEENT: PERRLA, EOMI, Anicteric, Trachea midline  Lungs: improved air flow b/l. No wheezing currently  Heart: yovana, RR, +S1/s2, no rub  ABD: soft, nt/nd, +BS, no guarding/rebound  Extremities: atraumatic, no cyanosis, no edema  Skin: no rashes or lesions  Neuro: AAOx3, no focal deficits  Psych: normal mood & affect        Results Review:  I have reviewed the labs, radiology results, and diagnostic studies.    Laboratory Data:   Results from last 7 days   Lab Units 01/14/20  0416 01/13/20  1005   WBC 10*3/mm3 11.45* 12.67*   HEMOGLOBIN g/dL 13.3 14.1   HEMATOCRIT % 42.1 45.8   PLATELETS 10*3/mm3 156 150        Results from last 7 days   Lab Units 01/15/20  0513 01/14/20  0416 01/13/20  1005   SODIUM mmol/L 141 138 137   POTASSIUM mmol/L 4.5 4.2 4.3   CHLORIDE mmol/L 98 94* 94*   CO2 mmol/L 36.0* 38.0* 37.0*   BUN mg/dL 21 17 5*   CREATININE mg/dL 0.59* 0.63* 0.61*   CALCIUM mg/dL 9.4 9.3 8.8   BILIRUBIN mg/dL  --  0.4 0.5   ALK PHOS U/L  --  94 115   ALT (SGPT) U/L  --  10 11   AST (SGOT) U/L  --  22 24    GLUCOSE mg/dL 141* 222* 300*       Culture Data:   Blood Culture   Date Value Ref Range Status   01/13/2020 No growth at less than 24 hours  Preliminary   01/13/2020 No growth at less than 24 hours  Preliminary       Radiology Data:   Imaging Results (Last 24 Hours)     ** No results found for the last 24 hours. **          I have reviewed the patient's current medications.     Assessment/Plan     Active Hospital Problems    Diagnosis   • **Acute on chronic respiratory failure with hypoxia and hypercapnia (CMS/HCC)   • Hypophosphatemia   • Toxic metabolic encephalopathy   • COPD exacerbation (CMS/HCC)   • Diabetes mellitus (CMS/HCC)   • Coronary artery disease   • History of colon cancer     #1 acute on chronic hypercarbic and hypoxic respiratory failure -in setting of COPD exacerbation.  Doing better today and back on baseline O2.  Continue PRN BiPAP for sleep and overnight.  Continue steroids, nebs and azithromycin.  Ambulate patient every shift.    #2 toxic metabolic encephalopathy -in the setting of hypercarbia.  This has resolved with BiPAP and normalization of ABG.  No neuro deficits.    #3 COPD exacerbation -as above.  Will start to wean steroids today.    #4 hypophosphatemia -improved with replacement    #5 CAD -with history of CABG.  No active chest pain or issues.    #6 insulin-dependent diabetes -takes 160 of Lantus at home.  His sugar was 200 with only 30 of Levemir despite IV steroids.  Suspect he is not diet compliant.  He was restarted on diet and Levemir was increased to 40 with 10 prandial lispro and sliding scale.  His sugars have been doing very well with this regimen.  Continue along with hypoglycemia protocol.  Nutrition consult pending.    #7 history of colon cancer -status post ostomy creation previous day.  No active issues.  Continue ostomy care.    #8 bradycardia - asymptomatic, d/c atenolol, started here, not sure why or by who. Pt never on this med before. Resume his nicardipine  tomorrow.    Discharge Planning: I expect the patient to be discharged to home in 1 to 2 days    Dhruv Rausch DO   01/16/20   9:44 AM

## 2020-01-16 NOTE — PLAN OF CARE
Problem: Patient Care Overview  Goal: Plan of Care Review  Outcome: Ongoing (interventions implemented as appropriate)  Flow sheets (Taken 1/16/2020 0164)  Progress: no change  Plan of Care Reviewed With: patient  Outcome Summary: Patient on 5 NC with humidification and Bi-PAP with sleep. Patient medicated with scheduled medication for C/O back/ leg pain. Reports non productive cough and SOB with exertion. Safety maintained.

## 2020-01-16 NOTE — PLAN OF CARE
Problem: Patient Care Overview  Goal: Plan of Care Review  Outcome: Ongoing (interventions implemented as appropriate)  Flowsheets (Taken 1/16/2020 1521)  Plan of Care Reviewed With: patient  Note:   Pt tells me he is a disabled vet and lives at home alone. No A1c at this time however pt did states ~4 months [facility]. He states he does not follow a DM diet. Reports that he only eats 1 meal daily and he prepares food for himself. Discussed with pt food habit changes that would aid with lowering his A1c and better control of his BS. Explained the importance of consistency with his eating pattern. Unsure if pt really wants change. Encouraged diet compliance. Will continue to follow.

## 2020-01-16 NOTE — THERAPY EVALUATION
Patient Name: Sai Adam  : 1960    MRN: [id number]                              Today's Date: 2020       Admit Date: 2020    Visit Dx:     ICD-10-CM ICD-9-CM   1. Acute respiratory failure with hypercapnia (CMS/HCC) J96.02 518.81   2. Impaired mobility Z74.09 799.89     Patient Active Problem List   Diagnosis   • Malignant neoplasm of sigmoid colon (CMS/HCC)   • Colon polyps   • Anticoagulated by anticoagulation treatment   • COPD exacerbation (CMS/HCC)   • Acute on chronic respiratory failure with hypoxia and hypercapnia (CMS/HCC)   • Diabetes mellitus (CMS/HCC)   • History of colon cancer   • Coronary artery disease   • Chronic respiratory acidosis   • Pneumonia of right middle lobe due to infectious organism (CMS/HCC)   • Acute respiratory failure with hypercapnia (CMS/HCC)   • Toxic metabolic encephalopathy   • Hypophosphatemia     Past Medical History:   Diagnosis Date   • Arthritis    • CAD (coronary artery disease)    • Colon cancer (CMS/HCC) 2016   • COPD (chronic obstructive pulmonary disease) (CMS/HCC)    • Diabetes mellitus (CMS/HCC)    • HLD (hyperlipidemia)    • Hypertension      Past Surgical History:   Procedure Laterality Date   • CARDIAC SURGERY     • CARPAL TUNNEL RELEASE     • COLON RESECTION WITH COLOSTOMY     • COLON SURGERY     • COLONOSCOPY  2016   • COLONOSCOPY N/A 10/12/2017    Procedure: COLONOSCOPY WITH ANESTHESIA;  Surgeon: Yessenia Gregg MD;  Location: [facility] ENDOSCOPY;  Service:    • CORONARY ANGIOPLASTY WITH STENT PLACEMENT     • HERNIA REPAIR     • ROTATOR CUFF REPAIR       General Information     Row Name 20 1014          PT Evaluation Time/Intention    Document Type  evaluation CC: Cough, AMS. Dx: Acute on chronic hypercarbia and hypoxic resp failure, toxic metabolic encephalopathy,   -DAVIDE     Mode of Treatment  physical therapy  -DAVIDE     Row Name 20 1014          General Information    Patient Profile Reviewed?  yes  -DAVIDE     Prior Level  of Function  independent:;all household mobility;ADL's  -DAVIDE     Existing Precautions/Restrictions  fall;oxygen therapy device and L/min  -DAVIDE     Barriers to Rehab  medically complex  -DAVIDE     Row Name 01/16/20 1014          Relationship/Environment    Lives With  alone  -DAVIDE     Row Name 01/16/20 1014          Resource/Environmental Concerns    Current Living Arrangements  home/apartment/condo  -DAVIDE     Row Name 01/16/20 1014          Cognitive Assessment/Intervention- PT/OT    Orientation Status (Cognition)  oriented x 4  -DAVIDE     Row Name 01/16/20 1014          Safety Issues, Functional Mobility    Impairments Affecting Function (Mobility)  endurance/activity tolerance;shortness of breath  -DAVIDE       User Key  (r) = Recorded By, (t) = Taken By, (c) = Cosigned By    Initials Name Provider Type    Fredy Swan PT DPT Physical Therapist        Mobility     Row Name 01/16/20 1014          Bed Mobility Assessment/Treatment    Bed Mobility Assessment/Treatment  supine-sit;sit-supine  -DAVIDE     Supine-Sit [place] (Bed Mobility)  supervision  -DAVIDE     Sit-Supine [place] (Bed Mobility)  supervision  -DAVIDE     Assistive Device (Bed Mobility)  head of bed elevated;bed rails  -DAVIDE     Row Name 01/16/20 1014          Sit-Stand Transfer    Sit-Stand [place] (Transfers)  contact guard  -DAVIDE     Assistive Device (Sit-Stand Transfers)  walker, front-wheeled  -DAVIDE     Row Name 01/16/20 1014          Gait/Stairs Assessment/Training    [place] Level (Gait)  contact guard  -DAVIDE     Assistive Device (Gait)  walker, front-wheeled  -DAVIDE     Distance in Feet (Gait)  250  -DAVIDE     Pattern (Gait)  swing-through  -DAVIDE     Deviations/Abnormal Patterns (Gait)  allie decreased;gait speed decreased  -DAVIDE     Comment (Gait/Stairs)  (S) desat to 85% with gait  -DAVIDE       User Key  (r) = Recorded By, (t) = Taken By, (c) = Cosigned By    Initials Name Provider Type    Fredy Swan PT DPT Physical Therapist         Obj/Interventions     [facility] Name 01/16/20 1014          General ROM    GENERAL ROM COMMENTS  B LE AROM WFL  -DAVIDE     [facility] Name 01/16/20 1014          MMT (Manual Muscle Testing)    General MMT Comments  B LE functionally 4/5  -DAVIDE     [facility] Name 01/16/20 1014          Static Sitting Balance    Level of [place] (Unsupported Sitting, Static Balance)  independent  -DAVIDE     Sitting Position (Unsupported Sitting, Static Balance)  sitting on edge of bed  -DAVIDE     [facility] Name 01/16/20 1014          Dynamic Sitting Balance    Level of [place], Reaches Outside Midline (Sitting, Dynamic Balance)  independent  -DAVIDE     Sitting Position, Reaches Outside Midline (Sitting, Dynamic Balance)  sitting on edge of bed  -DAVIDE     [facility] Name 01/16/20 1014          Static Standing Balance    Level of [place] (Supported Standing, Static Balance)  contact guard assist  -DAVIDE     Assistive Device Utilized (Supported Standing, Static Balance)  walker, rolling  -DAVIDE     [facility] Name 01/16/20 1014          Dynamic Standing Balance    Level of [place], Reaches Outside Midline (Standing, Dynamic Balance)  contact guard assist  -DAVIDE     Assistive Device Utilized (Supported Standing, Dynamic Balance)  walker, rolling  -DAVIDE       User Key  (r) = Recorded By, (t) = Taken By, (c) = Cosigned By    Initials Name Provider Type    Fredy Swan, PT DPT Physical Therapist        Goals/Plan     Row Name 01/16/20 1014          Transfer Goal 1 (PT)    Activity/Assistive Device (Transfer Goal 1, PT)  sit-to-stand/stand-to-sit;bed-to-chair/chair-to-bed  -DAVIDE     [place] Level/Cues Needed (Transfer Goal 1, PT)  independent  -DAVIDE     Time Frame (Transfer Goal 1, PT)  long term goal (LTG);10 days  -DAVIDE     Progress/Outcome (Transfer Goal 1, PT)  goal ongoing  -DAVIDE     [facility] Name 01/16/20 1014          Gait Training Goal 1 (PT)    Activity/Assistive Device (Gait Training Goal 1, PT)  gait (walking locomotion);assistive device use;decrease fall risk;improve  balance and speed;increase endurance/gait distance O2 sat WNL on scheduled O2  -DAVIDE     [place] Level (Gait Training Goal 1, PT)  independent  -DAVIDE     Distance (Gait Goal 1, PT)  250  -DAVIDE     Time Frame (Gait Training Goal 1, PT)  long term goal (LTG);10 days  -DAVIDE     Progress/Outcome (Gait Training Goal 1, PT)  goal ongoing  -DAVIDE       User Key  (r) = Recorded By, (t) = Taken By, (c) = Cosigned By    Initials Name Provider Type    Fredy Swan, PT DPT Physical Therapist        Clinical Impression     Row Name 01/16/20 1014          Pain Assessment    Additional Documentation  Pain Scale: Numbers Pre/Post-Treatment (Group)  -DAVIDE     Row Name 01/16/20 1014          Pain Scale: Numbers Pre/Post-Treatment    Pain Scale: Numbers, Pretreatment  0/10 - no pain  -DAVIDE     Row Name 01/16/20 1014          Plan of Care Review    Plan of Care Reviewed With  patient  -DAVIDE     Row Name 01/16/20 1014          Physical Therapy Clinical Impression    Patient/Family Goals Statement (PT Clinical Impression)  go home soon  -DAVIDE     Criteria for Skilled Interventions Met (PT Clinical Impression)  yes;treatment indicated  -DAVIDE     Rehab Potential (PT Clinical Summary)  good, to achieve stated therapy goals  -DAVIDE     Predicted Duration of Therapy (PT)  until d/c  -DAVIDE     Row Name 01/16/20 1014          Vital Signs    Intra SpO2 (%)  85  -DAVIDE     O2 Delivery Intra Treatment  supplemental O2  -DAVIDE     Post SpO2 (%)  90  -DAVIDE     O2 Delivery Post Treatment  supplemental O2  -DAVIDE     Pre Patient Position  Sitting  -DAVIDE     Intra Patient Position  Standing  -DAVIDE     Post Patient Position  Sitting  -DAVIDE     Row Name 01/16/20 1014          Positioning and Restraints    Pre-Treatment Position  in bed  -DAVIDE     Post Treatment Position  bed  -DAVIDE     In Bed  notified nsg;fowlers;call light within reach;encouraged to call for assist;with nsg  -DAVIDE       User Key  (r) = Recorded By, (t) = Taken By, (c) = Cosigned By    Initials Name Provider Type    DAVIDE  Fredy Farah, PT DPT Physical Therapist        Outcome Measures     Row Name 01/16/20 1014          How much help from another person do you currently need...    Turning from your back to your side while in flat bed without using bedrails?  3  -DAVIDE     Moving from lying on back to sitting on the side of a flat bed without bedrails?  3  -DAVIDE     Moving to and from a bed to a chair (including a wheelchair)?  3  -DAVIDE     Standing up from a chair using your arms (e.g., wheelchair, bedside chair)?  3  -DAVIDE     Climbing 3-5 steps with a railing?  3  -DAVIDE     To walk in hospital room?  3  -DAVIDE     AM-PAC 6 Clicks Score (PT)  18  -DAVIDE     Row Name 01/16/20 1014          Functional Assessment    Outcome Measure Options  AM-PAC 6 Clicks Basic Mobility (PT)  -DAVIDE       User Key  (r) = Recorded By, (t) = Taken By, (c) = Cosigned By    Initials Name Provider Type    DAVIDE Fredy Farah, PT DPT Physical Therapist          PT Recommendation and Plan  Planned Therapy Interventions (PT Eval): bed mobility training, transfer training, gait training, balance training, home exercise program, patient/family education, postural re-education, stair training, strengthening  Outcome Summary/Treatment Plan (PT)  Anticipated Discharge Disposition (PT): home with assist, home with home health  Plan of Care Reviewed With: patient  Outcome Summary: PT eval completed. He presents alert and oriented. He is on 5L/NC with all activity. He was able to ambulate into the hallway with a RW, 250 ft. No sway or LOB. He desat to 85% with gait. Once sitting on the EOB and with a 1-2 minute rest break his O2 sat was up to 90%. PT will continue with gait and endurance. I anticipate he will d.c home with family, consider HH if needed.     Time Calculation:   PT Charges     Row Name 01/16/20 4389             Time Calculation    Start Time  1014  -DAVIDE      Stop Time  1100  -DAVIDE      Time Calculation (min)  46 min  -DAVIDE      PT Received On  01/16/20  -DAVIDE      PT Goal  Re-Cert Due Date  01/26/20  -DAVIDE        User Key  (r) = Recorded By, (t) = Taken By, (c) = Cosigned By    Initials Name Provider Type    Fredy Swan, PT DPT Physical Therapist        Therapy Charges for Today     Code Description Service Date Service Provider Modifiers Qty    [id number] HC PT EVAL MOD COMPLEXITY 3 1/16/2020 Fredy Farah, PT DPT GP 1          PT G-Codes  Outcome Measure Options: AM-PAC 6 Clicks Basic Mobility (PT)  AM-PAC 6 Clicks Score (PT): 18    Fredy Farah, PT DPT  1/16/2020

## 2020-01-17 VITALS
SYSTOLIC BLOOD PRESSURE: 147 MMHG | BODY MASS INDEX: 22.85 KG/M2 | HEIGHT: 67 IN | DIASTOLIC BLOOD PRESSURE: 67 MMHG | WEIGHT: 145.6 LBS | HEART RATE: 62 BPM | TEMPERATURE: 98.1 F | OXYGEN SATURATION: 94 % | RESPIRATION RATE: 20 BRPM

## 2020-01-17 LAB
BACTERIA SPEC RESP CULT: NORMAL
GLUCOSE BLDC GLUCOMTR-MCNC: 145 MG/DL (ref 70–130)
GLUCOSE BLDC GLUCOMTR-MCNC: 240 MG/DL (ref 70–130)
GRAM STN SPEC: NORMAL

## 2020-01-17 PROCEDURE — 25010000002 METHYLPREDNISOLONE PER 40 MG: Performed by: INTERNAL MEDICINE

## 2020-01-17 PROCEDURE — 97116 GAIT TRAINING THERAPY: CPT

## 2020-01-17 PROCEDURE — 63710000001 INSULIN LISPRO (HUMAN) PER 5 UNITS: Performed by: INTERNAL MEDICINE

## 2020-01-17 PROCEDURE — 63710000001 INSULIN REGULAR HUMAN PER 5 UNITS: Performed by: INTERNAL MEDICINE

## 2020-01-17 PROCEDURE — 94799 UNLISTED PULMONARY SVC/PX: CPT

## 2020-01-17 PROCEDURE — 82962 GLUCOSE BLOOD TEST: CPT

## 2020-01-17 RX ORDER — LOSARTAN POTASSIUM 100 MG/1
50 TABLET ORAL DAILY
Start: 2020-01-17 | End: 2020-08-14 | Stop reason: DRUGHIGH

## 2020-01-17 RX ORDER — AZITHROMYCIN 500 MG/1
500 TABLET, FILM COATED ORAL DAILY
Qty: 4 TABLET | Refills: 0 | Status: SHIPPED | OUTPATIENT
Start: 2020-01-18 | End: 2020-08-14

## 2020-01-17 RX ORDER — PREDNISONE 10 MG/1
TABLET ORAL
Qty: 20 TABLET | Refills: 0 | Status: SHIPPED | OUTPATIENT
Start: 2020-01-17 | End: 2020-08-14

## 2020-01-17 RX ORDER — INSULIN GLARGINE 100 [IU]/ML
40 INJECTION, SOLUTION SUBCUTANEOUS DAILY
Refills: 12
Start: 2020-01-17 | End: 2020-08-14 | Stop reason: DRUGHIGH

## 2020-01-17 RX ADMIN — METHYLPREDNISOLONE SODIUM SUCCINATE 40 MG: 40 INJECTION, POWDER, FOR SOLUTION INTRAMUSCULAR; INTRAVENOUS at 12:29

## 2020-01-17 RX ADMIN — IPRATROPIUM BROMIDE AND ALBUTEROL SULFATE 3 ML: 2.5; .5 SOLUTION RESPIRATORY (INHALATION) at 07:26

## 2020-01-17 RX ADMIN — NIFEDIPINE 30 MG: 30 TABLET, FILM COATED, EXTENDED RELEASE ORAL at 09:49

## 2020-01-17 RX ADMIN — FINASTERIDE 5 MG: 5 TABLET, FILM COATED ORAL at 09:52

## 2020-01-17 RX ADMIN — IPRATROPIUM BROMIDE AND ALBUTEROL SULFATE 3 ML: 2.5; .5 SOLUTION RESPIRATORY (INHALATION) at 11:11

## 2020-01-17 RX ADMIN — SODIUM CHLORIDE, PRESERVATIVE FREE 10 ML: 5 INJECTION INTRAVENOUS at 09:51

## 2020-01-17 RX ADMIN — IPRATROPIUM BROMIDE AND ALBUTEROL SULFATE 3 ML: 2.5; .5 SOLUTION RESPIRATORY (INHALATION) at 00:41

## 2020-01-17 RX ADMIN — ISOSORBIDE MONONITRATE 60 MG: 60 TABLET, EXTENDED RELEASE ORAL at 09:51

## 2020-01-17 RX ADMIN — CLOPIDOGREL BISULFATE 75 MG: 75 TABLET, FILM COATED ORAL at 09:50

## 2020-01-17 RX ADMIN — LOSARTAN POTASSIUM 50 MG: 50 TABLET, FILM COATED ORAL at 09:49

## 2020-01-17 RX ADMIN — PREGABALIN 300 MG: 100 CAPSULE ORAL at 09:48

## 2020-01-17 RX ADMIN — INSULIN LISPRO 10 UNITS: 100 INJECTION, SOLUTION INTRAVENOUS; SUBCUTANEOUS at 10:06

## 2020-01-17 RX ADMIN — ATROPINE SULFATE 1 DROP: 10 SOLUTION/ DROPS OPHTHALMIC at 09:49

## 2020-01-17 RX ADMIN — BUDESONIDE 0.25 MG: 0.25 INHALANT RESPIRATORY (INHALATION) at 07:26

## 2020-01-17 RX ADMIN — INSULIN LISPRO 10 UNITS: 100 INJECTION, SOLUTION INTRAVENOUS; SUBCUTANEOUS at 12:30

## 2020-01-17 RX ADMIN — LEVOTHYROXINE SODIUM 50 MCG: 50 TABLET ORAL at 06:30

## 2020-01-17 RX ADMIN — SPIRONOLACTONE 25 MG: 25 TABLET ORAL at 09:51

## 2020-01-17 RX ADMIN — FAMOTIDINE 20 MG: 20 TABLET, FILM COATED ORAL at 09:48

## 2020-01-17 RX ADMIN — GUAIFENESIN 600 MG: 600 TABLET, EXTENDED RELEASE ORAL at 09:49

## 2020-01-17 RX ADMIN — DORZOLAMIDE HYDROCHLORIDE 1 DROP: 20 SOLUTION OPHTHALMIC at 09:49

## 2020-01-17 RX ADMIN — INSULIN HUMAN 5 UNITS: 100 INJECTION, SOLUTION PARENTERAL at 12:29

## 2020-01-17 NOTE — PROGRESS NOTES
Continued Stay Note  [facility]     Patient Name: Sai Adam  MRN: [id number]  Today's Date: 1/17/2020    Admit Date: 1/13/2020    Discharge Plan     Row Name 01/17/20 1545       Plan    Plan  HH request sent to [facility]    Patient/Family in Agreement with Plan  yes    Final Discharge Disposition Code  01 - home or self-care    Final Note  Pt is being discharged home today.  Pt has  care orders. Will be faxing d/c summary (when available) and  order request to [facility] clinic [phone].        Discharge Codes    No documentation.       Expected Discharge Date and Time     Expected Discharge Date Expected Discharge Time    Jan 17, 2020             SURYA Lemons

## 2020-01-17 NOTE — DISCHARGE PLACEMENT REQUEST
"DR STEPH LU RN  [id number]      Sai Adam (59 y.o. Male)     Date of Birth Social Security Number Address Home Phone MRN    1960  [address] [place] [id number] [phone] [id number]    Judaism Marital Status          Protestant        Admission Date Admission Type Admitting Provider Attending Provider Department, Room/Bed    1/13/20 Emergency Dhruv Rausch DO Demeo, Michael F, DO [facility] 4C, 496/1    Discharge Date Discharge Disposition Discharge Destination                       Attending Provider:  Dhruv Rausch DO    Allergies:  Tetanus Toxoids, Lamotrigine, Lisinopril, Methadone, Penicillins, Tramadol    Isolation:  None   Infection:  None   Code Status:  CPR    Ht:  170.2 cm (67\")   Wt:  66 kg (145 lb 9.6 oz)    Admission Cmt:  None   Principal Problem:  Acute on chronic respiratory failure with hypoxia and hypercapnia (CMS/HCC) [J96.21,J96.22]                 Active Insurance as of 1/13/2020     Primary Coverage     Payor Plan Insurance Group Employer/Plan Group    [facility] ADMINISTRATION [id number]     Payor Plan Address Payor Plan Phone Number Payor Plan Fax Number Effective Dates    [address] [phone]  12/1/2014 - None Entered    [facility] [id number]       Subscriber Name Subscriber Birth Date Member ID       SAI ADAM 1960 [id number]                 Emergency Contacts      (Rel.) Home Phone Work Phone Mobile Phone    Keith Wilcox (Son) [phone] -- --    Antonio Danielson (Son) [phone] -- --    NUBIA RIDLEY (Daughter) -- -- [phone]               History & Physical      Dhruv Rausch DO at 01/13/20 [facility]7              [facility] Medicine Services  HISTORY AND PHYSICAL    Date of Admission: 1/13/2020  Primary Care Physician: Steph Tillman MD    Subjective     Chief Complaint: Altered mental status    History of Present Illness  This is a " 59-year-old male with chronic hypoxic respiratory failure on 5 L O2 at baseline, sleep apnea on NIPPV at home, COPD CAD post CABG, diabetes.  [facility]  He has had a cough for several days at home but denied any overt shortness of breath or fevers.  Was seen normal last night.  This morning was altered and hard to wake up.  Presented into the ER was found to be in hypercarbic respiratory failure with a CO2 of 96 and a pH of 7.23.  PO2 was 59.  Patient was placed on BiPAP and worked up in the ER.  Chest imaging negative for any overt pneumonia.  He received steroids and nebulizers.  Repeat ABG improved to pH of 7.28 with a CO2 of 87.  Patient starting to wake up.  He is able to answer questions at this time.  Says he feels okay.  Denies any chest pain.  Again no fever chills.  No abdominal pain, nausea, vomiting, diarrhea.  No focal weakness.  We have been asked to admit for continued care.        Review of Systems   Otherwise complete ROS reviewed and negative except as mentioned in the HPI.    Past Medical History:   Past Medical History:   Diagnosis Date   • Arthritis    • CAD (coronary artery disease)    • Colon cancer (CMS/HCC) 11/2016   • COPD (chronic obstructive pulmonary disease) (CMS/[facility])    • Diabetes mellitus (CMS/[facility])    • HLD (hyperlipidemia)    • Hypertension      Past Surgical History:  Past Surgical History:   Procedure Laterality Date   • CARDIAC SURGERY     • CARPAL TUNNEL RELEASE     • COLON RESECTION WITH COLOSTOMY     • COLON SURGERY     • COLONOSCOPY  05/27/2016   • COLONOSCOPY N/A 10/12/2017    Procedure: COLONOSCOPY WITH ANESTHESIA;  Surgeon: Yessenia Gregg MD;  Location: [facility] ENDOSCOPY;  Service:    • CORONARY ANGIOPLASTY WITH STENT PLACEMENT     • HERNIA REPAIR     • ROTATOR CUFF REPAIR       Social History:  reports that he has been smoking. He has a 40.00 pack-year smoking history. He has never used smokeless tobacco. He reports that he does not drink alcohol or use  drugs.    Family History: family history includes Arthritis in his father; Cancer in his brother; Diabetes in his brother; Heart disease in his father; Hepatitis in his brother; Hypertension in his brother and mother; Stroke in his mother.       Allergies:  Allergies   Allergen Reactions   • Tetanus Toxoids Shortness Of Breath and Swelling   • Lamotrigine      Unknown     • Lisinopril Hives   • Methadone Swelling   • Penicillins Rash   • Tramadol Rash     Medications:  Prior to Admission medications    Medication Sig Start Date End Date Taking? Authorizing Provider   arformoterol (BROVANA) 15 MCG/2ML nebulizer solution Take 2 mL by nebulization 2 (Two) Times a Day. 2 boxes 1/23/18   Dimitri Guevara MD   atenolol (TENORMIN) 25 MG tablet Take 1 tablet by mouth Every 12 (Twelve) Hours.  Patient taking differently: Take 100 mg by mouth Every 12 (Twelve) Hours. 11/28/17   Ernie Cornejo DO   atorvastatin (LIPITOR) 40 MG tablet Take 1 tablet by mouth Every Night. 1/23/18   Dimitri Guevara MD   atropine 1 % ophthalmic solution Administer 1 drop to the right eye 2 (Two) Times a Day.    Erin Ambrocio MD   benzonatate (TESSALON) 100 MG capsule Take 2 capsules by mouth 3 (Three) Times a Day As Needed for Cough. 1/23/18   Dimitri Guevara MD   budesonide (PULMICORT) 0.25 MG/2ML nebulizer solution Take 2 mL by nebulization 2 (Two) Times a Day. 1/23/18   Dimitri Guevara MD   Cholecalciferol (VITAMIN D) 2000 units tablet Take 4,000 Units by mouth Daily.    Erin Ambrocio MD   clopidogrel (PLAVIX) 75 MG tablet Take 75 mg by mouth Daily.    Erin Ambrocio MD   docusate sodium (COLACE) 100 MG capsule Take 100 mg by mouth 2 (Two) Times a Day As Needed for Constipation.    Erin Ambrocio MD   dorzolamide (TRUSOPT) 2 % ophthalmic solution Administer 1 drop into the left eye 2 (Two) Times a Day.    Erin Ambrocio MD   finasteride (PROSCAR) 5 MG tablet Take 1 tablet by mouth Daily. 1/24/18    Dimitri Guevara MD   guaiFENesin (MUCINEX) 600 MG 12 hr tablet Take 2 tablets by mouth Every 12 (Twelve) Hours. 1/2/18   Leonel Roth APRN   insulin aspart (novoLOG) 100 UNIT/ML injection Inject 40 Units under the skin 3 (Three) Times a Day Before Meals.    Erin Ambrocio MD   insulin glargine (LANTUS) 100 UNIT/ML injection Inject 160 Units under the skin Daily.    Erin Ambrocio MD   ipratropium-albuterol (DUONEB) 0.5-2.5 mg/mL nebulizer Take 3 mL by nebulization Every 4 (Four) Hours As Needed for Wheezing or Shortness of Air. 1/23/18   Dimitri Guevara MD   isosorbide mononitrate (IMDUR) 60 MG 24 hr tablet Take 90 mg by mouth Daily.    Erin Ambrocio MD   levoFLOXacin (LEVAQUIN) 500 MG tablet Take 1 tablet by mouth Daily. 1/23/18   Dimitri Guevara MD   levothyroxine (SYNTHROID, LEVOTHROID) 50 MCG tablet Take 50 mcg by mouth Daily.    Erin Ambrocio MD   losartan (COZAAR) 100 MG tablet Take 100 mg by mouth Daily.    Erin Ambrocio MD   metFORMIN (GLUCOPHAGE) 500 MG tablet Take 1,000 mg by mouth 2 (Two) Times a Day With Meals.    Erin Ambrocio MD   MethylPREDNISolone (MEDROL, SHELL,) 4 MG tablet Take as directed on package instructions. 1/23/18   Dimitri Guevara MD   nicotine (NICODERM CQ) 14 MG/24HR patch Place 1 patch on the skin Daily. 1/24/18   Dimitri Guevara MD   nitroglycerin (NITROSTAT) 0.4 MG SL tablet Place 0.4 mg under the tongue Every 5 (Five) Minutes As Needed for Chest Pain.    Erin Ambrocio MD   potassium chloride (K-DUR,KLOR-CON) 20 MEQ CR tablet Take 40 mEq by mouth 2 (Two) Times a Day.    Erin Ambrocio MD   pregabalin (LYRICA) 150 MG capsule Take 1 capsule by mouth Every 12 (Twelve) Hours.  Patient taking differently: Take 300 mg by mouth Every 12 (Twelve) Hours. 11/28/17   Ernie Cornejo DO   raNITIdine (ZANTAC) 150 MG tablet Take 150 mg by mouth 2 (Two) Times a Day As Needed for Indigestion.    Provider, MD Erin  "  SAXagliptin (ONGLYZA) 5 MG tablet Take 5 mg by mouth Daily.    ProviderErin MD   spironolactone (ALDACTONE) 25 MG tablet Take 25 mg by mouth Daily.    ProviderErin MD   tamsulosin (FLOMAX) 0.4 MG capsule 24 hr capsule Take 1 capsule by mouth Every Night.    Erin Ambrocio MD   venlafaxine XR (EFFEXOR-XR) 75 MG 24 hr capsule Take 225 mg by mouth Daily.    ProviderErin MD     Objective     Vital Signs: /68 (BP Location: Left arm, Patient Position: Sitting)   Pulse 108   Temp 100.1 °F (37.8 °C) (Oral)   Resp 26   Ht 170.2 cm (67\")   Wt 77.1 kg (170 lb)   SpO2 91%   BMI 26.63 kg/m²    Physical Exam  GEN: Awake, alert, interactive, in NAD  HEENT: PERRLA, EOMI, Anicteric, Trachea midline  Lungs: diminished bs b/l w/ end exp wheezing, no rales  Heart: tachycardic, regular rhythm, +S1/s2  ABD: soft, nt/nd, +BS, no guarding/rebound. + ostomy LLQ  Extremities: atraumatic, no cyanosis, no edema  Skin: no rashes or lesions  Neuro: AAOx3, no focal deficits  Psych: normal mood & affect        Results Reviewed:  Lab Results (last 24 hours)     Procedure Component Value Units Date/Time    Blood Culture - Blood, Hand, Right [[id number]] Collected:  01/13/20 1052    Specimen:  Blood from Hand, Right Updated:  01/13/20 1233    Lactic Acid, Plasma [[id number]]  (Normal) Collected:  01/13/20 1052    Specimen:  Blood Updated:  01/13/20 1128     Lactate 1.4 mmol/L     Blood Culture - Blood, Hand, Right [[id number]] Collected:  01/13/20 1052    Specimen:  Blood from Hand, Right Updated:  01/13/20 1126    Blood Gas, Arterial [[id number]]  (Abnormal) Collected:  01/13/20 1050    Specimen:  Arterial Blood Updated:  01/13/20 1122     Site Right Radial     Jarad's Test Positive     pH, Arterial 7.278 pH units      Comment: 84 Value below reference range        pCO2, Arterial 87.0 mm Hg      Comment: 86 Value above critical limit        pO2, Arterial 61.4 mm Hg      Comment: 84 Value below reference " range        HCO3, Arterial 40.7 mmol/L      Comment: 83 Value above reference range        Base Excess, Arterial 10.0 mmol/L      Comment: 83 Value above reference range        O2 Saturation, Arterial 91.7 %      Comment: 84 Value below reference range        Temperature 37.0 C      Barometric Pressure for Blood Gas 760 mmHg      Modality BiPap     FIO2 45 %      Ventilator Mode BiPAP     IPAP 19     Comment: Meter: [id number]     :  [id number]        EPAP 4     Notified Who DR SCHULTZ     Notified By [id number]     Notified Time 01/13/2020 11:14     Collected by [id number]    Marlow Draw [[id number]] Collected:  01/13/20 1005    Specimen:  Blood Updated:  01/13/20 1116    Narrative:       The following orders were created for panel order [place] Draw.  Procedure                               Abnormality         Status                     ---------                               -----------         ------                     Light Blue Top[[id number]]                                   Final result               Green Top (Gel)[[id number]]                                  Final result               Lavender Top[[id number]]                                     Final result               Red Top[[id number]]                                          Final result               Marlow Blood Culture Scott...[[id number]]                      Final result                 Please view results for these tests on the individual orders.    Light Blue Top [[id number]] Collected:  01/13/20 1005    Specimen:  Blood Updated:  01/13/20 1116     Extra Tube hold for add-on     Comment: Auto resulted       Marlow Blood Culture Bottle Set [[id number]] Collected:  01/13/20 1005    Specimen:  Blood from Hand, Right Updated:  01/13/20 1116     Extra Tube Hold for add-ons.     Comment: Auto resulted.       Green Top (Gel) [[id number]] Collected:  01/13/20 1005    Specimen:  Blood Updated:  01/13/20 1116     Extra Tube Hold for add-ons.     Comment:  Auto resulted.       Red Top [[id number]] Collected:  01/13/20 1005    Specimen:  Blood Updated:  01/13/20 1115     Extra Tube Hold for add-ons.     Comment: Auto resulted.       Lavender Top [[id number]] Collected:  01/13/20 1005    Specimen:  Blood Updated:  01/13/20 1115     Extra Tube hold for add-on     Comment: Auto resulted       Comprehensive Metabolic Panel [[id number]]  (Abnormal) Collected:  01/13/20 1005    Specimen:  Blood Updated:  01/13/20 1055     Glucose 300 mg/dL      BUN 5 mg/dL      Creatinine 0.61 mg/dL      Sodium 137 mmol/L      Potassium 4.3 mmol/L      Chloride 94 mmol/L      CO2 37.0 mmol/L      Calcium 8.8 mg/dL      Total Protein 7.1 g/dL      Albumin 4.10 g/dL      ALT (SGPT) 11 U/L      AST (SGOT) 24 U/L      Alkaline Phosphatase 115 U/L      Total Bilirubin 0.5 mg/dL      eGFR Non African Amer 134 mL/min/1.73      Globulin 3.0 gm/dL      A/G Ratio 1.4 g/dL      BUN/Creatinine Ratio 8.2     Anion Gap 6.0 mmol/L     Narrative:       GFR Normal >60  Chronic Kidney Disease <60  Kidney Failure <15      CBC & Differential [[id number]] Collected:  01/13/20 1005    Specimen:  Blood Updated:  01/13/20 1036    Narrative:       The following orders were created for panel order CBC & Differential.  Procedure                               Abnormality         Status                     ---------                               -----------         ------                     CBC Auto Differential[[id number]]        Abnormal            Final result                 Please view results for these tests on the individual orders.    CBC Auto Differential [[id number]]  (Abnormal) Collected:  01/13/20 1005    Specimen:  Blood Updated:  01/13/20 1036     WBC 12.67 10*3/mm3      RBC 4.93 10*6/mm3      Hemoglobin 14.1 g/dL      Hematocrit 45.8 %      MCV 92.9 fL      MCH 28.6 pg      MCHC 30.8 g/dL      RDW 12.8 %      RDW-SD 44.0 fl      MPV 9.1 fL      Platelets 150 10*3/mm3      Neutrophil % 79.5 %      Lymphocyte %  11.6 %      Monocyte % 6.9 %      Eosinophil % 0.0 %      Basophil % 0.5 %      Immature Grans % 1.5 %      Neutrophils, Absolute 10.07 10*3/mm3      Lymphocytes, Absolute 1.47 10*3/mm3      Monocytes, Absolute 0.88 10*3/mm3      Eosinophils, Absolute 0.00 10*3/mm3      Basophils, Absolute 0.06 10*3/mm3      Immature Grans, Absolute 0.19 10*3/mm3      nRBC 0.0 /100 WBC     Blood Gas, Arterial With Co-Ox [[id number]]  (Abnormal) Collected:  01/13/20 1000    Specimen:  Arterial Blood Updated:  01/13/20 1007     Site Right Brachial     Jarad's Test N/A     pH, Arterial 7.231 pH units      Comment: 84 Value below reference range        pCO2, Arterial 95.8 mm Hg      Comment: 86 Value above critical limit        pO2, Arterial 59.5 mm Hg      Comment: 84 Value below reference range        HCO3, Arterial 40.2 mmol/L      Comment: 83 Value above reference range        Base Excess, Arterial 8.6 mmol/L      Comment: 83 Value above reference range        O2 Saturation, Arterial 89.6 %      Comment: 84 Value below reference range        Hemoglobin, Blood Gas 14.0 g/dL      Hematocrit, Blood Gas 42.9 %      Oxyhemoglobin 86.1 %      Comment: 84 Value below reference range        Methemoglobin 0.20 %      Carboxyhemoglobin 3.7 %      A-a Gradiant --     Comment: UNABLE TO CALCULATE        Temperature 37.0 C      Sodium, Arterial 141 mmol/L      Potassium, Arterial 4.2 mmol/L      Barometric Pressure for Blood Gas 761 mmHg      Modality Nasal Cannula     Flow Rate 5.0 lpm      Ventilator Mode NA     Note --     Notified Who DR SCHULTZ     Notified By [id number]     Notified Time 01/13/2020 10:09     Collected by [id number]     Comment: Meter: [id number]     :  [id number]        pH, Temp Corrected --     pCO2, Temperature Corrected --     pO2, Temperature Corrected --        Imaging Results (Last 24 Hours)     Procedure Component Value Units Date/Time    XR Chest 1 View [[id number]] Collected:  01/13/20 1052     Updated:   01/13/20 1059    Narrative:       EXAMINATION: XR CHEST 1 VW- 1/13/2020 10:52 AM CST     HISTORY: Shortness of breath     COMPARISON: 01/18/2019     FINDINGS:  Heart and mediastinal contours are stable. There has been prior median  sternotomy. Upright approach Port-A-Cath is in place with tip projecting  over the mid to distal SVC. There is atelectasis in the lung bases. The  pulmonary vasculature is prominent. Opacities are noted along the right  minor fissure. There is pleural thickening on the right, similar to  prior exams.       Impression:       Opacities along the right minor fissure may reflect fluid or  atelectasis. Bibasilar atelectasis is evident.   This report was finalized on 01/13/2020 10:56 by Dr. Zia Machuca MD.        I have personally reviewed and interpreted the radiology studies and ECG obtained at time of admission.     Assessment / Plan     Assessment:   Active Hospital Problems    Diagnosis   • **Acute on chronic respiratory failure with hypoxia and hypercapnia (CMS/HCC)   • Toxic metabolic encephalopathy   • COPD exacerbation (CMS/HCC)   • Diabetes mellitus (CMS/HCC)   • Coronary artery disease   • History of colon cancer         Plan:   #1 acute on chronic hypercarbic and hypoxic respiratory failure -in the setting of COPD exacerbation.  Admit per COPD protocol.  Duo nebs around-the-clock.  Steroids 40 IV every 6.  Check sputum culture.  IV azithromycin.  Continue BiPAP.  N.p.o. for now.  Admit to ICU for low-dose monitoring current presentation.    #2 toxic metabolic encephalopathy -in the setting of hypercarbic respiratory failure.  His mental status improving with BiPAP.  He is nonfocal on exam.  Continue to monitor.    #3 diabetes, insulin-dependent -on large doses of insulin at home.  Sugar 300 on arrival.  He will be getting steroids.  Will admit with Levemir nightly and sliding scale for now as he is n.p.o.  Hypoglycemia protocol in place.  Monitor closely.    #4 CAD -with history  of CABG.  No active chest pain or issues.  Resume home meds as able    #5 history of colon cancer -status post resection with ostomy creation.  No recent issues.  Ostomy care.      Code Status: Full Code     I discussed the patient's findings and my recommendations with the patient and family at bedside    Estimated length of stay 4-5 days    Dhruv Rausch DO   01/13/20   12:47 PM              Electronically signed by Dhruv Rausch DO at 01/13/20 1255          Emergency Department Notes      Lennie Medellin, RN at 01/13/20 1012        Pt placed on bipap at this time     Lennie Medellin RN  01/13/20 1028      Electronically signed by Lnenie Medellin RN at 01/13/20 1028     Nicola Carrero Jr., MD at 01/13/20 1125      Procedure Orders    1. Critical Care [[id number]] ordered by Nicola Carrero Jr., MD at 01/13/20 1140                Subjective   Family says the patient has become progressively more short of breath over the last couple days.  He has had increased cough with some sputum production.  He has had some chills but no definite fever.  He has a long history of COPD and is normally using a BiPAP but only at night.  They say he has been using that consistently but seems to be getting worse.      History provided by:  Relative  History limited by:  Mental status change and acuity of condition   used: No    Shortness of Breath   Severity:  Severe  Onset quality:  Gradual  Duration:  2 days  Timing:  Constant  Progression:  Worsening  Chronicity:  Recurrent  Context: URI    Context: not activity, not animal exposure, not emotional upset, not fumes, not known allergens, not occupational exposure, not pollens, not smoke exposure, not strong odors and not weather changes    Relieved by:  Nothing  Worsened by:  Nothing  Ineffective treatments:  Oxygen  Associated symptoms: cough and sputum production    Associated symptoms: no abdominal pain, no chest pain, no claudication, no  diaphoresis, no ear pain, no fever, no headaches, no hemoptysis, no neck pain, no PND, no rash, no sore throat, no syncope, no swollen glands, no vomiting and no wheezing    Risk factors: tobacco use    Risk factors: no recent alcohol use, no family hx of DVT, no hx of cancer, no hx of PE/DVT, no obesity, no oral contraceptive use, no prolonged immobilization and no recent surgery        Review of Systems   Constitutional: Negative.  Negative for diaphoresis and fever.   HENT: Negative.  Negative for ear pain and sore throat.    Respiratory: Positive for cough, sputum production and shortness of breath. Negative for hemoptysis and wheezing.    Cardiovascular: Negative.  Negative for chest pain, claudication, syncope and PND.   Gastrointestinal: Negative.  Negative for abdominal pain and vomiting.   Genitourinary: Negative.    Musculoskeletal: Negative.  Negative for neck pain.   Skin: Negative.  Negative for rash.   Neurological: Negative.  Negative for headaches.   Psychiatric/Behavioral: Negative.    All other systems reviewed and are negative.      Past Medical History:   Diagnosis Date   • Arthritis    • CAD (coronary artery disease)    • Colon cancer (CMS/HCC) 11/2016   • COPD (chronic obstructive pulmonary disease) (CMS/[facility])    • Diabetes mellitus (CMS/[facility])    • HLD (hyperlipidemia)    • Hypertension        Allergies   Allergen Reactions   • Tetanus Toxoids Shortness Of Breath and Swelling   • Lamotrigine      Unknown     • Lisinopril Hives   • Methadone Swelling   • Penicillins Rash   • Tramadol Rash       Past Surgical History:   Procedure Laterality Date   • CARDIAC SURGERY     • CARPAL TUNNEL RELEASE     • COLON RESECTION WITH COLOSTOMY     • COLON SURGERY     • COLONOSCOPY  05/27/2016   • COLONOSCOPY N/A 10/12/2017    Procedure: COLONOSCOPY WITH ANESTHESIA;  Surgeon: Yessenia Gregg MD;  Location: [facility] ENDOSCOPY;  Service:    • CORONARY ANGIOPLASTY WITH STENT PLACEMENT     • HERNIA REPAIR     • ROTATOR  CUFF REPAIR         Family History   Problem Relation Age of Onset   • Stroke Mother    • Hypertension Mother    • Arthritis Father    • Heart disease Father    • Cancer Brother    • Diabetes Brother    • Hepatitis Brother    • Hypertension Brother        Social History     Socioeconomic History   • Marital status:      Spouse name: Not on file   • Number of children: Not on file   • Years of education: Not on file   • Highest education level: Not on file   Tobacco Use   • Smoking status: Current Every Day Smoker     Packs/day: 1.00     Years: 40.00     Pack years: 40.00   • Smokeless tobacco: Never Used   Substance and Sexual Activity   • Alcohol use: No   • Drug use: No   • Sexual activity: Defer       Prior to Admission medications    Medication Sig Start Date End Date Taking? Authorizing Provider   arformoterol (BROVANA) 15 MCG/2ML nebulizer solution Take 2 mL by nebulization 2 (Two) Times a Day. 2 boxes 1/23/18   Dimitri Guevara MD   atenolol (TENORMIN) 25 MG tablet Take 1 tablet by mouth Every 12 (Twelve) Hours.  Patient taking differently: Take 100 mg by mouth Every 12 (Twelve) Hours. 11/28/17   Ernie Cornejo DO   atorvastatin (LIPITOR) 40 MG tablet Take 1 tablet by mouth Every Night. 1/23/18   Dimitri Guevara MD   atropine 1 % ophthalmic solution Administer 1 drop to the right eye 2 (Two) Times a Day.    ProviderErin MD   benzonatate (TESSALON) 100 MG capsule Take 2 capsules by mouth 3 (Three) Times a Day As Needed for Cough. 1/23/18   Dimitri Guevara MD   budesonide (PULMICORT) 0.25 MG/2ML nebulizer solution Take 2 mL by nebulization 2 (Two) Times a Day. 1/23/18   Dimitri Guevara MD   Cholecalciferol (VITAMIN D) 2000 units tablet Take 4,000 Units by mouth Daily.    ProviderErin MD   clopidogrel (PLAVIX) 75 MG tablet Take 75 mg by mouth Daily.    Erin Ambrocio MD   docusate sodium (COLACE) 100 MG capsule Take 100 mg by mouth 2 (Two) Times a Day As Needed for  Constipation.    Erin Ambrocio MD   dorzolamide (TRUSOPT) 2 % ophthalmic solution Administer 1 drop into the left eye 2 (Two) Times a Day.    Erin Ambrocio MD   finasteride (PROSCAR) 5 MG tablet Take 1 tablet by mouth Daily. 1/24/18   Dimitri Guevara MD   guaiFENesin (MUCINEX) 600 MG 12 hr tablet Take 2 tablets by mouth Every 12 (Twelve) Hours. 1/2/18   Leonel Roth APRN   insulin aspart (novoLOG) 100 UNIT/ML injection Inject 40 Units under the skin 3 (Three) Times a Day Before Meals.    Erin Ambrocio MD   insulin glargine (LANTUS) 100 UNIT/ML injection Inject 160 Units under the skin Daily.    Erin Ambrocio MD   ipratropium-albuterol (DUONEB) 0.5-2.5 mg/mL nebulizer Take 3 mL by nebulization Every 4 (Four) Hours As Needed for Wheezing or Shortness of Air. 1/23/18   Dimitri Guevara MD   isosorbide mononitrate (IMDUR) 60 MG 24 hr tablet Take 90 mg by mouth Daily.    Erin Ambrocio MD   levoFLOXacin (LEVAQUIN) 500 MG tablet Take 1 tablet by mouth Daily. 1/23/18   Dimitri Guevara MD   levothyroxine (SYNTHROID, LEVOTHROID) 50 MCG tablet Take 50 mcg by mouth Daily.    Erin Ambrocio MD   losartan (COZAAR) 100 MG tablet Take 100 mg by mouth Daily.    Erin Ambrocio MD   metFORMIN (GLUCOPHAGE) 500 MG tablet Take 1,000 mg by mouth 2 (Two) Times a Day With Meals.    Erin Ambrocio MD   MethylPREDNISolone (MEDROL, SHELL,) 4 MG tablet Take as directed on package instructions. 1/23/18   Dimitri Guevara MD   nicotine (NICODERM CQ) 14 MG/24HR patch Place 1 patch on the skin Daily. 1/24/18   Dimitri Guevara MD   nitroglycerin (NITROSTAT) 0.4 MG SL tablet Place 0.4 mg under the tongue Every 5 (Five) Minutes As Needed for Chest Pain.    Erin Ambrocio MD   potassium chloride (K-DUR,KLOR-CON) 20 MEQ CR tablet Take 40 mEq by mouth 2 (Two) Times a Day.    Erin Ambrocio MD   pregabalin (LYRICA) 150 MG capsule Take 1 capsule by mouth Every 12 (Twelve)  Hours.  Patient taking differently: Take 300 mg by mouth Every 12 (Twelve) Hours. 11/28/17   Ernie Cornejo DO   raNITIdine (ZANTAC) 150 MG tablet Take 150 mg by mouth 2 (Two) Times a Day As Needed for Indigestion.    Erin Ambrocio MD   SAXagliptin (ONGLYZA) 5 MG tablet Take 5 mg by mouth Daily.    Erin Ambrocio MD   spironolactone (ALDACTONE) 25 MG tablet Take 25 mg by mouth Daily.    Erin Ambrocio MD   tamsulosin (FLOMAX) 0.4 MG capsule 24 hr capsule Take 1 capsule by mouth Every Night.    Erin Ambrocio MD   venlafaxine XR (EFFEXOR-XR) 75 MG 24 hr capsule Take 225 mg by mouth Daily.    Erin Ambrocio MD       Medications   sodium chloride 0.9 % flush 10 mL (has no administration in time range)   ipratropium-albuterol (DUO-NEB) nebulizer solution 3 mL (has no administration in time range)   ipratropium-albuterol (DUO-NEB) nebulizer solution 3 mL (3 mL Nebulization Given 1/13/20 1044)   methylPREDNISolone sodium succinate (SOLU-Medrol) injection 125 mg (125 mg Intravenous Given 1/13/20 1054)       Vitals:    01/13/20 1052   BP:    Pulse: 108   Resp:    Temp:    SpO2: 91%         Objective   Physical Exam   Constitutional: He appears well-developed and well-nourished.   HENT:   Head: Atraumatic.   Neck: Normal range of motion. Neck supple.   Cardiovascular: Regular rhythm.   Patient is mildly tachycardic but it seems to be a sinus rhythm.   Pulmonary/Chest: Accessory muscle usage present. Tachypnea noted. He is in respiratory distress. He has decreased breath sounds in the right middle field and the left middle field. He has wheezes in the right middle field and the left middle field.   Abdominal: Soft. Bowel sounds are normal.   Musculoskeletal: Normal range of motion.   Neurological:   Lethargic but nonfocal.   Skin: Skin is warm and dry.   Psychiatric:   Nonverbal.   Nursing note and vitals reviewed.      Critical Care  Performed by: Nicola Carrero Jr.,  MD  Authorized by: Nicola Carrero Jr., MD     Critical care provider statement:     Critical care time (minutes):  30    Critical care start time:  1/13/2020 11:00 AM    Critical care end time:  1/13/2020 11:30 AM    Critical care time was exclusive of:  Separately billable procedures and treating other patients    Critical care was necessary to treat or prevent imminent or life-threatening deterioration of the following conditions:  Respiratory failure    Critical care was time spent personally by me on the following activities:  Blood draw for specimens, development of treatment plan with patient or surrogate, discussions with primary provider, evaluation of patient's response to treatment, examination of patient, interpretation of cardiac output measurements, obtaining history from patient or surrogate, ordering and performing treatments and interventions, ordering and review of laboratory studies, ordering and review of radiographic studies, pulse oximetry and re-evaluation of patient's condition    I assumed direction of critical care for this patient from another provider in my specialty: no              Lab Results (last 24 hours)     Procedure Component Value Units Date/Time    Blood Gas, Arterial With Co-Ox [[id number]]  (Abnormal) Collected:  01/13/20 1000    Specimen:  Arterial Blood Updated:  01/13/20 1007     Site Right Brachial     Jarad's Test N/A     pH, Arterial 7.231 pH units      Comment: 84 Value below reference range        pCO2, Arterial 95.8 mm Hg      Comment: 86 Value above critical limit        pO2, Arterial 59.5 mm Hg      Comment: 84 Value below reference range        HCO3, Arterial 40.2 mmol/L      Comment: 83 Value above reference range        Base Excess, Arterial 8.6 mmol/L      Comment: 83 Value above reference range        O2 Saturation, Arterial 89.6 %      Comment: 84 Value below reference range        Hemoglobin, Blood Gas 14.0 g/dL      Hematocrit, Blood Gas 42.9 %       Oxyhemoglobin 86.1 %      Comment: 84 Value below reference range        Methemoglobin 0.20 %      Carboxyhemoglobin 3.7 %      A-a Gradiant --     Comment: UNABLE TO CALCULATE        Temperature 37.0 C      Sodium, Arterial 141 mmol/L      Potassium, Arterial 4.2 mmol/L      Barometric Pressure for Blood Gas 761 mmHg      Modality Nasal Cannula     Flow Rate 5.0 lpm      Ventilator Mode NA     Note --     Notified Who DR SCHULTZ     Notified By [id number]     Notified Time 01/13/2020 10:09     Collected by [id number]     Comment: Meter: [id number]     :  [id number]        pH, Temp Corrected --     pCO2, Temperature Corrected --     pO2, Temperature Corrected --    [place] Blood Culture Bottle Set [[id number]] Collected:  01/13/20 1005    Specimen:  Blood from Hand, Right Updated:  01/13/20 1116     Extra Tube Hold for add-ons.     Comment: Auto resulted.       CBC & Differential [[id number]] Collected:  01/13/20 1005    Specimen:  Blood Updated:  01/13/20 1036    Narrative:       The following orders were created for panel order CBC & Differential.  Procedure                               Abnormality         Status                     ---------                               -----------         ------                     CBC Auto Differential[[id number]]        Abnormal            Final result                 Please view results for these tests on the individual orders.    Comprehensive Metabolic Panel [[id number]]  (Abnormal) Collected:  01/13/20 1005    Specimen:  Blood Updated:  01/13/20 1055     Glucose 300 mg/dL      BUN 5 mg/dL      Creatinine 0.61 mg/dL      Sodium 137 mmol/L      Potassium 4.3 mmol/L      Chloride 94 mmol/L      CO2 37.0 mmol/L      Calcium 8.8 mg/dL      Total Protein 7.1 g/dL      Albumin 4.10 g/dL      ALT (SGPT) 11 U/L      AST (SGOT) 24 U/L      Alkaline Phosphatase 115 U/L      Total Bilirubin 0.5 mg/dL      eGFR Non African Amer 134 mL/min/1.73      Globulin 3.0 gm/dL      A/G Ratio  1.4 g/dL      BUN/Creatinine Ratio 8.2     Anion Gap 6.0 mmol/L     Narrative:       GFR Normal >60  Chronic Kidney Disease <60  Kidney Failure <15      CBC Auto Differential [[id number]]  (Abnormal) Collected:  01/13/20 1005    Specimen:  Blood Updated:  01/13/20 1036     WBC 12.67 10*3/mm3      RBC 4.93 10*6/mm3      Hemoglobin 14.1 g/dL      Hematocrit 45.8 %      MCV 92.9 fL      MCH 28.6 pg      MCHC 30.8 g/dL      RDW 12.8 %      RDW-SD 44.0 fl      MPV 9.1 fL      Platelets 150 10*3/mm3      Neutrophil % 79.5 %      Lymphocyte % 11.6 %      Monocyte % 6.9 %      Eosinophil % 0.0 %      Basophil % 0.5 %      Immature Grans % 1.5 %      Neutrophils, Absolute 10.07 10*3/mm3      Lymphocytes, Absolute 1.47 10*3/mm3      Monocytes, Absolute 0.88 10*3/mm3      Eosinophils, Absolute 0.00 10*3/mm3      Basophils, Absolute 0.06 10*3/mm3      Immature Grans, Absolute 0.19 10*3/mm3      nRBC 0.0 /100 WBC     Blood Gas, Arterial [[id number]]  (Abnormal) Collected:  01/13/20 1050    Specimen:  Arterial Blood Updated:  01/13/20 1122     Site Right Radial     Jarad's Test Positive     pH, Arterial 7.278 pH units      Comment: 84 Value below reference range        pCO2, Arterial 87.0 mm Hg      Comment: 86 Value above critical limit        pO2, Arterial 61.4 mm Hg      Comment: 84 Value below reference range        HCO3, Arterial 40.7 mmol/L      Comment: 83 Value above reference range        Base Excess, Arterial 10.0 mmol/L      Comment: 83 Value above reference range        O2 Saturation, Arterial 91.7 %      Comment: 84 Value below reference range        Temperature 37.0 C      Barometric Pressure for Blood Gas 760 mmHg      Modality BiPap     FIO2 45 %      Ventilator Mode BiPAP     IPAP 19     Comment: Meter: [id number]     :  [id number]        EPAP 4     Notified Who DR SCHULTZ     Notified By [id number]     Notified Time 01/13/2020 11:14     Collected by [id number]    Blood Culture - Blood, Hand, Right [[id number]]  Collected:  01/13/20 1052    Specimen:  Blood from Hand, Right Updated:  01/13/20 1126    Lactic Acid, Plasma [[id number]]  (Normal) Collected:  01/13/20 1052    Specimen:  Blood Updated:  01/13/20 1128     Lactate 1.4 mmol/L           XR Chest 1 View   Final Result   Opacities along the right minor fissure may reflect fluid or   atelectasis. Bibasilar atelectasis is evident.    This report was finalized on 01/13/2020 10:56 by Dr. Zia Machuca MD.          ED Course  ED Course as of Jan 13 1140   Mon Jan 13, 2020   1137 I told the family of the findings most pacifically on the blood gases of respiratory failure.  I told him that the CO2 does not begin to blow off he may have to be intubated but right now organ to continue with BiPAP.  However he will require admission for further stabilization.  I have spoken with Dr. Rausch and he has accepted.    [TR]   1139 Patient clinically does not have sepsis and abnormal vital signs are related to his underlying respiratory failure.    [TR]      ED Course User Index  [TR] Nicola Carrero Jr., MD          MDM  Number of Diagnoses or Management Options  Acute respiratory failure with hypercapnia (CMS/HCC): new and requires workup     Amount and/or Complexity of Data Reviewed  Clinical lab tests: ordered and reviewed  Tests in the radiology section of CPT®:  ordered and reviewed  Tests in the medicine section of CPT®:  ordered and reviewed  Discuss the patient with other providers: yes    Risk of Complications, Morbidity, and/or Mortality  Presenting problems: high  Diagnostic procedures: high  Management options: high    Critical Care  Total time providing critical care: 30-74 minutes    Patient Progress  Patient progress: improved      Final diagnoses:   Acute respiratory failure with hypercapnia (CMS/HCC)          Nicola Carrero Jr., MD  01/13/20 1140      Electronically signed by Nicola Carrero Jr., MD at 01/13/20 1140     Jess Buenrostro, RN at 01/13/20 1138         RT called for Jess Harmon, RN  01/13/20 1227      Electronically signed by Jess Buenrostro, RN at 01/13/20 1227       Vital Signs (last 2 days)     Date/Time   Temp   Temp src   Pulse   Resp   BP   Patient Position   SpO2    01/17/20 0733   --   --   54   25   --   --   96    01/17/20 0726   --   --   54   25   --   --   93    01/17/20 0725   98.2 (36.8)   Axillary   59   25   124/74   Lying   93    01/17/20 0305   97.3 (36.3)   Oral   54   22   125/71   Lying   94    01/17/20 0048   --   --   51   --   --   --   --    01/17/20 0041   --   --   54   25   --   --   90    01/16/20 1921   --   --   56   --   --   --   --    01/16/20 1914   --   --   53   18   --   --   90    01/16/20 1802   97.8 (36.6)   Oral   62   18   125/77   Lying   90    01/16/20 1600   98.1 (36.7)   Oral   67   18   141/78   Lying   93    01/16/20 1400   --   --   63   20   --   --   93    01/16/20 1144   --   --   64   22   --   --   92    01/16/20 1137   --   --   60   22   --   --   92 01/16/20 1058   97.8 (36.6)   Oral   59   22   128/69   Lying   92    01/16/20 0900   --   --   --   --   --   --   93    01/16/20 0719   98.2 (36.8)   Axillary   52   25   130/78   Lying   96    01/16/20 0630   --   --   (!) 47   25   --   --   93    01/16/20 0622   --   --   (!) 47   25   --   --   94    01/16/20 0318   98.2 (36.8)   Oral   56   22   140/75   Lying   95    01/16/20 0017   --   --   (!) 49   --   --   --   --    01/16/20 0010   --   --   51   25   --   --   92    01/15/20 2104   97.9 (36.6)   Oral   52   18   143/73   Lying   90    01/15/20 1925   --   --   55   --   --   --   --    01/15/20 1917   --   --   52   17   --   --   92    01/15/20 1607   98.1 (36.7)   Oral   57   18   118/64   Lying   90    01/15/20 1440   --   --   55   --   --   --   --    01/15/20 1436   --   --   57   18   --   --   90    01/15/20 1109   98.2 (36.8)   Oral   59   18   134/66   Lying   90    01/15/20 1046   --   --   --   --   --   --   90     01/15/20 0735   --   --   60   --   --   --   --    01/15/20 0730   --   --   54   20   --   --   90    01/15/20 0717   97.8 (36.6)   Oral   56   20   128/71   Lying   90    01/15/20 0416   98.3 (36.8)   Axillary   54   22   114/59   Lying   95    01/15/20 0006   --   --   60   (!) 31   --   --   95    01/15/20 0000   --   --   56   26   --   --   95            Lab Results (last 72 hours)     Procedure Component Value Units Date/Time    POC Glucose Once [[id number]]  (Abnormal) Collected:  01/17/20 0727    Specimen:  Blood Updated:  01/17/20 0738     Glucose 145 mg/dL      Comment: : [id number] At The PoolMeter ID: [id number]       POC Glucose Once [[id number]]  (Abnormal) Collected:  01/16/20 1609    Specimen:  Blood Updated:  01/16/20 1620     Glucose 147 mg/dL      Comment: : [id number] At The PoolMeter ID: [id number]       Magnesium [[id number]]  (Normal) Collected:  01/16/20 1202    Specimen:  Blood Updated:  01/16/20 1259     Magnesium 2.3 mg/dL     Phosphorus [[id number]]  (Normal) Collected:  01/16/20 1202    Specimen:  Blood Updated:  01/16/20 1259     Phosphorus 3.6 mg/dL     Basic Metabolic Panel [[id number]]  (Abnormal) Collected:  01/16/20 1202    Specimen:  Blood Updated:  01/16/20 1259     Glucose 203 mg/dL      BUN 20 mg/dL      Creatinine 0.54 mg/dL      Sodium 141 mmol/L      Potassium 4.2 mmol/L      Chloride 98 mmol/L      CO2 35.0 mmol/L      Calcium 9.4 mg/dL      eGFR Non African Amer >150 mL/min/1.73      BUN/Creatinine Ratio 37.0     Anion Gap 8.0 mmol/L     Narrative:       GFR Normal >60  Chronic Kidney Disease <60  Kidney Failure <15      Blood Culture - Blood, Hand, Right [[id number]] Collected:  01/13/20 1052    Specimen:  Blood from Hand, Right Updated:  01/16/20 1246     Blood Culture No growth at 3 days    Respiratory Culture - Sputum, Cough [[id number]] Collected:  01/15/20 0931    Specimen:  Sputum from Cough Updated:  01/16/20 1223     Respiratory Culture Light growth (2+)  Normal Respiratory Negro     Gram Stain Greater than 25 WBCs per low power field      Rare (1+) Mixed gram positive negro      Rare (1+) Epithelial cells seen    Blood Culture - Blood, Hand, Right [[id number]] Collected:  01/13/20 1052    Specimen:  Blood from Hand, Right Updated:  01/16/20 1130     Blood Culture No growth at 3 days    POC Glucose Once [[id number]]  (Abnormal) Collected:  01/16/20 1105    Specimen:  Blood Updated:  01/16/20 1123     Glucose 200 mg/dL      Comment: : [id number] Cardiovascular DecisionsMeter ID: [id number]       POC Glucose Once [[id number]]  (Abnormal) Collected:  01/16/20 0721    Specimen:  Blood Updated:  01/16/20 0732     Glucose 140 mg/dL      Comment: : [id number] Cardiovascular DecisionsMeter ID: [id number]       POC Glucose Once [[id number]]  (Abnormal) Collected:  01/15/20 2146    Specimen:  Blood Updated:  01/15/20 2157     Glucose 177 mg/dL      Comment: : [id number] Cobradha Dian EMeter ID: [id number]       POC Glucose Once [[id number]]  (Abnormal) Collected:  01/15/20 1647    Specimen:  Blood Updated:  01/15/20 1701     Glucose 153 mg/dL      Comment: : [id number] Cardiovascular DecisionsMeter ID: [id number]       POC Glucose Once [[id number]]  (Abnormal) Collected:  01/15/20 1111    Specimen:  Blood Updated:  01/15/20 1130     Glucose 187 mg/dL      Comment: : [id number] Cardiovascular DecisionsMeter ID: [id number]       POC Glucose Once [[id number]]  (Abnormal) Collected:  01/15/20 0720    Specimen:  Blood Updated:  01/15/20 0731     Glucose 152 mg/dL      Comment: : [id number] Cardiovascular DecisionsMeter ID: [id number]       Phosphorus [[id number]]  (Normal) Collected:  01/15/20 0513    Specimen:  Blood Updated:  01/15/20 0629     Phosphorus 3.0 mg/dL     Basic Metabolic Panel [[id number]]  (Abnormal) Collected:  01/15/20 0513    Specimen:  Blood Updated:  01/15/20 0551     Glucose 141 mg/dL      BUN 21 mg/dL      Creatinine 0.59 mg/dL      Sodium 141 mmol/L      Potassium 4.5 mmol/L      Chloride 98  mmol/L      CO2 36.0 mmol/L      Calcium 9.4 mg/dL      eGFR Non African Amer 140 mL/min/1.73      BUN/Creatinine Ratio 35.6     Anion Gap 7.0 mmol/L     Narrative:       GFR Normal >60  Chronic Kidney Disease <60  Kidney Failure <15      POC Glucose Once [[id number]]  (Abnormal) Collected:  01/14/20 2008    Specimen:  Blood Updated:  01/14/20 2019     Glucose 236 mg/dL      Comment: : [id number] Lj HeatherMeter ID: [id number]       POC Glucose Once [[id number]]  (Abnormal) Collected:  01/14/20 1634    Specimen:  Blood Updated:  01/14/20 1657     Glucose 231 mg/dL      Comment: : [id number] Justus KimMeter ID: [id number]       POC Glucose Once [[id number]]  (Abnormal) Collected:  01/14/20 1115    Specimen:  Blood Updated:  01/14/20 1152     Glucose 246 mg/dL      Comment: : [id number] Justus KimMeter ID: [id number]       POC Glucose Once [[id number]]  (Abnormal) Collected:  01/14/20 0808    Specimen:  Blood Updated:  01/14/20 0837     Glucose 270 mg/dL      Comment: : [id number] Justus KimMeter ID: [id number]                  Physician Progress Notes (last 72 hours) (Notes from 01/14/20 0807 through 01/17/20 0807)      Dhruv Rausch DO at 01/16/20 0944              [facility] Medicine Services  INPATIENT PROGRESS NOTE    Patient Name: Sai Adam  Date of Admission: 1/13/2020  Today's Date: 01/16/20  Length of Stay: 3  Primary Care Physician: Jarod Tillman MD    Subjective   Chief Complaint: Follow-up respiratory failure    HPI:  Patient seen and examined, feels better, still has not been up walking much. Denies any dizziness, chest pain, or sob. On his home baseline 5L 02. Overnight was bradycardic, no other events noted.     ROS:  All pertinent negatives and positives are as above. All other systems have been reviewed and are negative unless otherwise stated.     Objective    Temp:  [97.9 °F (36.6 °C)-98.2 °F (36.8 °C)] 98.2 °F (36.8 °C)  Heart  Rate:  [47-59] 52  Resp:  [17-25] 25  BP: (118-143)/(64-78) 130/78  Physical Exam  GEN: Awake, alert, interactive, purse lip breathing but speaking in full sentences, no accessory muscle use  HEENT: PERRLA, EOMI, Anicteric, Trachea midline  Lungs: improved air flow b/l. No wheezing currently  Heart: yovana, RR, +S1/s2, no rub  ABD: soft, nt/nd, +BS, no guarding/rebound  Extremities: atraumatic, no cyanosis, no edema  Skin: no rashes or lesions  Neuro: AAOx3, no focal deficits  Psych: normal mood & affect        Results Review:  I have reviewed the labs, radiology results, and diagnostic studies.    Laboratory Data:   Results from last 7 days   Lab Units 01/14/20  0416 01/13/20  1005   WBC 10*3/mm3 11.45* 12.67*   HEMOGLOBIN g/dL 13.3 14.1   HEMATOCRIT % 42.1 45.8   PLATELETS 10*3/mm3 156 150        Results from last 7 days   Lab Units 01/15/20  0513 01/14/20  0416 01/13/20  1005   SODIUM mmol/L 141 138 137   POTASSIUM mmol/L 4.5 4.2 4.3   CHLORIDE mmol/L 98 94* 94*   CO2 mmol/L 36.0* 38.0* 37.0*   BUN mg/dL 21 17 5*   CREATININE mg/dL 0.59* 0.63* 0.61*   CALCIUM mg/dL 9.4 9.3 8.8   BILIRUBIN mg/dL  --  0.4 0.5   ALK PHOS U/L  --  94 115   ALT (SGPT) U/L  --  10 11   AST (SGOT) U/L  --  22 24   GLUCOSE mg/dL 141* 222* 300*       Culture Data:   Blood Culture   Date Value Ref Range Status   01/13/2020 No growth at less than 24 hours  Preliminary   01/13/2020 No growth at less than 24 hours  Preliminary       Radiology Data:   Imaging Results (Last 24 Hours)     ** No results found for the last 24 hours. **          I have reviewed the patient's current medications.     Assessment/Plan     Active Hospital Problems    Diagnosis   • **Acute on chronic respiratory failure with hypoxia and hypercapnia (CMS/HCC)   • Hypophosphatemia   • Toxic metabolic encephalopathy   • COPD exacerbation (CMS/HCC)   • Diabetes mellitus (CMS/HCC)   • Coronary artery disease   • History of colon cancer     #1 acute on chronic hypercarbic and  hypoxic respiratory failure -in setting of COPD exacerbation.  Doing better today and back on baseline O2.  Continue PRN BiPAP for sleep and overnight.  Continue steroids, nebs and azithromycin.  Ambulate patient every shift.    #2 toxic metabolic encephalopathy -in the setting of hypercarbia.  This has resolved with BiPAP and normalization of ABG.  No neuro deficits.    #3 COPD exacerbation -as above.  Will start to wean steroids today.    #4 hypophosphatemia -improved with replacement    #5 CAD -with history of CABG.  No active chest pain or issues.    #6 insulin-dependent diabetes -takes 160 of Lantus at home.  His sugar was 200 with only 30 of Levemir despite IV steroids.  Suspect he is not diet compliant.  He was restarted on diet and Levemir was increased to 40 with 10 prandial lispro and sliding scale.  His sugars have been doing very well with this regimen.  Continue along with hypoglycemia protocol.  Nutrition consult pending.    #7 history of colon cancer -status post ostomy creation previous day.  No active issues.  Continue ostomy care.    #8 bradycardia - asymptomatic, d/c atenolol, started here, not sure why or by who. Pt never on this med before. Resume his nicardipine tomorrow.    Discharge Planning: I expect the patient to be discharged to home in 1 to 2 days    Dhruv Rausch DO   01/16/20   9:44 AM      Electronically signed by Dhruv Rausch DO at 01/16/20 1148     Dhruv Rausch DO at 01/15/20 1231              [facility] Medicine Services  INPATIENT PROGRESS NOTE    Patient Name: Sai Adam  Date of Admission: 1/13/2020  Today's Date: 01/15/20  Length of Stay: 2  Primary Care Physician: Jarod Tillman MD    Subjective   Chief Complaint: Follow-up respiratory failure    HPI   Patient seen and examined.  He is feeling better today.  States he has been up walking around the room going to the bathroom but really has not been walking long distances.   His breathing is improved.  Denies chest pain or nausea.  Back on his home chronic 5 L home O2.    ROS:  All pertinent negatives and positives are as above. All other systems have been reviewed and are negative unless otherwise stated.     Objective    Temp:  [97.8 °F (36.6 °C)-98.7 °F (37.1 °C)] 98.2 °F (36.8 °C)  Heart Rate:  [54-99] 59  Resp:  [18-31] 18  BP: (114-168)/(52-71) 134/66  Physical Exam  GEN: Awake, alert, interactive, purse lip breathing but speaking in full sentences, no accessory muscle use  HEENT: PERRLA, EOMI, Anicteric, Trachea midline  Lungs: improved air flow b/l. No wheezing currently  Heart: RRR, +S1/s2, no rub  ABD: soft, nt/nd, +BS, no guarding/rebound  Extremities: atraumatic, no cyanosis, no edema  Skin: no rashes or lesions  Neuro: AAOx3, no focal deficits  Psych: normal mood & affect        Results Review:  I have reviewed the labs, radiology results, and diagnostic studies.    Laboratory Data:   Results from last 7 days   Lab Units 01/14/20  0416 01/13/20  1005   WBC 10*3/mm3 11.45* 12.67*   HEMOGLOBIN g/dL 13.3 14.1   HEMATOCRIT % 42.1 45.8   PLATELETS 10*3/mm3 156 150        Results from last 7 days   Lab Units 01/15/20  0513 01/14/20  0416 01/13/20  1005   SODIUM mmol/L 141 138 137   POTASSIUM mmol/L 4.5 4.2 4.3   CHLORIDE mmol/L 98 94* 94*   CO2 mmol/L 36.0* 38.0* 37.0*   BUN mg/dL 21 17 5*   CREATININE mg/dL 0.59* 0.63* 0.61*   CALCIUM mg/dL 9.4 9.3 8.8   BILIRUBIN mg/dL  --  0.4 0.5   ALK PHOS U/L  --  94 115   ALT (SGPT) U/L  --  10 11   AST (SGOT) U/L  --  22 24   GLUCOSE mg/dL 141* 222* 300*       Culture Data:   Blood Culture   Date Value Ref Range Status   01/13/2020 No growth at less than 24 hours  Preliminary   01/13/2020 No growth at less than 24 hours  Preliminary       Radiology Data:   Imaging Results (Last 24 Hours)     ** No results found for the last 24 hours. **          I have reviewed the patient's current medications.     Assessment/Plan     Active Hospital  Problems    Diagnosis   • **Acute on chronic respiratory failure with hypoxia and hypercapnia (CMS/HCC)   • Hypophosphatemia   • Toxic metabolic encephalopathy   • COPD exacerbation (CMS/HCC)   • Diabetes mellitus (CMS/HCC)   • Coronary artery disease   • History of colon cancer     #1 acute on chronic hypercarbic and hypoxic respiratory failure -in setting of COPD exacerbation.  Doing better today and back on baseline O2.  Continue PRN BiPAP for sleep and overnight.  Will decrease steroids to every 12 hours.  Continue nebs and azithromycin.  Ambulate patient every shift.    #2 toxic metabolic encephalopathy -in the setting of hypercarbia.  This has resolved with BiPAP and normalization of ABG.  No neuro deficits.    #3 COPD exacerbation -as above.  Will start to wean steroids today.    #4 hypophosphatemia -improved with replacement    #5 CAD -with history of CABG.  No active chest pain or issues.    #6 insulin-dependent diabetes -takes 160 of Lantus at home.  His sugar was 200 today with only 30 of Levemir despite IV steroids.  Suspect he is not diet compliant.  He was restarted on diet and Levemir was increased to 40 with 10 prandial lispro and sliding scale.  His sugars have been doing very well with this regimen.  Continue along with hypoglycemia protocol.  Nutrition consult.    #7 history of colon cancer -status post ostomy creation previous day.  No active issues.  Continue ostomy care.    Discharge Planning: I expect the patient to be discharged to home in 2 to 3 days    Dhruv Rausch DO   01/15/20   12:31 PM      Electronically signed by Dhruv Rausch DO at 01/15/20 1530     Dhruv Rausch DO at 01/14/20 0966              [facility] Medicine Services  INPATIENT PROGRESS NOTE    Patient Name: Sai Adam  Date of Admission: 1/13/2020  Today's Date: 01/14/20  Length of Stay: 1  Primary Care Physician: Jarod Tillman MD    Subjective   Chief Complaint: Follow-up  respiratory failure    HPI   Patient seen and examined.  He is off BiPAP and doing some pursed lip breathing.  He says that is baseline for him.  His breathing overall is not yet back to baseline but he feels a lot better than yesterday.  Normal mentation.  Denies chest pain.  Denies any abdominal pain or nausea/vomiting.  Wanting to eat.  No acute overnight events noted.        Review of Systems   All pertinent negatives and positives are as above. All other systems have been reviewed and are negative unless otherwise stated.     Objective    Temp:  [98 °F (36.7 °C)-100.1 °F (37.8 °C)] 98 °F (36.7 °C)  Heart Rate:  [] 61  Resp:  [20-30] 25  BP: (126-146)/(68-86) 140/71  Physical Exam  GEN: Awake, alert, interactive, purse lip breathing but speaking in full sentences, no accessory muscle use  HEENT: PERRLA, EOMI, Anicteric, Trachea midline  Lungs: improved air flow today but still tight/diminished. No wheezing currently  Heart: RRR, +S1/s2, no rub  ABD: soft, nt/nd, +BS, no guarding/rebound  Extremities: atraumatic, no cyanosis, no edema  Skin: no rashes or lesions  Neuro: AAOx3, no focal deficits  Psych: normal mood & affect        Results Review:  I have reviewed the labs, radiology results, and diagnostic studies.    Laboratory Data:   Results from last 7 days   Lab Units 01/14/20  0416 01/13/20  1005   WBC 10*3/mm3 11.45* 12.67*   HEMOGLOBIN g/dL 13.3 14.1   HEMATOCRIT % 42.1 45.8   PLATELETS 10*3/mm3 156 150        Results from last 7 days   Lab Units 01/14/20  0416 01/13/20  1005 01/13/20  1000   SODIUM mmol/L 138 137  --    SODIUM, ARTERIAL mmol/L  --   --  141   POTASSIUM mmol/L 4.2 4.3  --    CHLORIDE mmol/L 94* 94*  --    CO2 mmol/L 38.0* 37.0*  --    BUN mg/dL 17 5*  --    CREATININE mg/dL 0.63* 0.61*  --    CALCIUM mg/dL 9.3 8.8  --    BILIRUBIN mg/dL 0.4 0.5  --    ALK PHOS U/L 94 115  --    ALT (SGPT) U/L 10 11  --    AST (SGOT) U/L 22 24  --    GLUCOSE mg/dL 222* 300*  --        Culture Data:    Blood Culture   Date Value Ref Range Status   01/13/2020 No growth at less than 24 hours  Preliminary   01/13/2020 No growth at less than 24 hours  Preliminary       Radiology Data:   Imaging Results (Last 24 Hours)     Procedure Component Value Units Date/Time    XR Chest 1 View [[id number]] Collected:  01/13/20 1052     Updated:  01/13/20 1059    Narrative:       EXAMINATION: XR CHEST 1 VW- 1/13/2020 10:52 AM CST     HISTORY: Shortness of breath     COMPARISON: 01/18/2019     FINDINGS:  Heart and mediastinal contours are stable. There has been prior median  sternotomy. Upright approach Port-A-Cath is in place with tip projecting  over the mid to distal SVC. There is atelectasis in the lung bases. The  pulmonary vasculature is prominent. Opacities are noted along the right  minor fissure. There is pleural thickening on the right, similar to  prior exams.       Impression:       Opacities along the right minor fissure may reflect fluid or  atelectasis. Bibasilar atelectasis is evident.   This report was finalized on 01/13/2020 10:56 by Dr. Zia Machuca MD.          I have reviewed the patient's current medications.     Assessment/Plan     Active Hospital Problems    Diagnosis   • **Acute on chronic respiratory failure with hypoxia and hypercapnia (CMS/HCC)   • Hypophosphatemia   • Toxic metabolic encephalopathy   • COPD exacerbation (CMS/HCC)   • Diabetes mellitus (CMS/HCC)   • Coronary artery disease   • History of colon cancer     #1 acute on chronic hypercarbic and hypoxic respiratory failure -in setting of COPD exacerbation.  Doing better today now off BiPAP with improved blood gas.  Can make BiPAP PRN for naps and nightly.  Continue steroids 40 IV every 6 hours.  Continue IV azithromycin.  Continue nebulizers.  Currently on 6 L, wean to baseline home 5 as able.  Goal SPO2 88 to 92%.    #2 toxic metabolic encephalopathy -in the setting of hypercarbia.  This has resolved with BiPAP and normalization of ABG.   No neuro deficits.    #3 COPD exacerbation -as above.  Not ready for steroid wean yet.    #4 hypophosphatemia -replace with sodium [place].  Recheck and continue placement protocol.    #5 CAD -with history of CABG.  No active chest pain or issues.    #6 insulin-dependent diabetes -takes 160 of Lantus at home.  His sugar was 200 this morning on only 30 of Levemir despite IV steroids.  Suspect he is not diet compliant.  He will be resuming diet today.  Will increase Levemir to 40 nightly.  Add pre-meal lispro 10 units.  Continue sliding scale and hypoglycemia protocol.  Will get diabetic/nutrition educator.    #7 history of colon cancer -status post ostomy creation previous day.  No active issues.  Continue ostomy care.    Discharge Planning: I expect the patient to be discharged to home in 3 to 4 days.    Dhruv Rausch DO   01/14/20   9:27 AM      Electronically signed by Dhruv Rausch DO at 01/14/20 6217

## 2020-01-17 NOTE — PROGRESS NOTES
Continued Stay Note   [facility]     Patient Name: Sai Adam  MRN: [id number]  Today's Date: 1/17/2020    Admit Date: 1/13/2020    Discharge Plan     Row Name 01/17/20 0907       Plan    Plan  [facility]    Patient/Family in Agreement with Plan  yes    Plan Comments  Pt planning to return home alone as before. Pt followed by [facility] and d/c summary will need to be faxed there upon d/c [phone]. If HH felt needed can order and will [facility].  [facility].  Pt will be followed by [facility] at d/c.  Will follow.         Discharge Codes    No documentation.             SURYA Lemons

## 2020-01-17 NOTE — DISCHARGE PLACEMENT REQUEST
"Beatrice Hanover [phone]  Sai Adam (59 y.o. Male)     Date of Birth Social Security Number Address Home Phone MRN    1960  [address] [facility] [address] [phone] [id number]    [place] Marital Status          Denominational        Admission Date Admission Type Admitting Provider Attending Provider Department, Room/Bed    1/13/20 Emergency Dhruv Rausch, Dhruv Ledezma,  [address], 496/1    Discharge Date Discharge Disposition Discharge Destination         [place] [facility] [place]ending Provider:  Dhruv Rausch DO    Allergies:  Tetanus Toxoids, Lamotrigine, Lisinopril, Methadone, Penicillins, Tramadol    Isolation:  None   Infection:  None   Code Status:  CPR    Ht:  170.2 cm (67\")   Wt:  66 kg (145 lb 9.6 oz)    Admission Cmt:  None   Principal Problem:  Acute on chronic respiratory failure with hypoxia and hypercapnia (CMS/HCC) [J96.21,J96.22]                 Active Insurance as of 1/13/2020     Primary Coverage     Payor Plan Insurance Group Employer/Plan Group    [employer] [facility] [id number]     Payor Plan Address Payor Plan Phone Number Payor Plan Fax Number Effective Dates    [address] [phone]  12/1/2014 - None Entered    [facility] [address]       Subscriber Name Subscriber Birth Date Member ID       SAI ADMA 1960 [id number]                 Emergency Contacts      (Rel.) Home Phone Work Phone Mobile Phone    Keith Wilcox (Son) [phone] -- --    Antonio Danielson (Son) [phone] -- --    NUBIA RIDLEY (Daughter) -- -- [phone]      Ambulatory Referral to Home Health [[id number]] (Order [id number])   Order   Date: 1/17/2020 Department: [address] Ordering/Authorizing: Dhruv Rausch DO   Order History   Outpatient   Date/Time Action Taken User Additional Information   01/17/20 1543 Sign Dhruv Rausch DO    Order Details     Frequency Duration Priority " Order Class   None None Routine Internal Referral   Start Date/Time     Start Date   01/17/20   Order Information     Order Date Service Start Date Start Time   01/17/20 Medicine 01/17/20    Reference Links     Associated Reports   View Encounter   Order Questions     Question Answer Comment   Face to Face Visit Date: 1/17/2020    Follow-up provider for Plan of Care? I treated the patient in an acute care facility and will not continue treatment after discharge.    Follow-up provider: STEPH JACOBO    Reason/Clinical Findings copd    Describe mobility limitations that make leaving home difficult need for 02    Nursing/Therapeutic Services Requested Skilled Nursing     Physical Therapy    Skilled nursing orders: COPD management    PT orders: Therapeutic exercise    Frequency: 1 Week 1           Source Order Set / Preference List     Preference List   AMB IM REFERRALS [[id number]]   Clinical Indications      ICD-10-CM ICD-9-CM   Acute respiratory failure with hypercapnia (CMS/[facility])  - Primary     J96.02 518.81   COPD exacerbation (CMS/[facility])     J44.1 491.21   Reprint Order Requisition     Ambulatory Referral to Home Health (Order #[id number]) on 1/17/20   Encounter-Level Cardiology Documents:     There are no encounter-level cardiology documents.   Encounter     View Encounter          Order Provider Info         Office phone Pager E-mail   Ordering User Dhruv Rausch [phone] -- --   Authorizing Provider Dhruv Rausch [phone] -- --   Attending Provider Dhruv Rausch [phone] -- --   Linked Charges     No charges linked to this procedure   Tracking Reports      Cosign Tracking Order Transmittal Tracking   Authorized by:  Dhruv Rausch DO  (NPI: [id number])       Lab Component SmartPhrase Guide     Ambulatory Referral to Home Health (Order #[id number]) on 1/17/20

## 2020-01-17 NOTE — DISCHARGE SUMMARY
[facility] Medicine Services  DISCHARGE SUMMARY       Date of Admission: 1/13/2020  Date of Discharge:  1/17/2020  Primary Care Physician: Jarod Tillman MD    Presenting Problem/History of Present Illness:  Acute respiratory failure with hypercapnia (CMS/HCC) [J96.02]     Final Discharge Diagnoses:  Active Hospital Problems    Diagnosis   • **Acute on chronic respiratory failure with hypoxia and hypercapnia (CMS/HCC)   • Hypophosphatemia   • Toxic metabolic encephalopathy   • COPD exacerbation (CMS/HCC)   • Diabetes mellitus (CMS/HCC)   • Coronary artery disease   • History of colon cancer       Consults: none    Procedures Performed: none    Pertinent Test Results:   Procedure Component Value Units Date/Time   XR Chest 1 View [[id number]] Jarod as Reviewed   Order Status: Completed Collected: 01/13/20 1052    Updated: 01/13/20 1059   Narrative:     EXAMINATION: XR CHEST 1 VW- 1/13/2020 10:52 AM CST     HISTORY: Shortness of breath     COMPARISON: 01/18/2019     FINDINGS:  Heart and mediastinal contours are stable. There has been prior median  sternotomy. Upright approach Port-A-Cath is in place with tip projecting  over the mid to distal SVC. There is atelectasis in the lung bases. The  pulmonary vasculature is prominent. Opacities are noted along the right  minor fissure. There is pleural thickening on the right, similar to  prior exams.      Impression:     Opacities along the right minor fissure may reflect fluid or  atelectasis. Bibasilar atelectasis is evident.   This report was finalized on 01/13/2020 10:56 by Dr. Zia Machuca MD.         Chief Complaint on Day of Discharge: f/u sob    History of Present Illness on Day of Discharge:   Patient seen and examined.  Feels back to baseline.  Wants to go home.  No acute complaints.  No events overnight.    Hospital Course:  The patient is a 59 y.o. male with a history of chronic hypoxic respiratory failure on 5 L O2 at  "baseline, sleep apnea, COPD, CAD, [facility]  He presented to the ER in the morning of 1/13 with acute on chronic hypercarbic respiratory failure and encephalopathy/CO2 narcosis.  Patient was found to be in COPD exacerbation was admitted to the hospital with IV steroids, nebulizers, azithromycin.  He improved on BiPAP and eventually was able to be weaned back to nasal cannula with BiPAP PRN and for sleep.  He is now back to his baseline O2 and doing well.  Okay for discharge home on p.o. steroid taper and to finish azithromycin p.o.  Of note home medications include 170 units of Lantus.  During hospitalization patient did well with 40 of Levemir and 10 pre-meal lispro.  Suspect he is dietarily noncompliant and had nutrition see the patient here.  Would not recommend high-dose of insulin at discharge.  Meds have been adjusted.  Can follow-up with PCP for ongoing insulin management.    Condition on Discharge:  Stable/improved    Physical Exam on Discharge:  /67 (BP Location: Right arm, Patient Position: Lying)   Pulse 62   Temp 98.1 °F (36.7 °C) (Oral)   Resp 20   Ht 170.2 cm (67\")   Wt 66 kg (145 lb 9.6 oz)   SpO2 94%   BMI 22.80 kg/m²   Physical Exam  GEN: Awake, alert, interactive, purse lip breathing but speaking in full sentences, no accessory muscle use  HEENT: PERRLA, EOMI, Anicteric, Trachea midline  Lungs: improved air flow b/l. No wheezing currently  Heart: yovana, RR, +S1/s2, no rub  ABD: soft, nt/nd, +BS, no guarding/rebound  Extremities: atraumatic, no cyanosis, no edema  Skin: no rashes or lesions  Neuro: AAOx3, no focal deficits  Psych: normal mood & affect    [place] [facility] [place]charge Medications:     Discharge Medications      New Medications      Instructions Start Date   azithromycin 500 MG tablet  Commonly known as:  ZITHROMAX   500 mg, Oral, Daily   Start Date:  January 18, 2020     predniSONE 10 MG tablet  Commonly known as:  " DELTASONE   Take 4 tabs po daily x 2 days, then 3 tabs po daily x 2 days, then 2 tabs po daily x 2 days, then 1 tab po daily x 2 days, then stop         Changes to Medications      Instructions Start Date   insulin aspart 100 UNIT/ML injection  Commonly known as:  novoLOG  What changed:  how much to take   10 Units, Subcutaneous, 3 Times Daily Before Meals      insulin glargine 100 UNIT/ML injection  Commonly known as:  LANTUS  What changed:  how much to take   40 Units, Subcutaneous, Daily      losartan 100 MG tablet  Commonly known as:  COZAAR  What changed:  how much to take   50 mg, Oral, Daily         Continue These Medications      Instructions Start Date   acetaminophen 325 MG tablet  Commonly known as:  TYLENOL   650 mg, Oral, Every 6 Hours PRN      albuterol sulfate  (90 Base) MCG/ACT inhaler  Commonly known as:  PROVENTIL HFA;VENTOLIN HFA;PROAIR HFA   2 puffs, Inhalation, 4 Times Daily PRN      albuterol (2.5 MG/3ML) 0.083% nebulizer solution  Commonly known as:  PROVENTIL   2.5 mg, Nebulization, Every 6 Hours PRN      bacitracin 500 UNIT/GM ointment   Topical, Daily, Colostomy      carboxymethylcellulose 0.5 % solution  Commonly known as:  REFRESH PLUS   1 drop, Both Eyes, 4 Times Daily PRN      clopidogrel 75 MG tablet  Commonly known as:  PLAVIX   75 mg, Oral, Daily      cyclobenzaprine 10 MG tablet  Commonly known as:  FLEXERIL   10 mg, Oral, Every Night at Bedtime      docusate sodium 100 MG capsule  Commonly known as:  COLACE   100 mg, Oral, 2 Times Daily PRN      dorzolamide 2 % ophthalmic solution  Commonly known as:  TRUSOPT   1 drop, Right Eye, 2 Times Daily      DULoxetine 30 MG capsule  Commonly known as:  CYMBALTA   30 mg, Oral, Daily      eucerin cream   Topical, As Needed      finasteride 5 MG tablet  Commonly known as:  PROSCAR   5 mg, Oral, Daily      folic acid 1 MG tablet  Commonly known as:  FOLVITE   1 mg, Oral, Daily      furosemide 20 MG tablet  Commonly known as:  LASIX   20  mg, Oral, Daily      guaiFENesin 400 MG tablet  Commonly known as:  HUMIBID 3   800 mg, Oral, Every 12 Hours      hydrocortisone 25 MG suppository  Commonly known as:  ANUSOL-HC   25 mg, Rectal, 2 Times Daily PRN      isosorbide mononitrate 60 MG 24 hr tablet  Commonly known as:  IMDUR   90 mg, Oral, Daily      levothyroxine 50 MCG tablet  Commonly known as:  SYNTHROID, LEVOTHROID   50 mcg, Oral, Daily      loratadine 10 MG tablet  Commonly known as:  CLARITIN   10 mg, Oral, Daily      LYRICA 150 MG capsule  Generic drug:  pregabalin   300 mg, Oral, 2 Times Daily      metFORMIN 500 MG tablet  Commonly known as:  GLUCOPHAGE   1,000 mg, Oral, 2 Times Daily With Meals      Morphine 15 MG tablet  Commonly known as:  MSIR   15 mg, Oral, 3 Times Daily PRN      multivitamin with minerals tablet tablet   1 tablet, Oral, Daily      naloxone 4 MG/0.1ML nasal spray  Commonly known as:  NARCAN   1 spray, Nasal, As Needed      NIFEdipine XL 30 MG 24 hr tablet  Commonly known as:  PROCARDIA XL   30 mg, Oral, Daily      nitroglycerin 0.4 MG SL tablet  Commonly known as:  NITROSTAT   0.4 mg, Sublingual, Every 5 Minutes PRN      OLANZapine 10 MG tablet  Commonly known as:  zyPREXA   10 mg, Oral, Nightly      polyethylene glycol packet  Commonly known as:  MIRALAX   17 g, Oral, Daily      raNITIdine 150 MG tablet  Commonly known as:  ZANTAC   150 mg, Oral, 2 Times Daily PRN      sodium chloride 0.65 % nasal spray   2 sprays, Nasal, As Needed      spironolactone 25 MG tablet  Commonly known as:  ALDACTONE   25 mg, Oral, Daily      tamsulosin 0.4 MG capsule 24 hr capsule  Commonly known as:  FLOMAX   1 capsule, Oral, Nightly      Vitamin D 50 MCG (2000 UT) tablet   6,000 Units, Oral, Daily         Stop These Medications    doxazosin 4 MG tablet  Commonly known as:  CARDURA     traZODone 100 MG tablet  Commonly known as:  DESYREL     TRULICITY 0.75 MG/0.5ML solution pen-injector  Generic drug:  Dulaglutide            Discharge Diet:  cardiac/carb consistent    Activity at Discharge: as tolerated    Discharge Care Plan/Instructions: f/u with pcp and home health    Follow-up Appointments:   No future appointments.    Test Results Pending at Discharge: none    Dhruv Rausch DO  01/17/20  3:42 PM    Time: 37 minutes

## 2020-01-17 NOTE — PLAN OF CARE
Problem: Patient Care Overview  Goal: Plan of Care Review  Outcome: Ongoing (interventions implemented as appropriate)  Flow sheets (Taken 1/17/2020 0422)  Plan of Care Reviewed With: patient  Outcome Summary: Patient ambulated in hallway this shift with walker, oxygen and assist of one person little to no distress noted after or during ambulation. Patient on 5 NC while awake and Bi- Pap with sleep. Patient bradycardic this shift.

## 2020-01-17 NOTE — PAYOR COMM NOTE
"[place] [facility] 1-17-20    Sai Adam (59 y.o. Male)     Date of Birth Social Security Number Address Home Phone MRN    1960  [address] [place] [id number] [phone] [id number]    Nondenominational Marital Status          Gnosticism        Admission Date Admission Type Admitting Provider Attending Provider Department, Room/Bed    1/13/20 Emergency Dhruv Rausch DO  [facility] 4C, 496/1    Discharge Date Discharge Disposition Discharge Destination        1/17/2020 Home-Health Care [facility]             Attending Provider:  (none)   Allergies:  Tetanus Toxoids, Lamotrigine, Lisinopril, Methadone, Penicillins, Tramadol    Isolation:  None   Infection:  None   Code Status:  CPR    Ht:  170.2 cm (67\")   Wt:  66 kg (145 lb 9.6 oz)    Admission Cmt:  None   Principal Problem:  Acute on chronic respiratory failure with hypoxia and hypercapnia (CMS/HCC) [J96.21,J96.22]                 Active Insurance as of 1/13/2020     Primary Coverage     Payor Plan Insurance Group Employer/Plan Group    [facility] [id number]     Payor Plan Address Payor Plan Phone Number Payor Plan Fax Number Effective Dates    [address] [phone]  12/1/2014 - None Entered    [facility] [address]       Subscriber Name Subscriber Birth Date Member ID       SAI ADAM 1960 [id number]                 Emergency Contacts      (Rel.) Home Phone Work Phone Mobile Phone    Keith Wilcox (Son) [phone] -- --    Antonio Danielson (Son) [phone] -- --    NUBIA RIDLEY (Daughter) -- -- [phone]               Discharge Summary      Dhruv Rausch DO at 01/17/20 1542              [facility] Medicine Services  DISCHARGE SUMMARY       Date of Admission: 1/13/2020  Date of Discharge:  1/17/2020  Primary Care Physician: Jarod Tillman MD    Presenting Problem/History of Present Illness:  Acute respiratory failure with hypercapnia " (CMS/HCC) [J96.02]     Final Discharge Diagnoses:  Active Hospital Problems    Diagnosis   • **Acute on chronic respiratory failure with hypoxia and hypercapnia (CMS/HCC)   • Hypophosphatemia   • Toxic metabolic encephalopathy   • COPD exacerbation (CMS/HCC)   • Diabetes mellitus (CMS/HCC)   • Coronary artery disease   • History of colon cancer       Consults: none    Procedures Performed: none    Pertinent Test Results:   Procedure Component Value Units Date/Time   XR Chest 1 View [[id number]] Jarod as Reviewed   Order Status: Completed Collected: 01/13/20 1052    Updated: 01/13/20 1059   Narrative:     EXAMINATION: XR CHEST 1 VW- 1/13/2020 10:52 AM CST     HISTORY: Shortness of breath     COMPARISON: 01/18/2019     FINDINGS:  Heart and mediastinal contours are stable. There has been prior median  sternotomy. Upright approach Port-A-Cath is in place with tip projecting  over the mid to distal SVC. There is atelectasis in the lung bases. The  pulmonary vasculature is prominent. Opacities are noted along the right  minor fissure. There is pleural thickening on the right, similar to  prior exams.      Impression:     Opacities along the right minor fissure may reflect fluid or  atelectasis. Bibasilar atelectasis is evident.   This report was finalized on 01/13/2020 10:56 by Dr. Zia Machuca MD.         Chief Complaint on Day of Discharge: f/u sob    History of Present Illness on Day of Discharge:   Patient seen and examined.  Feels back to baseline.  Wants to go home.  No acute complaints.  No events overnight.    Hospital Course:  The patient is a 59 y.o. male with a history of chronic hypoxic respiratory failure on 5 L O2 at baseline, sleep apnea, COPD, CAD, [facility]  He presented to the ER in the morning of 1/13 with acute on chronic hypercarbic respiratory failure and encephalopathy/CO2 narcosis.  Patient was found to be in COPD exacerbation was admitted to the hospital with IV steroids,  "nebulizers, azithromycin.  He improved on BiPAP and eventually was able to be weaned back to nasal cannula with BiPAP PRN and for sleep.  He is now back to his baseline O2 and doing well.  Okay for discharge home on p.o. steroid taper and to finish azithromycin p.o.  Of note home medications include 170 units of Lantus.  During hospitalization patient did well with 40 of Levemir and 10 pre-meal lispro.  Suspect he is dietarily noncompliant and had nutrition see the patient here.  Would not recommend high-dose of insulin at discharge.  Meds have been adjusted.  Can follow-up with PCP for ongoing insulin management.    Condition on Discharge:  Stable/improved    Physical Exam on Discharge:  /67 (BP Location: Right arm, Patient Position: Lying)   Pulse 62   Temp 98.1 °F (36.7 °C) (Oral)   Resp 20   Ht 170.2 cm (67\")   Wt 66 kg (145 lb 9.6 oz)   SpO2 94%   BMI 22.80 kg/m²    Physical Exam  GEN: Awake, alert, interactive, purse lip breathing but speaking in full sentences, no accessory muscle use  HEENT: PERRLA, EOMI, Anicteric, Trachea midline  Lungs: improved air flow b/l. No wheezing currently  Heart: yovana, RR, +S1/s2, no rub  ABD: soft, nt/nd, +BS, no guarding/rebound  Extremities: atraumatic, no cyanosis, no edema  Skin: no rashes or lesions  Neuro: AAOx3, no focal deficits  Psych: normal mood & affect    Discharge Disposition:  Home-Health Care [facility]    Discharge Medications:     Discharge Medications      New Medications      Instructions Start Date   azithromycin 500 MG tablet  Commonly known as:  ZITHROMAX   500 mg, Oral, Daily   Start Date:  January 18, 2020     predniSONE 10 MG tablet  Commonly known as:  DELTASONE   Take 4 tabs po daily x 2 days, then 3 tabs po daily x 2 days, then 2 tabs po daily x 2 days, then 1 tab po daily x 2 days, then stop         Changes to Medications      Instructions Start Date   insulin aspart 100 UNIT/ML injection  Commonly known as:  novoLOG  What changed:  how much " to take   10 Units, Subcutaneous, 3 Times Daily Before Meals      insulin glargine 100 UNIT/ML injection  Commonly known as:  LANTUS  What changed:  how much to take   40 Units, Subcutaneous, Daily      losartan 100 MG tablet  Commonly known as:  COZAAR  What changed:  how much to take   50 mg, Oral, Daily         Continue These Medications      Instructions Start Date   acetaminophen 325 MG tablet  Commonly known as:  TYLENOL   650 mg, Oral, Every 6 Hours PRN      albuterol sulfate  (90 Base) MCG/ACT inhaler  Commonly known as:  PROVENTIL HFA;VENTOLIN HFA;PROAIR HFA   2 puffs, Inhalation, 4 Times Daily PRN      albuterol (2.5 MG/3ML) 0.083% nebulizer solution  Commonly known as:  PROVENTIL   2.5 mg, Nebulization, Every 6 Hours PRN      bacitracin 500 UNIT/GM ointment   Topical, Daily, Colostomy      carboxymethylcellulose 0.5 % solution  Commonly known as:  REFRESH PLUS   1 drop, Both Eyes, 4 Times Daily PRN      clopidogrel 75 MG tablet  Commonly known as:  PLAVIX   75 mg, Oral, Daily      cyclobenzaprine 10 MG tablet  Commonly known as:  FLEXERIL   10 mg, Oral, Every Night at Bedtime      docusate sodium 100 MG capsule  Commonly known as:  COLACE   100 mg, Oral, 2 Times Daily PRN      dorzolamide 2 % ophthalmic solution  Commonly known as:  TRUSOPT   1 drop, Right Eye, 2 Times Daily      DULoxetine 30 MG capsule  Commonly known as:  CYMBALTA   30 mg, Oral, Daily      eucerin cream   Topical, As Needed      finasteride 5 MG tablet  Commonly known as:  PROSCAR   5 mg, Oral, Daily      folic acid 1 MG tablet  Commonly known as:  FOLVITE   1 mg, Oral, Daily      furosemide 20 MG tablet  Commonly known as:  LASIX   20 mg, Oral, Daily      guaiFENesin 400 MG tablet  Commonly known as:  HUMIBID 3   800 mg, Oral, Every 12 Hours      hydrocortisone 25 MG suppository  Commonly known as:  ANUSOL-HC   25 mg, Rectal, 2 Times Daily PRN      isosorbide mononitrate 60 MG 24 hr tablet  Commonly known as:  IMDUR   90 mg,  Oral, Daily      levothyroxine 50 MCG tablet  Commonly known as:  SYNTHROID, LEVOTHROID   50 mcg, Oral, Daily      loratadine 10 MG tablet  Commonly known as:  CLARITIN   10 mg, Oral, Daily      LYRICA 150 MG capsule  Generic drug:  pregabalin   300 mg, Oral, 2 Times Daily      metFORMIN 500 MG tablet  Commonly known as:  GLUCOPHAGE   1,000 mg, Oral, 2 Times Daily With Meals      Morphine 15 MG tablet  Commonly known as:  MSIR   15 mg, Oral, 3 Times Daily PRN      multivitamin with minerals tablet tablet   1 tablet, Oral, Daily      naloxone 4 MG/0.1ML nasal spray  Commonly known as:  NARCAN   1 spray, Nasal, As Needed      NIFEdipine XL 30 MG 24 hr tablet  Commonly known as:  PROCARDIA XL   30 mg, Oral, Daily      nitroglycerin 0.4 MG SL tablet  Commonly known as:  NITROSTAT   0.4 mg, Sublingual, Every 5 Minutes PRN      OLANZapine 10 MG tablet  Commonly known as:  zyPREXA   10 mg, Oral, Nightly      polyethylene glycol packet  Commonly known as:  MIRALAX   17 g, Oral, Daily      raNITIdine 150 MG tablet  Commonly known as:  ZANTAC   150 mg, Oral, 2 Times Daily PRN      sodium chloride 0.65 % nasal spray   2 sprays, Nasal, As Needed      spironolactone 25 MG tablet  Commonly known as:  ALDACTONE   25 mg, Oral, Daily      tamsulosin 0.4 MG capsule 24 hr capsule  Commonly known as:  FLOMAX   1 capsule, Oral, Nightly      Vitamin D 50 MCG (2000 UT) tablet   6,000 Units, Oral, Daily         Stop These Medications    doxazosin 4 MG tablet  Commonly known as:  CARDURA     traZODone 100 MG tablet  Commonly known as:  DESYREL     TRULICITY 0.75 MG/0.5ML solution pen-injector  Generic drug:  Dulaglutide            Discharge Diet: cardiac/carb consistent    Activity at Discharge: as tolerated    Discharge Care Plan/Instructions: f/u with pcp and home health    Follow-up Appointments:   No future appointments.    Test Results Pending at Discharge: none    Dhruv Rausch DO  01/17/20  3:42 PM    Time: 37  minutes          Electronically signed by Dhruv Rausch DO at 01/17/20 7250

## 2020-01-17 NOTE — PLAN OF CARE
Problem: Patient Care Overview  Goal: Plan of Care Review  Outcome: Ongoing (interventions implemented as appropriate)  Flowsheets (Taken 1/17/2020 8919)  Outcome Summary: Pt. is independent in bed mobility. Stands and sits with supervision. Ambulated 500' with Rwx and supervision. SAT on 5lpm began at 94% dropped to 88% and recovered to 92%. Will benefit frm increased activity.

## 2020-01-18 ENCOUNTER — READMISSION MANAGEMENT (OUTPATIENT)
Dept: CALL CENTER | Facility: HOSPITAL | Age: 60
End: 2020-01-18

## 2020-01-18 LAB
BACTERIA SPEC AEROBE CULT: NORMAL
BACTERIA SPEC AEROBE CULT: NORMAL

## 2020-01-18 NOTE — THERAPY DISCHARGE NOTE
Acute Care - Physical Therapy Progress Note/Discharge  [facility]     Patient Name: Sai Adam  : 1960  MRN: [id number]  Today's Date: 2020             Admit Date: 2020    Visit Dx:    ICD-10-CM ICD-9-CM   1. Acute respiratory failure with hypercapnia (CMS/HCC) J96.02 518.81   2. Impaired mobility Z74.09 799.89   3. COPD exacerbation (CMS/HCC) J44.1 491.21     Patient Active Problem List   Diagnosis   • Malignant neoplasm of sigmoid colon (CMS/HCC)   • Colon polyps   • Anticoagulated by anticoagulation treatment   • COPD exacerbation (CMS/HCC)   • Acute on chronic respiratory failure with hypoxia and hypercapnia (CMS/HCC)   • Diabetes mellitus (CMS/HCC)   • History of colon cancer   • Coronary artery disease   • Chronic respiratory acidosis   • Pneumonia of right middle lobe due to infectious organism (CMS/HCC)   • Acute respiratory failure with hypercapnia (CMS/HCC)   • Toxic metabolic encephalopathy   • Hypophosphatemia         Rehab Goal Summary     Row Name 20 1300             Transfer Goal 1 (PT)    [place] Level/Cues Needed (Transfer Goal 1, PT)  independent Goal: independent  -MEEK      Progress/Outcome (Transfer Goal 1, PT)  goal met  -MEEK         Gait Training Goal 1 (PT)    [place] Level (Gait Training Goal 1, PT)  supervision required Goal: independent  -MEEK      Distance (Gait Goal 1, PT)  500 Goal:250  -MEEK      Progress/Outcome (Gait Training Goal 1, PT)  goal not met  -MEEK        User Key  (r) = Recorded By, (t) = Taken By, (c) = Cosigned By    Initials Name Provider Type Discipline    Osiris Spicer, PTA Physical Therapy Assistant PT        Therapy Treatment         PT Recommendation and Plan  Anticipated Discharge Disposition (PT): home with home health  Outcome Summary/Treatment Plan (PT)  Anticipated Discharge Disposition (PT): home with home health  Outcome Summary: Pt. is independent in bed mobility. Stands and sits with supervision. Ambulated 500' with  Rwx and supervision. SAT on 5lpm began at 94% dropped to 88% and recovered to 92%. Will benefit frm increased activity.         Time Calculation:     Therapy Charges for Today     Code Description Service Date Service Provider Modifiers Qty    [id number] HC GAIT TRAINING EA 15 MIN 1/17/2020 Osiris Fontenot, PTA GP 2          PT G-Codes  Outcome Measure Options: AM-PAC 6 Clicks Basic Mobility (PT)  AM-PAC 6 Clicks Score (PT): 18    PT Discharge Summary  Anticipated Discharge Disposition (PT): home with home health  Reason for Discharge: Discharge from facility  Outcomes Achieved: Patient able to partially acheive established goals  Discharge Destination: Home with home health    Osiris Fontenot, MARCELL  1/18/2020

## 2020-01-18 NOTE — OUTREACH NOTE
Prep Survey      Responses   Facility patient discharged from?  [facility]   Is patient eligible?  Yes   Discharge diagnosis  A/C resp. failure with hypoxia and hypercapnia, hypophosphatemia, toxic metabolic encephalopathy, COPD exac., DM, CAD, hx colon cancer   Does the patient have one of the following disease processes/diagnoses(primary or secondary)?  COPD/Pneumonia   Does the patient have Home health ordered?  Yes   What is the Home health agency?   [facility]   Is there a DME ordered?  No   What DME was ordered?  [facility]   Comments regarding appointments  See AVS   Prep survey completed?  Yes          Teresa Knight RN

## 2020-01-20 NOTE — PAYOR COMM NOTE
"[facility]  [place]   [phone]  OR   FAX   [phone]    Sai Adam (59 y.o. Male)     Date of Birth Social Security Number Address Home Phone MRN    1960  [address] [address] [phone] [id number]    Presybeterian Marital Status          Episcopal        Admission Date Admission Type Admitting Provider Attending Provider Department, Room/Bed    1/13/20 Emergency Dhruv Rausch DO  [facility] 4C, 496/1    Discharge Date Discharge Disposition Discharge Destination        1/17/2020 Home-Health Care [facility]             Attending Provider:  (none)   Allergies:  Tetanus Toxoids, Lamotrigine, Lisinopril, Methadone, Penicillins, Tramadol    Isolation:  None   Infection:  None   Code Status:  Prior    Ht:  170.2 cm (67\")   Wt:  66 kg (145 lb 9.6 oz)    Admission Cmt:  None   Principal Problem:  Acute on chronic respiratory failure with hypoxia and hypercapnia (CMS/HCC) [J96.21,J96.22]                 Active Insurance as of 1/13/2020     Primary Coverage     Payor Plan Insurance Group Employer/Plan Group    [employer] [id number]      Payor Plan Address Payor Plan Phone Number Payor Plan Fax Number Effective Dates    [employer] [employer] [employer] [phone]  12/1/2014 - None Entered    [address] [facility] [id number]       Subscriber Name Subscriber Birth Date Member ID       SAI ADAM 1960 [id number]                 Emergency Contacts      (Rel.) Home Phone Work Phone Mobile Phone    Keith Wilcox (Son) [phone] -- --    Antonio Danielson (Son) [phone] -- --    NUBIA RIDLEY (Daughter) -- -- [phone]               Discharge Summary      Dhruv Rausch DO at 01/17/20 1542              [facility] Medicine Services  DISCHARGE SUMMARY       Date of Admission: 1/13/2020  Date of Discharge:  1/17/2020  Primary Care Physician: Jarod Tillman MD    Presenting " Problem/History of Present Illness:  Acute respiratory failure with hypercapnia (CMS/HCC) [J96.02]     Final Discharge Diagnoses:  Active Hospital Problems    Diagnosis   • **Acute on chronic respiratory failure with hypoxia and hypercapnia (CMS/HCC)   • Hypophosphatemia   • Toxic metabolic encephalopathy   • COPD exacerbation (CMS/HCC)   • Diabetes mellitus (CMS/HCC)   • Coronary artery disease   • History of colon cancer       Consults: none    Procedures Performed: none    Pertinent Test Results:   Procedure Component Value Units Date/Time   XR Chest 1 View [[id number]] Jarod as Reviewed   Order Status: Completed Collected: 01/13/20 1052    Updated: 01/13/20 1059   Narrative:     EXAMINATION: XR CHEST 1 VW- 1/13/2020 10:52 AM CST     HISTORY: Shortness of breath     COMPARISON: 01/18/2019     FINDINGS:  Heart and mediastinal contours are stable. There has been prior median  sternotomy. Upright approach Port-A-Cath is in place with tip projecting  over the mid to distal SVC. There is atelectasis in the lung bases. The  pulmonary vasculature is prominent. Opacities are noted along the right  minor fissure. There is pleural thickening on the right, similar to  prior exams.      Impression:     Opacities along the right minor fissure may reflect fluid or  atelectasis. Bibasilar atelectasis is evident.   This report was finalized on 01/13/2020 10:56 by Dr. Zia Machuca MD.         Chief Complaint on Day of Discharge: f/u sob    History of Present Illness on Day of Discharge:   Patient seen and examined.  Feels back to baseline.  Wants to go home.  No acute complaints.  No events overnight.    Hospital Course:  The patient is a 59 y.o. male with a history of chronic hypoxic respiratory failure on 5 L O2 at baseline, sleep apnea, COPD, CAD, [facility]  He presented to the ER in the morning of 1/13 with acute on chronic hypercarbic respiratory failure and encephalopathy/CO2 narcosis.  Patient was found  "to be in COPD exacerbation was admitted to the hospital with IV steroids, nebulizers, azithromycin.  He improved on BiPAP and eventually was able to be weaned back to nasal cannula with BiPAP PRN and for sleep.  He is now back to his baseline O2 and doing well.  Okay for discharge home on p.o. steroid taper and to finish azithromycin p.o.  Of note home medications include 170 units of Lantus.  During hospitalization patient did well with 40 of Levemir and 10 pre-meal lispro.  Suspect he is dietarily noncompliant and had nutrition see the patient here.  Would not recommend high-dose of insulin at discharge.  Meds have been adjusted.  Can follow-up with PCP for ongoing insulin management.    Condition on Discharge:  Stable/improved    Physical Exam on Discharge:  /67 (BP Location: Right arm, Patient Position: Lying)   Pulse 62   Temp 98.1 °F (36.7 °C) (Oral)   Resp 20   Ht 170.2 cm (67\")   Wt 66 kg (145 lb 9.6 oz)   SpO2 94%   BMI 22.80 kg/m²    Physical Exam  GEN: Awake, alert, interactive, purse lip breathing but speaking in full sentences, no accessory muscle use  HEENT: PERRLA, EOMI, Anicteric, Trachea midline  Lungs: improved air flow b/l. No wheezing currently  Heart: yovana, RR, +S1/s2, no rub  ABD: soft, nt/nd, +BS, no guarding/rebound  Extremities: atraumatic, no cyanosis, no edema  Skin: no rashes or lesions  Neuro: AAOx3, no focal deficits  Psych: normal mood & affect    Discharge Disposition:  Home-Health Care [facility]    Discharge Medications:     Discharge Medications      New Medications      Instructions Start Date   azithromycin 500 MG tablet  Commonly known as:  ZITHROMAX   500 mg, Oral, Daily   Start Date:  January 18, 2020     predniSONE 10 MG tablet  Commonly known as:  DELTASONE   Take 4 tabs po daily x 2 days, then 3 tabs po daily x 2 days, then 2 tabs po daily x 2 days, then 1 tab po daily x 2 days, then stop         Changes to Medications      Instructions Start Date   insulin aspart " 100 UNIT/ML injection  Commonly known as:  novoLOG  What changed:  how much to take   10 Units, Subcutaneous, 3 Times Daily Before Meals      insulin glargine 100 UNIT/ML injection  Commonly known as:  LANTUS  What changed:  how much to take   40 Units, Subcutaneous, Daily      losartan 100 MG tablet  Commonly known as:  COZAAR  What changed:  how much to take   50 mg, Oral, Daily         Continue These Medications      Instructions Start Date   acetaminophen 325 MG tablet  Commonly known as:  TYLENOL   650 mg, Oral, Every 6 Hours PRN      albuterol sulfate  (90 Base) MCG/ACT inhaler  Commonly known as:  PROVENTIL HFA;VENTOLIN HFA;PROAIR HFA   2 puffs, Inhalation, 4 Times Daily PRN      albuterol (2.5 MG/3ML) 0.083% nebulizer solution  Commonly known as:  PROVENTIL   2.5 mg, Nebulization, Every 6 Hours PRN      bacitracin 500 UNIT/GM ointment   Topical, Daily, Colostomy      carboxymethylcellulose 0.5 % solution  Commonly known as:  REFRESH PLUS   1 drop, Both Eyes, 4 Times Daily PRN      clopidogrel 75 MG tablet  Commonly known as:  PLAVIX   75 mg, Oral, Daily      cyclobenzaprine 10 MG tablet  Commonly known as:  FLEXERIL   10 mg, Oral, Every Night at Bedtime      docusate sodium 100 MG capsule  Commonly known as:  COLACE   100 mg, Oral, 2 Times Daily PRN      dorzolamide 2 % ophthalmic solution  Commonly known as:  TRUSOPT   1 drop, Right Eye, 2 Times Daily      DULoxetine 30 MG capsule  Commonly known as:  CYMBALTA   30 mg, Oral, Daily      eucerin cream   Topical, As Needed      finasteride 5 MG tablet  Commonly known as:  PROSCAR   5 mg, Oral, Daily      folic acid 1 MG tablet  Commonly known as:  FOLVITE   1 mg, Oral, Daily      furosemide 20 MG tablet  Commonly known as:  LASIX   20 mg, Oral, Daily      guaiFENesin 400 MG tablet  Commonly known as:  HUMIBID 3   800 mg, Oral, Every 12 Hours      hydrocortisone 25 MG suppository  Commonly known as:  ANUSOL-HC   25 mg, Rectal, 2 Times Daily PRN       isosorbide mononitrate 60 MG 24 hr tablet  Commonly known as:  IMDUR   90 mg, Oral, Daily      levothyroxine 50 MCG tablet  Commonly known as:  SYNTHROID, LEVOTHROID   50 mcg, Oral, Daily      loratadine 10 MG tablet  Commonly known as:  CLARITIN   10 mg, Oral, Daily      LYRICA 150 MG capsule  Generic drug:  pregabalin   300 mg, Oral, 2 Times Daily      metFORMIN 500 MG tablet  Commonly known as:  GLUCOPHAGE   1,000 mg, Oral, 2 Times Daily With Meals      Morphine 15 MG tablet  Commonly known as:  MSIR   15 mg, Oral, 3 Times Daily PRN      multivitamin with minerals tablet tablet   1 tablet, Oral, Daily      naloxone 4 MG/0.1ML nasal spray  Commonly known as:  NARCAN   1 spray, Nasal, As Needed      NIFEdipine XL 30 MG 24 hr tablet  Commonly known as:  PROCARDIA XL   30 mg, Oral, Daily      nitroglycerin 0.4 MG SL tablet  Commonly known as:  NITROSTAT   0.4 mg, Sublingual, Every 5 Minutes PRN      OLANZapine 10 MG tablet  Commonly known as:  zyPREXA   10 mg, Oral, Nightly      polyethylene glycol packet  Commonly known as:  MIRALAX   17 g, Oral, Daily      raNITIdine 150 MG tablet  Commonly known as:  ZANTAC   150 mg, Oral, 2 Times Daily PRN      sodium chloride 0.65 % nasal spray   2 sprays, Nasal, As Needed      spironolactone 25 MG tablet  Commonly known as:  ALDACTONE   25 mg, Oral, Daily      tamsulosin 0.4 MG capsule 24 hr capsule  Commonly known as:  FLOMAX   1 capsule, Oral, Nightly      Vitamin D 50 MCG (2000 UT) tablet   6,000 Units, Oral, Daily         Stop These Medications    doxazosin 4 MG tablet  Commonly known as:  CARDURA     traZODone 100 MG tablet  Commonly known as:  DESYREL     TRULICITY 0.75 MG/0.5ML solution pen-injector  Generic drug:  Dulaglutide            Discharge Diet: cardiac/carb consistent    Activity at Discharge: as tolerated    Discharge Care Plan/Instructions: f/u with pcp and home health    Follow-up Appointments:   No future appointments.    Test Results Pending at Discharge:  none    Dhruv Rausch DO  01/17/20  3:42 PM    Time: 37 minutes          Electronically signed by Dhruv Rausch DO at 01/17/20 7702

## 2020-01-21 ENCOUNTER — READMISSION MANAGEMENT (OUTPATIENT)
Dept: CALL CENTER | Facility: HOSPITAL | Age: 60
End: 2020-01-21

## 2020-01-21 NOTE — DISCHARGE PLACEMENT REQUEST
"[facility]  STEPHANIE LU RN CM [id number]      Sai Adam (59 y.o. Male)     Date of Birth Social Security Number Address Home Phone MRN    1960  [address] [place] [id number] [phone] [id number]    [place] Marital Status          Denominational        Admission Date Admission Type Admitting Provider Attending Provider Department, Room/Bed    1/13/20 Emergency Dhruv Rausch DO  [facility] 4C, 496/1    Discharge Date Discharge Disposition Discharge Destination        1/17/2020 Home-Health Care [facility]             Attending Provider:  (none)   Allergies:  Tetanus Toxoids, Lamotrigine, Lisinopril, Methadone, Penicillins, Tramadol    Isolation:  None   Infection:  None   Code Status:  Prior    Ht:  170.2 cm (67\")   Wt:  66 kg (145 lb 9.6 oz)    Admission Cmt:  None   Principal Problem:  Acute on chronic respiratory failure with hypoxia and hypercapnia (CMS/HCC) [J96.21,J96.22]                 Active Insurance as of 1/13/2020     Primary Coverage     Payor Plan Insurance Group Employer/Plan Group    [facility] ADMINISTRATION [id number]     Payor Plan Address Payor Plan Phone Number Payor Plan Fax Number Effective Dates    [address] [phone]  12/1/2014 - None Entered    [facility] [address]       Subscriber Name Subscriber Birth Date Member ID       SAI ADAM 1960 [id number]                 Emergency Contacts      (Rel.) Home Phone Work Phone Mobile Phone    Keith Wilcox (Son) [phone] -- --    Antonio Danielson (Son) [phone] -- --    NUBIA RIDLEY (Daughter) -- -- [phone]               Discharge Summary      Dhruv Rausch DO at 01/17/20 1542              [facility]  DISCHARGE SUMMARY       Date of Admission: 1/13/2020  Date of Discharge:  1/17/2020  Primary Care Physician: Jarod Tillman MD    Presenting Problem/History of Present " Illness:  Acute respiratory failure with hypercapnia (CMS/HCC) [J96.02]     Final Discharge Diagnoses:  Active Hospital Problems    Diagnosis   • **Acute on chronic respiratory failure with hypoxia and hypercapnia (CMS/HCC)   • Hypophosphatemia   • Toxic metabolic encephalopathy   • COPD exacerbation (CMS/HCC)   • Diabetes mellitus (CMS/HCC)   • Coronary artery disease   • History of colon cancer       Consults: none    Procedures Performed: none    Pertinent Test Results:   Procedure Component Value Units Date/Time   XR Chest 1 View [[id number]] Jarod as Reviewed   Order Status: Completed Collected: 01/13/20 1052    Updated: 01/13/20 1059   Narrative:     EXAMINATION: XR CHEST 1 VW- 1/13/2020 10:52 AM CST     HISTORY: Shortness of breath     COMPARISON: 01/18/2019     FINDINGS:  Heart and mediastinal contours are stable. There has been prior median  sternotomy. Upright approach Port-A-Cath is in place with tip projecting  over the mid to distal SVC. There is atelectasis in the lung bases. The  pulmonary vasculature is prominent. Opacities are noted along the right  minor fissure. There is pleural thickening on the right, similar to  prior exams.      Impression:     Opacities along the right minor fissure may reflect fluid or  atelectasis. Bibasilar atelectasis is evident.   This report was finalized on 01/13/2020 10:56 by Dr. Zia Machuca MD.         Chief Complaint on Day of Discharge: f/u sob    History of Present Illness on Day of Discharge:   Patient seen and examined.  Feels back to baseline.  Wants to go home.  No acute complaints.  No events overnight.    Hospital Course:  The patient is a 59 y.o. male with a history of chronic hypoxic respiratory failure on 5 L O2 at baseline, sleep apnea, COPD, CAD, [facility]  He presented to the ER in the morning of 1/13 with acute on chronic hypercarbic respiratory failure and encephalopathy/CO2 narcosis.  Patient was found to be in COPD exacerbation  "was admitted to the hospital with IV steroids, nebulizers, azithromycin.  He improved on BiPAP and eventually was able to be weaned back to nasal cannula with BiPAP PRN and for sleep.  He is now back to his baseline O2 and doing well.  Okay for discharge home on p.o. steroid taper and to finish azithromycin p.o.  Of note home medications include 170 units of Lantus.  During hospitalization patient did well with 40 of Levemir and 10 pre-meal lispro.  Suspect he is dietarily noncompliant and had nutrition see the patient here.  Would not recommend high-dose of insulin at discharge.  Meds have been adjusted.  Can follow-up with PCP for ongoing insulin management.    Condition on Discharge:  Stable/improved    Physical Exam on Discharge:  /67 (BP Location: Right arm, Patient Position: Lying)   Pulse 62   Temp 98.1 °F (36.7 °C) (Oral)   Resp 20   Ht 170.2 cm (67\")   Wt 66 kg (145 lb 9.6 oz)   SpO2 94%   BMI 22.80 kg/m²    Physical Exam  GEN: Awake, alert, interactive, purse lip breathing but speaking in full sentences, no accessory muscle use  HEENT: PERRLA, EOMI, Anicteric, Trachea midline  Lungs: improved air flow b/l. No wheezing currently  Heart: yovana, RR, +S1/s2, no rub  ABD: soft, nt/nd, +BS, no guarding/rebound  Extremities: atraumatic, no cyanosis, no edema  Skin: no rashes or lesions  Neuro: AAOx3, no focal deficits  Psych: normal mood & affect    Discharge Disposition:  Home-Health Care [facility]    Discharge Medications:     Discharge Medications      New Medications      Instructions Start Date   azithromycin 500 MG tablet  Commonly known as:  ZITHROMAX   500 mg, Oral, Daily   Start Date:  January 18, 2020     predniSONE 10 MG tablet  Commonly known as:  DELTASONE   Take 4 tabs po daily x 2 days, then 3 tabs po daily x 2 days, then 2 tabs po daily x 2 days, then 1 tab po daily x 2 days, then stop         Changes to Medications      Instructions Start Date   insulin aspart 100 UNIT/ML " injection  Commonly known as:  novoLOG  What changed:  how much to take   10 Units, Subcutaneous, 3 Times Daily Before Meals      insulin glargine 100 UNIT/ML injection  Commonly known as:  LANTUS  What changed:  how much to take   40 Units, Subcutaneous, Daily      losartan 100 MG tablet  Commonly known as:  COZAAR  What changed:  how much to take   50 mg, Oral, Daily         Continue These Medications      Instructions Start Date   acetaminophen 325 MG tablet  Commonly known as:  TYLENOL   650 mg, Oral, Every 6 Hours PRN      albuterol sulfate  (90 Base) MCG/ACT inhaler  Commonly known as:  PROVENTIL HFA;VENTOLIN HFA;PROAIR HFA   2 puffs, Inhalation, 4 Times Daily PRN      albuterol (2.5 MG/3ML) 0.083% nebulizer solution  Commonly known as:  PROVENTIL   2.5 mg, Nebulization, Every 6 Hours PRN      bacitracin 500 UNIT/GM ointment   Topical, Daily, Colostomy      carboxymethylcellulose 0.5 % solution  Commonly known as:  REFRESH PLUS   1 drop, Both Eyes, 4 Times Daily PRN      clopidogrel 75 MG tablet  Commonly known as:  PLAVIX   75 mg, Oral, Daily      cyclobenzaprine 10 MG tablet  Commonly known as:  FLEXERIL   10 mg, Oral, Every Night at Bedtime      docusate sodium 100 MG capsule  Commonly known as:  COLACE   100 mg, Oral, 2 Times Daily PRN      dorzolamide 2 % ophthalmic solution  Commonly known as:  TRUSOPT   1 drop, Right Eye, 2 Times Daily      DULoxetine 30 MG capsule  Commonly known as:  CYMBALTA   30 mg, Oral, Daily      eucerin cream   Topical, As Needed      finasteride 5 MG tablet  Commonly known as:  PROSCAR   5 mg, Oral, Daily      folic acid 1 MG tablet  Commonly known as:  FOLVITE   1 mg, Oral, Daily      furosemide 20 MG tablet  Commonly known as:  LASIX   20 mg, Oral, Daily      guaiFENesin 400 MG tablet  Commonly known as:  HUMIBID 3   800 mg, Oral, Every 12 Hours      hydrocortisone 25 MG suppository  Commonly known as:  ANUSOL-HC   25 mg, Rectal, 2 Times Daily PRN      isosorbide  mononitrate 60 MG 24 hr tablet  Commonly known as:  IMDUR   90 mg, Oral, Daily      levothyroxine 50 MCG tablet  Commonly known as:  SYNTHROID, LEVOTHROID   50 mcg, Oral, Daily      loratadine 10 MG tablet  Commonly known as:  CLARITIN   10 mg, Oral, Daily      LYRICA 150 MG capsule  Generic drug:  pregabalin   300 mg, Oral, 2 Times Daily      metFORMIN 500 MG tablet  Commonly known as:  GLUCOPHAGE   1,000 mg, Oral, 2 Times Daily With Meals      Morphine 15 MG tablet  Commonly known as:  MSIR   15 mg, Oral, 3 Times Daily PRN      multivitamin with minerals tablet tablet   1 tablet, Oral, Daily      naloxone 4 MG/0.1ML nasal spray  Commonly known as:  NARCAN   1 spray, Nasal, As Needed      NIFEdipine XL 30 MG 24 hr tablet  Commonly known as:  PROCARDIA XL   30 mg, Oral, Daily      nitroglycerin 0.4 MG SL tablet  Commonly known as:  NITROSTAT   0.4 mg, Sublingual, Every 5 Minutes PRN      OLANZapine 10 MG tablet  Commonly known as:  zyPREXA   10 mg, Oral, Nightly      polyethylene glycol packet  Commonly known as:  MIRALAX   17 g, Oral, Daily      raNITIdine 150 MG tablet  Commonly known as:  ZANTAC   150 mg, Oral, 2 Times Daily PRN      sodium chloride 0.65 % nasal spray   2 sprays, Nasal, As Needed      spironolactone 25 MG tablet  Commonly known as:  ALDACTONE   25 mg, Oral, Daily      tamsulosin 0.4 MG capsule 24 hr capsule  Commonly known as:  FLOMAX   1 capsule, Oral, Nightly      Vitamin D 50 MCG (2000 UT) tablet   6,000 Units, Oral, Daily         Stop These Medications    doxazosin 4 MG tablet  Commonly known as:  CARDURA     traZODone 100 MG tablet  Commonly known as:  DESYREL     TRULICITY 0.75 MG/0.5ML solution pen-injector  Generic drug:  Dulaglutide            Discharge Diet: cardiac/carb consistent    Activity at Discharge: as tolerated    Discharge Care Plan/Instructions: f/u with pcp and home health    Follow-up Appointments:   No future appointments.    Test Results Pending at Discharge:  none    Dhruv Rausch DO  01/17/20  3:42 PM    Time: 37 minutes          Electronically signed by Dhruv Rausch DO at 01/17/20 1479

## 2020-01-21 NOTE — OUTREACH NOTE
COPD/PN Week 1 Survey      Responses   Facility patient discharged from?  [facility]   Does the patient have one of the following disease processes/diagnoses(primary or secondary)?  COPD/Pneumonia   Is there a successful TCM telephone encounter documented?  No   Was the primary reason for admission:  COPD exacerbation   Week 1 attempt successful?  No   Unsuccessful attempts  Attempt 1          Maria G Guan RN

## 2020-01-22 ENCOUNTER — READMISSION MANAGEMENT (OUTPATIENT)
Dept: CALL CENTER | Facility: HOSPITAL | Age: 60
End: 2020-01-22

## 2020-01-22 NOTE — OUTREACH NOTE
"COPD/PN Week 1 Survey      Responses   Facility patient discharged from?  [facility]   Does the patient have one of the following disease processes/diagnoses(primary or secondary)?  COPD/Pneumonia   Is there a successful TCM telephone encounter documented?  No   Was the primary reason for admission:  COPD exacerbation   Week 1 attempt successful?  Yes   Call start time  1346   Call end time  1357   Discharge diagnosis  A/C resp. failure with hypoxia and hypercapnia, COPD   Is patient permission given to speak with other caregiver?  No   Meds reviewed with patient/caregiver?  Yes   Is the patient having any side effects they believe may be caused by any medication additions or changes?  No   Does the patient have all medications ordered at discharge?  Yes   Is the patient taking all medications as directed (includes completed medication regime)?  Yes   Does the patient have a primary care provider?   Yes   Does the patient have an appointment with their PCP or pulmonologist within 7 days of discharge?  Yes   Comments regarding PCP  Follow up with Jarod Tillman MD, January 24, 2020 @ 2:00 PM   Has the patient kept scheduled appointments due by today?  N/A   What is the Home health agency?   [facility]   Has home health visited the patient within 72 hours of discharge?  No   DME comments  Patient states that he has home O2 and nebulizer. He states that [facility] not [facility]. Patient would like case management to contact him.    Psychosocial issues?  No   Notified Case Management  Home Health, DME [Survey routed to case management for HH and DME needs. ]   Did the patient receive a copy of their discharge instructions?  Yes   Nursing interventions  Reviewed instructions with patient   What is the patient's perception of their health status since discharge?  Same   Is the patient/caregiver able to teach back the hierarchy of who to " call/visit for symptoms/problems? PCP, Specialist, Home health nurse, Urgent Care, ED, 911  Yes   Patient reports what zone on this call?  Yellow Zone   Yellow Zone  Increased shortness of air, Using quick relief inhaler/nebulizer more often, Medication is not helping relieve symptoms   Yellow interventions  Continue to use daily medications, Use quick relief inhaler as ordered, Use oxygen as ordered, Use other meds such as steroids or antibiotics as ordered, Call provider immediatly if symptoms do not improve   Week 1 call completed?  Yes          Teresa Collado RN

## 2020-01-24 NOTE — DISCHARGE PLACEMENT REQUEST
"[facility]  Jess Osorio RN  [id number]        Sai Adam (59 y.o. Male)     Date of Birth Social Security Number Address Home Phone MRN    1960  [address] [place] [id number] [phone] [id number]    [employer] Marital Status          Holiness        Admission Date Admission Type Admitting Provider Attending Provider Department, Room/Bed    1/13/20 Emergency Dhruv Rausch DO  [facility] 4C, 496/1    Discharge Date Discharge Disposition Discharge Destination        1/17/2020 Home or Self Care Home             Attending Provider:  (none)   Allergies:  Tetanus Toxoids, Lamotrigine, Lisinopril, Methadone, Penicillins, Tramadol    Isolation:  None   Infection:  None   Code Status:  Prior    Ht:  170.2 cm (67\")   Wt:  66 kg (145 lb 9.6 oz)    Admission Cmt:  None   Principal Problem:  Acute on chronic respiratory failure with hypoxia and hypercapnia (CMS/HCC) [J96.21,J96.22]                 Active Insurance as of 1/13/2020     Primary Coverage     Payor Plan Insurance Group Employer/Plan Group    [id number]     Payor Plan Address Payor Plan Phone Number Payor Plan Fax Number Effective Dates    [address] [phone]  12/1/2014 - None Entered    [facility] [address]       Subscriber Name Subscriber Birth Date Member ID       SAI ADAM 1960 [id number]                 Emergency Contacts      (Rel.) Home Phone Work Phone Mobile Phone    Keith Wilcox (Son) [phone] -- --    Antonio Danielson (Son) [phone] -- --    NUBIA RIDLEY (Daughter) -- -- [phone]               History & Physical      Dhruv Rausch DO at 01/13/20 [facility]7              [facility] Medicine Services  HISTORY AND PHYSICAL    Date of Admission: 1/13/2020  Primary Care Physician: Jarod Tillman MD    Subjective     Chief Complaint: Altered mental status    History of Present Illness  This " is a 59-year-old male with chronic hypoxic respiratory failure on 5 L O2 at baseline, sleep apnea on NIPPV at home, COPD CAD post CABG, diabetes.  [facility]  He has had a cough for several days at home but denied any overt shortness of breath or fevers.  Was seen normal last night.  This morning was altered and hard to wake up.  Presented into the ER was found to be in hypercarbic respiratory failure with a CO2 of 96 and a pH of 7.23.  PO2 was 59.  Patient was placed on BiPAP and worked up in the ER.  Chest imaging negative for any overt pneumonia.  He received steroids and nebulizers.  Repeat ABG improved to pH of 7.28 with a CO2 of 87.  Patient starting to wake up.  He is able to answer questions at this time.  Says he feels okay.  Denies any chest pain.  Again no fever chills.  No abdominal pain, nausea, vomiting, diarrhea.  No focal weakness.  We have been asked to admit for continued care.        Review of Systems   Otherwise complete ROS reviewed and negative except as mentioned in the HPI.    Past Medical History:   Past Medical History:   Diagnosis Date   • Arthritis    • CAD (coronary artery disease)    • Colon cancer (CMS/HCC) 11/2016   • COPD (chronic obstructive pulmonary disease) (CMS/[facility])    • Diabetes mellitus (CMS/[facility])    • HLD (hyperlipidemia)    • Hypertension      Past Surgical History:  Past Surgical History:   Procedure Laterality Date   • CARDIAC SURGERY     • CARPAL TUNNEL RELEASE     • COLON RESECTION WITH COLOSTOMY     • COLON SURGERY     • COLONOSCOPY  05/27/2016   • COLONOSCOPY N/A 10/12/2017    Procedure: COLONOSCOPY WITH ANESTHESIA;  Surgeon: Yessenia Gregg MD;  Location: [facility] ENDOSCOPY;  Service:    • CORONARY ANGIOPLASTY WITH STENT PLACEMENT     • HERNIA REPAIR     • ROTATOR CUFF REPAIR       Social History:  reports that he has been smoking. He has a 40.00 pack-year smoking history. He has never used smokeless tobacco. He reports that he does not drink alcohol or use  drugs.    Family History: family history includes Arthritis in his father; Cancer in his brother; Diabetes in his brother; Heart disease in his father; Hepatitis in his brother; Hypertension in his brother and mother; Stroke in his mother.       Allergies:  Allergies   Allergen Reactions   • Tetanus Toxoids Shortness Of Breath and Swelling   • Lamotrigine      Unknown     • Lisinopril Hives   • Methadone Swelling   • Penicillins Rash   • Tramadol Rash     Medications:  Prior to Admission medications    Medication Sig Start Date End Date Taking? Authorizing Provider   arformoterol (BROVANA) 15 MCG/2ML nebulizer solution Take 2 mL by nebulization 2 (Two) Times a Day. 2 boxes 1/23/18   Dimitri Guevara MD   atenolol (TENORMIN) 25 MG tablet Take 1 tablet by mouth Every 12 (Twelve) Hours.  Patient taking differently: Take 100 mg by mouth Every 12 (Twelve) Hours. 11/28/17   Ernie Cornejo DO   atorvastatin (LIPITOR) 40 MG tablet Take 1 tablet by mouth Every Night. 1/23/18   Dimitri Guevara MD   atropine 1 % ophthalmic solution Administer 1 drop to the right eye 2 (Two) Times a Day.    Erin Ambrocio MD   benzonatate (TESSALON) 100 MG capsule Take 2 capsules by mouth 3 (Three) Times a Day As Needed for Cough. 1/23/18   Dimitri Guevara MD   budesonide (PULMICORT) 0.25 MG/2ML nebulizer solution Take 2 mL by nebulization 2 (Two) Times a Day. 1/23/18   Dimitri Guevara MD   Cholecalciferol (VITAMIN D) 2000 units tablet Take 4,000 Units by mouth Daily.    Erin Ambrocio MD   clopidogrel (PLAVIX) 75 MG tablet Take 75 mg by mouth Daily.    Erin Ambrocio MD   docusate sodium (COLACE) 100 MG capsule Take 100 mg by mouth 2 (Two) Times a Day As Needed for Constipation.    Erin Ambrocio MD   dorzolamide (TRUSOPT) 2 % ophthalmic solution Administer 1 drop into the left eye 2 (Two) Times a Day.    Erin mAbrocio MD   finasteride (PROSCAR) 5 MG tablet Take 1 tablet by mouth Daily. 1/24/18    Dimitri Guevara MD   guaiFENesin (MUCINEX) 600 MG 12 hr tablet Take 2 tablets by mouth Every 12 (Twelve) Hours. 1/2/18   Leonel Roth APRN   insulin aspart (novoLOG) 100 UNIT/ML injection Inject 40 Units under the skin 3 (Three) Times a Day Before Meals.    Erin Ambrocio MD   insulin glargine (LANTUS) 100 UNIT/ML injection Inject 160 Units under the skin Daily.    Erin Ambrocio MD   ipratropium-albuterol (DUONEB) 0.5-2.5 mg/mL nebulizer Take 3 mL by nebulization Every 4 (Four) Hours As Needed for Wheezing or Shortness of Air. 1/23/18   Dimitri Guevara MD   isosorbide mononitrate (IMDUR) 60 MG 24 hr tablet Take 90 mg by mouth Daily.    Erin Ambrocio MD   levoFLOXacin (LEVAQUIN) 500 MG tablet Take 1 tablet by mouth Daily. 1/23/18   Dimitri Guevara MD   levothyroxine (SYNTHROID, LEVOTHROID) 50 MCG tablet Take 50 mcg by mouth Daily.    Erin Ambrocio MD   losartan (COZAAR) 100 MG tablet Take 100 mg by mouth Daily.    Erin Ambrocio MD   metFORMIN (GLUCOPHAGE) 500 MG tablet Take 1,000 mg by mouth 2 (Two) Times a Day With Meals.    Erin Ambrocio MD   MethylPREDNISolone (MEDROL, SHELL,) 4 MG tablet Take as directed on package instructions. 1/23/18   Dimitri Guevara MD   nicotine (NICODERM CQ) 14 MG/24HR patch Place 1 patch on the skin Daily. 1/24/18   Dimitri Guevara MD   nitroglycerin (NITROSTAT) 0.4 MG SL tablet Place 0.4 mg under the tongue Every 5 (Five) Minutes As Needed for Chest Pain.    Erin Ambrocio MD   potassium chloride (K-DUR,KLOR-CON) 20 MEQ CR tablet Take 40 mEq by mouth 2 (Two) Times a Day.    Erin Ambrocio MD   pregabalin (LYRICA) 150 MG capsule Take 1 capsule by mouth Every 12 (Twelve) Hours.  Patient taking differently: Take 300 mg by mouth Every 12 (Twelve) Hours. 11/28/17   Ernie Cornejo DO   raNITIdine (ZANTAC) 150 MG tablet Take 150 mg by mouth 2 (Two) Times a Day As Needed for Indigestion.    Provider, MD Erin  "  SAXagliptin (ONGLYZA) 5 MG tablet Take 5 mg by mouth Daily.    ProviderErin MD   spironolactone (ALDACTONE) 25 MG tablet Take 25 mg by mouth Daily.    ProviderErin MD   tamsulosin (FLOMAX) 0.4 MG capsule 24 hr capsule Take 1 capsule by mouth Every Night.    Erin Ambrocio MD   venlafaxine XR (EFFEXOR-XR) 75 MG 24 hr capsule Take 225 mg by mouth Daily.    ProviderErin MD     Objective     Vital Signs: /68 (BP Location: Left arm, Patient Position: Sitting)   Pulse 108   Temp 100.1 °F (37.8 °C) (Oral)   Resp 26   Ht 170.2 cm (67\")   Wt 77.1 kg (170 lb)   SpO2 91%   BMI 26.63 kg/m²    Physical Exam  GEN: Awake, alert, interactive, in NAD  HEENT: PERRLA, EOMI, Anicteric, Trachea midline  Lungs: diminished bs b/l w/ end exp wheezing, no rales  Heart: tachycardic, regular rhythm, +S1/s2  ABD: soft, nt/nd, +BS, no guarding/rebound. + ostomy LLQ  Extremities: atraumatic, no cyanosis, no edema  Skin: no rashes or lesions  Neuro: AAOx3, no focal deficits  Psych: normal mood & affect        Results Reviewed:  Lab Results (last 24 hours)     Procedure Component Value Units Date/Time    Blood Culture - Blood, Hand, Right [[id number]] Collected:  01/13/20 1052    Specimen:  Blood from Hand, Right Updated:  01/13/20 1233    Lactic Acid, Plasma [[id number]]  (Normal) Collected:  01/13/20 1052    Specimen:  Blood Updated:  01/13/20 1128     Lactate 1.4 mmol/L     Blood Culture - Blood, Hand, Right [[id number]] Collected:  01/13/20 1052    Specimen:  Blood from Hand, Right Updated:  01/13/20 1126    Blood Gas, Arterial [[id number]]  (Abnormal) Collected:  01/13/20 1050    Specimen:  Arterial Blood Updated:  01/13/20 1122     Site Right Radial     Jarad's Test Positive     pH, Arterial 7.278 pH units      Comment: 84 Value below reference range        pCO2, Arterial 87.0 mm Hg      Comment: 86 Value above critical limit        pO2, Arterial 61.4 mm Hg      Comment: 84 Value below reference " range        HCO3, Arterial 40.7 mmol/L      Comment: 83 Value above reference range        Base Excess, Arterial 10.0 mmol/L      Comment: 83 Value above reference range        O2 Saturation, Arterial 91.7 %      Comment: 84 Value below reference range        Temperature 37.0 C      Barometric Pressure for Blood Gas 760 mmHg      Modality BiPap     FIO2 45 %      Ventilator Mode BiPAP     IPAP 19     Comment: Meter: [id number]     :  [id number]        EPAP 4     Notified Who DR SCHULTZ     Notified By [id number]     Notified Time 01/13/2020 11:14     Collected by [id number]    [facility] Draw [[id number]] Collected:  01/13/20 1005    Specimen:  Blood Updated:  01/13/20 1116    Narrative:       The following orders were created for panel order [facility] Draw.  Procedure                               Abnormality         Status                     ---------                               -----------         ------                     Light Blue Top[[id number]]                                   Final result               Green Top (Gel)[[id number]]                                  Final result               Lavender Top[[id number]]                                     Final result               Red Top[[id number]]                                          Final result               [facility] Blood Culture Scott...[[id number]]                      Final result                 Please view results for these tests on the individual orders.    Light Blue Top [[id number]] Collected:  01/13/20 1005    Specimen:  Blood Updated:  01/13/20 1116     Extra Tube hold for add-on     Comment: Auto resulted       [facility] Blood Culture Bottle Set [[id number]] Collected:  01/13/20 1005    Specimen:  Blood from Hand, Right Updated:  01/13/20 1116     Extra Tube Hold for add-ons.     Comment: Auto resulted.       Green Top (Gel) [[id number]] Collected:  01/13/20 1005    Specimen:  Blood Updated:  01/13/20 1116     Extra Tube Hold for add-ons.     Comment:  Auto resulted.       Red Top [[id number]] Collected:  01/13/20 1005    Specimen:  Blood Updated:  01/13/20 1115     Extra Tube Hold for add-ons.     Comment: Auto resulted.       Lavender Top [[id number]] Collected:  01/13/20 1005    Specimen:  Blood Updated:  01/13/20 1115     Extra Tube hold for add-on     Comment: Auto resulted       Comprehensive Metabolic Panel [[id number]]  (Abnormal) Collected:  01/13/20 1005    Specimen:  Blood Updated:  01/13/20 1055     Glucose 300 mg/dL      BUN 5 mg/dL      Creatinine 0.61 mg/dL      Sodium 137 mmol/L      Potassium 4.3 mmol/L      Chloride 94 mmol/L      CO2 37.0 mmol/L      Calcium 8.8 mg/dL      Total Protein 7.1 g/dL      Albumin 4.10 g/dL      ALT (SGPT) 11 U/L      AST (SGOT) 24 U/L      Alkaline Phosphatase 115 U/L      Total Bilirubin 0.5 mg/dL      eGFR Non African Amer 134 mL/min/1.73      Globulin 3.0 gm/dL      A/G Ratio 1.4 g/dL      BUN/Creatinine Ratio 8.2     Anion Gap 6.0 mmol/L     Narrative:       GFR Normal >60  Chronic Kidney Disease <60  Kidney Failure <15      CBC & Differential [[id number]] Collected:  01/13/20 1005    Specimen:  Blood Updated:  01/13/20 1036    Narrative:       The following orders were created for panel order CBC & Differential.  Procedure                               Abnormality         Status                     ---------                               -----------         ------                     CBC Auto Differential[[id number]]        Abnormal            Final result                 Please view results for these tests on the individual orders.    CBC Auto Differential [[id number]]  (Abnormal) Collected:  01/13/20 1005    Specimen:  Blood Updated:  01/13/20 1036     WBC 12.67 10*3/mm3      RBC 4.93 10*6/mm3      Hemoglobin 14.1 g/dL      Hematocrit 45.8 %      MCV 92.9 fL      MCH 28.6 pg      MCHC 30.8 g/dL      RDW 12.8 %      RDW-SD 44.0 fl      MPV 9.1 fL      Platelets 150 10*3/mm3      Neutrophil % 79.5 %      Lymphocyte %  11.6 %      Monocyte % 6.9 %      Eosinophil % 0.0 %      Basophil % 0.5 %      Immature Grans % 1.5 %      Neutrophils, Absolute 10.07 10*3/mm3      Lymphocytes, Absolute 1.47 10*3/mm3      Monocytes, Absolute 0.88 10*3/mm3      Eosinophils, Absolute 0.00 10*3/mm3      Basophils, Absolute 0.06 10*3/mm3      Immature Grans, Absolute 0.19 10*3/mm3      nRBC 0.0 /100 WBC     Blood Gas, Arterial With Co-Ox [[id number]]  (Abnormal) Collected:  01/13/20 1000    Specimen:  Arterial Blood Updated:  01/13/20 1007     Site Right Brachial     Jarad's Test N/A     pH, Arterial 7.231 pH units      Comment: 84 Value below reference range        pCO2, Arterial 95.8 mm Hg      Comment: 86 Value above critical limit        pO2, Arterial 59.5 mm Hg      Comment: 84 Value below reference range        HCO3, Arterial 40.2 mmol/L      Comment: 83 Value above reference range        Base Excess, Arterial 8.6 mmol/L      Comment: 83 Value above reference range        O2 Saturation, Arterial 89.6 %      Comment: 84 Value below reference range        Hemoglobin, Blood Gas 14.0 g/dL      Hematocrit, Blood Gas 42.9 %      Oxyhemoglobin 86.1 %      Comment: 84 Value below reference range        Methemoglobin 0.20 %      Carboxyhemoglobin 3.7 %      A-a Gradiant --     Comment: UNABLE TO CALCULATE        Temperature 37.0 C      Sodium, Arterial 141 mmol/L      Potassium, Arterial 4.2 mmol/L      Barometric Pressure for Blood Gas 761 mmHg      Modality Nasal Cannula     Flow Rate 5.0 lpm      Ventilator Mode NA     Note --     Notified Who DR SCHULTZ     Notified By [id number]     Notified Time 01/13/2020 10:09     Collected by [id number]     Comment: Meter: [id number]     :  [id number]        pH, Temp Corrected --     pCO2, Temperature Corrected --     pO2, Temperature Corrected --        Imaging Results (Last 24 Hours)     Procedure Component Value Units Date/Time    XR Chest 1 View [[id number]] Collected:  01/13/20 1052     Updated:   01/13/20 1059    Narrative:       EXAMINATION: XR CHEST 1 VW- 1/13/2020 10:52 AM CST     HISTORY: Shortness of breath     COMPARISON: 01/18/2019     FINDINGS:  Heart and mediastinal contours are stable. There has been prior median  sternotomy. Upright approach Port-A-Cath is in place with tip projecting  over the mid to distal SVC. There is atelectasis in the lung bases. The  pulmonary vasculature is prominent. Opacities are noted along the right  minor fissure. There is pleural thickening on the right, similar to  prior exams.       Impression:       Opacities along the right minor fissure may reflect fluid or  atelectasis. Bibasilar atelectasis is evident.   This report was finalized on 01/13/2020 10:56 by Dr. Zia Machuca MD.        I have personally reviewed and interpreted the radiology studies and ECG obtained at time of admission.     Assessment / Plan     Assessment:   Active Hospital Problems    Diagnosis   • **Acute on chronic respiratory failure with hypoxia and hypercapnia (CMS/HCC)   • Toxic metabolic encephalopathy   • COPD exacerbation (CMS/HCC)   • Diabetes mellitus (CMS/HCC)   • Coronary artery disease   • History of colon cancer         Plan:   #1 acute on chronic hypercarbic and hypoxic respiratory failure -in the setting of COPD exacerbation.  Admit per COPD protocol.  Duo nebs around-the-clock.  Steroids 40 IV every 6.  Check sputum culture.  IV azithromycin.  Continue BiPAP.  N.p.o. for now.  Admit to ICU for low-dose monitoring current presentation.    #2 toxic metabolic encephalopathy -in the setting of hypercarbic respiratory failure.  His mental status improving with BiPAP.  He is nonfocal on exam.  Continue to monitor.    #3 diabetes, insulin-dependent -on large doses of insulin at home.  Sugar 300 on arrival.  He will be getting steroids.  Will admit with Levemir nightly and sliding scale for now as he is n.p.o.  Hypoglycemia protocol in place.  Monitor closely.    #4 CAD -with history  of CABG.  No active chest pain or issues.  Resume home meds as able    #5 history of colon cancer -status post resection with ostomy creation.  No recent issues.  Ostomy care.      Code Status: Full Code     I discussed the patient's findings and my recommendations with the patient and family at bedside    Estimated length of stay 4-5 days    Dhruv Rausch DO   01/13/20   12:47 PM              Electronically signed by Dhruv Rausch DO at 01/13/20 1255          Physician Progress Notes (all)      Dhruv Rausch DO at 01/16/20 0944              [facility] Medicine Services  INPATIENT PROGRESS NOTE    Patient Name: Sai Adam  Date of Admission: 1/13/2020  Today's Date: 01/16/20  Length of Stay: 3  Primary Care Physician: Jarod Tillman MD    Subjective   Chief Complaint: Follow-up respiratory failure    HPI:  Patient seen and examined, feels better, still has not been up walking much. Denies any dizziness, chest pain, or sob. On his home baseline 5L 02. Overnight was bradycardic, no other events noted.     ROS:  All pertinent negatives and positives are as above. All other systems have been reviewed and are negative unless otherwise stated.     Objective    Temp:  [97.9 °F (36.6 °C)-98.2 °F (36.8 °C)] 98.2 °F (36.8 °C)  Heart Rate:  [47-59] 52  Resp:  [17-25] 25  BP: (118-143)/(64-78) 130/78  Physical Exam  GEN: Awake, alert, interactive, purse lip breathing but speaking in full sentences, no accessory muscle use  HEENT: PERRLA, EOMI, Anicteric, Trachea midline  Lungs: improved air flow b/l. No wheezing currently  Heart: yovana, RR, +S1/s2, no rub  ABD: soft, nt/nd, +BS, no guarding/rebound  Extremities: atraumatic, no cyanosis, no edema  Skin: no rashes or lesions  Neuro: AAOx3, no focal deficits  Psych: normal mood & affect        Results Review:  I have reviewed the labs, radiology results, and diagnostic studies.    Laboratory Data:   Results from last 7 days   Lab  Units 01/14/20  0416 01/13/20  1005   WBC 10*3/mm3 11.45* 12.67*   HEMOGLOBIN g/dL 13.3 14.1   HEMATOCRIT % 42.1 45.8   PLATELETS 10*3/mm3 156 150        Results from last 7 days   Lab Units 01/15/20  0513 01/14/20  0416 01/13/20  1005   SODIUM mmol/L 141 138 137   POTASSIUM mmol/L 4.5 4.2 4.3   CHLORIDE mmol/L 98 94* 94*   CO2 mmol/L 36.0* 38.0* 37.0*   BUN mg/dL 21 17 5*   CREATININE mg/dL 0.59* 0.63* 0.61*   CALCIUM mg/dL 9.4 9.3 8.8   BILIRUBIN mg/dL  --  0.4 0.5   ALK PHOS U/L  --  94 115   ALT (SGPT) U/L  --  10 11   AST (SGOT) U/L  --  22 24   GLUCOSE mg/dL 141* 222* 300*       Culture Data:   Blood Culture   Date Value Ref Range Status   01/13/2020 No growth at less than 24 hours  Preliminary   01/13/2020 No growth at less than 24 hours  Preliminary       Radiology Data:   Imaging Results (Last 24 Hours)     ** No results found for the last 24 hours. **          I have reviewed the patient's current medications.     Assessment/Plan     Active Hospital Problems    Diagnosis   • **Acute on chronic respiratory failure with hypoxia and hypercapnia (CMS/HCC)   • Hypophosphatemia   • Toxic metabolic encephalopathy   • COPD exacerbation (CMS/HCC)   • Diabetes mellitus (CMS/HCC)   • Coronary artery disease   • History of colon cancer     #1 acute on chronic hypercarbic and hypoxic respiratory failure -in setting of COPD exacerbation.  Doing better today and back on baseline O2.  Continue PRN BiPAP for sleep and overnight.  Continue steroids, nebs and azithromycin.  Ambulate patient every shift.    #2 toxic metabolic encephalopathy -in the setting of hypercarbia.  This has resolved with BiPAP and normalization of ABG.  No neuro deficits.    #3 COPD exacerbation -as above.  Will start to wean steroids today.    #4 hypophosphatemia -improved with replacement    #5 CAD -with history of CABG.  No active chest pain or issues.    #6 insulin-dependent diabetes -takes 160 of Lantus at home.  His sugar was 200 with only 30 of  Levemir despite IV steroids.  Suspect he is not diet compliant.  He was restarted on diet and Levemir was increased to 40 with 10 prandial lispro and sliding scale.  His sugars have been doing very well with this regimen.  Continue along with hypoglycemia protocol.  Nutrition consult pending.    #7 history of colon cancer -status post ostomy creation previous day.  No active issues.  Continue ostomy care.    #8 bradycardia - asymptomatic, d/c atenolol, started here, not sure why or by who. Pt never on this med before. Resume his nicardipine tomorrow.    Discharge Planning: I expect the patient to be discharged to home in 1 to 2 days    Dhruv Rausch DO   01/16/20   9:44 AM      Electronically signed by Dhruv Rausch DO at 01/16/20 1148     Dhruv Rausch DO at 01/15/20 1231              [facility] Medicine Services  INPATIENT PROGRESS NOTE    Patient Name: Sai Adam  Date of Admission: 1/13/2020  Today's Date: 01/15/20  Length of Stay: 2  Primary Care Physician: Jarod Tillman MD    Subjective   Chief Complaint: Follow-up respiratory failure    HPI   Patient seen and examined.  He is feeling better today.  States he has been up walking around the room going to the bathroom but really has not been walking long distances.  His breathing is improved.  Denies chest pain or nausea.  Back on his home chronic 5 L home O2.    ROS:  All pertinent negatives and positives are as above. All other systems have been reviewed and are negative unless otherwise stated.     Objective    Temp:  [97.8 °F (36.6 °C)-98.7 °F (37.1 °C)] 98.2 °F (36.8 °C)  Heart Rate:  [54-99] 59  Resp:  [18-31] 18  BP: (114-168)/(52-71) 134/66  Physical Exam  GEN: Awake, alert, interactive, purse lip breathing but speaking in full sentences, no accessory muscle use  HEENT: PERRLA, EOMI, Anicteric, Trachea midline  Lungs: improved air flow b/l. No wheezing currently  Heart: RRR, +S1/s2, no rub  ABD: soft,  nt/nd, +BS, no guarding/rebound  Extremities: atraumatic, no cyanosis, no edema  Skin: no rashes or lesions  Neuro: AAOx3, no focal deficits  Psych: normal mood & affect        Results Review:  I have reviewed the labs, radiology results, and diagnostic studies.    Laboratory Data:   Results from last 7 days   Lab Units 01/14/20  0416 01/13/20  1005   WBC 10*3/mm3 11.45* 12.67*   HEMOGLOBIN g/dL 13.3 14.1   HEMATOCRIT % 42.1 45.8   PLATELETS 10*3/mm3 156 150        Results from last 7 days   Lab Units 01/15/20  0513 01/14/20  0416 01/13/20  1005   SODIUM mmol/L 141 138 137   POTASSIUM mmol/L 4.5 4.2 4.3   CHLORIDE mmol/L 98 94* 94*   CO2 mmol/L 36.0* 38.0* 37.0*   BUN mg/dL 21 17 5*   CREATININE mg/dL 0.59* 0.63* 0.61*   CALCIUM mg/dL 9.4 9.3 8.8   BILIRUBIN mg/dL  --  0.4 0.5   ALK PHOS U/L  --  94 115   ALT (SGPT) U/L  --  10 11   AST (SGOT) U/L  --  22 24   GLUCOSE mg/dL 141* 222* 300*       Culture Data:   Blood Culture   Date Value Ref Range Status   01/13/2020 No growth at less than 24 hours  Preliminary   01/13/2020 No growth at less than 24 hours  Preliminary       Radiology Data:   Imaging Results (Last 24 Hours)     ** No results found for the last 24 hours. **          I have reviewed the patient's current medications.     Assessment/Plan     Active Hospital Problems    Diagnosis   • **Acute on chronic respiratory failure with hypoxia and hypercapnia (CMS/HCC)   • Hypophosphatemia   • Toxic metabolic encephalopathy   • COPD exacerbation (CMS/HCC)   • Diabetes mellitus (CMS/HCC)   • Coronary artery disease   • History of colon cancer     #1 acute on chronic hypercarbic and hypoxic respiratory failure -in setting of COPD exacerbation.  Doing better today and back on baseline O2.  Continue PRN BiPAP for sleep and overnight.  Will decrease steroids to every 12 hours.  Continue nebs and azithromycin.  Ambulate patient every shift.    #2 toxic metabolic encephalopathy -in the setting of hypercarbia.  This has  resolved with BiPAP and normalization of ABG.  No neuro deficits.    #3 COPD exacerbation -as above.  Will start to wean steroids today.    #4 hypophosphatemia -improved with replacement    #5 CAD -with history of CABG.  No active chest pain or issues.    #6 insulin-dependent diabetes -takes 160 of Lantus at home.  His sugar was 200 today with only 30 of Levemir despite IV steroids.  Suspect he is not diet compliant.  He was restarted on diet and Levemir was increased to 40 with 10 prandial lispro and sliding scale.  His sugars have been doing very well with this regimen.  Continue along with hypoglycemia protocol.  Nutrition consult.    #7 history of colon cancer -status post ostomy creation previous day.  No active issues.  Continue ostomy care.    Discharge Planning: I expect the patient to be discharged to home in 2 to 3 days    Dhruv Rausch DO   01/15/20   12:31 PM      Electronically signed by Dhruv Rausch DO at 01/15/20 1530     Dhruv Rausch DO at 01/14/20 0968              [facility] Medicine Services  INPATIENT PROGRESS NOTE    Patient Name: Sai Adam  Date of Admission: 1/13/2020  Today's Date: 01/14/20  Length of Stay: 1  Primary Care Physician: Jarod Tillman MD    Subjective   Chief Complaint: Follow-up respiratory failure    HPI   Patient seen and examined.  He is off BiPAP and doing some pursed lip breathing.  He says that is baseline for him.  His breathing overall is not yet back to baseline but he feels a lot better than yesterday.  Normal mentation.  Denies chest pain.  Denies any abdominal pain or nausea/vomiting.  Wanting to eat.  No acute overnight events noted.        Review of Systems   All pertinent negatives and positives are as above. All other systems have been reviewed and are negative unless otherwise stated.     Objective    Temp:  [98 °F (36.7 °C)-100.1 °F (37.8 °C)] 98 °F (36.7 °C)  Heart Rate:  [] 61  Resp:  [20-30]  25  BP: (126-146)/(68-86) 140/71  Physical Exam  GEN: Awake, alert, interactive, purse lip breathing but speaking in full sentences, no accessory muscle use  HEENT: PERRLA, EOMI, Anicteric, Trachea midline  Lungs: improved air flow today but still tight/diminished. No wheezing currently  Heart: RRR, +S1/s2, no rub  ABD: soft, nt/nd, +BS, no guarding/rebound  Extremities: atraumatic, no cyanosis, no edema  Skin: no rashes or lesions  Neuro: AAOx3, no focal deficits  Psych: normal mood & affect        Results Review:  I have reviewed the labs, radiology results, and diagnostic studies.    Laboratory Data:   Results from last 7 days   Lab Units 01/14/20  0416 01/13/20  1005   WBC 10*3/mm3 11.45* 12.67*   HEMOGLOBIN g/dL 13.3 14.1   HEMATOCRIT % 42.1 45.8   PLATELETS 10*3/mm3 156 150        Results from last 7 days   Lab Units 01/14/20  0416 01/13/20  1005 01/13/20  1000   SODIUM mmol/L 138 137  --    SODIUM, ARTERIAL mmol/L  --   --  141   POTASSIUM mmol/L 4.2 4.3  --    CHLORIDE mmol/L 94* 94*  --    CO2 mmol/L 38.0* 37.0*  --    BUN mg/dL 17 5*  --    CREATININE mg/dL 0.63* 0.61*  --    CALCIUM mg/dL 9.3 8.8  --    BILIRUBIN mg/dL 0.4 0.5  --    ALK PHOS U/L 94 115  --    ALT (SGPT) U/L 10 11  --    AST (SGOT) U/L 22 24  --    GLUCOSE mg/dL 222* 300*  --        Culture Data:   Blood Culture   Date Value Ref Range Status   01/13/2020 No growth at less than 24 hours  Preliminary   01/13/2020 No growth at less than 24 hours  Preliminary       Radiology Data:   Imaging Results (Last 24 Hours)     Procedure Component Value Units Date/Time    XR Chest 1 View [[id number]] Collected:  01/13/20 1052     Updated:  01/13/20 1059    Narrative:       EXAMINATION: XR CHEST 1 VW- 1/13/2020 10:52 AM CST     HISTORY: Shortness of breath     COMPARISON: 01/18/2019     FINDINGS:  Heart and mediastinal contours are stable. There has been prior median  sternotomy. Upright approach Port-A-Cath is in place with tip projecting  over the mid  to distal SVC. There is atelectasis in the lung bases. The  pulmonary vasculature is prominent. Opacities are noted along the right  minor fissure. There is pleural thickening on the right, similar to  prior exams.       Impression:       Opacities along the right minor fissure may reflect fluid or  atelectasis. Bibasilar atelectasis is evident.   This report was finalized on 01/13/2020 10:56 by Dr. Zia Machuca MD.          I have reviewed the patient's current medications.     Assessment/Plan     Active Hospital Problems    Diagnosis   • **Acute on chronic respiratory failure with hypoxia and hypercapnia (CMS/HCC)   • Hypophosphatemia   • Toxic metabolic encephalopathy   • COPD exacerbation (CMS/HCC)   • Diabetes mellitus (CMS/HCC)   • Coronary artery disease   • History of colon cancer     #1 acute on chronic hypercarbic and hypoxic respiratory failure -in setting of COPD exacerbation.  Doing better today now off BiPAP with improved blood gas.  Can make BiPAP PRN for naps and nightly.  Continue steroids 40 IV every 6 hours.  Continue IV azithromycin.  Continue nebulizers.  Currently on 6 L, wean to baseline home 5 as able.  Goal SPO2 88 to 92%.    #2 toxic metabolic encephalopathy -in the setting of hypercarbia.  This has resolved with BiPAP and normalization of ABG.  No neuro deficits.    #3 COPD exacerbation -as above.  Not ready for steroid wean yet.    #4 hypophosphatemia -replace with sodium [place].  Recheck and continue placement protocol.    #5 CAD -with history of CABG.  No active chest pain or issues.    #6 insulin-dependent diabetes -takes 160 of Lantus at home.  His sugar was 200 this morning on only 30 of Levemir despite IV steroids.  Suspect he is not diet compliant.  He will be resuming diet today.  Will increase Levemir to 40 nightly.  Add pre-meal lispro 10 units.  Continue sliding scale and hypoglycemia protocol.  Will get diabetic/nutrition educator.    #7 history of colon cancer -status post  ostomy creation previous day.  No active issues.  Continue ostomy care.    Discharge Planning: I expect the patient to be discharged to home in 3 to 4 days.    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 [employer] # SUBSCRIBER ID   Primary:    [facility] [id number]      Referring Provider Information:  PAULINA FARRELL Phone: [phone] Fax:       Referral Information:   # Visits:  1 Referral Type: Home Health [42]   Urgency:  Routine Referral Reason: Specialty Services Required   Start Date: 2020 End Date:  To be determined by Insurer   Diagnosis: Acute respiratory failure with hypercapnia (CMS/HCC) (J96.02 [ICD-10-CM] 518.81 [ICD-9-CM])  COPD exacerbation (CMS/HCC) (J44.1 [ICD-10-CM] 491.21 [ICD-9-CM])      Refer to Dept:   Refer to Provider:   Refer to Facility:       Face to Face Visit Date: 2020  Follow-up provider for Plan of Care? I treated the patient in an acute care facility and will not continue treatment after discharge.  Follow-up provider: STEPH JACOBO [[id number]]  Reason/Clinical Findings: copd  Describe mobility limitations that make leaving home difficult: need for 02  Nursing/Therapeutic Services Requested: Skilled Nursing  Nursing/Therapeutic Services Requested: Physical Therapy  Skilled nursing orders: COPD management  PT orders: Therapeutic exercise  Frequency: 1 Week 1     This document serves as a request of services and does not constitute Insurance authorization or approval of services.  To determine eligibility, please contact the members Insurance  carrier to verify and review coverage.     If you have medical questions regarding this request for services. Please contact [address] at [phone] during normal business hours.       Authorizing Provider:Dhruv Rausch DO  Authorizing Provider's NPI: [id number]  Order Entered By: Dhruv Rausch DO 1/17/2020  3:43 PM     Electronically signed by: Dhruv Rausch DO 1/17/2020  3:43 PM        Osiris Fontenot PTA   Physical Therapy Assistant   Physical Therapy   Plan of Care   Signed   Date of Service:  01/17/20 1506   Creation Time:  01/17/20 1506            Signed             Show:Clear all  []Manual[x]Template[]Copied    Added by:  [x]Osiris Fontenot PTA    []Zekever for details     Problem: Patient Care Overview  Goal: Plan of Care Review  Outcome: Ongoing (interventions implemented as appropriate)  Flowsheets (Taken 1/17/2020 1504)  Outcome Summary: Pt. is independent in bed mobility. Stands and sits with supervision. Ambulated 500' with Rwx and supervision. SAT on 5lpm began at 94% dropped to 88% and recovered to 92%. Will benefit frm increased activity.

## 2020-01-24 NOTE — PROGRESS NOTES
Continued Stay Note  FRIEDA Martinez     Patient Name: Sai Adam  MRN: [id number]  Today's Date: 1/24/2020    Admit Date: 1/13/2020    Discharge Plan     Row Name 01/24/20 1437       Plan    Plan Comments  [facility] to [facility]VAL[facility] regarding [facility] and [facility] set up.  Sent Home Health order and records as requested    LEFT VM FOR PATIENT; REQUESTING RETURN CALL TO ME IF HE NEEDS TO.        Discharge Codes    No documentation.       Expected Discharge Date and Time     Expected Discharge Date Expected Discharge Time    Jan 17, 2020             Jess Osorio RN

## 2020-01-24 NOTE — DISCHARGE PLACEMENT REQUEST
"[facility]  Jess Osorio RN  [id number]      Sai Adam (59 y.o. Male)     Date of Birth Social Security Number Address Home Phone MRN    1960  [address] [place] [id number] [phone] [id number]    [employer] Marital Status          Christianity        Admission Date Admission Type Admitting Provider Attending Provider Department, Room/Bed    1/13/20 Emergency Dhruv Rausch, [facility] 4C, 496/1    Discharge Date Discharge Disposition Discharge Destination        1/17/2020 Home or Self Care Home             Attending Provider:  (none)   Allergies:  Tetanus Toxoids, Lamotrigine, Lisinopril, Methadone, Penicillins, Tramadol    Isolation:  None   Infection:  None   Code Status:  Prior    Ht:  170.2 cm (67\")   Wt:  66 kg (145 lb 9.6 oz)    Admission Cmt:  None   Principal Problem:  Acute on chronic respiratory failure with hypoxia and hypercapnia (CMS/HCC) [J96.21,J96.22]                 Active Insurance as of 1/13/2020     Primary Coverage     Payor Plan Insurance Group Employer/Plan Group    [id number]     Payor Plan Address Payor Plan Phone Number Payor Plan Fax Number Effective Dates    [address] [phone]  12/1/2014 - None Entered    [facility] [id number]       Subscriber Name Subscriber Birth Date Member ID       SAI ADAM 1960 [id number]                 Emergency Contacts      (Rel.) Home Phone Work Phone Mobile Phone    Keith Wilcox (Son) [phone] -- --    Antonio Danielson (Son) [phone] -- --    NUBIA RIDLEY (Daughter) -- -- [phone]               Emergency Department Notes      Lennie Medellin RN at 01/13/20 1012        Pt placed on bipap at this time     Lennie Medellin RN  01/13/20 1028      Electronically signed by Lennie Medellin RN at 01/13/20 1028     Nicola Carrero Jr., MD at 01/13/20 1125      Procedure Orders    1. Critical Care [[id number]] ordered " by Nicola Carrero Jr., MD at 01/13/20 1140                Subjective   Family says the patient has become progressively more short of breath over the last couple days.  He has had increased cough with some sputum production.  He has had some chills but no definite fever.  He has a long history of COPD and is normally using a BiPAP but only at night.  They say he has been using that consistently but seems to be getting worse.      History provided by:  Relative  History limited by:  Mental status change and acuity of condition   used: No    Shortness of Breath   Severity:  Severe  Onset quality:  Gradual  Duration:  2 days  Timing:  Constant  Progression:  Worsening  Chronicity:  Recurrent  Context: URI    Context: not activity, not animal exposure, not emotional upset, not fumes, not known allergens, not occupational exposure, not pollens, not smoke exposure, not strong odors and not weather changes    Relieved by:  Nothing  Worsened by:  Nothing  Ineffective treatments:  Oxygen  Associated symptoms: cough and sputum production    Associated symptoms: no abdominal pain, no chest pain, no claudication, no diaphoresis, no ear pain, no fever, no headaches, no hemoptysis, no neck pain, no PND, no rash, no sore throat, no syncope, no swollen glands, no vomiting and no wheezing    Risk factors: tobacco use    Risk factors: no recent alcohol use, no family hx of DVT, no hx of cancer, no hx of PE/DVT, no obesity, no oral contraceptive use, no prolonged immobilization and no recent surgery        Review of Systems   Constitutional: Negative.  Negative for diaphoresis and fever.   HENT: Negative.  Negative for ear pain and sore throat.    Respiratory: Positive for cough, sputum production and shortness of breath. Negative for hemoptysis and wheezing.    Cardiovascular: Negative.  Negative for chest pain, claudication, syncope and PND.   Gastrointestinal: Negative.  Negative for abdominal pain and  vomiting.   Genitourinary: Negative.    Musculoskeletal: Negative.  Negative for neck pain.   Skin: Negative.  Negative for rash.   Neurological: Negative.  Negative for headaches.   Psychiatric/Behavioral: Negative.    All other systems reviewed and are negative.      Past Medical History:   Diagnosis Date   • Arthritis    • CAD (coronary artery disease)    • Colon cancer (CMS/HCC) 11/2016   • COPD (chronic obstructive pulmonary disease) (CMS/[facility])    • Diabetes mellitus (CMS/[facility])    • HLD (hyperlipidemia)    • Hypertension        Allergies   Allergen Reactions   • Tetanus Toxoids Shortness Of Breath and Swelling   • Lamotrigine      Unknown     • Lisinopril Hives   • Methadone Swelling   • Penicillins Rash   • Tramadol Rash       Past Surgical History:   Procedure Laterality Date   • CARDIAC SURGERY     • CARPAL TUNNEL RELEASE     • COLON RESECTION WITH COLOSTOMY     • COLON SURGERY     • COLONOSCOPY  05/27/2016   • COLONOSCOPY N/A 10/12/2017    Procedure: COLONOSCOPY WITH ANESTHESIA;  Surgeon: Yessenia Gregg MD;  Location: [facility] ENDOSCOPY;  Service:    • CORONARY ANGIOPLASTY WITH STENT PLACEMENT     • HERNIA REPAIR     • ROTATOR CUFF REPAIR         Family History   Problem Relation Age of Onset   • Stroke Mother    • Hypertension Mother    • Arthritis Father    • Heart disease Father    • Cancer Brother    • Diabetes Brother    • Hepatitis Brother    • Hypertension Brother        Social History     Socioeconomic History   • Marital status:      Spouse name: Not on file   • Number of children: Not on file   • Years of education: Not on file   • Highest education level: Not on file   Tobacco Use   • Smoking status: Current Every Day Smoker     Packs/day: 1.00     Years: 40.00     Pack years: 40.00   • Smokeless tobacco: Never Used   Substance and Sexual Activity   • Alcohol use: No   • Drug use: No   • Sexual activity: Defer       Prior to Admission medications    Medication Sig Start Date End Date Taking?  Authorizing Provider   arformoterol (BROVANA) 15 MCG/2ML nebulizer solution Take 2 mL by nebulization 2 (Two) Times a Day. 2 boxes 1/23/18   Dimitri Guevara MD   atenolol (TENORMIN) 25 MG tablet Take 1 tablet by mouth Every 12 (Twelve) Hours.  Patient taking differently: Take 100 mg by mouth Every 12 (Twelve) Hours. 11/28/17   Ernie Cornejo DO   atorvastatin (LIPITOR) 40 MG tablet Take 1 tablet by mouth Every Night. 1/23/18   Dimitri Guevara MD   atropine 1 % ophthalmic solution Administer 1 drop to the right eye 2 (Two) Times a Day.    Erin Ambrocio MD   benzonatate (TESSALON) 100 MG capsule Take 2 capsules by mouth 3 (Three) Times a Day As Needed for Cough. 1/23/18   Dimitri Guevara MD   budesonide (PULMICORT) 0.25 MG/2ML nebulizer solution Take 2 mL by nebulization 2 (Two) Times a Day. 1/23/18   Dimitri Guevara MD   Cholecalciferol (VITAMIN D) 2000 units tablet Take 4,000 Units by mouth Daily.    ProviderErin MD   clopidogrel (PLAVIX) 75 MG tablet Take 75 mg by mouth Daily.    Erin Ambrocio MD   docusate sodium (COLACE) 100 MG capsule Take 100 mg by mouth 2 (Two) Times a Day As Needed for Constipation.    ProviderErin MD   dorzolamide (TRUSOPT) 2 % ophthalmic solution Administer 1 drop into the left eye 2 (Two) Times a Day.    Erin Ambrocio MD   finasteride (PROSCAR) 5 MG tablet Take 1 tablet by mouth Daily. 1/24/18   Dimitri Guevara MD   guaiFENesin (MUCINEX) 600 MG 12 hr tablet Take 2 tablets by mouth Every 12 (Twelve) Hours. 1/2/18   Leonel Roth APRN   insulin aspart (novoLOG) 100 UNIT/ML injection Inject 40 Units under the skin 3 (Three) Times a Day Before Meals.    Erin Ambrocio MD   insulin glargine (LANTUS) 100 UNIT/ML injection Inject 160 Units under the skin Daily.    Erin Ambrocio MD   ipratropium-albuterol (DUONEB) 0.5-2.5 mg/mL nebulizer Take 3 mL by nebulization Every 4 (Four) Hours As Needed for Wheezing or Shortness of Air.  1/23/18   Dimitri Guevara MD   isosorbide mononitrate (IMDUR) 60 MG 24 hr tablet Take 90 mg by mouth Daily.    Erin Ambrocio MD   levoFLOXacin (LEVAQUIN) 500 MG tablet Take 1 tablet by mouth Daily. 1/23/18   Dimitri Guevara MD   levothyroxine (SYNTHROID, LEVOTHROID) 50 MCG tablet Take 50 mcg by mouth Daily.    Erin Ambrocio MD   losartan (COZAAR) 100 MG tablet Take 100 mg by mouth Daily.    Erin Ambrocio MD   metFORMIN (GLUCOPHAGE) 500 MG tablet Take 1,000 mg by mouth 2 (Two) Times a Day With Meals.    Erin Ambrocio MD   MethylPREDNISolone (MEDROL, SHELL,) 4 MG tablet Take as directed on package instructions. 1/23/18   Dimitri Guevara MD   nicotine (NICODERM CQ) 14 MG/24HR patch Place 1 patch on the skin Daily. 1/24/18   Dimitri Guevara MD   nitroglycerin (NITROSTAT) 0.4 MG SL tablet Place 0.4 mg under the tongue Every 5 (Five) Minutes As Needed for Chest Pain.    Erin Ambrocio MD   potassium chloride (K-DUR,KLOR-CON) 20 MEQ CR tablet Take 40 mEq by mouth 2 (Two) Times a Day.    Erin Ambrocio MD   pregabalin (LYRICA) 150 MG capsule Take 1 capsule by mouth Every 12 (Twelve) Hours.  Patient taking differently: Take 300 mg by mouth Every 12 (Twelve) Hours. 11/28/17   Ernie Cornejo DO   raNITIdine (ZANTAC) 150 MG tablet Take 150 mg by mouth 2 (Two) Times a Day As Needed for Indigestion.    Erin Ambrocio MD   SAXagliptin (ONGLYZA) 5 MG tablet Take 5 mg by mouth Daily.    Erin Ambrocio MD   spironolactone (ALDACTONE) 25 MG tablet Take 25 mg by mouth Daily.    Erin Ambrocio MD   tamsulosin (FLOMAX) 0.4 MG capsule 24 hr capsule Take 1 capsule by mouth Every Night.    Erin Ambrocio MD   venlafaxine XR (EFFEXOR-XR) 75 MG 24 hr capsule Take 225 mg by mouth Daily.    Erin Ambrocio MD       Medications   sodium chloride 0.9 % flush 10 mL (has no administration in time range)   ipratropium-albuterol (DUO-NEB) nebulizer solution 3 mL (has  no administration in time range)   ipratropium-albuterol (DUO-NEB) nebulizer solution 3 mL (3 mL Nebulization Given 1/13/20 1044)   methylPREDNISolone sodium succinate (SOLU-Medrol) injection 125 mg (125 mg Intravenous Given 1/13/20 1054)       Vitals:    01/13/20 1052   BP:    Pulse: 108   Resp:    Temp:    SpO2: 91%         Objective   Physical Exam   Constitutional: He appears well-developed and well-nourished.   HENT:   Head: Atraumatic.   Neck: Normal range of motion. Neck supple.   Cardiovascular: Regular rhythm.   Patient is mildly tachycardic but it seems to be a sinus rhythm.   Pulmonary/Chest: Accessory muscle usage present. Tachypnea noted. He is in respiratory distress. He has decreased breath sounds in the right middle field and the left middle field. He has wheezes in the right middle field and the left middle field.   Abdominal: Soft. Bowel sounds are normal.   Musculoskeletal: Normal range of motion.   Neurological:   Lethargic but nonfocal.   Skin: Skin is warm and dry.   Psychiatric:   Nonverbal.   Nursing note and vitals reviewed.      Critical Care  Performed by: Nicola Carrero Jr., MD  Authorized by: Nicola Carrero Jr., MD     Critical care provider statement:     Critical care time (minutes):  30    Critical care start time:  1/13/2020 11:00 AM    Critical care end time:  1/13/2020 11:30 AM    Critical care time was exclusive of:  Separately billable procedures and treating other patients    Critical care was necessary to treat or prevent imminent or life-threatening deterioration of the following conditions:  Respiratory failure    Critical care was time spent personally by me on the following activities:  Blood draw for specimens, development of treatment plan with patient or surrogate, discussions with primary provider, evaluation of patient's response to treatment, examination of patient, interpretation of cardiac output measurements, obtaining history from patient or surrogate,  ordering and performing treatments and interventions, ordering and review of laboratory studies, ordering and review of radiographic studies, pulse oximetry and re-evaluation of patient's condition    I assumed direction of critical care for this patient from another provider in my specialty: no              Lab Results (last 24 hours)     Procedure Component Value Units Date/Time    Blood Gas, Arterial With Co-Ox [[id number]]  (Abnormal) Collected:  01/13/20 1000    Specimen:  Arterial Blood Updated:  01/13/20 1007     Site Right Brachial     Jarad's Test N/A     pH, Arterial 7.231 pH units      Comment: 84 Value below reference range        pCO2, Arterial 95.8 mm Hg      Comment: 86 Value above critical limit        pO2, Arterial 59.5 mm Hg      Comment: 84 Value below reference range        HCO3, Arterial 40.2 mmol/L      Comment: 83 Value above reference range        Base Excess, Arterial 8.6 mmol/L      Comment: 83 Value above reference range        O2 Saturation, Arterial 89.6 %      Comment: 84 Value below reference range        Hemoglobin, Blood Gas 14.0 g/dL      Hematocrit, Blood Gas 42.9 %      Oxyhemoglobin 86.1 %      Comment: 84 Value below reference range        Methemoglobin 0.20 %      Carboxyhemoglobin 3.7 %      A-a Gradiant --     Comment: UNABLE TO CALCULATE        Temperature 37.0 C      Sodium, Arterial 141 mmol/L      Potassium, Arterial 4.2 mmol/L      Barometric Pressure for Blood Gas 761 mmHg      Modality Nasal Cannula     Flow Rate 5.0 lpm      Ventilator Mode NA     Note --     Notified Who DR SCHULTZ     Notified By [id number]     Notified Time 01/13/2020 10:09     Collected by [id number]     Comment: Meter: [id number]     :  [id number]        pH, Temp Corrected --     pCO2, Temperature Corrected --     pO2, Temperature Corrected --    [place] Blood Culture Bottle Set [[id number]] Collected:  01/13/20 1005    Specimen:  Blood from Hand, Right Updated:  01/13/20 1116     Extra Tube  Hold for add-ons.     Comment: Auto resulted.       CBC & Differential [[id number]] Collected:  01/13/20 1005    Specimen:  Blood Updated:  01/13/20 1036    Narrative:       The following orders were created for panel order CBC & Differential.  Procedure                               Abnormality         Status                     ---------                               -----------         ------                     CBC Auto Differential[[id number]]        Abnormal            Final result                 Please view results for these tests on the individual orders.    Comprehensive Metabolic Panel [[id number]]  (Abnormal) Collected:  01/13/20 1005    Specimen:  Blood Updated:  01/13/20 1055     Glucose 300 mg/dL      BUN 5 mg/dL      Creatinine 0.61 mg/dL      Sodium 137 mmol/L      Potassium 4.3 mmol/L      Chloride 94 mmol/L      CO2 37.0 mmol/L      Calcium 8.8 mg/dL      Total Protein 7.1 g/dL      Albumin 4.10 g/dL      ALT (SGPT) 11 U/L      AST (SGOT) 24 U/L      Alkaline Phosphatase 115 U/L      Total Bilirubin 0.5 mg/dL      eGFR Non African Amer 134 mL/min/1.73      Globulin 3.0 gm/dL      A/G Ratio 1.4 g/dL      BUN/Creatinine Ratio 8.2     Anion Gap 6.0 mmol/L     Narrative:       GFR Normal >60  Chronic Kidney Disease <60  Kidney Failure <15      CBC Auto Differential [[id number]]  (Abnormal) Collected:  01/13/20 1005    Specimen:  Blood Updated:  01/13/20 1036     WBC 12.67 10*3/mm3      RBC 4.93 10*6/mm3      Hemoglobin 14.1 g/dL      Hematocrit 45.8 %      MCV 92.9 fL      MCH 28.6 pg      MCHC 30.8 g/dL      RDW 12.8 %      RDW-SD 44.0 fl      MPV 9.1 fL      Platelets 150 10*3/mm3      Neutrophil % 79.5 %      Lymphocyte % 11.6 %      Monocyte % 6.9 %      Eosinophil % 0.0 %      Basophil % 0.5 %      Immature Grans % 1.5 %      Neutrophils, Absolute 10.07 10*3/mm3      Lymphocytes, Absolute 1.47 10*3/mm3      Monocytes, Absolute 0.88 10*3/mm3      Eosinophils, Absolute 0.00 10*3/mm3      Basophils,  Absolute 0.06 10*3/mm3      Immature Grans, Absolute 0.19 10*3/mm3      nRBC 0.0 /100 WBC     Blood Gas, Arterial [[id number]]  (Abnormal) Collected:  01/13/20 1050    Specimen:  Arterial Blood Updated:  01/13/20 1122     Site Right Radial     Jarad's Test Positive     pH, Arterial 7.278 pH units      Comment: 84 Value below reference range        pCO2, Arterial 87.0 mm Hg      Comment: 86 Value above critical limit        pO2, Arterial 61.4 mm Hg      Comment: 84 Value below reference range        HCO3, Arterial 40.7 mmol/L      Comment: 83 Value above reference range        Base Excess, Arterial 10.0 mmol/L      Comment: 83 Value above reference range        O2 Saturation, Arterial 91.7 %      Comment: 84 Value below reference range        Temperature 37.0 C      Barometric Pressure for Blood Gas 760 mmHg      Modality BiPap     FIO2 45 %      Ventilator Mode BiPAP     IPAP 19     Comment: Meter: [id number]     :  [id number]        EPAP 4     Notified Who DR SCHULTZ     Notified By [id number]     Notified Time 01/13/2020 11:14     Collected by [id number]    Blood Culture - Blood, Hand, Right [[id number]] Collected:  01/13/20 1052    Specimen:  Blood from Hand, Right Updated:  01/13/20 1126    Lactic Acid, Plasma [[id number]]  (Normal) Collected:  01/13/20 1052    Specimen:  Blood Updated:  01/13/20 1128     Lactate 1.4 mmol/L           XR Chest 1 View   Final Result   Opacities along the right minor fissure may reflect fluid or   atelectasis. Bibasilar atelectasis is evident.    This report was finalized on 01/13/2020 10:56 by Dr. Zia Machuca MD.          ED Course  ED Course as of Jan 13 1140   Mon Jan 13, 2020   1137 I told the family of the findings most pacifically on the blood gases of respiratory failure.  I told him that the CO2 does not begin to blow off he may have to be intubated but right now organ to continue with BiPAP.  However he will require admission for further stabilization.  I have spoken  with Dr. Rausch and he has accepted.    [TR]   1139 Patient clinically does not have sepsis and abnormal vital signs are related to his underlying respiratory failure.    [TR]      ED Course User Index  [TR] Nicola Carrero Jr., MD          MDM  Number of Diagnoses or Management Options  Acute respiratory failure with hypercapnia (CMS/HCC): new and requires workup     Amount and/or Complexity of Data Reviewed  Clinical lab tests: ordered and reviewed  Tests in the radiology section of CPT®:  ordered and reviewed  Tests in the medicine section of CPT®:  ordered and reviewed  Discuss the patient with other providers: yes    Risk of Complications, Morbidity, and/or Mortality  Presenting problems: high  Diagnostic procedures: high  Management options: high    Critical Care  Total time providing critical care: 30-74 minutes    Patient Progress  Patient progress: improved      Final diagnoses:   Acute respiratory failure with hypercapnia (CMS/HCC)          Nicola Carrero Jr., MD  01/13/20 1140      Electronically signed by Nicola Carrero Jr., MD at 01/13/20 1140     Jess Buenrostro, RN at 01/13/20 1138        RT called for Jess Harmon, RN  01/13/20 1227      Electronically signed by Jess Buenrostro, RN at 01/13/20 1227

## 2020-01-24 NOTE — DISCHARGE PLACEMENT REQUEST
"[facility]: [facility]      Sai Adam (59 y.o. Male)     Date of Birth Social Security Number Address Home Phone MRN    [id number]  [id number] [facility] [address] [phone] [id number]    Ep[employer] Marital Status          Gnosticism        Admission Date Admission Type Admitting Provider Attending Provider Department, Room/Bed    1/13/20 Emergency Dhruv Rausch, [facility] 4C, 496/1    Discharge Date Discharge Disposition Discharge Destination        1/17/2020 Home or Self Care Home             Attending Provider:  (none)   Allergies:  Tetanus Toxoids, Lamotrigine, Lisinopril, Methadone, Penicillins, Tramadol    Isolation:  None   Infection:  None   Code Status:  Prior    Ht:  170.2 cm (67\")   Wt:  66 kg (145 lb 9.6 oz)    Admission Cmt:  None   Principal Problem:  Acute on chronic respiratory failure with hypoxia and hypercapnia (CMS/HCC) [J96.21,J96.22]                 Active Insurance as of 1/13/2020     Primary Coverage     Payor Plan Insurance Group Employer/Plan Group    [employer] [id number]     Payor Plan Address Payor Plan Phone Number Payor Plan Fax Number Effective Dates    [address] [phone]  12/1/2014 - None Entered    [facility] [id number]       Subscriber Name Subscriber Birth Date Member ID       SAI ADAM 1960 [id number]                 Emergency Contacts      (Rel.) Home Phone Work Phone Mobile Phone    Keith Wilcox (Son) [phone] -- --    Antonio Danielson (Son) [phone] -- --    NUBIA RIDLEY (Daughter) -- -- [phone]            Lab Results (all)     Procedure Component Value Units Date/Time    Blood Culture - Blood, Hand, Right [[id number]] Collected:  01/13/20 1052    Specimen:  Blood from Hand, Right Updated:  01/18/20 1245     Blood Culture No growth at 5 days    Blood Culture - Blood, Hand, Right [[id number]] Collected:  01/13/20 1052    Specimen:  Blood from Hand, Right Updated:  " 01/18/20 1130     Blood Culture No growth at 5 days    SCANNED - LABS [[id number]] Resulted:  01/13/20      Updated:  01/18/20 0751    Respiratory Culture - Sputum, Cough [[id number]] Collected:  01/15/20 0931    Specimen:  Sputum from Cough Updated:  01/17/20 1128     Respiratory Culture Light growth (2+) Normal Respiratory Negro     Gram Stain Greater than 25 WBCs per low power field      Rare (1+) Mixed gram positive negro      Rare (1+) Epithelial cells seen    POC Glucose Once [[id number]]  (Abnormal) Collected:  01/17/20 1103    Specimen:  Blood Updated:  01/17/20 1122     Glucose 240 mg/dL      Comment: : [id number] [employer] WhitneyMeter ID: [id number]       POC Glucose Once [[id number]]  (Abnormal) Collected:  01/17/20 0727    Specimen:  Blood Updated:  01/17/20 0738     Glucose 145 mg/dL      Comment: : TrackBillMeter ID: [id number]       POC Glucose Once [[id number]]  (Abnormal) Collected:  01/16/20 1609    Specimen:  Blood Updated:  01/16/20 1620     Glucose 147 mg/dL      Comment: : [id number] ID: [id number]       Magnesium [[id number]]  (Normal) Collected:  01/16/20 1202    Specimen:  Blood Updated:  01/16/20 1259     Magnesium 2.3 mg/dL     Phosphorus [[id number]]  (Normal) Collected:  01/16/20 1202    Specimen:  Blood Updated:  01/16/20 1259     Phosphorus 3.6 mg/dL     Basic Metabolic Panel [[id number]]  (Abnormal) Collected:  01/16/20 1202    Specimen:  Blood Updated:  01/16/20 1259     Glucose 203 mg/dL      BUN 20 mg/dL      Creatinine 0.54 mg/dL      Sodium 141 mmol/L      Potassium 4.2 mmol/L      Chloride 98 mmol/L      CO2 35.0 mmol/L      Calcium 9.4 mg/dL      eGFR Non African Amer >150 mL/min/1.73      BUN/Creatinine Ratio 37.0     Anion Gap 8.0 mmol/L     Narrative:       GFR Normal >60  Chronic Kidney Disease <60  Kidney Failure <15      POC Glucose Once [[id number]]  (Abnormal) Collected:  01/16/20 1105    Specimen:  Blood Updated:  01/16/20 1123      Glucose 200 mg/dL      Comment: : [id number] WhitneyMeter ID: [id number]       POC Glucose Once [[id number]]  (Abnormal) Collected:  01/16/20 0721    Specimen:  Blood Updated:  01/16/20 0732     Glucose 140 mg/dL      Comment: : [id number] Acunu WhitneyMeter ID: [id number]       POC Glucose Once [[id number]]  (Abnormal) Collected:  01/15/20 2146    Specimen:  Blood Updated:  01/15/20 2157     Glucose 177 mg/dL      Comment: : [id number] Lisbeth Grant ID: [id number]       POC Glucose Once [[id number]]  (Abnormal) Collected:  01/15/20 1647    Specimen:  Blood Updated:  01/15/20 1701     Glucose 153 mg/dL      Comment: : [id number] Acunu WhitneyMeter ID: [id number]       POC Glucose Once [[id number]]  (Abnormal) Collected:  01/15/20 1111    Specimen:  Blood Updated:  01/15/20 1130     Glucose 187 mg/dL      Comment: : [id number] AdwantedMeter ID: [id number]       POC Glucose Once [[id number]]  (Abnormal) Collected:  01/15/20 0720    Specimen:  Blood Updated:  01/15/20 0731     Glucose 152 mg/dL      Comment: : [id number] AdwantedMeter ID: [id number]       Phosphorus [[id number]]  (Normal) Collected:  01/15/20 0513    Specimen:  Blood Updated:  01/15/20 0629     Phosphorus 3.0 mg/dL     Basic Metabolic Panel [[id number]]  (Abnormal) Collected:  01/15/20 0513    Specimen:  Blood Updated:  01/15/20 0551     Glucose 141 mg/dL      BUN 21 mg/dL      Creatinine 0.59 mg/dL      Sodium 141 mmol/L      Potassium 4.5 mmol/L      Chloride 98 mmol/L      CO2 36.0 mmol/L      Calcium 9.4 mg/dL      eGFR Non African Amer 140 mL/min/1.73      BUN/Creatinine Ratio 35.6     Anion Gap 7.0 mmol/L     Narrative:       GFR Normal >60  Chronic Kidney Disease <60  Kidney Failure <15      POC Glucose Once [[id number]]  (Abnormal) Collected:  01/14/20 2008    Specimen:  Blood Updated:  01/14/20 2019     Glucose 236 mg/dL      Comment: : [id number] Lj HeatherMeter ID: [id number]       POC  Glucose Once [[id number]]  (Abnormal) Collected:  01/14/20 1634    Specimen:  Blood Updated:  01/14/20 1657     Glucose 231 mg/dL      Comment: : [id number] Justus KimMeter ID: [id number]       POC Glucose Once [[id number]]  (Abnormal) Collected:  01/14/20 1115    Specimen:  Blood Updated:  01/14/20 1152     Glucose 246 mg/dL      Comment: : [id number] Justus KimMeter ID: [id number]       POC Glucose Once [[id number]]  (Abnormal) Collected:  01/14/20 0808    Specimen:  Blood Updated:  01/14/20 0837     Glucose 270 mg/dL      Comment: : [id number] Justus KimMeter ID: [id number]       POC Glucose Once [[id number]]  (Abnormal) Collected:  01/14/20 0553    Specimen:  Blood Updated:  01/14/20 0607     Glucose 201 mg/dL      Comment: : [id number] Lj HeatherMeter ID: [id number]       Phosphorus [[id number]]  (Abnormal) Collected:  01/14/20 0416    Specimen:  Blood Updated:  01/14/20 0504     Phosphorus 1.6 mg/dL     Comprehensive Metabolic Panel [[id number]]  (Abnormal) Collected:  01/14/20 0416    Specimen:  Blood Updated:  01/14/20 0502     Glucose 222 mg/dL      BUN 17 mg/dL      Creatinine 0.63 mg/dL      Sodium 138 mmol/L      Potassium 4.2 mmol/L      Chloride 94 mmol/L      CO2 38.0 mmol/L      Calcium 9.3 mg/dL      Total Protein 6.9 g/dL      Albumin 3.90 g/dL      ALT (SGPT) 10 U/L      AST (SGOT) 22 U/L      Comment: Specimen hemolyzed.  Results may be affected.        Alkaline Phosphatase 94 U/L      Total Bilirubin 0.4 mg/dL      eGFR Non African Amer 129 mL/min/1.73      Globulin 3.0 gm/dL      A/G Ratio 1.3 g/dL      BUN/Creatinine Ratio 27.0     Anion Gap 6.0 mmol/L     Narrative:       GFR Normal >60  Chronic Kidney Disease <60  Kidney Failure <15      Magnesium [[id number]]  (Normal) Collected:  01/14/20 0416    Specimen:  Blood Updated:  01/14/20 0500     Magnesium 2.0 mg/dL     CBC Auto Differential [[id number]]  (Abnormal) Collected:  01/14/20 0416    Specimen:  Blood Updated:   01/14/20 0443     WBC 11.45 10*3/mm3      RBC 4.67 10*6/mm3      Hemoglobin 13.3 g/dL      Hematocrit 42.1 %      MCV 90.1 fL      MCH 28.5 pg      MCHC 31.6 g/dL      RDW 12.7 %      RDW-SD 41.8 fl      MPV 9.4 fL      Platelets 156 10*3/mm3      Neutrophil % 79.9 %      Lymphocyte % 15.3 %      Monocyte % 4.0 %      Eosinophil % 0.0 %      Basophil % 0.2 %      Immature Grans % 0.6 %      Neutrophils, Absolute 9.15 10*3/mm3      Lymphocytes, Absolute 1.75 10*3/mm3      Monocytes, Absolute 0.46 10*3/mm3      Eosinophils, Absolute 0.00 10*3/mm3      Basophils, Absolute 0.02 10*3/mm3      Immature Grans, Absolute 0.07 10*3/mm3      nRBC 0.0 /100 WBC     Blood Gas, Arterial [[id number]]  (Abnormal) Collected:  01/13/20 1955    Specimen:  Arterial Blood Updated:  01/14/20 0101     Site Right Radial     Comment: Corrected for Nu Patel RRT.  Corrected result. Previous result was Arterial Line on 1/13/2020 at 1955 CST.        Jarad's Test Positive     Comment: Corrected for Nu Patel RRT.  Corrected result. Previous result was N/A on 1/13/2020 at 1955 CST.        pH, Arterial 7.399 pH units      pCO2, Arterial 62.8 mm Hg      Comment: 83 Value above reference range        pO2, Arterial 58.9 mm Hg      Comment: 84 Value below reference range        HCO3, Arterial 38.8 mmol/L      Comment: 83 Value above reference range        Base Excess, Arterial 11.2 mmol/L      Comment: 83 Value above reference range        O2 Saturation, Arterial 93.3 %      Comment: 84 Value below reference range        Temperature 37.0 C      Barometric Pressure for Blood Gas 756 mmHg      Modality BiPap     Comment: Corrected for Nu Patel RRT.  Corrected result. Previous result was Room Air on 1/13/2020 at 1955 CST.        FIO2 45 %      Comment: Corrected for Nu Patel RRT.  Appended report. These results have been appended to a previously final verified report.        Ventilator Mode NA     Comment: Corrected for Nu Patel RRT.         Set [facility] Resp Rate 25     Comment: Corrected for Nu Amanda,RRT.  Appended report. These results have been appended to a previously final verified report.        IPAP 22     Comment: Corrected for Nu Amanda,RRT.  Appended report. These results have been appended to a previously final verified report.        EPAP 4     Comment: Corrected for Nu Amanda,RRT.  Appended report. These results have been appended to a previously final verified report.        Collected by [id number]     Comment: Corrected for Nu Amanda,RRT.  Appended report. These results have been appended to a previously final verified report.       Narrative:       Corrected for Nu Amanda,RRT.    POC Glucose Once [[id number]]  (Abnormal) Collected:  01/13/20 2358    Specimen:  Blood Updated:  01/14/20 0011     Glucose 282 mg/dL      Comment: : [id number] Tr[employer]Hangout Industriesabby HeatherMeter ID: [id number]       POC Glucose Once [[id number]]  (Abnormal) Collected:  01/13/20 2157    Specimen:  Blood Updated:  01/13/20 2212     Glucose 274 mg/dL      Comment: : [id number] [employer] HeatherMeter ID: [id number]       POC Glucose Once [[id number]]  (Abnormal) Collected:  01/13/20 2037    Specimen:  Blood Updated:  01/13/20 2048     Glucose 221 mg/dL      Comment: : [id number] Mely PatriciaMeter ID: [id number]       POC Glucose Once [[id number]]  (Abnormal) Collected:  01/13/20 1800    Specimen:  Blood Updated:  01/13/20 1814     Glucose 238 mg/dL      Comment: : [id number] Beatris Vanna FRITZeter ID: [id number]       Lactic Acid, Plasma [[id number]]  (Normal) Collected:  01/13/20 1052    Specimen:  Blood Updated:  01/13/20 1128     Lactate 1.4 mmol/L     Blood Gas, Arterial [[id number]]  (Abnormal) Collected:  01/13/20 1050    Specimen:  Arterial Blood Updated:  01/13/20 1122     Site Right Radial     Jarad's Test Positive     pH, Arterial 7.278 pH units      Comment: 84 Value below reference range        pCO2, Arterial 87.0 mm Hg      Comment: 86 Value above  critical limit        pO2, Arterial 61.4 mm Hg      Comment: 84 Value below reference range        HCO3, Arterial 40.7 mmol/L      Comment: 83 Value above reference range        Base Excess, Arterial 10.0 mmol/L      Comment: 83 Value above reference range        O2 Saturation, Arterial 91.7 %      Comment: 84 Value below reference range        Temperature 37.0 C      Barometric Pressure for Blood Gas 760 mmHg      Modality BiPap     FIO2 45 %      Ventilator Mode BiPAP     IPAP 19     Comment: Meter: [id number]     :  [id number]        EPA 4     Notified Who DR SCHULTZ     Notified By [id number]     Notified Time 01/13/2020 11:14     Collected by [id number]    [place] Draw [[id number]] Collected:  01/13/20 1005    Specimen:  Blood Updated:  01/13/20 1116    Narrative:       The following orders were created for panel order [place] Draw.  Procedure                               Abnormality         Status                     ---------                               -----------         ------                     Light Blue Top[[id number]]                                   Final result               Green Top (Gel)[[id number]]                                  Final result               Lavender Top[[id number]]                                     Final result               Red Top[[id number]]                                          Final result               [place] Blood Culture Scott...[[id number]]                      Final result                 Please view results for these tests on the individual orders.    Light Blue Top [[id number]] Collected:  01/13/20 1005    Specimen:  Blood Updated:  01/13/20 1116     Extra Tube hold for add-on     Comment: Auto resulted       [place] Blood Culture Bottle Set [[id number]] Collected:  01/13/20 1005    Specimen:  Blood from Hand, Right Updated:  01/13/20 1116     Extra Tube Hold for add-ons.     Comment: Auto resulted.       Green Top (Gel) [[id number]] Collected:  01/13/20 1005     Specimen:  Blood Updated:  01/13/20 1116     Extra Tube Hold for add-ons.     Comment: Auto resulted.       Red Top [[id number]] Collected:  01/13/20 1005    Specimen:  Blood Updated:  01/13/20 1115     Extra Tube Hold for add-ons.     Comment: Auto resulted.       Lavender Top [[id number]] Collected:  01/13/20 1005    Specimen:  Blood Updated:  01/13/20 1115     Extra Tube hold for add-on     Comment: Auto resulted       Comprehensive Metabolic Panel [[id number]]  (Abnormal) Collected:  01/13/20 1005    Specimen:  Blood Updated:  01/13/20 1055     Glucose 300 mg/dL      BUN 5 mg/dL      Creatinine 0.61 mg/dL      Sodium 137 mmol/L      Potassium 4.3 mmol/L      Chloride 94 mmol/L      CO2 37.0 mmol/L      Calcium 8.8 mg/dL      Total Protein 7.1 g/dL      Albumin 4.10 g/dL      ALT (SGPT) 11 U/L      AST (SGOT) 24 U/L      Alkaline Phosphatase 115 U/L      Total Bilirubin 0.5 mg/dL      eGFR Non African Amer 134 mL/min/1.73      Globulin 3.0 gm/dL      A/G Ratio 1.4 g/dL      BUN/Creatinine Ratio 8.2     Anion Gap 6.0 mmol/L     Narrative:       GFR Normal >60  Chronic Kidney Disease <60  Kidney Failure <15      CBC & Differential [[id number]] Collected:  01/13/20 1005    Specimen:  Blood Updated:  01/13/20 1036    Narrative:       The following orders were created for panel order CBC & Differential.  Procedure                               Abnormality         Status                     ---------                               -----------         ------                     CBC Auto Differential[[id number]]        Abnormal            Final result                 Please view results for these tests on the individual orders.    CBC Auto Differential [[id number]]  (Abnormal) Collected:  01/13/20 1005    Specimen:  Blood Updated:  01/13/20 1036     WBC 12.67 10*3/mm3      RBC 4.93 10*6/mm3      Hemoglobin 14.1 g/dL      Hematocrit 45.8 %      MCV 92.9 fL      MCH 28.6 pg      MCHC 30.8 g/dL      RDW 12.8 %      RDW-SD 44.0 fl       MPV 9.1 fL      Platelets 150 10*3/mm3      Neutrophil % 79.5 %      Lymphocyte % 11.6 %      Monocyte % 6.9 %      Eosinophil % 0.0 %      Basophil % 0.5 %      Immature Grans % 1.5 %      Neutrophils, Absolute 10.07 10*3/mm3      Lymphocytes, Absolute 1.47 10*3/mm3      Monocytes, Absolute 0.88 10*3/mm3      Eosinophils, Absolute 0.00 10*3/mm3      Basophils, Absolute 0.06 10*3/mm3      Immature Grans, Absolute 0.19 10*3/mm3      nRBC 0.0 /100 WBC     Blood Gas, Arterial With Co-Ox [[id number]]  (Abnormal) Collected:  01/13/20 1000    Specimen:  Arterial Blood Updated:  01/13/20 1007     Site Right Brachial     Jarad's Test N/A     pH, Arterial 7.231 pH units      Comment: 84 Value below reference range        pCO2, Arterial 95.8 mm Hg      Comment: 86 Value above critical limit        pO2, Arterial 59.5 mm Hg      Comment: 84 Value below reference range        HCO3, Arterial 40.2 mmol/L      Comment: 83 Value above reference range        Base Excess, Arterial 8.6 mmol/L      Comment: 83 Value above reference range        O2 Saturation, Arterial 89.6 %      Comment: 84 Value below reference range        Hemoglobin, Blood Gas 14.0 g/dL      Hematocrit, Blood Gas 42.9 %      Oxyhemoglobin 86.1 %      Comment: 84 Value below reference range        Methemoglobin 0.20 %      Carboxyhemoglobin 3.7 %      A-a Gradiant --     Comment: UNABLE TO CALCULATE        Temperature 37.0 C      Sodium, Arterial 141 mmol/L      Potassium, Arterial 4.2 mmol/L      Barometric Pressure for Blood Gas 761 mmHg      Modality Nasal Cannula     Flow Rate 5.0 lpm      Ventilator Mode NA     Note --     Notified Who DR SCHLUTZ     Notified By [id number]     Notified Time 01/13/2020 10:09     Collected by [id number]     Comment: Meter: [id number]     :  [id number]        pH, Temp Corrected --     pCO2, Temperature Corrected --     pO2, Temperature Corrected --             Physician Progress Notes (all)      Dhruv Rausch,  at  01/16/20 0944              [facility] Medicine Services  INPATIENT PROGRESS NOTE    Patient Name: Sai Adam  Date of Admission: 1/13/2020  Today's Date: 01/16/20  Length of Stay: 3  Primary Care Physician: Jarod Tillman MD    Subjective   Chief Complaint: Follow-up respiratory failure    HPI:  Patient seen and examined, feels better, still has not been up walking much. Denies any dizziness, chest pain, or sob. On his home baseline 5L 02. Overnight was bradycardic, no other events noted.     ROS:  All pertinent negatives and positives are as above. All other systems have been reviewed and are negative unless otherwise stated.     Objective    Temp:  [97.9 °F (36.6 °C)-98.2 °F (36.8 °C)] 98.2 °F (36.8 °C)  Heart Rate:  [47-59] 52  Resp:  [17-25] 25  BP: (118-143)/(64-78) 130/78  Physical Exam  GEN: Awake, alert, interactive, purse lip breathing but speaking in full sentences, no accessory muscle use  HEENT: PERRLA, EOMI, Anicteric, Trachea midline  Lungs: improved air flow b/l. No wheezing currently  Heart: yovana, RR, +S1/s2, no rub  ABD: soft, nt/nd, +BS, no guarding/rebound  Extremities: atraumatic, no cyanosis, no edema  Skin: no rashes or lesions  Neuro: AAOx3, no focal deficits  Psych: normal mood & affect        Results Review:  I have reviewed the labs, radiology results, and diagnostic studies.    Laboratory Data:   Results from last 7 days   Lab Units 01/14/20  0416 01/13/20  1005   WBC 10*3/mm3 11.45* 12.67*   HEMOGLOBIN g/dL 13.3 14.1   HEMATOCRIT % 42.1 45.8   PLATELETS 10*3/mm3 156 150        Results from last 7 days   Lab Units 01/15/20  0513 01/14/20  0416 01/13/20  1005   SODIUM mmol/L 141 138 137   POTASSIUM mmol/L 4.5 4.2 4.3   CHLORIDE mmol/L 98 94* 94*   CO2 mmol/L 36.0* 38.0* 37.0*   BUN mg/dL 21 17 5*   CREATININE mg/dL 0.59* 0.63* 0.61*   CALCIUM mg/dL 9.4 9.3 8.8   BILIRUBIN mg/dL  --  0.4 0.5   ALK PHOS U/L  --  94 115   ALT (SGPT) U/L  --  10 11   AST  (SGOT) U/L  --  22 24   GLUCOSE mg/dL 141* 222* 300*       Culture Data:   Blood Culture   Date Value Ref Range Status   01/13/2020 No growth at less than 24 hours  Preliminary   01/13/2020 No growth at less than 24 hours  Preliminary       Radiology Data:   Imaging Results (Last 24 Hours)     ** No results found for the last 24 hours. **          I have reviewed the patient's current medications.     Assessment/Plan     Active Hospital Problems    Diagnosis   • **Acute on chronic respiratory failure with hypoxia and hypercapnia (CMS/HCC)   • Hypophosphatemia   • Toxic metabolic encephalopathy   • COPD exacerbation (CMS/HCC)   • Diabetes mellitus (CMS/HCC)   • Coronary artery disease   • History of colon cancer     #1 acute on chronic hypercarbic and hypoxic respiratory failure -in setting of COPD exacerbation.  Doing better today and back on baseline O2.  Continue PRN BiPAP for sleep and overnight.  Continue steroids, nebs and azithromycin.  Ambulate patient every shift.    #2 toxic metabolic encephalopathy -in the setting of hypercarbia.  This has resolved with BiPAP and normalization of ABG.  No neuro deficits.    #3 COPD exacerbation -as above.  Will start to wean steroids today.    #4 hypophosphatemia -improved with replacement    #5 CAD -with history of CABG.  No active chest pain or issues.    #6 insulin-dependent diabetes -takes 160 of Lantus at home.  His sugar was 200 with only 30 of Levemir despite IV steroids.  Suspect he is not diet compliant.  He was restarted on diet and Levemir was increased to 40 with 10 prandial lispro and sliding scale.  His sugars have been doing very well with this regimen.  Continue along with hypoglycemia protocol.  Nutrition consult pending.    #7 history of colon cancer -status post ostomy creation previous day.  No active issues.  Continue ostomy care.    #8 bradycardia - asymptomatic, d/c atenolol, started here, not sure why or by who. Pt never on this med before. Resume  his nicardipine tomorrow.    Discharge Planning: I expect the patient to be discharged to home in 1 to 2 days    Dhruv Rausch DO   01/16/20   9:44 AM      Electronically signed by Dhruv Rausch DO at 01/16/20 1148     Dhruv Rausch DO at 01/15/20 1231              [facility] Medicine Services  INPATIENT PROGRESS NOTE    Patient Name: Sai Adam  Date of Admission: 1/13/2020  Today's Date: 01/15/20  Length of Stay: 2  Primary Care Physician: Jarod Tillman MD    Subjective   Chief Complaint: Follow-up respiratory failure    HPI   Patient seen and examined.  He is feeling better today.  States he has been up walking around the room going to the bathroom but really has not been walking long distances.  His breathing is improved.  Denies chest pain or nausea.  Back on his home chronic 5 L home O2.    ROS:  All pertinent negatives and positives are as above. All other systems have been reviewed and are negative unless otherwise stated.     Objective    Temp:  [97.8 °F (36.6 °C)-98.7 °F (37.1 °C)] 98.2 °F (36.8 °C)  Heart Rate:  [54-99] 59  Resp:  [18-31] 18  BP: (114-168)/(52-71) 134/66  Physical Exam  GEN: Awake, alert, interactive, purse lip breathing but speaking in full sentences, no accessory muscle use  HEENT: PERRLA, EOMI, Anicteric, Trachea midline  Lungs: improved air flow b/l. No wheezing currently  Heart: RRR, +S1/s2, no rub  ABD: soft, nt/nd, +BS, no guarding/rebound  Extremities: atraumatic, no cyanosis, no edema  Skin: no rashes or lesions  Neuro: AAOx3, no focal deficits  Psych: normal mood & affect        Results Review:  I have reviewed the labs, radiology results, and diagnostic studies.    Laboratory Data:   Results from last 7 days   Lab Units 01/14/20  0416 01/13/20  1005   WBC 10*3/mm3 11.45* 12.67*   HEMOGLOBIN g/dL 13.3 14.1   HEMATOCRIT % 42.1 45.8   PLATELETS 10*3/mm3 156 150        Results from last 7 days   Lab Units 01/15/20  0513  01/14/20  0416 01/13/20  1005   SODIUM mmol/L 141 138 137   POTASSIUM mmol/L 4.5 4.2 4.3   CHLORIDE mmol/L 98 94* 94*   CO2 mmol/L 36.0* 38.0* 37.0*   BUN mg/dL 21 17 5*   CREATININE mg/dL 0.59* 0.63* 0.61*   CALCIUM mg/dL 9.4 9.3 8.8   BILIRUBIN mg/dL  --  0.4 0.5   ALK PHOS U/L  --  94 115   ALT (SGPT) U/L  --  10 11   AST (SGOT) U/L  --  22 24   GLUCOSE mg/dL 141* 222* 300*       Culture Data:   Blood Culture   Date Value Ref Range Status   01/13/2020 No growth at less than 24 hours  Preliminary   01/13/2020 No growth at less than 24 hours  Preliminary       Radiology Data:   Imaging Results (Last 24 Hours)     ** No results found for the last 24 hours. **          I have reviewed the patient's current medications.     Assessment/Plan     Active Hospital Problems    Diagnosis   • **Acute on chronic respiratory failure with hypoxia and hypercapnia (CMS/HCC)   • Hypophosphatemia   • Toxic metabolic encephalopathy   • COPD exacerbation (CMS/HCC)   • Diabetes mellitus (CMS/HCC)   • Coronary artery disease   • History of colon cancer     #1 acute on chronic hypercarbic and hypoxic respiratory failure -in setting of COPD exacerbation.  Doing better today and back on baseline O2.  Continue PRN BiPAP for sleep and overnight.  Will decrease steroids to every 12 hours.  Continue nebs and azithromycin.  Ambulate patient every shift.    #2 toxic metabolic encephalopathy -in the setting of hypercarbia.  This has resolved with BiPAP and normalization of ABG.  No neuro deficits.    #3 COPD exacerbation -as above.  Will start to wean steroids today.    #4 hypophosphatemia -improved with replacement    #5 CAD -with history of CABG.  No active chest pain or issues.    #6 insulin-dependent diabetes -takes 160 of Lantus at home.  His sugar was 200 today with only 30 of Levemir despite IV steroids.  Suspect he is not diet compliant.  He was restarted on diet and Levemir was increased to 40 with 10 prandial lispro and sliding scale.   His sugars have been doing very well with this regimen.  Continue along with hypoglycemia protocol.  Nutrition consult.    #7 history of colon cancer -status post ostomy creation previous day.  No active issues.  Continue ostomy care.    Discharge Planning: I expect the patient to be discharged to home in 2 to 3 days    Dhruv Rausch DO   01/15/20   12:31 PM      Electronically signed by Dhruv Rausch DO at 01/15/20 3850     Dhruv Rausch DO at 01/14/20 0973              [facility]  INPATIENT PROGRESS NOTE    Patient Name: Sai Adam  Date of Admission: 1/13/2020  Today's Date: 01/14/20  Length of Stay: 1  Primary Care Physician: Jarod Tillman MD    Subjective   Chief Complaint: Follow-up respiratory failure    HPI   Patient seen and examined.  He is off BiPAP and doing some pursed lip breathing.  He says that is baseline for him.  His breathing overall is not yet back to baseline but he feels a lot better than yesterday.  Normal mentation.  Denies chest pain.  Denies any abdominal pain or nausea/vomiting.  Wanting to eat.  No acute overnight events noted.        Review of Systems   All pertinent negatives and positives are as above. All other systems have been reviewed and are negative unless otherwise stated.     Objective    Temp:  [98 °F (36.7 °C)-100.1 °F (37.8 °C)] 98 °F (36.7 °C)  Heart Rate:  [] 61  Resp:  [20-30] 25  BP: (126-146)/(68-86) 140/71  Physical Exam  GEN: Awake, alert, interactive, purse lip breathing but speaking in full sentences, no accessory muscle use  HEENT: PERRLA, EOMI, Anicteric, Trachea midline  Lungs: improved air flow today but still tight/diminished. No wheezing currently  Heart: RRR, +S1/s2, no rub  ABD: soft, nt/nd, +BS, no guarding/rebound  Extremities: atraumatic, no cyanosis, no edema  Skin: no rashes or lesions  Neuro: AAOx3, no focal deficits  Psych: normal mood & affect        Results Review:  I have  reviewed the labs, radiology results, and diagnostic studies.    Laboratory Data:   Results from last 7 days   Lab Units 01/14/20  0416 01/13/20  1005   WBC 10*3/mm3 11.45* 12.67*   HEMOGLOBIN g/dL 13.3 14.1   HEMATOCRIT % 42.1 45.8   PLATELETS 10*3/mm3 156 150        Results from last 7 days   Lab Units 01/14/20  0416 01/13/20  1005 01/13/20  1000   SODIUM mmol/L 138 137  --    SODIUM, ARTERIAL mmol/L  --   --  141   POTASSIUM mmol/L 4.2 4.3  --    CHLORIDE mmol/L 94* 94*  --    CO2 mmol/L 38.0* 37.0*  --    BUN mg/dL 17 5*  --    CREATININE mg/dL 0.63* 0.61*  --    CALCIUM mg/dL 9.3 8.8  --    BILIRUBIN mg/dL 0.4 0.5  --    ALK PHOS U/L 94 115  --    ALT (SGPT) U/L 10 11  --    AST (SGOT) U/L 22 24  --    GLUCOSE mg/dL 222* 300*  --        Culture Data:   Blood Culture   Date Value Ref Range Status   01/13/2020 No growth at less than 24 hours  Preliminary   01/13/2020 No growth at less than 24 hours  Preliminary       Radiology Data:   Imaging Results (Last 24 Hours)     Procedure Component Value Units Date/Time    XR Chest 1 View [[id number]] Collected:  01/13/20 1052     Updated:  01/13/20 1059    Narrative:       EXAMINATION: XR CHEST 1 VW- 1/13/2020 10:52 AM CST     HISTORY: Shortness of breath     COMPARISON: 01/18/2019     FINDINGS:  Heart and mediastinal contours are stable. There has been prior median  sternotomy. Upright approach Port-A-Cath is in place with tip projecting  over the mid to distal SVC. There is atelectasis in the lung bases. The  pulmonary vasculature is prominent. Opacities are noted along the right  minor fissure. There is pleural thickening on the right, similar to  prior exams.       Impression:       Opacities along the right minor fissure may reflect fluid or  atelectasis. Bibasilar atelectasis is evident.   This report was finalized on 01/13/2020 10:56 by Dr. Zia Machuca MD.          I have reviewed the patient's current medications.     Assessment/Plan     Active Hospital  Problems    Diagnosis   • **Acute on chronic respiratory failure with hypoxia and hypercapnia (CMS/HCC)   • Hypophosphatemia   • Toxic metabolic encephalopathy   • COPD exacerbation (CMS/HCC)   • Diabetes mellitus (CMS/HCC)   • Coronary artery disease   • History of colon cancer     #1 acute on chronic hypercarbic and hypoxic respiratory failure -in setting of COPD exacerbation.  Doing better today now off BiPAP with improved blood gas.  Can make BiPAP PRN for naps and nightly.  Continue steroids 40 IV every 6 hours.  Continue IV azithromycin.  Continue nebulizers.  Currently on 6 L, wean to baseline home 5 as able.  Goal SPO2 88 to 92%.    #2 toxic metabolic encephalopathy -in the setting of hypercarbia.  This has resolved with BiPAP and normalization of ABG.  No neuro deficits.    #3 COPD exacerbation -as above.  Not ready for steroid wean yet.    #4 hypophosphatemia -replace with sodium Александр.  Recheck and continue placement protocol.    #5 CAD -with history of CABG.  No active chest pain or issues.    #6 insulin-dependent diabetes -takes 160 of Lantus at home.  His sugar was 200 this morning on only 30 of Levemir despite IV steroids.  Suspect he is not diet compliant.  He will be resuming diet today.  Will increase Levemir to 40 nightly.  Add pre-meal lispro 10 units.  Continue sliding scale and hypoglycemia protocol.  Will get diabetic/nutrition educator.    #7 history of colon cancer -status post ostomy creation previous day.  No active issues.  Continue ostomy care.    Discharge Planning: I expect the patient to be discharged to home in 3 to 4 days.    Dhruv Rausch DO   01/14/20   9:27 AM      Electronically signed by Dhruv Rausch DO at 01/14/20 7931       Family says the patient has become progressively more short of breath over the last couple days. He has had increased cough with some sputum production. He has had some chills but no definite fever. He has a long history of COPD and is normally  using a BiPAP but only at night. They say he has been using that consistently but seems to be getting worse.   History provided by: Relative   History limited by: Mental status change and acuity of condition   used: No   Shortness of Breath   Severity: Severe   Onset quality: Gradual   Duration: 2 days   Timing: Constant   Progression: Worsening   Chronicity: Recurrent  Context: URI   Context: not activity, not animal exposure, not emotional upset, not fumes, not known allergens, not occupational exposure, not pollens, not smoke exposure, not strong odors and not weather changes   Relieved by: Nothing   Worsened by: Nothing   Ineffective treatments: Oxygen  Associated symptoms: cough and sputum production   Associated symptoms: no abdominal pain, no chest pain, no claudication, no diaphoresis, no ear pain, no fever, no headaches, no hemoptysis, no neck pain, no PND, no rash, no sore throat, no syncope, no swollen glands, no vomiting and no wheezing   Risk factors: tobacco use   Risk factors: no recent alcohol use, no family hx of DVT, no hx of cancer, no hx of PE/DVT, no obesity, no oral contraceptive use, no prolonged immobilization and no recent surgery     Review of Systems   Constitutional: Negative. Negative for diaphoresis and fever.   HENT: Negative. Negative for ear pain and sore throat.   Respiratory: Positive for cough, sputum production and shortness of breath. Negative for hemoptysis and wheezing.   Cardiovascular: Negative. Negative for chest pain, claudication, syncope and PND.   Gastrointestinal: Negative. Negative for abdominal pain and vomiting.   Genitourinary: Negative.   Musculoskeletal: Negative. Negative for neck pain.   Skin: Negative. Negative for rash.   Neurological: Negative. Negative for headaches.   Psychiatric/Behavioral: Negative.   All other systems reviewed and are negative.      Medical History        Past Medical History:   Diagnosis Date   • Arthritis    • CAD  (coronary artery disease)    • Colon cancer (CMS/[facility]) 11/2016   • COPD (chronic obstructive pulmonary disease) (CMS/[facility])    • Diabetes mellitus (CMS/[facility])    • HLD (hyperlipidemia)    • Hypertension          Family says the patient has become progressively more short of breath over the last couple days. He has had increased cough with some sputum production. He has had some chills but no definite fever. He has a long history of COPD and is normally using a BiPAP but only at night. They say he has been using that consistently but seems to be getting worse.   History provided by: Relative   History limited by: Mental status change and acuity of condition   used: No   Shortness of Breath   Severity: Severe   Onset quality: Gradual   Duration: 2 days   Timing: Constant   Progression: Worsening   Chronicity: Recurrent  Context: URI   Context: not activity, not animal exposure, not emotional upset, not fumes, not known allergens, not occupational exposure, not pollens, not smoke exposure, not strong odors and not weather changes   Relieved by: Nothing   Worsened by: Nothing   Ineffective treatments: Oxygen  Associated symptoms: cough and sputum production   Associated symptoms: no abdominal pain, no chest pain, no claudication, no diaphoresis, no ear pain, no fever, no headaches, no hemoptysis, no neck pain, no PND, no rash, no sore throat, no syncope, no swollen glands, no vomiting and no wheezing   Risk factors: tobacco use   Risk factors: no recent alcohol use, no family hx of DVT, no hx of cancer, no hx of PE/DVT, no obesity, no oral contraceptive use, no prolonged immobilization and no recent surgery     Review of Systems   Constitutional: Negative. Negative for diaphoresis and fever.   HENT: Negative. Negative for ear pain and sore throat.   Respiratory: Positive for cough, sputum production and shortness of breath. Negative for hemoptysis and wheezing.   Cardiovascular: Negative. Negative for  chest pain, claudication, syncope and PND.   Gastrointestinal: Negative. Negative for abdominal pain and vomiting.   Genitourinary: Negative.   Musculoskeletal: Negative. Negative for neck pain.   Skin: Negative. Negative for rash.   Neurological: Negative. Negative for headaches.   Psychiatric/Behavioral: Negative.   All other systems reviewed and are negative.      Medical History        Past Medical History:   Diagnosis Date   • Arthritis    • CAD (coronary artery disease)    • Colon cancer (CMS/[facility]) 11/2016   • COPD (chronic obstructive pulmonary disease) (CMS/[facility])    • Diabetes mellitus (CMS/[facility])    • HLD (hyperlipidemia)    • Hypertension

## 2020-01-24 NOTE — OUTREACH NOTE
Case Management Call Center Follow-up      Responses   [facility] Call Center Tracking Reason?  HH Delay: Other, No DME   Has the Call Center Case Management Follow-up issue been resolved?  Yes   Follow-up Comments  Called [facility] clinic and they did not receive the HH orders, refaxed the orders to them and they will arrange for HH. [facility] on getting all [facility] needed for the triology machine. Called and updated patient regarding HH and the triology and he verbalized understanding of the process.           Maria G Nowak RN    1/24/2020, 4:08 PM

## 2020-01-30 ENCOUNTER — READMISSION MANAGEMENT (OUTPATIENT)
Dept: CALL CENTER | Facility: HOSPITAL | Age: 60
End: 2020-01-30

## 2020-01-30 NOTE — OUTREACH NOTE
COPD/PN Week 2 Survey      Responses   Facility patient discharged from?  [facility]   Does the patient have one of the following disease processes/diagnoses(primary or secondary)?  COPD/Pneumonia   Was the primary reason for admission:  COPD exacerbation   Week 2 attempt successful?  No   Unsuccessful attempts  Attempt 1          Joshua Lopez RN

## 2020-01-31 ENCOUNTER — READMISSION MANAGEMENT (OUTPATIENT)
Dept: CALL CENTER | Facility: HOSPITAL | Age: 60
End: 2020-01-31

## 2020-01-31 NOTE — OUTREACH NOTE
COPD/PN Week 2 Survey      Responses   Facility patient discharged from?  [place]   Does the patient have one of the following disease processes/diagnoses(primary or secondary)?  COPD/Pneumonia   Was the primary reason for admission:  COPD exacerbation   Week 2 attempt successful?  No   Unsuccessful attempts  Attempt 2          Bertha Casillas, RN

## 2020-02-05 ENCOUNTER — APPOINTMENT (OUTPATIENT)
Dept: GENERAL RADIOLOGY | Facility: HOSPITAL | Age: 60
End: 2020-02-05

## 2020-02-05 ENCOUNTER — HOSPITAL ENCOUNTER (EMERGENCY)
Facility: HOSPITAL | Age: 60
Discharge: HOME | End: 2020-02-06
Admitting: INTERNAL MEDICINE

## 2020-02-05 DIAGNOSIS — J44.1 COPD EXACERBATION (HCC): Primary | ICD-10-CM

## 2020-02-05 LAB
ALBUMIN SERPL-MCNC: 3.9 G/DL (ref 3.5–5.2)
ALBUMIN/GLOB SERPL: 1.8 G/DL
ALP SERPL-CCNC: 117 U/L (ref 39–117)
ALT SERPL W P-5'-P-CCNC: 12 U/L (ref 1–41)
ANION GAP SERPL CALCULATED.3IONS-SCNC: 9 MMOL/L (ref 5–15)
APTT PPP: 35.4 SECONDS (ref 24.1–35)
ARTERIAL PATENCY WRIST A: POSITIVE
AST SERPL-CCNC: 24 U/L (ref 1–40)
ATMOSPHERIC PRESS: 743 MMHG
BASE EXCESS BLDA CALC-SCNC: 12.9 MMOL/L (ref 0–2)
BASOPHILS # BLD AUTO: 0.02 10*3/MM3 (ref 0–0.2)
BASOPHILS NFR BLD AUTO: 0.2 % (ref 0–1.5)
BDY SITE: ABNORMAL
BILIRUB SERPL-MCNC: 0.3 MG/DL (ref 0.2–1.2)
BODY TEMPERATURE: 37 C
BUN BLD-MCNC: 7 MG/DL (ref 8–23)
BUN/CREAT SERPL: 11.9 (ref 7–25)
CALCIUM SPEC-SCNC: 9 MG/DL (ref 8.6–10.5)
CHLORIDE SERPL-SCNC: 95 MMOL/L (ref 98–107)
CO2 SERPL-SCNC: 36 MMOL/L (ref 22–29)
CREAT BLD-MCNC: 0.59 MG/DL (ref 0.76–1.27)
DEPRECATED RDW RBC AUTO: 43.5 FL (ref 37–54)
EOSINOPHIL # BLD AUTO: 0.05 10*3/MM3 (ref 0–0.4)
EOSINOPHIL NFR BLD AUTO: 0.6 % (ref 0.3–6.2)
ERYTHROCYTE [DISTWIDTH] IN BLOOD BY AUTOMATED COUNT: 13.4 % (ref 12.3–15.4)
GAS FLOW AIRWAY: 5 LPM
GFR SERPL CREATININE-BSD FRML MDRD: 140 ML/MIN/1.73
GLOBULIN UR ELPH-MCNC: 2.2 GM/DL
GLUCOSE BLD-MCNC: 175 MG/DL (ref 65–99)
HCO3 BLDA-SCNC: 41.3 MMOL/L (ref 20–26)
HCT VFR BLD AUTO: 43.1 % (ref 37.5–51)
HGB BLD-MCNC: 13.7 G/DL (ref 13–17.7)
HOLD SPECIMEN: NORMAL
IMM GRANULOCYTES # BLD AUTO: 0.03 10*3/MM3 (ref 0–0.05)
IMM GRANULOCYTES NFR BLD AUTO: 0.4 % (ref 0–0.5)
INR PPP: 1.11 (ref 0.91–1.09)
LYMPHOCYTES # BLD AUTO: 2.62 10*3/MM3 (ref 0.7–3.1)
LYMPHOCYTES NFR BLD AUTO: 30.8 % (ref 19.6–45.3)
Lab: ABNORMAL
Lab: ABNORMAL
MCH RBC QN AUTO: 28.1 PG (ref 26.6–33)
MCHC RBC AUTO-ENTMCNC: 31.8 G/DL (ref 31.5–35.7)
MCV RBC AUTO: 88.5 FL (ref 79–97)
MODALITY: ABNORMAL
MONOCYTES # BLD AUTO: 0.57 10*3/MM3 (ref 0.1–0.9)
MONOCYTES NFR BLD AUTO: 6.7 % (ref 5–12)
NEUTROPHILS # BLD AUTO: 5.21 10*3/MM3 (ref 1.7–7)
NEUTROPHILS NFR BLD AUTO: 61.3 % (ref 42.7–76)
NOTIFIED BY: ABNORMAL
NOTIFIED WHO: ABNORMAL
NRBC BLD AUTO-RTO: 0 /100 WBC (ref 0–0.2)
NT-PROBNP SERPL-MCNC: 57.9 PG/ML (ref 5–900)
PCO2 BLDA: 69.3 MM HG (ref 35–45)
PH BLDA: 7.38 PH UNITS (ref 7.35–7.45)
PLATELET # BLD AUTO: 171 10*3/MM3 (ref 140–450)
PMV BLD AUTO: 8.8 FL (ref 6–12)
PO2 BLDA: 61.2 MM HG (ref 83–108)
POTASSIUM BLD-SCNC: 4.2 MMOL/L (ref 3.5–5.2)
PROT SERPL-MCNC: 6.1 G/DL (ref 6–8.5)
PROTHROMBIN TIME: 14.7 SECONDS (ref 11.9–14.6)
RBC # BLD AUTO: 4.87 10*6/MM3 (ref 4.14–5.8)
SAO2 % BLDCOA: 92.4 % (ref 94–99)
SODIUM BLD-SCNC: 140 MMOL/L (ref 136–145)
TROPONIN T SERPL-MCNC: <0.01 NG/ML (ref 0–0.03)
VENTILATOR MODE: ABNORMAL
WBC NRBC COR # BLD: 8.5 10*3/MM3 (ref 3.4–10.8)
WHOLE BLOOD HOLD SPECIMEN: NORMAL
WHOLE BLOOD HOLD SPECIMEN: NORMAL

## 2020-02-05 PROCEDURE — 82803 BLOOD GASES ANY COMBINATION: CPT

## 2020-02-05 PROCEDURE — 94799 UNLISTED PULMONARY SVC/PX: CPT

## 2020-02-05 PROCEDURE — 83880 ASSAY OF NATRIURETIC PEPTIDE: CPT | Performed by: PHYSICIAN ASSISTANT

## 2020-02-05 PROCEDURE — 25010000002 METHYLPREDNISOLONE PER 125 MG: Performed by: PHYSICIAN ASSISTANT

## 2020-02-05 PROCEDURE — 93005 ELECTROCARDIOGRAM TRACING: CPT

## 2020-02-05 PROCEDURE — 36600 WITHDRAWAL OF ARTERIAL BLOOD: CPT

## 2020-02-05 PROCEDURE — 85610 PROTHROMBIN TIME: CPT | Performed by: PHYSICIAN ASSISTANT

## 2020-02-05 PROCEDURE — 85025 COMPLETE CBC W/AUTO DIFF WBC: CPT | Performed by: PHYSICIAN ASSISTANT

## 2020-02-05 PROCEDURE — 36415 COLL VENOUS BLD VENIPUNCTURE: CPT

## 2020-02-05 PROCEDURE — 84484 ASSAY OF TROPONIN QUANT: CPT | Performed by: PHYSICIAN ASSISTANT

## 2020-02-05 PROCEDURE — 94640 AIRWAY INHALATION TREATMENT: CPT

## 2020-02-05 PROCEDURE — 71045 X-RAY EXAM CHEST 1 VIEW: CPT

## 2020-02-05 PROCEDURE — 80053 COMPREHEN METABOLIC PANEL: CPT | Performed by: PHYSICIAN ASSISTANT

## 2020-02-05 PROCEDURE — 99284 EMERGENCY DEPT VISIT MOD MDM: CPT

## 2020-02-05 PROCEDURE — 85730 THROMBOPLASTIN TIME PARTIAL: CPT | Performed by: PHYSICIAN ASSISTANT

## 2020-02-05 PROCEDURE — 96374 THER/PROPH/DIAG INJ IV PUSH: CPT

## 2020-02-05 PROCEDURE — 93010 ELECTROCARDIOGRAM REPORT: CPT | Performed by: INTERNAL MEDICINE

## 2020-02-05 RX ORDER — IPRATROPIUM BROMIDE AND ALBUTEROL SULFATE 2.5; .5 MG/3ML; MG/3ML
3 SOLUTION RESPIRATORY (INHALATION) ONCE
Status: COMPLETED | OUTPATIENT
Start: 2020-02-05 | End: 2020-02-05

## 2020-02-05 RX ORDER — SODIUM CHLORIDE 0.9 % (FLUSH) 0.9 %
10 SYRINGE (ML) INJECTION AS NEEDED
Status: DISCONTINUED | OUTPATIENT
Start: 2020-02-05 | End: 2020-02-06 | Stop reason: HOSPADM

## 2020-02-05 RX ORDER — METHYLPREDNISOLONE SODIUM SUCCINATE 125 MG/2ML
125 INJECTION, POWDER, LYOPHILIZED, FOR SOLUTION INTRAMUSCULAR; INTRAVENOUS ONCE
Status: COMPLETED | OUTPATIENT
Start: 2020-02-05 | End: 2020-02-05

## 2020-02-05 RX ADMIN — IPRATROPIUM BROMIDE AND ALBUTEROL SULFATE 3 ML: 2.5; .5 SOLUTION RESPIRATORY (INHALATION) at 23:31

## 2020-02-05 RX ADMIN — METHYLPREDNISOLONE SODIUM SUCCINATE 125 MG: 125 INJECTION, POWDER, FOR SOLUTION INTRAMUSCULAR; INTRAVENOUS at 23:20

## 2020-02-06 VITALS
SYSTOLIC BLOOD PRESSURE: 137 MMHG | OXYGEN SATURATION: 92 % | TEMPERATURE: 98 F | RESPIRATION RATE: 21 BRPM | HEIGHT: 67 IN | HEART RATE: 91 BPM | BODY MASS INDEX: 22.8 KG/M2 | DIASTOLIC BLOOD PRESSURE: 68 MMHG

## 2020-02-06 RX ORDER — METHYLPREDNISOLONE 4 MG/1
TABLET ORAL
Qty: 21 EACH | Refills: 0 | Status: SHIPPED | OUTPATIENT
Start: 2020-02-06 | End: 2020-08-14

## 2020-02-06 RX ORDER — AZITHROMYCIN 250 MG/1
250 TABLET, FILM COATED ORAL DAILY
Qty: 6 TABLET | Refills: 0 | Status: SHIPPED | OUTPATIENT
Start: 2020-02-06 | End: 2020-08-14

## 2020-02-06 NOTE — ED PROVIDER NOTES
Subjective   History of Present Illness  59-year-old male presents with a chief complaint of cough and shortness of breath.  Patient reports he has CHF and COPD.  He reports is getting worse over the past 24 hours.  He says he feels like he needs steroids.  He has been afebrile.  Cough is productive of yellow sputum  Review of Systems   All other systems reviewed and are negative.      Past Medical History:   Diagnosis Date   • Arthritis    • CAD (coronary artery disease)    • Colon cancer (CMS/[facility]) 11/2016   • COPD (chronic obstructive pulmonary disease) (CMS/[facility])    • Diabetes mellitus (CMS/[facility])    • HLD (hyperlipidemia)    • Hypertension        Allergies   Allergen Reactions   • Tetanus Toxoids Shortness Of Breath and Swelling   • Lamotrigine      Unknown     • Lisinopril Hives   • Methadone Swelling   • Penicillins Rash   • Tramadol Rash       Past Surgical History:   Procedure Laterality Date   • CARDIAC SURGERY     • CARPAL TUNNEL RELEASE     • COLON RESECTION WITH COLOSTOMY     • COLON SURGERY     • COLONOSCOPY  05/27/2016   • COLONOSCOPY N/A 10/12/2017    Procedure: COLONOSCOPY WITH ANESTHESIA;  Surgeon: Yessenia Gregg MD;  Location: [facility] ENDOSCOPY;  Service:    • CORONARY ANGIOPLASTY WITH STENT PLACEMENT     • HERNIA REPAIR     • ROTATOR CUFF REPAIR         Family History   Problem Relation Age of Onset   • Stroke Mother    • Hypertension Mother    • Arthritis Father    • Heart disease Father    • Cancer Brother    • Diabetes Brother    • Hepatitis Brother    • Hypertension Brother        Social History     Socioeconomic History   • Marital status:      Spouse name: Not on file   • Number of children: Not on file   • Years of education: Not on file   • Highest education level: Not on file   Tobacco Use   • Smoking status: Current Every Day Smoker     Packs/day: 1.00     Years: 40.00     Pack years: 40.00   • Smokeless tobacco: Never Used   Substance and Sexual Activity   • Alcohol use: No   • Drug  use: No   • Sexual activity: Defer           Objective   Physical Exam   Constitutional: He is oriented to person, place, and time. He appears well-developed and well-nourished.   HENT:   Head: Normocephalic and atraumatic.   Eyes: Pupils are equal, round, and reactive to light. EOM are normal.   Neck: Normal range of motion. Neck supple.   Cardiovascular: Normal rate and regular rhythm.   Pulmonary/Chest: Effort normal. He has wheezes.   Musculoskeletal: Normal range of motion.        Right lower leg: Normal.        Left lower leg: Normal.   Neurological: He is alert and oriented to person, place, and time.   Skin: Skin is warm and dry. Capillary refill takes less than 2 seconds.   Psychiatric: He has a normal mood and affect. His behavior is normal.   Nursing note and vitals reviewed.      Procedures           ED Course           Labs Reviewed   COMPREHENSIVE METABOLIC PANEL - Abnormal; Notable for the following components:       Result Value    Glucose 175 (*)     BUN 7 (*)     Creatinine 0.59 (*)     Chloride 95 (*)     CO2 36.0 (*)     All other components within normal limits    Narrative:     GFR Normal >60  Chronic Kidney Disease <60  Kidney Failure <15     PROTIME-INR - Abnormal; Notable for the following components:    Protime 14.7 (*)     INR 1.11 (*)     All other components within normal limits   APTT - Abnormal; Notable for the following components:    PTT 35.4 (*)     All other components within normal limits   BLOOD GAS, ARTERIAL - Abnormal; Notable for the following components:    pCO2, Arterial 69.3 (*)     pO2, Arterial 61.2 (*)     HCO3, Arterial 41.3 (*)     Base Excess, Arterial 12.9 (*)     O2 Saturation, Arterial 92.4 (*)     All other components within normal limits   TROPONIN (IN-HOUSE) - Normal    Narrative:     Troponin T Reference Range:  <= 0.03 ng/mL-   Negative for AMI  >0.03 ng/mL-     Abnormal for myocardial necrosis.  Clinicians would have to utilize clinical acumen, EKG, Troponin  and serial changes to determine if it is an Acute Myocardial Infarction or myocardial injury due to an underlying chronic condition.       Results may be falsely decreased if patient taking Biotin.     BNP (IN-HOUSE) - Normal    Narrative:     Among patients with dyspnea, NT-proBNP is highly sensitive for the detection of acute congestive heart failure. In addition NT-proBNP of <300 pg/ml effectively rules out acute congestive heart failure with 99% negative predictive value.    Results may be falsely decreased if patient taking Biotin.     CBC WITH AUTO DIFFERENTIAL - Normal   RAINBOW DRAW    Narrative:     The following orders were created for panel order [facility] Draw.  Procedure                               Abnormality         Status                     ---------                               -----------         ------                     Light Blue Top[[id number]]                                   Final result               Green Top (Gel)[[id number]]                                  Final result               Lavender Top[[id number]]                                     Final result               Red Top[[id number]]                                          Final result                 Please view results for these tests on the individual orders.   RAINBOW DRAW    Narrative:     The following orders were created for panel order [place] Draw.  Procedure                               Abnormality         Status                     ---------                               -----------         ------                     [place] Blood Culture Scott...[[id number]]                      Final result               Gray Top - Ice[[id number]]                                   Final result                 Please view results for these tests on the individual orders.   GRAY TOP - ICE   BLOOD GAS, ARTERIAL   LIGHT BLUE TOP   GREEN TOP   LAVENDER TOP   RED TOP   RAINBOW BLOOD CULTURE BOTTLES - 1 SET   CBC AND DIFFERENTIAL    Narrative:      The following orders were created for panel order CBC & Differential.  Procedure                               Abnormality         Status                     ---------                               -----------         ------                     CBC Auto Differential[[id number]]        Normal              Final result                 Please view results for these tests on the individual orders.     XR Chest 1 View    (Results Pending)                [place] Coma Scale Score: 15                          MDM  Number of Diagnoses or Management Options  COPD exacerbation (CMS/[facility]): new and requires workup  Diagnosis management comments: Patient requesting DC, will dc in stable condition and treat for COPD exacerbation        Amount and/or Complexity of Data Reviewed  Clinical lab tests: ordered and reviewed  Tests in the radiology section of CPT®: ordered and reviewed  Tests in the medicine section of CPT®: ordered and reviewed  Decide to obtain previous medical records or to obtain history from someone other than the patient: yes    Risk of Complications, Morbidity, and/or Mortality  Presenting problems: moderate  Diagnostic procedures: moderate  Management options: moderate    Patient Progress  Patient progress: stable      Final diagnoses:   COPD exacerbation (CMS/[facility])            Darrel Preicado PA-C  02/06/20 0405

## 2020-02-07 ENCOUNTER — READMISSION MANAGEMENT (OUTPATIENT)
Dept: CALL CENTER | Facility: HOSPITAL | Age: 60
End: 2020-02-07

## 2020-02-07 NOTE — OUTREACH NOTE
COPD/PN Week 4 Survey      Responses   Facility patient discharged from?  [facility]   Does the patient have one of the following disease processes/diagnoses(primary or secondary)?  COPD/Pneumonia   Was the primary reason for admission:  COPD exacerbation   Week 4 attempt successful?  Yes   Call start time  1816   Call end time  1827   Discharge diagnosis  COPD   Meds reviewed with patient/caregiver?  Yes   Is the patient taking all medications as directed (includes completed medication regime)?  Yes   Has the patient kept scheduled appointments due by today?  Yes   Comments  Appt with PCP 2/3/20 and pulmonary MD 2/7/20. Pt advised to go to pul rehab   Is the patient still receiving Home Health Services?  N/A   Psychosocial issues?  No   What is the patient's perception of their health status since discharge?  Improving [Pt reports he's a little better. He's has swelling in his feet, legs, and face. He's also short of air. Pt did not weigh himself this morning. O2 this morning was about 90%.  Advised him to go to ER. He verbalized understanding. ]   Nursing Interventions  Nurse provided patient education   Are the patient's immunizations up to date?   Yes   If the patient is a current smoker, are they able to teach back resources for cessation?  [id number] [Pt is a smoker]   Additional teach back comments  Pt wears O2 all the time   Is the patient able to teach back COPD zones?  Yes   Nursing interventions  Education provided on various zones   Patient reports what zone on this call?  Yellow Zone   Yellow Zone  Increased shortness of air, Increased or thicker phlegm/mucus, Using quick relief inhaler/nebulizer more often, Unable to complete daily activities, Increased swelling of ankles, Poor sleep and symptoms work patient up, Medication is not helping relieve symptoms   Yellow interventions  Continue to use daily medications, Use quick relief inhaler as ordered, Use oxygen as ordered, Use other meds such as  steroids or antibiotics as ordered, Do not smoke, Get plenty of rest, Call provider immediatly if symptoms do not improve   Week 4 call completed?  Yes   Would the patient like one additional call?  Yes          Ritu Fields RN

## 2020-02-11 DIAGNOSIS — J44.1 COPD WITH ACUTE EXACERBATION (HCC): Primary | ICD-10-CM

## 2020-02-14 ENCOUNTER — READMISSION MANAGEMENT (OUTPATIENT)
Dept: CALL CENTER | Facility: HOSPITAL | Age: 60
End: 2020-02-14

## 2020-02-14 NOTE — OUTREACH NOTE
COPD/PN Week 5 Survey      Responses   Facility patient discharged from?  [facility] [place]   Does the patient have one of the following disease processes/diagnoses(primary or secondary)?  COPD/Pneumonia   Was the primary reason for admission:  COPD exacerbation   Week 5 attempt successful?  Yes   Call start time  1709   Call end time  1711   Discharge diagnosis  COPD   Meds reviewed with patient/caregiver?  Yes   Is the patient having any side effects they believe may be caused by any medication additions or changes?  No   Is the patient taking all medications as directed (includes completed medication regime)?  Yes   Has the patient kept scheduled appointments due by today?  Yes   Is the patient still receiving Home Health Services?  N/A   Psychosocial issues?  No   What is the patient's perception of their health status since discharge?  Improving   Nursing Interventions  Nurse provided patient education   Are the patient's immunizations up to date?   Yes   Nursing interventions  Educated on importance of maintaining up to date immunizations as advised by provider   If the patient is a current smoker, are they able to teach back resources for cessation?  [id number]   Is the patient/caregiver able to teach back the hierarchy of who to call/visit for symptoms/problems? PCP, Specialist, Home health nurse, Urgent Care, ED, 911  Yes   Additional teach back comments  States he is doing some better and [facility]   Is the patient able to teach back COPD zones?  Yes   Nursing interventions  Education provided on various zones   Patient reports what zone on this call?  Green Zone   Green Zone  Reports doing well, Breathing without shortness of breath, Usual activity and exercise level, Usual amount of phlegm/mucus without difficulty coughing up, Sleeping well, Appetite is good   Green Zone interventions:  Take daily medications   Graduated  Yes   Did the patient feel the follow up calls were  helpful during their recovery period?  Yes   Was the number of calls appropriate?  Yes          Ros Treviño RN

## 2020-02-24 ENCOUNTER — TREATMENT (OUTPATIENT)
Dept: CARDIAC REHAB | Facility: HOSPITAL | Age: 60
End: 2020-02-24

## 2020-02-24 DIAGNOSIS — J44.9 CHRONIC OBSTRUCTIVE PULMONARY DISEASE, UNSPECIFIED COPD TYPE (HCC): Primary | ICD-10-CM

## 2020-02-24 PROCEDURE — G0424 PULMONARY REHAB W EXER: HCPCS

## 2020-02-26 ENCOUNTER — APPOINTMENT (OUTPATIENT)
Dept: CARDIAC REHAB | Facility: HOSPITAL | Age: 60
End: 2020-02-26

## 2020-02-26 ENCOUNTER — TREATMENT (OUTPATIENT)
Dept: CARDIAC REHAB | Facility: HOSPITAL | Age: 60
End: 2020-02-26

## 2020-02-26 DIAGNOSIS — J44.9 CHRONIC OBSTRUCTIVE PULMONARY DISEASE, UNSPECIFIED COPD TYPE (HCC): Primary | ICD-10-CM

## 2020-02-26 PROCEDURE — G0424 PULMONARY REHAB W EXER: HCPCS

## 2020-03-02 ENCOUNTER — APPOINTMENT (OUTPATIENT)
Dept: CARDIAC REHAB | Facility: HOSPITAL | Age: 60
End: 2020-03-02

## 2020-03-04 ENCOUNTER — APPOINTMENT (OUTPATIENT)
Dept: CARDIAC REHAB | Facility: HOSPITAL | Age: 60
End: 2020-03-04

## 2020-03-09 ENCOUNTER — APPOINTMENT (OUTPATIENT)
Dept: CARDIAC REHAB | Facility: HOSPITAL | Age: 60
End: 2020-03-09

## 2020-03-09 ENCOUNTER — TREATMENT (OUTPATIENT)
Dept: CARDIAC REHAB | Facility: HOSPITAL | Age: 60
End: 2020-03-09

## 2020-03-09 DIAGNOSIS — J44.9 CHRONIC OBSTRUCTIVE PULMONARY DISEASE, UNSPECIFIED COPD TYPE (HCC): Primary | ICD-10-CM

## 2020-03-09 PROCEDURE — G0424 PULMONARY REHAB W EXER: HCPCS

## 2020-03-11 ENCOUNTER — APPOINTMENT (OUTPATIENT)
Dept: CARDIAC REHAB | Facility: HOSPITAL | Age: 60
End: 2020-03-11

## 2020-03-16 ENCOUNTER — TELEPHONE (OUTPATIENT)
Dept: CARDIAC REHAB | Facility: HOSPITAL | Age: 60
End: 2020-03-16

## 2020-03-16 ENCOUNTER — APPOINTMENT (OUTPATIENT)
Dept: CARDIAC REHAB | Facility: HOSPITAL | Age: 60
End: 2020-03-16

## 2020-03-16 NOTE — TELEPHONE ENCOUNTER
Patient's pulmonary rehab has been cancelled for the next two weeks due to the COVID-19 precautions. Patient is aware.

## 2020-03-17 ENCOUNTER — TELEPHONE (OUTPATIENT)
Dept: CARDIAC REHAB | Facility: HOSPITAL | Age: 60
End: 2020-03-17

## 2020-03-18 ENCOUNTER — APPOINTMENT (OUTPATIENT)
Dept: CARDIAC REHAB | Facility: HOSPITAL | Age: 60
End: 2020-03-18

## 2020-03-23 ENCOUNTER — APPOINTMENT (OUTPATIENT)
Dept: CARDIAC REHAB | Facility: HOSPITAL | Age: 60
End: 2020-03-23

## 2020-03-25 ENCOUNTER — APPOINTMENT (OUTPATIENT)
Dept: CARDIAC REHAB | Facility: HOSPITAL | Age: 60
End: 2020-03-25

## 2020-03-30 ENCOUNTER — APPOINTMENT (OUTPATIENT)
Dept: CARDIAC REHAB | Facility: HOSPITAL | Age: 60
End: 2020-03-30

## 2020-04-01 ENCOUNTER — APPOINTMENT (OUTPATIENT)
Dept: CARDIAC REHAB | Facility: HOSPITAL | Age: 60
End: 2020-04-01

## 2020-04-03 ENCOUNTER — TELEPHONE (OUTPATIENT)
Dept: CARDIAC REHAB | Facility: HOSPITAL | Age: 60
End: 2020-04-03

## 2020-04-03 NOTE — TELEPHONE ENCOUNTER
Informed patient that [facility] will be closed until further notice due to COVID-19. Also, informed patient that [facility] can be contacted at [facility] to get an extension authorization due to past sessions lost.

## 2020-04-06 ENCOUNTER — APPOINTMENT (OUTPATIENT)
Dept: CARDIAC REHAB | Facility: HOSPITAL | Age: 60
End: 2020-04-06

## 2020-04-08 ENCOUNTER — APPOINTMENT (OUTPATIENT)
Dept: CARDIAC REHAB | Facility: HOSPITAL | Age: 60
End: 2020-04-08

## 2020-04-13 ENCOUNTER — APPOINTMENT (OUTPATIENT)
Dept: CARDIAC REHAB | Facility: HOSPITAL | Age: 60
End: 2020-04-13

## 2020-04-13 ENCOUNTER — TELEPHONE (OUTPATIENT)
Dept: CARDIAC REHAB | Facility: HOSPITAL | Age: 60
End: 2020-04-13

## 2020-04-15 ENCOUNTER — APPOINTMENT (OUTPATIENT)
Dept: CARDIAC REHAB | Facility: HOSPITAL | Age: 60
End: 2020-04-15

## 2020-04-16 ENCOUNTER — TELEPHONE (OUTPATIENT)
Dept: CARDIAC REHAB | Facility: HOSPITAL | Age: 60
End: 2020-04-16

## 2020-04-16 NOTE — TELEPHONE ENCOUNTER
Pulmonary Rehab Note  Called to check on pt. He states he is doing well. No needs voiced at this time. Informed of cont'd closure of [facility] d/t COVID-19

## 2020-04-20 ENCOUNTER — APPOINTMENT (OUTPATIENT)
Dept: CARDIAC REHAB | Facility: HOSPITAL | Age: 60
End: 2020-04-20

## 2020-04-20 ENCOUNTER — TELEPHONE (OUTPATIENT)
Dept: CARDIAC REHAB | Facility: HOSPITAL | Age: 60
End: 2020-04-20

## 2020-04-20 NOTE — TELEPHONE ENCOUNTER
Cardiopulmonary Rehab Note:   Called to check on pt. Left VM to inform of cont'd closure due to COVID-19. Will check back on 4/23

## 2020-04-22 ENCOUNTER — APPOINTMENT (OUTPATIENT)
Dept: CARDIAC REHAB | Facility: HOSPITAL | Age: 60
End: 2020-04-22

## 2020-04-23 ENCOUNTER — TELEPHONE (OUTPATIENT)
Dept: CARDIAC REHAB | Facility: HOSPITAL | Age: 60
End: 2020-04-23

## 2020-04-23 NOTE — TELEPHONE ENCOUNTER
Cardiopulmonary Rehab Note:  Called to check on pt. Left VM informing of continued unit closure d/t COVID-19. Will check back on 4/27

## 2020-04-27 ENCOUNTER — APPOINTMENT (OUTPATIENT)
Dept: CARDIAC REHAB | Facility: HOSPITAL | Age: 60
End: 2020-04-27

## 2020-04-29 ENCOUNTER — APPOINTMENT (OUTPATIENT)
Dept: CARDIAC REHAB | Facility: HOSPITAL | Age: 60
End: 2020-04-29

## 2020-05-04 ENCOUNTER — APPOINTMENT (OUTPATIENT)
Dept: CARDIAC REHAB | Facility: HOSPITAL | Age: 60
End: 2020-05-04

## 2020-05-06 ENCOUNTER — APPOINTMENT (OUTPATIENT)
Dept: CARDIAC REHAB | Facility: HOSPITAL | Age: 60
End: 2020-05-06

## 2020-05-11 ENCOUNTER — APPOINTMENT (OUTPATIENT)
Dept: CARDIAC REHAB | Facility: HOSPITAL | Age: 60
End: 2020-05-11

## 2020-05-13 ENCOUNTER — APPOINTMENT (OUTPATIENT)
Dept: CARDIAC REHAB | Facility: HOSPITAL | Age: 60
End: 2020-05-13

## 2020-05-18 ENCOUNTER — APPOINTMENT (OUTPATIENT)
Dept: CT IMAGING | Facility: HOSPITAL | Age: 60
End: 2020-05-18

## 2020-05-18 ENCOUNTER — APPOINTMENT (OUTPATIENT)
Dept: CARDIAC REHAB | Facility: HOSPITAL | Age: 60
End: 2020-05-18

## 2020-05-18 ENCOUNTER — APPOINTMENT (OUTPATIENT)
Dept: GENERAL RADIOLOGY | Facility: HOSPITAL | Age: 60
End: 2020-05-18

## 2020-05-18 ENCOUNTER — HOSPITAL ENCOUNTER (EMERGENCY)
Facility: HOSPITAL | Age: 60
Discharge: HOME | End: 2020-05-19
Admitting: EMERGENCY MEDICINE

## 2020-05-18 DIAGNOSIS — R19.00 PELVIC MASS: Primary | ICD-10-CM

## 2020-05-18 DIAGNOSIS — J44.9 CHRONIC OBSTRUCTIVE PULMONARY DISEASE, UNSPECIFIED COPD TYPE (HCC): ICD-10-CM

## 2020-05-18 LAB
ALBUMIN SERPL-MCNC: 4.2 G/DL (ref 3.5–5.2)
ALBUMIN/GLOB SERPL: 1.4 G/DL
ALP SERPL-CCNC: 147 U/L (ref 39–117)
ALT SERPL W P-5'-P-CCNC: 7 U/L (ref 1–41)
ANION GAP SERPL CALCULATED.3IONS-SCNC: 10 MMOL/L (ref 5–15)
APTT PPP: 40.4 SECONDS (ref 24.1–35)
ARTERIAL PATENCY WRIST A: POSITIVE
AST SERPL-CCNC: 21 U/L (ref 1–40)
ATMOSPHERIC PRESS: 746 MMHG
B PARAPERT DNA SPEC QL NAA+PROBE: NOT DETECTED
B PERT DNA SPEC QL NAA+PROBE: NOT DETECTED
BASE EXCESS BLDA CALC-SCNC: 11.1 MMOL/L (ref 0–2)
BASOPHILS # BLD AUTO: 0.08 10*3/MM3 (ref 0–0.2)
BASOPHILS NFR BLD AUTO: 0.9 % (ref 0–1.5)
BDY SITE: ABNORMAL
BILIRUB SERPL-MCNC: 0.4 MG/DL (ref 0.2–1.2)
BODY TEMPERATURE: 37 C
BUN BLD-MCNC: 6 MG/DL (ref 8–23)
BUN/CREAT SERPL: 11.5 (ref 7–25)
C PNEUM DNA NPH QL NAA+NON-PROBE: NOT DETECTED
CALCIUM SPEC-SCNC: 9.6 MG/DL (ref 8.6–10.5)
CHLORIDE SERPL-SCNC: 93 MMOL/L (ref 98–107)
CO2 SERPL-SCNC: 36 MMOL/L (ref 22–29)
CREAT BLD-MCNC: 0.52 MG/DL (ref 0.76–1.27)
DEPRECATED RDW RBC AUTO: 43.3 FL (ref 37–54)
EOSINOPHIL # BLD AUTO: 0.07 10*3/MM3 (ref 0–0.4)
EOSINOPHIL NFR BLD AUTO: 0.8 % (ref 0.3–6.2)
ERYTHROCYTE [DISTWIDTH] IN BLOOD BY AUTOMATED COUNT: 13.3 % (ref 12.3–15.4)
FLUAV H1 2009 PAND RNA NPH QL NAA+PROBE: NOT DETECTED
FLUAV H1 HA GENE NPH QL NAA+PROBE: NOT DETECTED
FLUAV H3 RNA NPH QL NAA+PROBE: NOT DETECTED
FLUAV SUBTYP SPEC NAA+PROBE: NOT DETECTED
FLUBV RNA ISLT QL NAA+PROBE: NOT DETECTED
GAS FLOW AIRWAY: 3.5 LPM
GFR SERPL CREATININE-BSD FRML MDRD: >150 ML/MIN/1.73
GLOBULIN UR ELPH-MCNC: 3 GM/DL
GLUCOSE BLD-MCNC: 280 MG/DL (ref 65–99)
HADV DNA SPEC NAA+PROBE: NOT DETECTED
HCO3 BLDA-SCNC: 40.3 MMOL/L (ref 20–26)
HCOV 229E RNA SPEC QL NAA+PROBE: NOT DETECTED
HCOV HKU1 RNA SPEC QL NAA+PROBE: NOT DETECTED
HCOV NL63 RNA SPEC QL NAA+PROBE: NOT DETECTED
HCOV OC43 RNA SPEC QL NAA+PROBE: NOT DETECTED
HCT VFR BLD AUTO: 49.8 % (ref 37.5–51)
HGB BLD-MCNC: 14.9 G/DL (ref 13–17.7)
HMPV RNA NPH QL NAA+NON-PROBE: NOT DETECTED
HOLD SPECIMEN: NORMAL
HPIV1 RNA SPEC QL NAA+PROBE: NOT DETECTED
HPIV2 RNA SPEC QL NAA+PROBE: NOT DETECTED
HPIV3 RNA NPH QL NAA+PROBE: NOT DETECTED
HPIV4 P GENE NPH QL NAA+PROBE: NOT DETECTED
IMM GRANULOCYTES # BLD AUTO: 0.03 10*3/MM3 (ref 0–0.05)
IMM GRANULOCYTES NFR BLD AUTO: 0.3 % (ref 0–0.5)
INR PPP: 1.13 (ref 0.91–1.09)
LIPASE SERPL-CCNC: 32 U/L (ref 13–60)
LYMPHOCYTES # BLD AUTO: 2.54 10*3/MM3 (ref 0.7–3.1)
LYMPHOCYTES NFR BLD AUTO: 27.9 % (ref 19.6–45.3)
Lab: ABNORMAL
Lab: ABNORMAL
M PNEUMO IGG SER IA-ACNC: NOT DETECTED
MCH RBC QN AUTO: 26.7 PG (ref 26.6–33)
MCHC RBC AUTO-ENTMCNC: 29.9 G/DL (ref 31.5–35.7)
MCV RBC AUTO: 89.2 FL (ref 79–97)
MODALITY: ABNORMAL
MONOCYTES # BLD AUTO: 0.59 10*3/MM3 (ref 0.1–0.9)
MONOCYTES NFR BLD AUTO: 6.5 % (ref 5–12)
NEUTROPHILS # BLD AUTO: 5.81 10*3/MM3 (ref 1.7–7)
NEUTROPHILS NFR BLD AUTO: 63.6 % (ref 42.7–76)
NOTIFIED BY: ABNORMAL
NOTIFIED WHO: ABNORMAL
NRBC BLD AUTO-RTO: 0 /100 WBC (ref 0–0.2)
NT-PROBNP SERPL-MCNC: 44.6 PG/ML (ref 5–900)
PCO2 BLDA: 72.9 MM HG (ref 35–45)
PH BLDA: 7.35 PH UNITS (ref 7.35–7.45)
PLATELET # BLD AUTO: 165 10*3/MM3 (ref 140–450)
PMV BLD AUTO: 8.9 FL (ref 6–12)
PO2 BLDA: 55.7 MM HG (ref 83–108)
POTASSIUM BLD-SCNC: 4.2 MMOL/L (ref 3.5–5.2)
PROT SERPL-MCNC: 7.2 G/DL (ref 6–8.5)
PROTHROMBIN TIME: 14.1 SECONDS (ref 11.9–14.6)
RBC # BLD AUTO: 5.58 10*6/MM3 (ref 4.14–5.8)
RHINOVIRUS RNA SPEC NAA+PROBE: NOT DETECTED
RSV RNA NPH QL NAA+NON-PROBE: NOT DETECTED
SAO2 % BLDCOA: 89.2 % (ref 94–99)
SODIUM BLD-SCNC: 139 MMOL/L (ref 136–145)
TROPONIN T SERPL-MCNC: <0.01 NG/ML (ref 0–0.03)
VENTILATOR MODE: ABNORMAL
WBC NRBC COR # BLD: 9.12 10*3/MM3 (ref 3.4–10.8)
WHOLE BLOOD HOLD SPECIMEN: NORMAL
WHOLE BLOOD HOLD SPECIMEN: NORMAL

## 2020-05-18 PROCEDURE — 74177 CT ABD & PELVIS W/CONTRAST: CPT

## 2020-05-18 PROCEDURE — 93010 ELECTROCARDIOGRAM REPORT: CPT | Performed by: INTERNAL MEDICINE

## 2020-05-18 PROCEDURE — 83880 ASSAY OF NATRIURETIC PEPTIDE: CPT | Performed by: NURSE PRACTITIONER

## 2020-05-18 PROCEDURE — 71045 X-RAY EXAM CHEST 1 VIEW: CPT

## 2020-05-18 PROCEDURE — 85610 PROTHROMBIN TIME: CPT | Performed by: NURSE PRACTITIONER

## 2020-05-18 PROCEDURE — 36415 COLL VENOUS BLD VENIPUNCTURE: CPT

## 2020-05-18 PROCEDURE — 0100U HC BIOFIRE FILMARRAY RESP PANEL 2: CPT | Performed by: NURSE PRACTITIONER

## 2020-05-18 PROCEDURE — 82803 BLOOD GASES ANY COMBINATION: CPT

## 2020-05-18 PROCEDURE — 85025 COMPLETE CBC W/AUTO DIFF WBC: CPT | Performed by: NURSE PRACTITIONER

## 2020-05-18 PROCEDURE — 99284 EMERGENCY DEPT VISIT MOD MDM: CPT

## 2020-05-18 PROCEDURE — 85730 THROMBOPLASTIN TIME PARTIAL: CPT | Performed by: NURSE PRACTITIONER

## 2020-05-18 PROCEDURE — 93005 ELECTROCARDIOGRAM TRACING: CPT | Performed by: EMERGENCY MEDICINE

## 2020-05-18 PROCEDURE — 36600 WITHDRAWAL OF ARTERIAL BLOOD: CPT

## 2020-05-18 PROCEDURE — 25010000002 IOPAMIDOL 61 % SOLUTION: Performed by: NURSE PRACTITIONER

## 2020-05-18 PROCEDURE — 84484 ASSAY OF TROPONIN QUANT: CPT | Performed by: NURSE PRACTITIONER

## 2020-05-18 PROCEDURE — 80053 COMPREHEN METABOLIC PANEL: CPT | Performed by: NURSE PRACTITIONER

## 2020-05-18 PROCEDURE — 83690 ASSAY OF LIPASE: CPT | Performed by: NURSE PRACTITIONER

## 2020-05-18 PROCEDURE — 87040 BLOOD CULTURE FOR BACTERIA: CPT | Performed by: NURSE PRACTITIONER

## 2020-05-18 RX ORDER — SODIUM CHLORIDE 0.9 % (FLUSH) 0.9 %
10 SYRINGE (ML) INJECTION AS NEEDED
Status: DISCONTINUED | OUTPATIENT
Start: 2020-05-18 | End: 2020-05-19 | Stop reason: HOSPADM

## 2020-05-18 RX ADMIN — IOPAMIDOL 100 ML: 612 INJECTION, SOLUTION INTRAVENOUS at 22:52

## 2020-05-19 VITALS
OXYGEN SATURATION: 94 % | TEMPERATURE: 97.8 F | RESPIRATION RATE: 18 BRPM | SYSTOLIC BLOOD PRESSURE: 127 MMHG | BODY MASS INDEX: 33.43 KG/M2 | DIASTOLIC BLOOD PRESSURE: 84 MMHG | HEART RATE: 93 BPM | WEIGHT: 213 LBS | HEIGHT: 67 IN

## 2020-05-19 LAB
BILIRUB UR QL STRIP: NEGATIVE
CLARITY UR: CLEAR
COLOR UR: YELLOW
GLUCOSE UR STRIP-MCNC: ABNORMAL MG/DL
HGB UR QL STRIP.AUTO: NEGATIVE
KETONES UR QL STRIP: NEGATIVE
LEUKOCYTE ESTERASE UR QL STRIP.AUTO: NEGATIVE
NITRITE UR QL STRIP: NEGATIVE
PH UR STRIP.AUTO: >=9 [PH] (ref 5–8)
PROT UR QL STRIP: ABNORMAL
SP GR UR STRIP: >=1.03 (ref 1–1.03)
UROBILINOGEN UR QL STRIP: ABNORMAL

## 2020-05-19 PROCEDURE — 81003 URINALYSIS AUTO W/O SCOPE: CPT | Performed by: NURSE PRACTITIONER

## 2020-05-19 NOTE — ED PROVIDER NOTES
Subjective   59 yom presents with multiple complaints.  He states he is constipated x 1 week.  He states he had a colostomy placed three years ago at [place] d/t colon cancer.  He states he takes miralax and stool softeners routinely.  He states he believes his constipation is causing his SOB to worsen as his abdomen is distended.  He denies fever.  He states he lives alone and has not been out.  He denies chest pain. He denies worsening cough. He states he has COPD and wears oxygen at 5l n/c at home.  He denies n/v. He has no abdomen tenderness.            Review of Systems   Constitutional: Negative for activity change, appetite change, fatigue and fever.   HENT: Negative for congestion, ear pain, facial swelling and sore throat.    Eyes: Negative for discharge and visual disturbance.   Respiratory: Positive for cough and shortness of breath. Negative for apnea, chest tightness, wheezing and stridor.    Cardiovascular: Negative for chest pain and palpitations.   Gastrointestinal: Positive for abdominal pain and constipation. Negative for abdominal distention, diarrhea, nausea and vomiting.   Genitourinary: Negative for difficulty urinating and dysuria.   Musculoskeletal: Negative for arthralgias and myalgias.   Skin: Negative for rash and wound.   Neurological: Negative for dizziness and seizures.   Psychiatric/Behavioral: Negative for agitation and confusion.       Past Medical History:   Diagnosis Date   • Arthritis    • CAD (coronary artery disease)    • Colon cancer (CMS/[facility]) 11/2016   • COPD (chronic obstructive pulmonary disease) (CMS/[facility])    • Diabetes mellitus (CMS/[facility])    • HLD (hyperlipidemia)    • Hypertension        Allergies   Allergen Reactions   • Tetanus Toxoids Shortness Of Breath and Swelling   • Lamotrigine      Unknown     • Lisinopril Hives   • Methadone Swelling   • Penicillins Rash   • Tramadol Rash       Past Surgical History:   Procedure Laterality Date   • CARDIAC SURGERY     • CARPAL  TUNNEL RELEASE     • COLON RESECTION WITH COLOSTOMY     • COLON SURGERY     • COLONOSCOPY  05/27/2016   • COLONOSCOPY N/A 10/12/2017    Procedure: COLONOSCOPY WITH ANESTHESIA;  Surgeon: Yessenia Gregg MD;  Location: [facility] ENDOSCOPY;  Service:    • CORONARY ANGIOPLASTY WITH STENT PLACEMENT     • HERNIA REPAIR     • ROTATOR CUFF REPAIR         Family History   Problem Relation Age of Onset   • Stroke Mother    • Hypertension Mother    • Arthritis Father    • Heart disease Father    • Cancer Brother    • Diabetes Brother    • Hepatitis Brother    • Hypertension Brother        Social History     Socioeconomic History   • Marital status: Single     Spouse name: Not on file   • Number of children: Not on file   • Years of education: Not on file   • Highest education level: Not on file   Tobacco Use   • Smoking status: Current Every Day Smoker     Packs/day: 1.00     Years: 40.00     Pack years: 40.00   • Smokeless tobacco: Never Used   Substance and Sexual Activity   • Alcohol use: No   • Drug use: No   • Sexual activity: Defer           Objective   Physical Exam   Constitutional: He is oriented to person, place, and time. He appears well-developed.   HENT:   Head: Normocephalic.   Eyes: Pupils are equal, round, and reactive to light. EOM are normal.   Neck: Normal range of motion. Neck supple.   Cardiovascular: Normal rate and regular rhythm.   No murmur heard.  Pulmonary/Chest: Effort normal. He has decreased breath sounds. He has no wheezes.   Abdominal: Soft. Bowel sounds are normal. He exhibits distension. There is no tenderness.   Colostomy present   Musculoskeletal: Normal range of motion.   Neurological: He is alert and oriented to person, place, and time.   Skin: Skin is warm and dry.   Psychiatric: He has a normal mood and affect.   Nursing note and vitals reviewed.      Procedures           ED Course  ED Course as of May 19 1706   Mon May 18, 2020   2220 Alerted to abnormal ABG.  Past ABG reviewed and this  appears to be his baseline.     [KS]   Tue May 19, 2020   0053 I discussed the patient's history, assessment and testing results with Dr Guerra.  [facility]  He voiced understanding of mass as well as d'c instructions and testing results.  We will give him a disc of his CT [facility].    [KS]      ED Course User Index  [KS] Rebeka Amanda APRN Wells' Criteria for Pulmonary Embolism from [employer]  on 5/18/2020  ** All calculations should be rechecked by clinician prior to use **    RESULT SUMMARY:  0.0 points  Low risk group: 1.3% chance of PE in an ED population.     Another study assigned scores ? 4 as “PE Unlikely” and had a 3% incidence of PE.      INPUTS:  Clinical signs and symptoms of DVT --> 0 = No  PE is #1 diagnosis OR equally likely --> 0 = No  Heart rate > 100 --> 0 = No  Immobilization at least 3 days OR surgery in the previous 4 weeks --> 0 = No  Previous, objectively diagnosed PE or DVT --> 0 = No  Hemoptysis --> 0 = No  Malignancy w/ treatment within 6 months or palliative --> 0 = No                           No current facility-administered medications for this encounter.     Current Outpatient Medications:   •  acetaminophen (TYLENOL) 325 MG tablet, Take 650 mg by mouth Every 6 (Six) Hours As Needed for Mild Pain ., Disp: , Rfl:   •  albuterol (PROVENTIL) (2.5 MG/3ML) 0.083% nebulizer solution, Take 2.5 mg by nebulization Every 6 (Six) Hours As Needed for Wheezing or Shortness of Air., Disp: , Rfl:   •  albuterol sulfate  (90 Base) MCG/ACT inhaler, Inhale 2 puffs 4 (Four) Times a Day As Needed for Wheezing., Disp: , Rfl:   •  azithromycin (ZITHROMAX) 250 MG tablet, Take 1 tablet by mouth Daily. Take 2 tablets the first day, then 1 tablet daily for 4 days., Disp: 6 tablet, Rfl: 0  •  azithromycin (ZITHROMAX) 500 MG tablet, Take 1 tablet by mouth Daily., Disp: 4 tablet, Rfl: 0  •  bacitracin 500 UNIT/GM ointment, Apply  topically  to the appropriate area as directed Daily. Colostomy, Disp: , Rfl:   •  carboxymethylcellulose (REFRESH PLUS) 0.5 % solution, Administer 1 drop to both eyes 4 (Four) Times a Day As Needed for Dry Eyes., Disp: , Rfl:   •  Cholecalciferol (VITAMIN D) 2000 units tablet, Take 6,000 Units by mouth Daily., Disp: , Rfl:   •  clopidogrel (PLAVIX) 75 MG tablet, Take 75 mg by mouth Daily., Disp: , Rfl:   •  cyclobenzaprine (FLEXERIL) 10 MG tablet, Take 10 mg by mouth every night at bedtime., Disp: , Rfl:   •  docusate sodium (COLACE) 100 MG capsule, Take 100 mg by mouth 2 (Two) Times a Day As Needed for Constipation., Disp: , Rfl:   •  dorzolamide (TRUSOPT) 2 % ophthalmic solution, Administer 1 drop to the right eye 2 (Two) Times a Day., Disp: , Rfl:   •  DULoxetine (CYMBALTA) 30 MG capsule, Take 30 mg by mouth Daily., Disp: , Rfl:   •  finasteride (PROSCAR) 5 MG tablet, Take 1 tablet by mouth Daily., Disp: 30 tablet, Rfl: 2  •  folic acid (FOLVITE) 1 MG tablet, Take 1 mg by mouth Daily., Disp: , Rfl:   •  furosemide (LASIX) 20 MG tablet, Take 20 mg by mouth Daily., Disp: , Rfl:   •  guaiFENesin (HUMIBID 3) 400 MG tablet, Take 800 mg by mouth Every 12 (Twelve) Hours., Disp: , Rfl:   •  hydrocortisone (ANUSOL-HC) 25 MG suppository, Insert 25 mg into the rectum 2 (Two) Times a Day As Needed for Hemorrhoids., Disp: , Rfl:   •  insulin aspart (novoLOG) 100 UNIT/ML injection, Inject 10 Units under the skin into the appropriate area as directed 3 (Three) Times a Day Before Meals., Disp: , Rfl: 12  •  insulin glargine (LANTUS) 100 UNIT/ML injection, Inject 40 Units under the skin into the appropriate area as directed Daily., Disp: , Rfl: 12  •  isosorbide mononitrate (IMDUR) 60 MG 24 hr tablet, Take 90 mg by mouth Daily., Disp: , Rfl:   •  levothyroxine (SYNTHROID, LEVOTHROID) 50 MCG tablet, Take 50 mcg by mouth Daily., Disp: , Rfl:   •  loratadine (CLARITIN) 10 MG tablet, Take 10 mg by mouth Daily., Disp: , Rfl:   •  losartan  (COZAAR) 100 MG tablet, Take 0.5 tablets by mouth Daily., Disp: , Rfl:   •  metFORMIN (GLUCOPHAGE) 500 MG tablet, Take 1,000 mg by mouth 2 (Two) Times a Day With Meals., Disp: , Rfl:   •  methylPREDNISolone (MEDROL, SHELL,) 4 MG tablet, Take as directed on package instructions., Disp: 21 each, Rfl: 0  •  Morphine (MSIR) 15 MG tablet, Take 15 mg by mouth 3 (Three) Times a Day As Needed for Severe Pain ., Disp: , Rfl:   •  Multiple Vitamins-Minerals (MULTIVITAMIN WITH MINERALS) tablet tablet, Take 1 tablet by mouth Daily., Disp: , Rfl:   •  naloxone (NARCAN) 4 MG/0.1ML nasal spray, 1 spray into the nostril(s) as directed by provider As Needed (O.D.)., Disp: , Rfl:   •  NIFEdipine XL (PROCARDIA XL) 30 MG 24 hr tablet, Take 30 mg by mouth Daily., Disp: , Rfl:   •  nitroglycerin (NITROSTAT) 0.4 MG SL tablet, Place 0.4 mg under the tongue Every 5 (Five) Minutes As Needed for Chest Pain., Disp: , Rfl:   •  OLANZapine (zyPREXA) 10 MG tablet, Take 10 mg by mouth Every Night., Disp: , Rfl:   •  polyethylene glycol (MIRALAX) packet, Take 17 g by mouth Daily., Disp: , Rfl:   •  predniSONE (DELTASONE) 10 MG tablet, Take 4 tabs po daily x 2 days, then 3 tabs po daily x 2 days, then 2 tabs po daily x 2 days, then 1 tab po daily x 2 days, then stop, Disp: 20 tablet, Rfl: 0  •  pregabalin (LYRICA) 150 MG capsule, Take 300 mg by mouth 2 (Two) Times a Day., Disp: , Rfl:   •  raNITIdine (ZANTAC) 150 MG tablet, Take 150 mg by mouth 2 (Two) Times a Day As Needed for Indigestion., Disp: , Rfl:   •  Skin Protectants, Misc. (EUCERIN) cream, Apply  topically to the appropriate area as directed As Needed for Dry Skin., Disp: , Rfl:   •  sodium chloride 0.65 % nasal spray, 2 sprays into the nostril(s) as directed by provider As Needed for Congestion., Disp: , Rfl:   •  spironolactone (ALDACTONE) 25 MG tablet, Take 25 mg by mouth Daily., Disp: , Rfl:   •  tamsulosin (FLOMAX) 0.4 MG capsule 24 hr capsule, Take 1 capsule by mouth Every Night.,  "Disp: , Rfl:     Vital signs:  /84   Pulse 93   Temp 97.8 °F (36.6 °C) (Oral)   Resp 18   Ht 170.2 cm (67\")   Wt 96.6 kg (213 lb)   SpO2 94%   BMI 33.36 kg/m²        ED LAB RESULTS:   Lab Results (last 24 hours)     Procedure Component Value Units Date/Time    [place] Blood Culture Bottle Set [[id number]] Collected:  05/18/20 2010    Specimen:  Blood from Hand, Left Updated:  05/18/20 2115     Extra Tube Hold for add-ons.     Comment: Auto resulted.       CBC & Differential [[id number]] Collected:  05/18/20 2010    Specimen:  Blood Updated:  05/18/20 2159    Narrative:       The following orders were created for panel order CBC & Differential.  Procedure                               Abnormality         Status                     ---------                               -----------         ------                     CBC Auto Differential[[id number]]        Abnormal            Final result                 Please view results for these tests on the individual orders.    Comprehensive Metabolic Panel [[id number]]  (Abnormal) Collected:  05/18/20 2010    Specimen:  Blood Updated:  05/18/20 2215     Glucose 280 mg/dL      BUN 6 mg/dL      Creatinine 0.52 mg/dL      Sodium 139 mmol/L      Potassium 4.2 mmol/L      Chloride 93 mmol/L      CO2 36.0 mmol/L      Calcium 9.6 mg/dL      Total Protein 7.2 g/dL      Albumin 4.20 g/dL      ALT (SGPT) 7 U/L      AST (SGOT) 21 U/L      Alkaline Phosphatase 147 U/L      Total Bilirubin 0.4 mg/dL      eGFR Non African Amer >150 mL/min/1.73      Globulin 3.0 gm/dL      A/G Ratio 1.4 g/dL      BUN/Creatinine Ratio 11.5     Anion Gap 10.0 mmol/L     Narrative:       GFR Normal >60  Chronic Kidney Disease <60  Kidney Failure <15      Protime-INR [[id number]]  (Abnormal) Collected:  05/18/20 2010    Specimen:  Blood Updated:  05/18/20 2206     Protime 14.1 Seconds      INR 1.13    aPTT [[id number]]  (Abnormal) Collected:  05/18/20 2010    Specimen:  Blood Updated:  05/18/20 2206     " PTT 40.4 seconds     Lipase [[id number]]  (Normal) Collected:  05/18/20 2010    Specimen:  Blood Updated:  05/18/20 2210     Lipase 32 U/L     Blood Culture - Blood, Hand, Left [[id number]] Collected:  05/18/20 2010    Specimen:  Blood from Hand, Left Updated:  05/18/20 2242    Troponin [[id number]]  (Normal) Collected:  05/18/20 2010    Specimen:  Blood Updated:  05/18/20 2212     Troponin T <0.010 ng/mL     Narrative:       Troponin T Reference Range:  <= 0.03 ng/mL-   Negative for AMI  >0.03 ng/mL-     Abnormal for myocardial necrosis.  Clinicians would have to utilize clinical acumen, EKG, Troponin and serial changes to determine if it is an Acute Myocardial Infarction or myocardial injury due to an underlying chronic condition.       Results may be falsely decreased if patient taking Biotin.      BNP [[id number]]  (Normal) Collected:  05/18/20 2010    Specimen:  Blood Updated:  05/18/20 2211     proBNP 44.6 pg/mL     Narrative:       Among patients with dyspnea, NT-proBNP is highly sensitive for the detection of acute congestive heart failure. In addition NT-proBNP of <300 pg/ml effectively rules out acute congestive heart failure with 99% negative predictive value.    Results may be falsely decreased if patient taking Biotin.      CBC Auto Differential [[id number]]  (Abnormal) Collected:  05/18/20 2010    Specimen:  Blood Updated:  05/18/20 2159     WBC 9.12 10*3/mm3      RBC 5.58 10*6/mm3      Hemoglobin 14.9 g/dL      Hematocrit 49.8 %      MCV 89.2 fL      MCH 26.7 pg      MCHC 29.9 g/dL      RDW 13.3 %      RDW-SD 43.3 fl      MPV 8.9 fL      Platelets 165 10*3/mm3      Neutrophil % 63.6 %      Lymphocyte % 27.9 %      Monocyte % 6.5 %      Eosinophil % 0.8 %      Basophil % 0.9 %      Immature Grans % 0.3 %      Neutrophils, Absolute 5.81 10*3/mm3      Lymphocytes, Absolute 2.54 10*3/mm3      Monocytes, Absolute 0.59 10*3/mm3      Eosinophils, Absolute 0.07 10*3/mm3      Basophils, Absolute 0.08 10*3/mm3       Immature Grans, Absolute 0.03 10*3/mm3      nRBC 0.0 /100 WBC     Blood Gas, Arterial [[id number]]  (Abnormal) Collected:  05/18/20 2219    Specimen:  Arterial Blood Updated:  05/18/20 2219     Site Right Radial     Jarad's Test Positive     pH, Arterial 7.351 pH units      pCO2, Arterial 72.9 mm Hg      Comment: 86 Value above critical limit        pO2, Arterial 55.7 mm Hg      Comment: 84 Value below reference range        HCO3, Arterial 40.3 mmol/L      Comment: 83 Value above reference range        Base Excess, Arterial 11.1 mmol/L      Comment: 83 Value above reference range        O2 Saturation, Arterial 89.2 %      Comment: 84 Value below reference range        Temperature 37.0 C      Barometric Pressure for Blood Gas 746 mmHg      Modality Nasal Cannula     Flow Rate 3.5 lpm      Ventilator Mode NA     Notified Who KRISTEE SHOULDERS APRN     Notified By [id number]     Notified Time 05/18/2020 22:23     Collected by [id number]     Comment: Meter: [id number]     :  [id number]       Blood Culture - Blood, Arm, Left [[id number]] Collected:  05/18/20 2237    Specimen:  Blood from Arm, Left Updated:  05/18/20 2257    Respiratory Panel, PCR - Swab, Nasopharynx [[id number]]  (Normal) Collected:  05/18/20 2241    Specimen:  Swab from Nasopharynx Updated:  05/18/20 2343     ADENOVIRUS, PCR Not Detected     Coronavirus 229E Not Detected     Coronavirus HKU1 Not Detected     Coronavirus NL63 Not Detected     Coronavirus OC43 Not Detected     Human Metapneumovirus Not Detected     Human Rhinovirus/Enterovirus Not Detected     Influenza B PCR Not Detected     Parainfluenza Virus 1 Not Detected     Parainfluenza Virus 2 Not Detected     Parainfluenza Virus 3 Not Detected     Parainfluenza Virus 4 Not Detected     Bordetella pertussis pcr Not Detected     Influenza A H1 2009 PCR Not Detected     Chlamydophila pneumoniae PCR Not Detected     Mycoplasma pneumo by PCR Not Detected     Influenza A PCR Not Detected      Influenza A H3 Not Detected     Influenza A H1 Not Detected     RSV, PCR Not Detected     Bordetella parapertussis PCR Not Detected    Narrative:       The coronavirus on the RVP is NOT COVID-19 and is NOT indicative of infection with COVID-19.     Urinalysis With Culture If Indicated - Urine, Clean Catch [[id number]]  (Abnormal) Collected:  05/19/20 0002    Specimen:  Urine, Clean Catch Updated:  05/19/20 0016     Color, UA Yellow     Appearance, UA Clear     pH, UA >=9.0     Specific Gravity, UA >=1.030     Glucose, UA >=1000 mg/dL (3+)     Ketones, UA Negative     Bilirubin, UA Negative     Blood, UA Negative     Protein, UA Trace     Leuk Esterase, UA Negative     Nitrite, UA Negative     Urobilinogen, UA 1.0 E.U./dL    Narrative:       Urine microscopic not indicated.             IMAGING RESULTS  CT Abdomen Pelvis With Contrast   Final Result   1. New mesenteric mass in the LEFT iliac region measures up to 5.5 cm   and is concerning for metastatic disease. Additional steps could include   biopsy or PET CT.    2. Fatty liver disease. There is a 5 mm, low-density lesion in the RIGHT   hepatic lobe that is too small to characterize.       A preliminary report was provided by [facility]. I   agree with the preliminary interpretation.           This report was finalized on 05/19/2020 07:19 by Dr. Kimo Leonard MD.      XR Chest 1 View   ED Interpretation   No acute cardiopulmonary process.       Final Result   . No acute disease.   This report was finalized on 05/19/2020 06:57 by Dr. Joseph Ortiz MD.               Wells' Criteria for Pulmonary Embolism from [email or web address]  on 5/18/2020  ** All calculations should be rechecked by clinician prior to use **    RESULT SUMMARY:  0.0 points  Low risk group: 1.3% chance of PE in an ED population.     Another study assigned scores ? 4 as “PE Unlikely” and had a 3% incidence of PE.      INPUTS:  Clinical signs and symptoms of DVT --> 0 = No  PE is #1  diagnosis OR equally likely --> 0 = No  Heart rate > 100 --> 0 = No  Immobilization at least 3 days OR surgery in the previous 4 weeks --> 0 = No  Previous, objectively diagnosed PE or DVT --> 0 = No  Hemoptysis --> 0 = No  Malignancy w/ treatment within 6 months or palliative --> 0 = No                MDM  Number of Diagnoses or Management Options  Chronic obstructive pulmonary disease, unspecified COPD type (CMS/HCC): established and worsening  Pelvic mass: new and requires workup     Amount and/or Complexity of Data Reviewed  Clinical lab tests: ordered and reviewed  Tests in the radiology section of CPT®: ordered and reviewed  Decide to obtain previous medical records or to obtain history from someone other than the patient: yes  Discuss the patient with other providers: yes  Independent visualization of images, tracings, or specimens: yes    Risk of Complications, Morbidity, and/or Mortality  Presenting problems: low  Diagnostic procedures: low  Management options: low    Patient Progress  Patient progress: stable      Final diagnoses:   Pelvic mass   Chronic obstructive pulmonary disease, unspecified COPD type (CMS/HCC)            Rebeka Amanda, APRN  05/19/20 1649

## 2020-05-19 NOTE — DISCHARGE INSTRUCTIONS
Increase fluids. Continue home medication.  [facility].  Follow up with surgeon and oncology - copy of disc given. Return to ED if condition does not improve or worsens

## 2020-05-19 NOTE — ED NOTES
"PATIENT STATES HE IS NOT HERE FOR SOB OR COUGH, STATES HE HAS BEEN THAT WAY FOR YEARS, STATES HE IS HERE BECAUSE HE \"NEEDS TO SHIT\"      Linda Ku, RN  05/18/20 5921    "

## 2020-05-20 ENCOUNTER — APPOINTMENT (OUTPATIENT)
Dept: CARDIAC REHAB | Facility: HOSPITAL | Age: 60
End: 2020-05-20

## 2020-05-23 LAB
BACTERIA SPEC AEROBE CULT: NORMAL
BACTERIA SPEC AEROBE CULT: NORMAL

## 2020-05-27 ENCOUNTER — APPOINTMENT (OUTPATIENT)
Dept: CARDIAC REHAB | Facility: HOSPITAL | Age: 60
End: 2020-05-27

## 2020-06-01 ENCOUNTER — APPOINTMENT (OUTPATIENT)
Dept: CARDIAC REHAB | Facility: HOSPITAL | Age: 60
End: 2020-06-01

## 2020-06-02 ENCOUNTER — TRANSCRIBE ORDERS (OUTPATIENT)
Dept: ADMINISTRATIVE | Facility: HOSPITAL | Age: 60
End: 2020-06-02

## 2020-06-02 DIAGNOSIS — C18.9 MALIGNANT NEOPLASM OF COLON, UNSPECIFIED PART OF COLON (HCC): Primary | ICD-10-CM

## 2020-06-03 ENCOUNTER — APPOINTMENT (OUTPATIENT)
Dept: CARDIAC REHAB | Facility: HOSPITAL | Age: 60
End: 2020-06-03

## 2020-06-03 ENCOUNTER — TELEPHONE (OUTPATIENT)
Dept: CARDIAC REHAB | Facility: HOSPITAL | Age: 60
End: 2020-06-03

## 2020-06-03 NOTE — TELEPHONE ENCOUNTER
Cardiopulmonary Rehab Note:  Checked in on patient. States he is doing well and voices no concerns. Is wanting to return to [facility] once reopened.

## 2020-06-05 ENCOUNTER — HOSPITAL ENCOUNTER (OUTPATIENT)
Dept: CT IMAGING | Facility: HOSPITAL | Age: 60
Discharge: HOME | End: 2020-06-05

## 2020-06-05 ENCOUNTER — HOSPITAL ENCOUNTER (EMERGENCY)
Facility: HOSPITAL | Age: 60
Discharge: HOME | End: 2020-06-05
Attending: INTERNAL MEDICINE | Admitting: INTERNAL MEDICINE

## 2020-06-05 ENCOUNTER — HOSPITAL ENCOUNTER (OUTPATIENT)
Dept: CT IMAGING | Facility: HOSPITAL | Age: 60
Discharge: HOME | End: 2020-06-05
Admitting: SPECIALIST

## 2020-06-05 VITALS
SYSTOLIC BLOOD PRESSURE: 145 MMHG | TEMPERATURE: 98.5 F | BODY MASS INDEX: 34.69 KG/M2 | HEART RATE: 94 BPM | WEIGHT: 221 LBS | DIASTOLIC BLOOD PRESSURE: 78 MMHG | RESPIRATION RATE: 20 BRPM | HEIGHT: 67 IN | OXYGEN SATURATION: 92 %

## 2020-06-05 DIAGNOSIS — N13.30 HYDRONEPHROSIS OF LEFT KIDNEY: Primary | ICD-10-CM

## 2020-06-05 DIAGNOSIS — R19.00 PELVIC MASS: ICD-10-CM

## 2020-06-05 DIAGNOSIS — C18.9 MALIGNANT NEOPLASM OF COLON, UNSPECIFIED PART OF COLON (HCC): ICD-10-CM

## 2020-06-05 LAB
ALBUMIN SERPL-MCNC: 3.9 G/DL (ref 3.5–5.2)
ALBUMIN/GLOB SERPL: 1.3 G/DL
ALP SERPL-CCNC: 132 U/L (ref 39–117)
ALT SERPL W P-5'-P-CCNC: 8 U/L (ref 1–41)
ANION GAP SERPL CALCULATED.3IONS-SCNC: 9 MMOL/L (ref 5–15)
AST SERPL-CCNC: 23 U/L (ref 1–40)
BASOPHILS # BLD AUTO: 0.05 10*3/MM3 (ref 0–0.2)
BASOPHILS NFR BLD AUTO: 0.4 % (ref 0–1.5)
BILIRUB SERPL-MCNC: 0.4 MG/DL (ref 0.2–1.2)
BUN BLD-MCNC: 6 MG/DL (ref 8–23)
BUN/CREAT SERPL: 11.1 (ref 7–25)
CALCIUM SPEC-SCNC: 9.3 MG/DL (ref 8.6–10.5)
CHLORIDE SERPL-SCNC: 94 MMOL/L (ref 98–107)
CO2 SERPL-SCNC: 35 MMOL/L (ref 22–29)
CREAT BLD-MCNC: 0.54 MG/DL (ref 0.76–1.27)
DEPRECATED RDW RBC AUTO: 41.7 FL (ref 37–54)
EOSINOPHIL # BLD AUTO: 0.01 10*3/MM3 (ref 0–0.4)
EOSINOPHIL NFR BLD AUTO: 0.1 % (ref 0.3–6.2)
ERYTHROCYTE [DISTWIDTH] IN BLOOD BY AUTOMATED COUNT: 13.2 % (ref 12.3–15.4)
GFR SERPL CREATININE-BSD FRML MDRD: >150 ML/MIN/1.73
GLOBULIN UR ELPH-MCNC: 2.9 GM/DL
GLUCOSE BLD-MCNC: 256 MG/DL (ref 65–99)
HCT VFR BLD AUTO: 45.8 % (ref 37.5–51)
HGB BLD-MCNC: 14.3 G/DL (ref 13–17.7)
IMM GRANULOCYTES # BLD AUTO: 0.06 10*3/MM3 (ref 0–0.05)
IMM GRANULOCYTES NFR BLD AUTO: 0.5 % (ref 0–0.5)
LIPASE SERPL-CCNC: 14 U/L (ref 13–60)
LYMPHOCYTES # BLD AUTO: 1.88 10*3/MM3 (ref 0.7–3.1)
LYMPHOCYTES NFR BLD AUTO: 16.3 % (ref 19.6–45.3)
MCH RBC QN AUTO: 27.1 PG (ref 26.6–33)
MCHC RBC AUTO-ENTMCNC: 31.2 G/DL (ref 31.5–35.7)
MCV RBC AUTO: 86.9 FL (ref 79–97)
MONOCYTES # BLD AUTO: 0.67 10*3/MM3 (ref 0.1–0.9)
MONOCYTES NFR BLD AUTO: 5.8 % (ref 5–12)
NEUTROPHILS # BLD AUTO: 8.84 10*3/MM3 (ref 1.7–7)
NEUTROPHILS NFR BLD AUTO: 76.9 % (ref 42.7–76)
NRBC BLD AUTO-RTO: 0 /100 WBC (ref 0–0.2)
PLATELET # BLD AUTO: 177 10*3/MM3 (ref 140–450)
PMV BLD AUTO: 8.4 FL (ref 6–12)
POTASSIUM BLD-SCNC: 4.1 MMOL/L (ref 3.5–5.2)
PROT SERPL-MCNC: 6.8 G/DL (ref 6–8.5)
RBC # BLD AUTO: 5.27 10*6/MM3 (ref 4.14–5.8)
SODIUM BLD-SCNC: 138 MMOL/L (ref 136–145)
WBC NRBC COR # BLD: 11.51 10*3/MM3 (ref 3.4–10.8)

## 2020-06-05 PROCEDURE — 96374 THER/PROPH/DIAG INJ IV PUSH: CPT

## 2020-06-05 PROCEDURE — 25010000002 ONDANSETRON PER 1 MG: Performed by: INTERNAL MEDICINE

## 2020-06-05 PROCEDURE — 83690 ASSAY OF LIPASE: CPT | Performed by: INTERNAL MEDICINE

## 2020-06-05 PROCEDURE — 78815 PET IMAGE W/CT SKULL-THIGH: CPT

## 2020-06-05 PROCEDURE — 96375 TX/PRO/DX INJ NEW DRUG ADDON: CPT

## 2020-06-05 PROCEDURE — 99282 EMERGENCY DEPT VISIT SF MDM: CPT

## 2020-06-05 PROCEDURE — 80053 COMPREHEN METABOLIC PANEL: CPT | Performed by: INTERNAL MEDICINE

## 2020-06-05 PROCEDURE — 0 FLUDEOXYGLUCOSE F18 SOLUTION: Performed by: SPECIALIST

## 2020-06-05 PROCEDURE — 25010000003 HYDROMORPHONE 1 MG/ML SOLUTION: Performed by: INTERNAL MEDICINE

## 2020-06-05 PROCEDURE — 85025 COMPLETE CBC W/AUTO DIFF WBC: CPT | Performed by: INTERNAL MEDICINE

## 2020-06-05 PROCEDURE — A9552 F18 FDG: HCPCS | Performed by: SPECIALIST

## 2020-06-05 PROCEDURE — 36415 COLL VENOUS BLD VENIPUNCTURE: CPT

## 2020-06-05 RX ORDER — KETOROLAC TROMETHAMINE 30 MG/ML
30 INJECTION, SOLUTION INTRAMUSCULAR; INTRAVENOUS ONCE
Status: DISCONTINUED | OUTPATIENT
Start: 2020-06-05 | End: 2020-06-05

## 2020-06-05 RX ORDER — ONDANSETRON 2 MG/ML
4 INJECTION INTRAMUSCULAR; INTRAVENOUS ONCE
Status: COMPLETED | OUTPATIENT
Start: 2020-06-05 | End: 2020-06-05

## 2020-06-05 RX ADMIN — HYDROMORPHONE HYDROCHLORIDE 1 MG: 1 INJECTION, SOLUTION INTRAMUSCULAR; INTRAVENOUS; SUBCUTANEOUS at 21:32

## 2020-06-05 RX ADMIN — ONDANSETRON HYDROCHLORIDE 4 MG: 2 SOLUTION INTRAMUSCULAR; INTRAVENOUS at 21:32

## 2020-06-05 RX ADMIN — FLUDEOXYGLUCOSE F18 1 DOSE: 300 INJECTION INTRAVENOUS at 11:06

## 2020-06-06 NOTE — DISCHARGE INSTRUCTIONS
Hydronephrosis    Hydronephrosis is the swelling of one or both kidneys due to a blockage that stops urine from flowing out of the body. Kidneys filter waste from the blood and produce urine. This condition can lead to kidney failure and may become life threatening if not treated promptly.  What are the causes?  Common causes of this condition include:  · Problems that occur when a baby is developing in the womb (congenital defect). These can include problems:  ? In the kidneys.  ? In the tubes that drain urine from the kidneys into the bladder (ureters).  · Kidney stones.  · Bladder infection.  · An enlarged prostate gland.  · Scar tissue from a previous surgery or injury.  · A blood clot.  · A tumor or cyst in the abdomen or pelvis.  · Cancer of the prostate, bladder, uterus, ovary, or colon.  What are the signs or symptoms?  Symptoms of this condition include:  · Pain or discomfort in your side (flank).  · Pain and swelling in your abdomen.  · Nausea and vomiting.  · Fever.  · Pain when passing urine.  · Feelings of urgency when you need to urinate.  · Urinating more often than normal.  In some cases, you may not have any symptoms.  How is this diagnosed?  This condition may be diagnosed based on:  · Your symptoms and medical history.  · A physical exam.  · Blood and urine tests.  · Imaging tests, such as an ultrasound, CT scan, or MRI.  · A procedure in which a scope is inserted into the urethra and used to view parts of the urinary tract and bladder (cystoscopy).  How is this treated?  Treatment for this condition depends on where the blockage is, how long it has been there, and what caused it. The goal of treatment is to remove the blockage. Treatment may include:  · Antibiotic medicines to treat or prevent infection.  · A procedure to place a small, thin tube (stent) into a blocked ureter. The stent will keep the ureter open so that urine can drain through it.  · A nonsurgical procedure that crushes kidney  stones with shock waves (extracorporeal shock wave lithotripsy).  · If kidney failure occurs, treatment may include dialysis or a kidney transplant.  Follow these instructions at home:    · Take over-the-counter and prescription medicines only as told by your health care provider.  · Rest and return to your normal activities as told by your health care provider. Ask your health care provider what activities are safe for you.  · Drink enough fluid to keep your urine pale yellow.  · If you were prescribed an antibiotic medicine, take it exactly as told by your health care provider. Do not stop taking the antibiotic even if you start to feel better.  · Keep all follow-up visits as told by your health care provider. This is important.  Contact a health care provider if:  · You continue to have symptoms after treatment.  · You develop new symptoms.  · Your urine becomes cloudy or bloody.  · You have a fever.  Get help right away if:  · You have severe flank or abdominal pain.  · You cannot drink fluids without vomiting.  Summary  · Hydronephrosis is the swelling of one or both kidneys due to a blockage that stops urine from flowing out of the body.  · Hydronephrosis can lead to kidney failure and may become life threatening if not treated promptly.  · The goal of treatment is to treat the cause of the blockage. It may include insertion of stent into a blocked ureter, a procedure to treat kidney stones, and antibiotic medicines.  · Follow your health care provider's instructions for taking care of yourself at home, including instructions about drinking fluids, taking medicines, and limiting activities.  This information is not intended to replace advice given to you by your health care provider. Make sure you discuss any questions you have with your health care provider.  Document Released: 10/14/2008 Document Revised: 12/29/2018 Document Reviewed: 12/29/2018  [email or web address] Patient Education © 2020 [employer]

## 2020-06-07 NOTE — ED PROVIDER NOTES
Subjective   Mr. Adam is a 59-year-old gentleman with a history of colon cancer who is had increased amounts of left flank pain and was sent for a image today to try to better delineate his source of pain..  At which point time he was found to have possible hydronephrosis in the imaging center called his primary care doctor who asked him to come to the emergency room.  He denies fevers chills nausea or vomiting he states he has been able to urinate.          Review of Systems   Constitutional: Negative for chills and fever.   HENT: Negative for congestion, ear pain and sinus pressure.    Eyes: Negative for photophobia, pain and visual disturbance.   Respiratory: Negative for cough, chest tightness, shortness of breath and wheezing.    Cardiovascular: Negative for chest pain and palpitations.   Gastrointestinal: Negative for abdominal pain, diarrhea and nausea.   Endocrine: Negative for cold intolerance and heat intolerance.   Genitourinary: Positive for flank pain. Negative for difficulty urinating and urgency.   Musculoskeletal: Negative for arthralgias, joint swelling and myalgias.   Skin: Negative for color change and wound.   Neurological: Negative for dizziness and headaches.   Hematological: Negative for adenopathy. Does not bruise/bleed easily.   Psychiatric/Behavioral: Negative for agitation, behavioral problems, confusion and decreased concentration.       Past Medical History:   Diagnosis Date   • Arthritis    • CAD (coronary artery disease)    • Colon cancer (CMS/[facility]) 11/2016   • COPD (chronic obstructive pulmonary disease) (CMS/[facility])    • Diabetes mellitus (CMS/[facility])    • HLD (hyperlipidemia)    • Hypertension        Allergies   Allergen Reactions   • Tetanus Toxoids Shortness Of Breath and Swelling   • Lamotrigine      Unknown     • Lisinopril Hives   • Methadone Swelling   • Penicillins Rash   • Tramadol Rash       Past Surgical History:   Procedure Laterality Date   • CARDIAC SURGERY     • CARPAL  TUNNEL RELEASE     • COLON RESECTION WITH COLOSTOMY     • COLON SURGERY     • COLONOSCOPY  05/27/2016   • COLONOSCOPY N/A 10/12/2017    Procedure: COLONOSCOPY WITH ANESTHESIA;  Surgeon: Yessenia Gregg MD;  Location: [facility] ENDOSCOPY;  Service:    • CORONARY ANGIOPLASTY WITH STENT PLACEMENT     • HERNIA REPAIR     • ROTATOR CUFF REPAIR         Family History   Problem Relation Age of Onset   • Stroke Mother    • Hypertension Mother    • Arthritis Father    • Heart disease Father    • Cancer Brother    • Diabetes Brother    • Hepatitis Brother    • Hypertension Brother        Social History     Socioeconomic History   • Marital status: Single     Spouse name: Not on file   • Number of children: Not on file   • Years of education: Not on file   • Highest education level: Not on file   Tobacco Use   • Smoking status: Current Every Day Smoker     Packs/day: 1.00     Years: 40.00     Pack years: 40.00   • Smokeless tobacco: Never Used   Substance and Sexual Activity   • Alcohol use: No   • Drug use: No   • Sexual activity: Defer           Objective   Physical Exam   Constitutional: He is oriented to person, place, and time. He appears well-developed and well-nourished.   HENT:   Head: Normocephalic and atraumatic.   Mouth/Throat: Oropharynx is clear and moist.   Eyes: Pupils are equal, round, and reactive to light. EOM are normal.   Neck: Normal range of motion. Neck supple.   Cardiovascular: Normal rate, regular rhythm, normal heart sounds and intact distal pulses.   Pulmonary/Chest: Effort normal and breath sounds normal.   Abdominal: Soft. Bowel sounds are normal. He exhibits no mass. There is tenderness ( Worse in the left upper quadrant left flank.). There is no rebound and no guarding.   Musculoskeletal: Normal range of motion. He exhibits no edema.   Neurological: He is alert and oriented to person, place, and time. He has normal reflexes. No cranial nerve deficit.   Skin: Skin is warm and dry. No rash noted.    Psychiatric: He has a normal mood and affect. His behavior is normal. Thought content normal.   Nursing note and vitals reviewed.      Procedures           ED Course                                           MDM    Final diagnoses:   Hydronephrosis of left kidney   Pelvic mass            Chris Lauren MD  06/07/20 [id number]

## 2020-06-08 ENCOUNTER — APPOINTMENT (OUTPATIENT)
Dept: CARDIAC REHAB | Facility: HOSPITAL | Age: 60
End: 2020-06-08

## 2020-06-10 ENCOUNTER — APPOINTMENT (OUTPATIENT)
Dept: CARDIAC REHAB | Facility: HOSPITAL | Age: 60
End: 2020-06-10

## 2020-06-15 ENCOUNTER — APPOINTMENT (OUTPATIENT)
Dept: CARDIAC REHAB | Facility: HOSPITAL | Age: 60
End: 2020-06-15

## 2020-06-17 ENCOUNTER — APPOINTMENT (OUTPATIENT)
Dept: CARDIAC REHAB | Facility: HOSPITAL | Age: 60
End: 2020-06-17

## 2020-06-22 ENCOUNTER — APPOINTMENT (OUTPATIENT)
Dept: CARDIAC REHAB | Facility: HOSPITAL | Age: 60
End: 2020-06-22

## 2020-06-24 ENCOUNTER — APPOINTMENT (OUTPATIENT)
Dept: CARDIAC REHAB | Facility: HOSPITAL | Age: 60
End: 2020-06-24

## 2020-06-29 ENCOUNTER — APPOINTMENT (OUTPATIENT)
Dept: CARDIAC REHAB | Facility: HOSPITAL | Age: 60
End: 2020-06-29

## 2020-07-01 ENCOUNTER — APPOINTMENT (OUTPATIENT)
Dept: CARDIAC REHAB | Facility: HOSPITAL | Age: 60
End: 2020-07-01

## 2020-08-04 ENCOUNTER — TELEPHONE (OUTPATIENT)
Dept: RADIATION ONCOLOGY | Facility: HOSPITAL | Age: 60
End: 2020-08-04

## 2020-08-04 NOTE — TELEPHONE ENCOUNTER
[facility]  Patient was offered an appointment for 8/7 but requested 8/17 due to transportation issues. Patient is scheduled to see Dr. Javid Sorensen 8/17 at 1:00pm

## 2020-08-14 ENCOUNTER — HOSPITAL ENCOUNTER (OUTPATIENT)
Dept: RADIATION ONCOLOGY | Facility: HOSPITAL | Age: 60
Setting detail: RADIATION/ONCOLOGY SERIES
End: 2020-08-14

## 2020-08-14 RX ORDER — BUPROPION HYDROCHLORIDE 100 MG/1
100 TABLET, EXTENDED RELEASE ORAL 2 TIMES DAILY
COMMUNITY

## 2020-08-14 RX ORDER — ATENOLOL 100 MG/1
100 TABLET ORAL 2 TIMES DAILY
COMMUNITY

## 2020-08-14 RX ORDER — PREGABALIN 300 MG/1
300 CAPSULE ORAL 2 TIMES DAILY
COMMUNITY

## 2020-08-14 RX ORDER — NICOTINE 21 MG/24HR
1 PATCH, TRANSDERMAL 24 HOURS TRANSDERMAL EVERY 24 HOURS
COMMUNITY

## 2020-08-14 RX ORDER — ISOSORBIDE MONONITRATE 30 MG/1
90 TABLET, EXTENDED RELEASE ORAL DAILY
COMMUNITY

## 2020-08-14 RX ORDER — INSULIN GLARGINE 100 [IU]/ML
170 INJECTION, SOLUTION SUBCUTANEOUS NIGHTLY
COMMUNITY

## 2020-08-14 RX ORDER — DOXAZOSIN MESYLATE 4 MG/1
4 TABLET ORAL NIGHTLY
COMMUNITY

## 2020-08-14 RX ORDER — LOSARTAN POTASSIUM 100 MG/1
100 TABLET ORAL DAILY
COMMUNITY

## 2020-08-14 RX ORDER — FAMOTIDINE 20 MG/1
20 TABLET, FILM COATED ORAL 2 TIMES DAILY
COMMUNITY

## 2020-08-14 RX ORDER — ERGOCALCIFEROL 1.25 MG/1
50000 CAPSULE ORAL WEEKLY
COMMUNITY

## 2020-08-14 RX ORDER — HYDROCODONE BITARTRATE AND ACETAMINOPHEN 7.5; 325 MG/1; MG/1
1 TABLET ORAL EVERY 8 HOURS PRN
COMMUNITY

## 2020-08-14 RX ORDER — PROMETHAZINE HYDROCHLORIDE 25 MG/1
25 TABLET ORAL EVERY 8 HOURS PRN
COMMUNITY

## 2020-08-14 RX ORDER — TRAZODONE HYDROCHLORIDE 100 MG/1
100 TABLET ORAL NIGHTLY
COMMUNITY

## 2020-08-14 RX ORDER — ACETYLCYSTEINE 200 MG/ML
4 SOLUTION ORAL; RESPIRATORY (INHALATION) 3 TIMES DAILY
COMMUNITY

## 2020-08-16 PROBLEM — F17.200 CURRENT EVERY DAY SMOKER: Status: ACTIVE | Noted: 2020-08-16

## 2020-08-17 ENCOUNTER — CONSULT (OUTPATIENT)
Dept: RADIATION ONCOLOGY | Facility: HOSPITAL | Age: 60
End: 2020-08-17

## 2020-08-17 VITALS
HEIGHT: 67 IN | HEART RATE: 91 BPM | BODY MASS INDEX: 34.06 KG/M2 | SYSTOLIC BLOOD PRESSURE: 124 MMHG | DIASTOLIC BLOOD PRESSURE: 64 MMHG | WEIGHT: 217 LBS

## 2020-08-17 DIAGNOSIS — F17.200 CURRENT EVERY DAY SMOKER: ICD-10-CM

## 2020-08-17 DIAGNOSIS — Z85.038 HISTORY OF COLON CANCER: ICD-10-CM

## 2020-08-17 DIAGNOSIS — C18.7 MALIGNANT NEOPLASM OF SIGMOID COLON (HCC): Primary | ICD-10-CM

## 2020-08-17 PROBLEM — J44.9 SEVERE CHRONIC OBSTRUCTIVE PULMONARY DISEASE (HCC): Status: ACTIVE | Noted: 2017-11-24

## 2020-08-17 PROBLEM — H54.61 VISION LOSS OF RIGHT EYE: Status: ACTIVE | Noted: 2020-08-17

## 2020-08-17 PROCEDURE — G0463 HOSPITAL OUTPT CLINIC VISIT: HCPCS | Performed by: RADIOLOGY

## 2020-08-17 RX ORDER — OXYCODONE HYDROCHLORIDE 5 MG/1
5 CAPSULE ORAL EVERY 6 HOURS PRN
COMMUNITY

## 2020-08-18 ENCOUNTER — TELEPHONE (OUTPATIENT)
Dept: RADIATION ONCOLOGY | Facility: HOSPITAL | Age: 60
End: 2020-08-18

## 2020-08-18 NOTE — TELEPHONE ENCOUNTER
SW attempted to speak to Mr. Adam due to distress score. Left message and will wait for return call.

## 2020-08-18 NOTE — TELEPHONE ENCOUNTER
VASQUEZ spoke to Mr. Adam via phone due to distress score of 5. He is a 59 year old male who lives alone in [place], [place]. He is currently receiving chemo treatments and may also have radiation treatments. He has a limited support system. He has two sons who live near him but they work and are not able to help him at this time. He said [facility] is [facility] and he has been setting it up. He uses [employer]2 and has a scooter. He states his main concern is if he will have time to complete radiation treatments. Mr. Adam states he has a /caregiver that comes to his house on Monday, Tuesday, and Thursday from 11:30AM-1:30PM and on Friday from 3:30PM-5:30PM. He get assistance with bathing and cleaning the house. He has been receiving these [facility] years [facility]. Informed Mr. Adam that if the doctor is recommending radiation treatments they can be given early in the day or in the afternoon pending his schedule. Mr. Adam states he does not get out of bed before 9:30AM and he does not like his appointments later then 2:00PM. He also goes to chemo on Wednesday and Friday twice a month his next chemo appointments are August 26th and 28th. Mr. Adam states he plans to speak to his chemo doctor before making any decisions on radiation treatments. He is visually impaired. He said his pain meds do keep his pain under control. He sleeps well most of the time. He said he does have some anxiety and has been [facility] a counselor [facility] three months. He does deep breathing exercises when he is anxious and states that helps.  VASQUEZ will remain available.

## 2020-09-01 ENCOUNTER — HOSPITAL ENCOUNTER (OUTPATIENT)
Dept: RADIATION ONCOLOGY | Facility: HOSPITAL | Age: 60
Setting detail: RADIATION/ONCOLOGY SERIES
End: 2020-09-01

## 2020-10-01 ENCOUNTER — HOSPITAL ENCOUNTER (OUTPATIENT)
Dept: RADIATION ONCOLOGY | Facility: HOSPITAL | Age: 60
Setting detail: RADIATION/ONCOLOGY SERIES
End: 2020-10-01

## 2020-11-15 ENCOUNTER — HOSPITAL ENCOUNTER (INPATIENT)
Facility: HOSPITAL | Age: 60
LOS: 3 days | Discharge: HOME HEALTH | End: 2020-11-18
Attending: INTERNAL MEDICINE | Admitting: INTERNAL MEDICINE

## 2020-11-15 ENCOUNTER — APPOINTMENT (OUTPATIENT)
Dept: CT IMAGING | Facility: HOSPITAL | Age: 60
End: 2020-11-15

## 2020-11-15 ENCOUNTER — APPOINTMENT (OUTPATIENT)
Dept: GENERAL RADIOLOGY | Facility: HOSPITAL | Age: 60
End: 2020-11-15

## 2020-11-15 DIAGNOSIS — J96.22 ACUTE ON CHRONIC RESPIRATORY FAILURE WITH HYPERCAPNIA (HCC): ICD-10-CM

## 2020-11-15 DIAGNOSIS — J96.01 ACUTE RESPIRATORY FAILURE WITH HYPOXIA AND HYPERCARBIA (HCC): Primary | ICD-10-CM

## 2020-11-15 DIAGNOSIS — Z78.9 DECREASED ACTIVITIES OF DAILY LIVING (ADL): ICD-10-CM

## 2020-11-15 DIAGNOSIS — J18.9 PNEUMONIA DUE TO INFECTIOUS ORGANISM, UNSPECIFIED LATERALITY, UNSPECIFIED PART OF LUNG: ICD-10-CM

## 2020-11-15 DIAGNOSIS — J96.02 ACUTE RESPIRATORY FAILURE WITH HYPOXIA AND HYPERCARBIA (HCC): Primary | ICD-10-CM

## 2020-11-15 DIAGNOSIS — Z74.09 IMPAIRED FUNCTIONAL MOBILITY AND ACTIVITY TOLERANCE: ICD-10-CM

## 2020-11-15 DIAGNOSIS — J44.9 SEVERE CHRONIC OBSTRUCTIVE PULMONARY DISEASE (HCC): ICD-10-CM

## 2020-11-15 PROBLEM — R33.8 ACUTE URINARY RETENTION: Status: ACTIVE | Noted: 2020-11-15

## 2020-11-15 LAB
ALBUMIN SERPL-MCNC: 3.5 G/DL (ref 3.5–5.2)
ALBUMIN/GLOB SERPL: 1.2 G/DL
ALP SERPL-CCNC: 100 U/L (ref 39–117)
ALT SERPL W P-5'-P-CCNC: 11 U/L (ref 1–41)
ANION GAP SERPL CALCULATED.3IONS-SCNC: 2 MMOL/L (ref 5–15)
ARTERIAL PATENCY WRIST A: POSITIVE
AST SERPL-CCNC: 43 U/L (ref 1–40)
ATMOSPHERIC PRESS: 753 MMHG
BASE EXCESS BLDA CALC-SCNC: 14.6 MMOL/L (ref 0–2)
BASOPHILS # BLD AUTO: 0.05 10*3/MM3 (ref 0–0.2)
BASOPHILS NFR BLD AUTO: 0.5 % (ref 0–1.5)
BDY SITE: ABNORMAL
BILIRUB SERPL-MCNC: 0.5 MG/DL (ref 0–1.2)
BILIRUB UR QL STRIP: NEGATIVE
BODY TEMPERATURE: 37 C
BUN SERPL-MCNC: 6 MG/DL (ref 8–23)
BUN/CREAT SERPL: 12.2 (ref 7–25)
CALCIUM SPEC-SCNC: 8.7 MG/DL (ref 8.6–10.5)
CHLORIDE SERPL-SCNC: 94 MMOL/L (ref 98–107)
CLARITY UR: CLEAR
CO2 SERPL-SCNC: 42 MMOL/L (ref 22–29)
COLOR UR: ABNORMAL
CREAT SERPL-MCNC: 0.49 MG/DL (ref 0.76–1.27)
D-LACTATE SERPL-SCNC: 1.4 MMOL/L (ref 0.5–2)
DEPRECATED RDW RBC AUTO: 49.5 FL (ref 37–54)
EOSINOPHIL # BLD AUTO: 0.01 10*3/MM3 (ref 0–0.4)
EOSINOPHIL NFR BLD AUTO: 0.1 % (ref 0.3–6.2)
ERYTHROCYTE [DISTWIDTH] IN BLOOD BY AUTOMATED COUNT: 14.6 % (ref 12.3–15.4)
GAS FLOW AIRWAY: 4.5 LPM
GFR SERPL CREATININE-BSD FRML MDRD: >150 ML/MIN/1.73
GLOBULIN UR ELPH-MCNC: 2.9 GM/DL
GLUCOSE BLDC GLUCOMTR-MCNC: 145 MG/DL (ref 70–130)
GLUCOSE SERPL-MCNC: 292 MG/DL (ref 65–99)
GLUCOSE UR STRIP-MCNC: ABNORMAL MG/DL
HCO3 BLDA-SCNC: 45.5 MMOL/L (ref 20–26)
HCT VFR BLD AUTO: 40.2 % (ref 37.5–51)
HGB BLD-MCNC: 12.2 G/DL (ref 13–17.7)
HGB UR QL STRIP.AUTO: NEGATIVE
IMM GRANULOCYTES # BLD AUTO: 0.17 10*3/MM3 (ref 0–0.05)
IMM GRANULOCYTES NFR BLD AUTO: 1.7 % (ref 0–0.5)
KETONES UR QL STRIP: NEGATIVE
LEUKOCYTE ESTERASE UR QL STRIP.AUTO: NEGATIVE
LYMPHOCYTES # BLD AUTO: 2.1 10*3/MM3 (ref 0.7–3.1)
LYMPHOCYTES NFR BLD AUTO: 21.4 % (ref 19.6–45.3)
Lab: ABNORMAL
Lab: ABNORMAL
MCH RBC QN AUTO: 27.9 PG (ref 26.6–33)
MCHC RBC AUTO-ENTMCNC: 30.3 G/DL (ref 31.5–35.7)
MCV RBC AUTO: 91.8 FL (ref 79–97)
MODALITY: ABNORMAL
MONOCYTES # BLD AUTO: 0.86 10*3/MM3 (ref 0.1–0.9)
MONOCYTES NFR BLD AUTO: 8.8 % (ref 5–12)
NEUTROPHILS NFR BLD AUTO: 6.63 10*3/MM3 (ref 1.7–7)
NEUTROPHILS NFR BLD AUTO: 67.5 % (ref 42.7–76)
NITRITE UR QL STRIP: NEGATIVE
NOTIFIED BY: ABNORMAL
NOTIFIED WHO: ABNORMAL
NRBC BLD AUTO-RTO: 0 /100 WBC (ref 0–0.2)
PCO2 BLDA: 96.3 MM HG (ref 35–45)
PCO2 TEMP ADJ BLD: 96.3 MM HG (ref 35–45)
PH BLDA: 7.28 PH UNITS (ref 7.35–7.45)
PH UR STRIP.AUTO: 5.5 [PH] (ref 5–8)
PH, TEMP CORRECTED: 7.28 PH UNITS (ref 7.35–7.45)
PLATELET # BLD AUTO: 192 10*3/MM3 (ref 140–450)
PMV BLD AUTO: 9 FL (ref 6–12)
PO2 BLDA: 69.7 MM HG (ref 83–108)
PO2 TEMP ADJ BLD: 69.7 MM HG (ref 83–108)
POTASSIUM SERPL-SCNC: 4.5 MMOL/L (ref 3.5–5.2)
PROCALCITONIN SERPL-MCNC: 0.12 NG/ML (ref 0–0.25)
PROT SERPL-MCNC: 6.4 G/DL (ref 6–8.5)
PROT UR QL STRIP: ABNORMAL
RBC # BLD AUTO: 4.38 10*6/MM3 (ref 4.14–5.8)
SAO2 % BLDCOA: 94.3 % (ref 94–99)
SARS-COV-2 RNA PNL SPEC NAA+PROBE: NOT DETECTED
SODIUM SERPL-SCNC: 138 MMOL/L (ref 136–145)
SP GR UR STRIP: >1.03 (ref 1–1.03)
TROPONIN T SERPL-MCNC: 0.02 NG/ML (ref 0–0.03)
UROBILINOGEN UR QL STRIP: ABNORMAL
VENTILATOR MODE: ABNORMAL
WBC # BLD AUTO: 9.82 10*3/MM3 (ref 3.4–10.8)

## 2020-11-15 PROCEDURE — 87899 AGENT NOS ASSAY W/OPTIC: CPT | Performed by: INTERNAL MEDICINE

## 2020-11-15 PROCEDURE — 84145 PROCALCITONIN (PCT): CPT | Performed by: PHYSICIAN ASSISTANT

## 2020-11-15 PROCEDURE — 94799 UNLISTED PULMONARY SVC/PX: CPT

## 2020-11-15 PROCEDURE — 82803 BLOOD GASES ANY COMBINATION: CPT

## 2020-11-15 PROCEDURE — 25010000002 METHYLPREDNISOLONE PER 40 MG: Performed by: INTERNAL MEDICINE

## 2020-11-15 PROCEDURE — 83605 ASSAY OF LACTIC ACID: CPT | Performed by: PHYSICIAN ASSISTANT

## 2020-11-15 PROCEDURE — 74177 CT ABD & PELVIS W/CONTRAST: CPT

## 2020-11-15 PROCEDURE — P9612 CATHETERIZE FOR URINE SPEC: HCPCS

## 2020-11-15 PROCEDURE — 71045 X-RAY EXAM CHEST 1 VIEW: CPT

## 2020-11-15 PROCEDURE — 99285 EMERGENCY DEPT VISIT HI MDM: CPT

## 2020-11-15 PROCEDURE — 25010000002 AZITHROMYCIN PER 500 MG: Performed by: PHYSICIAN ASSISTANT

## 2020-11-15 PROCEDURE — 93005 ELECTROCARDIOGRAM TRACING: CPT | Performed by: PHYSICIAN ASSISTANT

## 2020-11-15 PROCEDURE — 80053 COMPREHEN METABOLIC PANEL: CPT | Performed by: PHYSICIAN ASSISTANT

## 2020-11-15 PROCEDURE — 85025 COMPLETE CBC W/AUTO DIFF WBC: CPT | Performed by: PHYSICIAN ASSISTANT

## 2020-11-15 PROCEDURE — 0 IOPAMIDOL PER 1 ML: Performed by: PHYSICIAN ASSISTANT

## 2020-11-15 PROCEDURE — 25010000002 CEFTRIAXONE PER 250 MG: Performed by: PHYSICIAN ASSISTANT

## 2020-11-15 PROCEDURE — 87040 BLOOD CULTURE FOR BACTERIA: CPT | Performed by: PHYSICIAN ASSISTANT

## 2020-11-15 PROCEDURE — 87641 MR-STAPH DNA AMP PROBE: CPT | Performed by: INTERNAL MEDICINE

## 2020-11-15 PROCEDURE — 82962 GLUCOSE BLOOD TEST: CPT

## 2020-11-15 PROCEDURE — 36600 WITHDRAWAL OF ARTERIAL BLOOD: CPT

## 2020-11-15 PROCEDURE — 63710000001 INSULIN DETEMIR PER 5 UNITS: Performed by: INTERNAL MEDICINE

## 2020-11-15 PROCEDURE — 87635 SARS-COV-2 COVID-19 AMP PRB: CPT | Performed by: PHYSICIAN ASSISTANT

## 2020-11-15 PROCEDURE — 71275 CT ANGIOGRAPHY CHEST: CPT

## 2020-11-15 PROCEDURE — 94660 CPAP INITIATION&MGMT: CPT

## 2020-11-15 PROCEDURE — 81003 URINALYSIS AUTO W/O SCOPE: CPT | Performed by: PHYSICIAN ASSISTANT

## 2020-11-15 PROCEDURE — 99284 EMERGENCY DEPT VISIT MOD MDM: CPT

## 2020-11-15 PROCEDURE — 84484 ASSAY OF TROPONIN QUANT: CPT | Performed by: PHYSICIAN ASSISTANT

## 2020-11-15 PROCEDURE — 25010000002 ENOXAPARIN PER 10 MG: Performed by: INTERNAL MEDICINE

## 2020-11-15 PROCEDURE — 93010 ELECTROCARDIOGRAM REPORT: CPT | Performed by: INTERNAL MEDICINE

## 2020-11-15 RX ORDER — IPRATROPIUM BROMIDE AND ALBUTEROL SULFATE 2.5; .5 MG/3ML; MG/3ML
3 SOLUTION RESPIRATORY (INHALATION)
Status: DISCONTINUED | OUTPATIENT
Start: 2020-11-15 | End: 2020-11-17

## 2020-11-15 RX ORDER — NICOTINE POLACRILEX 4 MG
15 LOZENGE BUCCAL
Status: DISCONTINUED | OUTPATIENT
Start: 2020-11-15 | End: 2020-11-18 | Stop reason: HOSPADM

## 2020-11-15 RX ORDER — NICOTINE 21 MG/24HR
1 PATCH, TRANSDERMAL 24 HOURS TRANSDERMAL
Status: DISCONTINUED | OUTPATIENT
Start: 2020-11-15 | End: 2020-11-18 | Stop reason: HOSPADM

## 2020-11-15 RX ORDER — ONDANSETRON 2 MG/ML
4 INJECTION INTRAMUSCULAR; INTRAVENOUS ONCE
Status: DISCONTINUED | OUTPATIENT
Start: 2020-11-15 | End: 2020-11-15

## 2020-11-15 RX ORDER — DEXTROSE MONOHYDRATE 25 G/50ML
25 INJECTION, SOLUTION INTRAVENOUS
Status: DISCONTINUED | OUTPATIENT
Start: 2020-11-15 | End: 2020-11-18 | Stop reason: HOSPADM

## 2020-11-15 RX ORDER — ACETAMINOPHEN 325 MG/1
650 TABLET ORAL EVERY 4 HOURS PRN
Status: DISCONTINUED | OUTPATIENT
Start: 2020-11-15 | End: 2020-11-18 | Stop reason: HOSPADM

## 2020-11-15 RX ORDER — LEVOTHYROXINE SODIUM 0.05 MG/1
50 TABLET ORAL
Status: DISCONTINUED | OUTPATIENT
Start: 2020-11-16 | End: 2020-11-18 | Stop reason: HOSPADM

## 2020-11-15 RX ORDER — FINASTERIDE 5 MG/1
5 TABLET, FILM COATED ORAL DAILY
Status: DISCONTINUED | OUTPATIENT
Start: 2020-11-16 | End: 2020-11-18 | Stop reason: HOSPADM

## 2020-11-15 RX ORDER — ONDANSETRON 2 MG/ML
4 INJECTION INTRAMUSCULAR; INTRAVENOUS EVERY 6 HOURS PRN
Status: DISCONTINUED | OUTPATIENT
Start: 2020-11-15 | End: 2020-11-18 | Stop reason: HOSPADM

## 2020-11-15 RX ORDER — METHYLPREDNISOLONE SODIUM SUCCINATE 40 MG/ML
40 INJECTION, POWDER, LYOPHILIZED, FOR SOLUTION INTRAMUSCULAR; INTRAVENOUS EVERY 12 HOURS
Status: DISCONTINUED | OUTPATIENT
Start: 2020-11-15 | End: 2020-11-16

## 2020-11-15 RX ORDER — SODIUM CHLORIDE 0.9 % (FLUSH) 0.9 %
10 SYRINGE (ML) INJECTION AS NEEDED
Status: DISCONTINUED | OUTPATIENT
Start: 2020-11-15 | End: 2020-11-18 | Stop reason: HOSPADM

## 2020-11-15 RX ORDER — TAMSULOSIN HYDROCHLORIDE 0.4 MG/1
0.4 CAPSULE ORAL NIGHTLY
Status: DISCONTINUED | OUTPATIENT
Start: 2020-11-15 | End: 2020-11-18 | Stop reason: HOSPADM

## 2020-11-15 RX ORDER — SODIUM CHLORIDE 0.9 % (FLUSH) 0.9 %
10 SYRINGE (ML) INJECTION EVERY 12 HOURS SCHEDULED
Status: DISCONTINUED | OUTPATIENT
Start: 2020-11-15 | End: 2020-11-18 | Stop reason: HOSPADM

## 2020-11-15 RX ORDER — HYDROCODONE BITARTRATE AND ACETAMINOPHEN 7.5; 325 MG/1; MG/1
1 TABLET ORAL EVERY 8 HOURS PRN
Status: DISCONTINUED | OUTPATIENT
Start: 2020-11-15 | End: 2020-11-18 | Stop reason: HOSPADM

## 2020-11-15 RX ADMIN — SODIUM CHLORIDE, PRESERVATIVE FREE 10 ML: 5 INJECTION INTRAVENOUS at 22:09

## 2020-11-15 RX ADMIN — IOPAMIDOL 100 ML: 755 INJECTION, SOLUTION INTRAVENOUS at 15:19

## 2020-11-15 RX ADMIN — METHYLPREDNISOLONE SODIUM SUCCINATE 40 MG: 40 INJECTION, POWDER, FOR SOLUTION INTRAMUSCULAR; INTRAVENOUS at 22:08

## 2020-11-15 RX ADMIN — ENOXAPARIN SODIUM 40 MG: 40 INJECTION SUBCUTANEOUS at 22:09

## 2020-11-15 RX ADMIN — AZITHROMYCIN DIHYDRATE 500 MG: 500 INJECTION, POWDER, LYOPHILIZED, FOR SOLUTION INTRAVENOUS at 16:01

## 2020-11-15 RX ADMIN — CEFTRIAXONE SODIUM 1 G: 1 INJECTION, POWDER, FOR SOLUTION INTRAMUSCULAR; INTRAVENOUS at 15:54

## 2020-11-15 RX ADMIN — TAMSULOSIN HYDROCHLORIDE 0.4 MG: 0.4 CAPSULE ORAL at 22:10

## 2020-11-15 RX ADMIN — INSULIN DETEMIR 30 UNITS: 100 INJECTION, SOLUTION SUBCUTANEOUS at 22:10

## 2020-11-15 RX ADMIN — DOXYCYCLINE 100 MG: 100 INJECTION, POWDER, LYOPHILIZED, FOR SOLUTION INTRAVENOUS at 22:20

## 2020-11-15 RX ADMIN — NICOTINE 1 PATCH: 14 PATCH, EXTENDED RELEASE TRANSDERMAL at 22:12

## 2020-11-15 RX ADMIN — IPRATROPIUM BROMIDE AND ALBUTEROL SULFATE 3 ML: 2.5; .5 SOLUTION RESPIRATORY (INHALATION) at 23:20

## 2020-11-16 ENCOUNTER — APPOINTMENT (OUTPATIENT)
Dept: ULTRASOUND IMAGING | Facility: HOSPITAL | Age: 60
End: 2020-11-16

## 2020-11-16 LAB
ANION GAP SERPL CALCULATED.3IONS-SCNC: 4 MMOL/L (ref 5–15)
ARTERIAL PATENCY WRIST A: POSITIVE
ATMOSPHERIC PRESS: 759 MMHG
BASE EXCESS BLDA CALC-SCNC: 14.1 MMOL/L (ref 0–2)
BDY SITE: ABNORMAL
BODY TEMPERATURE: 37 C
BUN SERPL-MCNC: 8 MG/DL (ref 8–23)
BUN/CREAT SERPL: 20 (ref 7–25)
CALCIUM SPEC-SCNC: 8.9 MG/DL (ref 8.6–10.5)
CHLORIDE SERPL-SCNC: 94 MMOL/L (ref 98–107)
CO2 SERPL-SCNC: 39 MMOL/L (ref 22–29)
CREAT SERPL-MCNC: 0.4 MG/DL (ref 0.76–1.27)
DEPRECATED RDW RBC AUTO: 47.5 FL (ref 37–54)
EPAP: 8
ERYTHROCYTE [DISTWIDTH] IN BLOOD BY AUTOMATED COUNT: 14.5 % (ref 12.3–15.4)
GAS FLOW AIRWAY: 15 LPM
GFR SERPL CREATININE-BSD FRML MDRD: >150 ML/MIN/1.73
GLUCOSE BLDC GLUCOMTR-MCNC: 247 MG/DL (ref 70–130)
GLUCOSE BLDC GLUCOMTR-MCNC: 249 MG/DL (ref 70–130)
GLUCOSE BLDC GLUCOMTR-MCNC: 252 MG/DL (ref 70–130)
GLUCOSE SERPL-MCNC: 269 MG/DL (ref 65–99)
HBA1C MFR BLD: 8.5 % (ref 4.8–5.6)
HCO3 BLDA-SCNC: 40.3 MMOL/L (ref 20–26)
HCT VFR BLD AUTO: 41.6 % (ref 37.5–51)
HGB BLD-MCNC: 12.4 G/DL (ref 13–17.7)
IPAP: 16
L PNEUMO1 AG UR QL IA: NEGATIVE
Lab: ABNORMAL
Lab: ABNORMAL
MCH RBC QN AUTO: 27.1 PG (ref 26.6–33)
MCHC RBC AUTO-ENTMCNC: 29.8 G/DL (ref 31.5–35.7)
MCV RBC AUTO: 90.8 FL (ref 79–97)
MODALITY: ABNORMAL
MRSA DNA SPEC QL NAA+PROBE: NORMAL
NOTIFIED BY: ABNORMAL
NOTIFIED WHO: ABNORMAL
PCO2 BLDA: 56.3 MM HG (ref 35–45)
PCO2 TEMP ADJ BLD: 56.3 MM HG (ref 35–45)
PH BLDA: 7.46 PH UNITS (ref 7.35–7.45)
PH, TEMP CORRECTED: 7.46 PH UNITS (ref 7.35–7.45)
PLATELET # BLD AUTO: 201 10*3/MM3 (ref 140–450)
PMV BLD AUTO: 8.9 FL (ref 6–12)
PO2 BLDA: 53.3 MM HG (ref 83–108)
PO2 TEMP ADJ BLD: 53.3 MM HG (ref 83–108)
POTASSIUM SERPL-SCNC: 4.3 MMOL/L (ref 3.5–5.2)
QT INTERVAL: 338 MS
QTC INTERVAL: 431 MS
RBC # BLD AUTO: 4.58 10*6/MM3 (ref 4.14–5.8)
S PNEUM AG SPEC QL LA: NEGATIVE
SAO2 % BLDCOA: 90.8 % (ref 94–99)
SET MECH RESP RATE: 18
SODIUM SERPL-SCNC: 137 MMOL/L (ref 136–145)
VENTILATOR MODE: ABNORMAL
WBC # BLD AUTO: 7.49 10*3/MM3 (ref 3.4–10.8)

## 2020-11-16 PROCEDURE — 63710000001 INSULIN DETEMIR PER 5 UNITS: Performed by: NURSE PRACTITIONER

## 2020-11-16 PROCEDURE — 94799 UNLISTED PULMONARY SVC/PX: CPT

## 2020-11-16 PROCEDURE — 97166 OT EVAL MOD COMPLEX 45 MIN: CPT | Performed by: OCCUPATIONAL THERAPIST

## 2020-11-16 PROCEDURE — 63710000001 PREDNISONE PER 1 MG: Performed by: NURSE PRACTITIONER

## 2020-11-16 PROCEDURE — 97162 PT EVAL MOD COMPLEX 30 MIN: CPT

## 2020-11-16 PROCEDURE — 25010000002 METHYLPREDNISOLONE PER 40 MG: Performed by: INTERNAL MEDICINE

## 2020-11-16 PROCEDURE — 83036 HEMOGLOBIN GLYCOSYLATED A1C: CPT | Performed by: NURSE PRACTITIONER

## 2020-11-16 PROCEDURE — 25010000002 ENOXAPARIN PER 10 MG: Performed by: INTERNAL MEDICINE

## 2020-11-16 PROCEDURE — 82962 GLUCOSE BLOOD TEST: CPT

## 2020-11-16 PROCEDURE — 85027 COMPLETE CBC AUTOMATED: CPT | Performed by: INTERNAL MEDICINE

## 2020-11-16 PROCEDURE — 80048 BASIC METABOLIC PNL TOTAL CA: CPT | Performed by: INTERNAL MEDICINE

## 2020-11-16 PROCEDURE — 25010000002 CEFTRIAXONE PER 250 MG: Performed by: NURSE PRACTITIONER

## 2020-11-16 PROCEDURE — 82803 BLOOD GASES ANY COMBINATION: CPT

## 2020-11-16 PROCEDURE — 76775 US EXAM ABDO BACK WALL LIM: CPT

## 2020-11-16 PROCEDURE — 63710000001 INSULIN LISPRO (HUMAN) PER 5 UNITS: Performed by: NURSE PRACTITIONER

## 2020-11-16 PROCEDURE — 36600 WITHDRAWAL OF ARTERIAL BLOOD: CPT

## 2020-11-16 RX ORDER — OLANZAPINE 10 MG/1
10 TABLET ORAL NIGHTLY
Status: DISCONTINUED | OUTPATIENT
Start: 2020-11-16 | End: 2020-11-18 | Stop reason: HOSPADM

## 2020-11-16 RX ORDER — CLOPIDOGREL BISULFATE 75 MG/1
75 TABLET ORAL DAILY
Status: DISCONTINUED | OUTPATIENT
Start: 2020-11-16 | End: 2020-11-18 | Stop reason: HOSPADM

## 2020-11-16 RX ORDER — POLYETHYLENE GLYCOL 3350 17 G/17G
17 POWDER, FOR SOLUTION ORAL DAILY
Status: DISCONTINUED | OUTPATIENT
Start: 2020-11-16 | End: 2020-11-18 | Stop reason: HOSPADM

## 2020-11-16 RX ORDER — PREDNISONE 20 MG/1
40 TABLET ORAL
Status: DISCONTINUED | OUTPATIENT
Start: 2020-11-16 | End: 2020-11-18 | Stop reason: HOSPADM

## 2020-11-16 RX ORDER — OXYCODONE HYDROCHLORIDE 5 MG/1
5 CAPSULE ORAL EVERY 6 HOURS PRN
Status: DISCONTINUED | OUTPATIENT
Start: 2020-11-16 | End: 2020-11-18 | Stop reason: HOSPADM

## 2020-11-16 RX ORDER — BUPROPION HYDROCHLORIDE 100 MG/1
100 TABLET, EXTENDED RELEASE ORAL EVERY 12 HOURS SCHEDULED
Status: DISCONTINUED | OUTPATIENT
Start: 2020-11-16 | End: 2020-11-18 | Stop reason: HOSPADM

## 2020-11-16 RX ORDER — LOSARTAN POTASSIUM 50 MG/1
100 TABLET ORAL DAILY
Status: DISCONTINUED | OUTPATIENT
Start: 2020-11-16 | End: 2020-11-18 | Stop reason: HOSPADM

## 2020-11-16 RX ORDER — FUROSEMIDE 20 MG/1
20 TABLET ORAL DAILY
Status: DISCONTINUED | OUTPATIENT
Start: 2020-11-16 | End: 2020-11-18 | Stop reason: HOSPADM

## 2020-11-16 RX ORDER — FAMOTIDINE 20 MG/1
20 TABLET, FILM COATED ORAL
Status: DISCONTINUED | OUTPATIENT
Start: 2020-11-16 | End: 2020-11-18 | Stop reason: HOSPADM

## 2020-11-16 RX ORDER — HYDROCODONE BITARTRATE AND ACETAMINOPHEN 10; 325 MG/1; MG/1
1 TABLET ORAL EVERY 8 HOURS PRN
Status: DISCONTINUED | OUTPATIENT
Start: 2020-11-16 | End: 2020-11-18 | Stop reason: HOSPADM

## 2020-11-16 RX ORDER — ATENOLOL 25 MG/1
100 TABLET ORAL 2 TIMES DAILY
Status: DISCONTINUED | OUTPATIENT
Start: 2020-11-16 | End: 2020-11-18 | Stop reason: HOSPADM

## 2020-11-16 RX ORDER — DULOXETIN HYDROCHLORIDE 30 MG/1
30 CAPSULE, DELAYED RELEASE ORAL DAILY
Status: DISCONTINUED | OUTPATIENT
Start: 2020-11-16 | End: 2020-11-18 | Stop reason: HOSPADM

## 2020-11-16 RX ORDER — TERAZOSIN 5 MG/1
5 CAPSULE ORAL NIGHTLY
Status: DISCONTINUED | OUTPATIENT
Start: 2020-11-16 | End: 2020-11-18 | Stop reason: HOSPADM

## 2020-11-16 RX ADMIN — IPRATROPIUM BROMIDE AND ALBUTEROL SULFATE 3 ML: 2.5; .5 SOLUTION RESPIRATORY (INHALATION) at 10:45

## 2020-11-16 RX ADMIN — DOXYCYCLINE 100 MG: 100 INJECTION, POWDER, LYOPHILIZED, FOR SOLUTION INTRAVENOUS at 21:06

## 2020-11-16 RX ADMIN — ISOSORBIDE MONONITRATE 90 MG: 30 TABLET, EXTENDED RELEASE ORAL at 11:59

## 2020-11-16 RX ADMIN — BUPROPION HYDROCHLORIDE 100 MG: 100 TABLET, EXTENDED RELEASE ORAL at 11:59

## 2020-11-16 RX ADMIN — PREDNISONE 40 MG: 20 TABLET ORAL at 11:59

## 2020-11-16 RX ADMIN — SODIUM CHLORIDE, PRESERVATIVE FREE 10 ML: 5 INJECTION INTRAVENOUS at 09:51

## 2020-11-16 RX ADMIN — INSULIN LISPRO 15 UNITS: 100 INJECTION, SOLUTION INTRAVENOUS; SUBCUTANEOUS at 11:59

## 2020-11-16 RX ADMIN — INSULIN DETEMIR 50 UNITS: 100 INJECTION, SOLUTION SUBCUTANEOUS at 20:55

## 2020-11-16 RX ADMIN — METHYLPREDNISOLONE SODIUM SUCCINATE 40 MG: 40 INJECTION, POWDER, FOR SOLUTION INTRAMUSCULAR; INTRAVENOUS at 09:51

## 2020-11-16 RX ADMIN — FUROSEMIDE 20 MG: 20 TABLET ORAL at 14:49

## 2020-11-16 RX ADMIN — SODIUM CHLORIDE, PRESERVATIVE FREE 10 ML: 5 INJECTION INTRAVENOUS at 21:06

## 2020-11-16 RX ADMIN — IPRATROPIUM BROMIDE AND ALBUTEROL SULFATE 3 ML: 2.5; .5 SOLUTION RESPIRATORY (INHALATION) at 18:26

## 2020-11-16 RX ADMIN — IPRATROPIUM BROMIDE AND ALBUTEROL SULFATE 3 ML: 2.5; .5 SOLUTION RESPIRATORY (INHALATION) at 15:22

## 2020-11-16 RX ADMIN — ATENOLOL 100 MG: 25 TABLET ORAL at 20:54

## 2020-11-16 RX ADMIN — ENOXAPARIN SODIUM 40 MG: 40 INJECTION SUBCUTANEOUS at 20:54

## 2020-11-16 RX ADMIN — TAMSULOSIN HYDROCHLORIDE 0.4 MG: 0.4 CAPSULE ORAL at 20:54

## 2020-11-16 RX ADMIN — HYDROCODONE BITARTRATE AND ACETAMINOPHEN 1 TABLET: 10; 325 TABLET ORAL at 09:52

## 2020-11-16 RX ADMIN — CLOPIDOGREL BISULFATE 75 MG: 75 TABLET, FILM COATED ORAL at 12:00

## 2020-11-16 RX ADMIN — LOSARTAN POTASSIUM 100 MG: 50 TABLET, FILM COATED ORAL at 11:59

## 2020-11-16 RX ADMIN — IPRATROPIUM BROMIDE AND ALBUTEROL SULFATE 3 ML: 2.5; .5 SOLUTION RESPIRATORY (INHALATION) at 03:25

## 2020-11-16 RX ADMIN — LEVOTHYROXINE SODIUM 50 MCG: 50 TABLET ORAL at 05:59

## 2020-11-16 RX ADMIN — CEFTRIAXONE SODIUM 2 G: 2 INJECTION, POWDER, FOR SOLUTION INTRAMUSCULAR; INTRAVENOUS at 18:02

## 2020-11-16 RX ADMIN — INSULIN LISPRO 15 UNITS: 100 INJECTION, SOLUTION INTRAVENOUS; SUBCUTANEOUS at 18:19

## 2020-11-16 RX ADMIN — IPRATROPIUM BROMIDE AND ALBUTEROL SULFATE 3 ML: 2.5; .5 SOLUTION RESPIRATORY (INHALATION) at 07:00

## 2020-11-16 RX ADMIN — OLANZAPINE 10 MG: 10 TABLET, FILM COATED ORAL at 20:55

## 2020-11-16 RX ADMIN — IPRATROPIUM BROMIDE AND ALBUTEROL SULFATE 3 ML: 2.5; .5 SOLUTION RESPIRATORY (INHALATION) at 23:07

## 2020-11-16 RX ADMIN — FAMOTIDINE 20 MG: 20 TABLET, FILM COATED ORAL at 18:02

## 2020-11-16 RX ADMIN — ATENOLOL 100 MG: 25 TABLET ORAL at 12:00

## 2020-11-16 RX ADMIN — DOXYCYCLINE 100 MG: 100 INJECTION, POWDER, LYOPHILIZED, FOR SOLUTION INTRAVENOUS at 10:05

## 2020-11-16 RX ADMIN — TERAZOSIN HYDROCHLORIDE 5 MG: 5 CAPSULE ORAL at 20:54

## 2020-11-16 RX ADMIN — FUROSEMIDE 20 MG: 20 TABLET ORAL at 14:47

## 2020-11-16 RX ADMIN — NICOTINE 1 PATCH: 14 PATCH, EXTENDED RELEASE TRANSDERMAL at 09:53

## 2020-11-16 RX ADMIN — DULOXETINE HYDROCHLORIDE 30 MG: 30 CAPSULE, DELAYED RELEASE ORAL at 11:59

## 2020-11-17 LAB
ANION GAP SERPL CALCULATED.3IONS-SCNC: 7 MMOL/L (ref 5–15)
BUN SERPL-MCNC: 11 MG/DL (ref 8–23)
BUN/CREAT SERPL: 20.4 (ref 7–25)
CALCIUM SPEC-SCNC: 9.1 MG/DL (ref 8.6–10.5)
CHLORIDE SERPL-SCNC: 96 MMOL/L (ref 98–107)
CO2 SERPL-SCNC: 34 MMOL/L (ref 22–29)
CREAT SERPL-MCNC: 0.54 MG/DL (ref 0.76–1.27)
DEPRECATED RDW RBC AUTO: 45.1 FL (ref 37–54)
ERYTHROCYTE [DISTWIDTH] IN BLOOD BY AUTOMATED COUNT: 14.4 % (ref 12.3–15.4)
GFR SERPL CREATININE-BSD FRML MDRD: >150 ML/MIN/1.73
GLUCOSE BLDC GLUCOMTR-MCNC: 102 MG/DL (ref 70–130)
GLUCOSE BLDC GLUCOMTR-MCNC: 132 MG/DL (ref 70–130)
GLUCOSE BLDC GLUCOMTR-MCNC: 176 MG/DL (ref 70–130)
GLUCOSE SERPL-MCNC: 191 MG/DL (ref 65–99)
HCT VFR BLD AUTO: 37.6 % (ref 37.5–51)
HGB BLD-MCNC: 11.7 G/DL (ref 13–17.7)
MCH RBC QN AUTO: 26.9 PG (ref 26.6–33)
MCHC RBC AUTO-ENTMCNC: 31.1 G/DL (ref 31.5–35.7)
MCV RBC AUTO: 86.4 FL (ref 79–97)
PLATELET # BLD AUTO: 238 10*3/MM3 (ref 140–450)
PMV BLD AUTO: 8.8 FL (ref 6–12)
POTASSIUM SERPL-SCNC: 3.8 MMOL/L (ref 3.5–5.2)
RBC # BLD AUTO: 4.35 10*6/MM3 (ref 4.14–5.8)
SODIUM SERPL-SCNC: 137 MMOL/L (ref 136–145)
WBC # BLD AUTO: 10.51 10*3/MM3 (ref 3.4–10.8)

## 2020-11-17 PROCEDURE — 25010000002 ENOXAPARIN PER 10 MG: Performed by: INTERNAL MEDICINE

## 2020-11-17 PROCEDURE — 82962 GLUCOSE BLOOD TEST: CPT

## 2020-11-17 PROCEDURE — 63710000001 INSULIN LISPRO (HUMAN) PER 5 UNITS: Performed by: NURSE PRACTITIONER

## 2020-11-17 PROCEDURE — 94799 UNLISTED PULMONARY SVC/PX: CPT

## 2020-11-17 PROCEDURE — 97110 THERAPEUTIC EXERCISES: CPT

## 2020-11-17 PROCEDURE — 80048 BASIC METABOLIC PNL TOTAL CA: CPT | Performed by: NURSE PRACTITIONER

## 2020-11-17 PROCEDURE — 85027 COMPLETE CBC AUTOMATED: CPT | Performed by: NURSE PRACTITIONER

## 2020-11-17 PROCEDURE — 94660 CPAP INITIATION&MGMT: CPT

## 2020-11-17 PROCEDURE — 63710000001 PREDNISONE PER 1 MG: Performed by: NURSE PRACTITIONER

## 2020-11-17 PROCEDURE — 63710000001 INSULIN DETEMIR PER 5 UNITS: Performed by: NURSE PRACTITIONER

## 2020-11-17 PROCEDURE — 97535 SELF CARE MNGMENT TRAINING: CPT | Performed by: OCCUPATIONAL THERAPIST

## 2020-11-17 PROCEDURE — 25010000002 CEFTRIAXONE PER 250 MG: Performed by: NURSE PRACTITIONER

## 2020-11-17 PROCEDURE — 97116 GAIT TRAINING THERAPY: CPT

## 2020-11-17 RX ORDER — IPRATROPIUM BROMIDE AND ALBUTEROL SULFATE 2.5; .5 MG/3ML; MG/3ML
3 SOLUTION RESPIRATORY (INHALATION)
Status: DISCONTINUED | OUTPATIENT
Start: 2020-11-17 | End: 2020-11-18 | Stop reason: HOSPADM

## 2020-11-17 RX ADMIN — CEFTRIAXONE SODIUM 2 G: 2 INJECTION, POWDER, FOR SOLUTION INTRAMUSCULAR; INTRAVENOUS at 17:28

## 2020-11-17 RX ADMIN — ATENOLOL 100 MG: 25 TABLET ORAL at 21:57

## 2020-11-17 RX ADMIN — IPRATROPIUM BROMIDE AND ALBUTEROL SULFATE 3 ML: 2.5; .5 SOLUTION RESPIRATORY (INHALATION) at 06:33

## 2020-11-17 RX ADMIN — IPRATROPIUM BROMIDE AND ALBUTEROL SULFATE 3 ML: 2.5; .5 SOLUTION RESPIRATORY (INHALATION) at 15:06

## 2020-11-17 RX ADMIN — TAMSULOSIN HYDROCHLORIDE 0.4 MG: 0.4 CAPSULE ORAL at 21:50

## 2020-11-17 RX ADMIN — INSULIN DETEMIR 65 UNITS: 100 INJECTION, SOLUTION SUBCUTANEOUS at 21:53

## 2020-11-17 RX ADMIN — IPRATROPIUM BROMIDE AND ALBUTEROL SULFATE 3 ML: 2.5; .5 SOLUTION RESPIRATORY (INHALATION) at 03:27

## 2020-11-17 RX ADMIN — DOXYCYCLINE 100 MG: 100 INJECTION, POWDER, LYOPHILIZED, FOR SOLUTION INTRAVENOUS at 21:45

## 2020-11-17 RX ADMIN — FINASTERIDE 5 MG: 5 TABLET, FILM COATED ORAL at 09:19

## 2020-11-17 RX ADMIN — BUPROPION HYDROCHLORIDE 100 MG: 100 TABLET, EXTENDED RELEASE ORAL at 21:50

## 2020-11-17 RX ADMIN — DULOXETINE HYDROCHLORIDE 30 MG: 30 CAPSULE, DELAYED RELEASE ORAL at 09:19

## 2020-11-17 RX ADMIN — CLOPIDOGREL BISULFATE 75 MG: 75 TABLET, FILM COATED ORAL at 09:18

## 2020-11-17 RX ADMIN — ENOXAPARIN SODIUM 40 MG: 40 INJECTION SUBCUTANEOUS at 21:45

## 2020-11-17 RX ADMIN — INSULIN LISPRO 15 UNITS: 100 INJECTION, SOLUTION INTRAVENOUS; SUBCUTANEOUS at 17:28

## 2020-11-17 RX ADMIN — FUROSEMIDE 20 MG: 20 TABLET ORAL at 09:19

## 2020-11-17 RX ADMIN — BUPROPION HYDROCHLORIDE 100 MG: 100 TABLET, EXTENDED RELEASE ORAL at 09:19

## 2020-11-17 RX ADMIN — TERAZOSIN HYDROCHLORIDE 5 MG: 5 CAPSULE ORAL at 21:53

## 2020-11-17 RX ADMIN — HYDROCODONE BITARTRATE AND ACETAMINOPHEN 1 TABLET: 10; 325 TABLET ORAL at 09:33

## 2020-11-17 RX ADMIN — LOSARTAN POTASSIUM 100 MG: 50 TABLET, FILM COATED ORAL at 09:18

## 2020-11-17 RX ADMIN — FAMOTIDINE 20 MG: 20 TABLET, FILM COATED ORAL at 09:18

## 2020-11-17 RX ADMIN — SODIUM CHLORIDE, PRESERVATIVE FREE 10 ML: 5 INJECTION INTRAVENOUS at 21:46

## 2020-11-17 RX ADMIN — ISOSORBIDE MONONITRATE 90 MG: 30 TABLET, EXTENDED RELEASE ORAL at 09:18

## 2020-11-17 RX ADMIN — LEVOTHYROXINE SODIUM 50 MCG: 50 TABLET ORAL at 05:23

## 2020-11-17 RX ADMIN — PREDNISONE 40 MG: 20 TABLET ORAL at 09:18

## 2020-11-17 RX ADMIN — IPRATROPIUM BROMIDE AND ALBUTEROL SULFATE 3 ML: 2.5; .5 SOLUTION RESPIRATORY (INHALATION) at 19:21

## 2020-11-17 RX ADMIN — INSULIN LISPRO 15 UNITS: 100 INJECTION, SOLUTION INTRAVENOUS; SUBCUTANEOUS at 09:19

## 2020-11-17 RX ADMIN — IPRATROPIUM BROMIDE AND ALBUTEROL SULFATE 3 ML: 2.5; .5 SOLUTION RESPIRATORY (INHALATION) at 10:30

## 2020-11-17 RX ADMIN — DOXYCYCLINE 100 MG: 100 INJECTION, POWDER, LYOPHILIZED, FOR SOLUTION INTRAVENOUS at 09:19

## 2020-11-17 RX ADMIN — ATENOLOL 100 MG: 25 TABLET ORAL at 09:18

## 2020-11-17 RX ADMIN — FAMOTIDINE 20 MG: 20 TABLET, FILM COATED ORAL at 17:28

## 2020-11-17 RX ADMIN — SODIUM CHLORIDE, PRESERVATIVE FREE 10 ML: 5 INJECTION INTRAVENOUS at 09:20

## 2020-11-17 RX ADMIN — NICOTINE 1 PATCH: 14 PATCH, EXTENDED RELEASE TRANSDERMAL at 09:20

## 2020-11-17 RX ADMIN — OLANZAPINE 10 MG: 10 TABLET, FILM COATED ORAL at 21:50

## 2020-11-18 VITALS
BODY MASS INDEX: 34.44 KG/M2 | HEIGHT: 67 IN | DIASTOLIC BLOOD PRESSURE: 70 MMHG | RESPIRATION RATE: 16 BRPM | HEART RATE: 60 BPM | TEMPERATURE: 97.8 F | OXYGEN SATURATION: 92 % | SYSTOLIC BLOOD PRESSURE: 119 MMHG | WEIGHT: 219.4 LBS

## 2020-11-18 LAB
GLUCOSE BLDC GLUCOMTR-MCNC: 165 MG/DL (ref 70–130)
GLUCOSE BLDC GLUCOMTR-MCNC: 92 MG/DL (ref 70–130)

## 2020-11-18 PROCEDURE — 87070 CULTURE OTHR SPECIMN AEROBIC: CPT | Performed by: INTERNAL MEDICINE

## 2020-11-18 PROCEDURE — 94799 UNLISTED PULMONARY SVC/PX: CPT

## 2020-11-18 PROCEDURE — 82962 GLUCOSE BLOOD TEST: CPT

## 2020-11-18 PROCEDURE — 94660 CPAP INITIATION&MGMT: CPT

## 2020-11-18 PROCEDURE — 97110 THERAPEUTIC EXERCISES: CPT

## 2020-11-18 PROCEDURE — 63710000001 PREDNISONE PER 1 MG: Performed by: NURSE PRACTITIONER

## 2020-11-18 PROCEDURE — 87205 SMEAR GRAM STAIN: CPT | Performed by: INTERNAL MEDICINE

## 2020-11-18 RX ORDER — CYCLOBENZAPRINE HCL 10 MG
10 TABLET ORAL NIGHTLY PRN
Start: 2020-11-18

## 2020-11-18 RX ORDER — CEFDINIR 300 MG/1
300 CAPSULE ORAL EVERY 12 HOURS SCHEDULED
Status: DISCONTINUED | OUTPATIENT
Start: 2020-11-18 | End: 2020-11-18 | Stop reason: HOSPADM

## 2020-11-18 RX ORDER — PREDNISONE 10 MG/1
TABLET ORAL
Qty: 21 TABLET | Refills: 0 | Status: SHIPPED | OUTPATIENT
Start: 2020-11-19 | End: 2020-11-28

## 2020-11-18 RX ORDER — DOXYCYCLINE 100 MG/1
100 TABLET ORAL EVERY 12 HOURS SCHEDULED
Status: DISCONTINUED | OUTPATIENT
Start: 2020-11-18 | End: 2020-11-18 | Stop reason: HOSPADM

## 2020-11-18 RX ORDER — DOXYCYCLINE 100 MG/1
100 CAPSULE ORAL EVERY 12 HOURS SCHEDULED
Qty: 8 CAPSULE | Refills: 0 | Status: SHIPPED | OUTPATIENT
Start: 2020-11-18 | End: 2020-11-22

## 2020-11-18 RX ORDER — CEFDINIR 300 MG/1
300 CAPSULE ORAL EVERY 12 HOURS SCHEDULED
Qty: 7 CAPSULE | Refills: 0 | Status: SHIPPED | OUTPATIENT
Start: 2020-11-18 | End: 2020-11-22

## 2020-11-18 RX ADMIN — FUROSEMIDE 20 MG: 20 TABLET ORAL at 08:26

## 2020-11-18 RX ADMIN — FAMOTIDINE 20 MG: 20 TABLET, FILM COATED ORAL at 08:25

## 2020-11-18 RX ADMIN — PREDNISONE 40 MG: 20 TABLET ORAL at 08:25

## 2020-11-18 RX ADMIN — IPRATROPIUM BROMIDE AND ALBUTEROL SULFATE 3 ML: 2.5; .5 SOLUTION RESPIRATORY (INHALATION) at 07:03

## 2020-11-18 RX ADMIN — POLYETHYLENE GLYCOL 3350 17 G: 17 POWDER, FOR SOLUTION ORAL at 08:25

## 2020-11-18 RX ADMIN — CEFDINIR 300 MG: 300 CAPSULE ORAL at 08:56

## 2020-11-18 RX ADMIN — LEVOTHYROXINE SODIUM 50 MCG: 50 TABLET ORAL at 06:16

## 2020-11-18 RX ADMIN — DOXYCYCLINE 100 MG: 100 TABLET, FILM COATED ORAL at 08:56

## 2020-11-18 RX ADMIN — HYDROCODONE BITARTRATE AND ACETAMINOPHEN 1 TABLET: 10; 325 TABLET ORAL at 08:32

## 2020-11-18 RX ADMIN — ATENOLOL 100 MG: 25 TABLET ORAL at 08:25

## 2020-11-18 RX ADMIN — DULOXETINE HYDROCHLORIDE 30 MG: 30 CAPSULE, DELAYED RELEASE ORAL at 08:26

## 2020-11-18 RX ADMIN — CLOPIDOGREL BISULFATE 75 MG: 75 TABLET, FILM COATED ORAL at 08:26

## 2020-11-18 RX ADMIN — IPRATROPIUM BROMIDE AND ALBUTEROL SULFATE 3 ML: 2.5; .5 SOLUTION RESPIRATORY (INHALATION) at 10:36

## 2020-11-18 RX ADMIN — BUPROPION HYDROCHLORIDE 100 MG: 100 TABLET, EXTENDED RELEASE ORAL at 08:26

## 2020-11-18 RX ADMIN — NICOTINE 1 PATCH: 14 PATCH, EXTENDED RELEASE TRANSDERMAL at 08:25

## 2020-11-18 RX ADMIN — FINASTERIDE 5 MG: 5 TABLET, FILM COATED ORAL at 08:26

## 2020-11-18 RX ADMIN — ISOSORBIDE MONONITRATE 90 MG: 30 TABLET, EXTENDED RELEASE ORAL at 08:26

## 2020-11-18 RX ADMIN — SODIUM CHLORIDE, PRESERVATIVE FREE 10 ML: 5 INJECTION INTRAVENOUS at 08:26

## 2020-11-18 RX ADMIN — LOSARTAN POTASSIUM 100 MG: 50 TABLET, FILM COATED ORAL at 08:25

## 2020-11-19 ENCOUNTER — READMISSION MANAGEMENT (OUTPATIENT)
Dept: CALL CENTER | Facility: HOSPITAL | Age: 60
End: 2020-11-19

## 2020-11-19 NOTE — OUTREACH NOTE
Prep Survey      Responses   [facility] patient discharged from?  [place]   Is LACE score < 7 ?  No   Eligibility  Readm Mgmt   Discharge diagnosis  Acute on chronic respiratory failure with hypercapnia Pneumonia   Does the patient have one of the following disease processes/diagnoses(primary or secondary)?  Other   Does the patient have Home health ordered?  Yes   What is the Home health agency?   [facility]   Is there a DME ordered?  No   Prep survey completed?  Yes          Allyssa Canada RN

## 2020-11-20 ENCOUNTER — READMISSION MANAGEMENT (OUTPATIENT)
Dept: CALL CENTER | Facility: HOSPITAL | Age: 60
End: 2020-11-20

## 2020-11-20 LAB
BACTERIA SPEC AEROBE CULT: NORMAL
BACTERIA SPEC AEROBE CULT: NORMAL
BACTERIA SPEC RESP CULT: NORMAL
GRAM STN SPEC: NORMAL

## 2020-11-20 NOTE — PROGRESS NOTES
Subjective    Mr. Adam is 60 y.o. male    Chief Complaint: Left Hydronephrosis    History of Present Illness     Abnormal radiographic finding   This patient presents with a possible abnormal finding of the left kidneyon CT that included abdominal cuts. This is a  new finding. This test was done 5 month(s) ago. Onset is sudden. This was done in context of evaluation for an unrelated illness. Symptoms include abdominal pain.     The following portions of the patient's history were reviewed and updated as appropriate: allergies, current medications, past family history, past medical history, past social history, past surgical history and problem list.    Review of Systems   Constitutional: Negative for appetite change and fever.   HENT: Negative for hearing loss and sore throat.    Eyes: Negative for pain and redness.   Respiratory: Negative for cough and shortness of breath.    Cardiovascular: Negative for chest pain and leg swelling.   Gastrointestinal: Negative for anal bleeding, nausea and vomiting.   Endocrine: Negative for cold intolerance and heat intolerance.   Genitourinary: Positive for difficulty urinating, frequency and urgency. Negative for dysuria, flank pain and hematuria.   Musculoskeletal: Negative for joint swelling and myalgias.   Skin: Negative for color change and rash.   Allergic/Immunologic: Negative for immunocompromised state.   Neurological: Negative for dizziness and speech difficulty.   Hematological: Negative for adenopathy. Does not bruise/bleed easily.   Psychiatric/Behavioral: Negative for dysphoric mood and suicidal ideas.         Current Outpatient Medications:   •  acetylcysteine (MUCOMYST) 20 % nebulizer solution, Take 4 mL by nebulization 3 (Three) Times a Day., Disp: , Rfl:   •  albuterol (PROVENTIL) (2.5 MG/3ML) 0.083% nebulizer solution, Take 2.5 mg by nebulization Every 6 (Six) Hours As Needed for Wheezing or Shortness of Air., Disp: , Rfl:   •  albuterol sulfate   (90 Base) MCG/ACT inhaler, Inhale 2 puffs 4 (Four) Times a Day As Needed for Wheezing., Disp: , Rfl:   •  alprostadil (MUSE) 1000 MCG pellet, 1,000 mcg by Transurethral route As Needed for Erectile Dysfunction. use no more than 3 times per week, Disp: , Rfl:   •  atenolol (TENORMIN) 100 MG tablet, Take 100 mg by mouth 2 (two) times a day., Disp: , Rfl:   •  bacitracin 500 UNIT/GM ointment, Apply  topically to the appropriate area as directed Daily. Colostomy, Disp: , Rfl:   •  buPROPion SR (WELLBUTRIN SR) 100 MG 12 hr tablet, Take 100 mg by mouth 2 (Two) Times a Day., Disp: , Rfl:   •  carboxymethylcellulose (REFRESH PLUS) 0.5 % solution, Administer 1 drop to both eyes 4 (Four) Times a Day As Needed for Dry Eyes., Disp: , Rfl:   •  Cholecalciferol (VITAMIN D) 2000 units tablet, Take 6,000 Units by mouth Daily., Disp: , Rfl:   •  clopidogrel (PLAVIX) 75 MG tablet, Take 75 mg by mouth Daily., Disp: , Rfl:   •  cyclobenzaprine (FLEXERIL) 10 MG tablet, Take 1 tablet by mouth At Night As Needed for Muscle Spasms., Disp: , Rfl:   •  docusate sodium (COLACE) 100 MG capsule, Take 100 mg by mouth 2 (Two) Times a Day As Needed for Constipation., Disp: , Rfl:   •  dorzolamide (TRUSOPT) 2 % ophthalmic solution, Administer 1 drop to the right eye 2 (Two) Times a Day., Disp: , Rfl:   •  doxazosin (CARDURA) 4 MG tablet, Take 4 mg by mouth Every Night., Disp: , Rfl:   •  Dulaglutide 0.75 MG/0.5ML solution pen-injector, Inject 0.5 mL under the skin into the appropriate area as directed 1 (One) Time Per Week., Disp: , Rfl:   •  DULoxetine (CYMBALTA) 30 MG capsule, Take 30 mg by mouth Daily., Disp: , Rfl:   •  ergocalciferol (ERGOCALCIFEROL) 1.25 MG (38977 UT) capsule, Take 50,000 Units by mouth 1 (One) Time Per Week., Disp: , Rfl:   •  famotidine (PEPCID) 20 MG tablet, Take 20 mg by mouth 2 (Two) Times a Day., Disp: , Rfl:   •  finasteride (PROSCAR) 5 MG tablet, Take 1 tablet by mouth Daily., Disp: 30 tablet, Rfl: 2  •  folic acid  (FOLVITE) 1 MG tablet, Take 1 mg by mouth Daily., Disp: , Rfl:   •  furosemide (LASIX) 20 MG tablet, Take 20 mg by mouth Daily., Disp: , Rfl:   •  guaiFENesin (HUMIBID 3) 400 MG tablet, Take 800 mg by mouth Every 12 (Twelve) Hours., Disp: , Rfl:   •  HYDROcodone-acetaminophen (NORCO) 7.5-325 MG per tablet, Take 1 tablet by mouth Every 8 (Eight) Hours As Needed for Moderate Pain ., Disp: , Rfl:   •  hydrocortisone (ANUSOL-HC) 25 MG suppository, Insert 25 mg into the rectum 2 (Two) Times a Day As Needed for Hemorrhoids., Disp: , Rfl:   •  insulin aspart (novoLOG FLEXPEN) 100 UNIT/ML solution pen-injector sc pen, Inject 50 Units under the skin into the appropriate area as directed 3 (Three) Times a Day With Meals., Disp: , Rfl:   •  insulin glargine (LANTUS) 100 UNIT/ML injection, Inject 170 Units under the skin into the appropriate area as directed Every Night., Disp: , Rfl:   •  isosorbide mononitrate (IMDUR) 30 MG 24 hr tablet, Take 90 mg by mouth Daily., Disp: , Rfl:   •  levothyroxine (SYNTHROID, LEVOTHROID) 50 MCG tablet, Take 50 mcg by mouth Daily., Disp: , Rfl:   •  loratadine (CLARITIN) 10 MG tablet, Take 10 mg by mouth Daily., Disp: , Rfl:   •  losartan (COZAAR) 100 MG tablet, Take 100 mg by mouth Daily., Disp: , Rfl:   •  metFORMIN (GLUCOPHAGE) 1000 MG tablet, Take 1,000 mg by mouth 2 (Two) Times a Day With Meals., Disp: , Rfl:   •  Multiple Vitamins-Minerals (MULTIVITAMIN WITH MINERALS) tablet tablet, Take 1 tablet by mouth Daily., Disp: , Rfl:   •  naloxone (NARCAN) 4 MG/0.1ML nasal spray, 1 spray into the nostril(s) as directed by provider As Needed (O.D.)., Disp: , Rfl:   •  nicotine (NICODERM CQ) 14 MG/24HR patch, Place 1 patch on the skin as directed by provider Daily., Disp: , Rfl:   •  NIFEdipine XL (PROCARDIA XL) 30 MG 24 hr tablet, Take 30 mg by mouth Daily., Disp: , Rfl:   •  nitroglycerin (NITROSTAT) 0.4 MG SL tablet, Place 0.4 mg under the tongue Every 5 (Five) Minutes As Needed for Chest Pain.,  Disp: , Rfl:   •  OLANZapine (zyPREXA) 10 MG tablet, Take 10 mg by mouth Every Night., Disp: , Rfl:   •  oxyCODONE (OXY-IR) 5 MG capsule, Take 5 mg by mouth Every 6 (Six) Hours As Needed for Moderate Pain  (Take 2 every 6 hours prn)., Disp: , Rfl:   •  polyethylene glycol (MIRALAX) packet, Take 17 g by mouth Daily., Disp: , Rfl:   •  predniSONE (DELTASONE) 10 MG tablet, Take 4 tablets by mouth Daily for 3 days, THEN 2 tablets Daily for 3 days, THEN 1 tablet Daily for 3 days., Disp: 21 tablet, Rfl: 0  •  pregabalin (Lyrica) 300 MG capsule, Take 300 mg by mouth 2 (Two) Times a Day., Disp: , Rfl:   •  promethazine (PHENERGAN) 25 MG tablet, Take 25 mg by mouth Every 8 (Eight) Hours As Needed for Nausea or Vomiting., Disp: , Rfl:   •  raNITIdine (ZANTAC) 150 MG tablet, Take 150 mg by mouth 2 (Two) Times a Day As Needed for Indigestion., Disp: , Rfl:   •  Skin Protectants, Misc. (EUCERIN) cream, Apply  topically to the appropriate area as directed As Needed for Dry Skin., Disp: , Rfl:   •  sodium chloride 0.65 % nasal spray, 2 sprays into the nostril(s) as directed by provider As Needed for Congestion., Disp: , Rfl:   •  spironolactone (ALDACTONE) 25 MG tablet, Take 25 mg by mouth Daily., Disp: , Rfl:   •  tamsulosin (FLOMAX) 0.4 MG capsule 24 hr capsule, Take 2 capsules by mouth Every Night., Disp: 90 capsule, Rfl: 3  •  traZODone (DESYREL) 100 MG tablet, Take 100 mg by mouth Every Night., Disp: , Rfl:   •  Umeclidinium Bromide (INCRUSE ELLIPTA) 62.5 MCG/INH aerosol powder , Inhale 1 puff Daily., Disp: , Rfl:     Past Medical History:   Diagnosis Date   • Anxiety    • Arthritis    • BPH (benign prostatic hyperplasia)    • CAD (coronary artery disease)    • Chronic ischemic heart disease    • Chronic respiratory failure (CMS/HCC)    • Colon cancer (CMS/HCC) 11/2016   • COPD (chronic obstructive pulmonary disease) (CMS/HCC)    • Diabetes mellitus (CMS/HCC)    • GERD (gastroesophageal reflux disease)    • HLD  "(hyperlipidemia)    • Hypertension    • Hypothyroidism    • Legal blindness    • Obstructive sleep apnea    • Raynauds syndrome        Past Surgical History:   Procedure Laterality Date   • CARDIAC SURGERY     • CARPAL TUNNEL RELEASE     • COLON RESECTION WITH COLOSTOMY     • COLON SURGERY     • COLONOSCOPY  05/27/2016   • COLONOSCOPY N/A 10/12/2017    Procedure: COLONOSCOPY WITH ANESTHESIA;  Surgeon: Yessenia Gregg MD;  Location: [facility] ENDOSCOPY;  Service:    • CORONARY ANGIOPLASTY WITH STENT PLACEMENT     • HERNIA REPAIR     • KNEE SURGERY Right    • RETINAL DETACHMENT SURGERY     • ROTATOR CUFF REPAIR         Social History     Socioeconomic History   • Marital status: Single     Spouse name: Not on file   • Number of children: 5   • Years of education: Not on file   • Highest education level: Not on file   Tobacco Use   • Smoking status: Current Every Day Smoker     Packs/day: 1.00     Years: 40.00     Pack years: 40.00     Types: Cigarettes   • Smokeless tobacco: Never Used   Substance and Sexual Activity   • Alcohol use: No   • Drug use: No   • Sexual activity: Defer       Family History   Problem Relation Age of Onset   • Stroke Mother    • Hypertension Mother    • Arthritis Father    • Heart disease Father    • Cancer Brother    • Diabetes Brother    • Hepatitis Brother    • Hypertension Brother        Objective    Temp 97.8 °F (36.6 °C) (Temporal)   Ht 170.2 cm (67\")   Wt 99.3 kg (219 lb)   BMI 34.30 kg/m²     Physical Exam  Vitals signs reviewed.   Constitutional:       General: He is not in acute distress.     Appearance: He is well-developed. He is not diaphoretic.   Pulmonary:      Effort: Pulmonary effort is normal.   Abdominal:      General: There is no distension.      Palpations: Abdomen is soft. There is no mass.      Tenderness: There is no abdominal tenderness. There is no guarding or rebound.      Hernia: No hernia is present.   Skin:     General: Skin is warm and dry.   Neurological:      " Mental Status: He is alert and oriented to person, place, and time.             Results for orders placed or performed during the hospital encounter of 11/15/20   Blood Culture - Blood, Arm, Right    Specimen: Arm, Right; Blood   Result Value Ref Range    Blood Culture No growth at 5 days    Blood Culture - Blood, Arm, Right    Specimen: Arm, Right; Blood   Result Value Ref Range    Blood Culture No growth at 5 days    COVID-19,[facility] IN-HOUSE,Nasal Swab No Transport Media 3-4 HR TAT - Swab, Nasal Cavity    Specimen: Nasal Cavity; Swab   Result Value Ref Range    COVID19 Not Detected Not Detected - Ref. Range   MRSA Screen, PCR (Inpatient) - Swab, Nares    Specimen: Nares; Swab   Result Value Ref Range    MRSA PCR No MRSA Detected No MRSA Detected   S. Pneumo Ag Urine or CSF - Urine, Urine, Clean Catch    Specimen: Urine, Clean Catch   Result Value Ref Range    Strep Pneumo Ag Negative Negative   Legionella Antigen, Urine - Urine, Urine, Clean Catch    Specimen: Urine, Clean Catch   Result Value Ref Range    LEGIONELLA ANTIGEN, URINE Negative Negative   Respiratory Culture - Sputum, Cough    Specimen: Cough; Sputum   Result Value Ref Range    Respiratory Culture       Light growth (2+) Normal Respiratory Marti: NO S.aureus/MRSA or Pseudomonas aeruginosa    Gram Stain Greater than 25 WBCs per low power field     Gram Stain Few (2+) Epithelial cells per low power field     Gram Stain Few (2+) Gram positive cocci    Comprehensive Metabolic Panel    Specimen: Arm, Right; Blood   Result Value Ref Range    Glucose 292 (H) 65 - 99 mg/dL    BUN 6 (L) 8 - 23 mg/dL    Creatinine 0.49 (L) 0.76 - 1.27 mg/dL    Sodium 138 136 - 145 mmol/L    Potassium 4.5 3.5 - 5.2 mmol/L    Chloride 94 (L) 98 - 107 mmol/L    CO2 42.0 (H) 22.0 - 29.0 mmol/L    Calcium 8.7 8.6 - 10.5 mg/dL    Total Protein 6.4 6.0 - 8.5 g/dL    Albumin 3.50 3.50 - 5.20 g/dL    ALT (SGPT) 11 1 - 41 U/L    AST (SGOT) 43 (H) 1 - 40 U/L    Alkaline Phosphatase 100  39 - 117 U/L    Total Bilirubin 0.5 0.0 - 1.2 mg/dL    eGFR Non African Amer >150 >60 mL/min/1.73    Globulin 2.9 gm/dL    A/G Ratio 1.2 g/dL    BUN/Creatinine Ratio 12.2 7.0 - 25.0    Anion Gap 2.0 (L) 5.0 - 15.0 mmol/L   Urinalysis With Culture If Indicated - Urine, Catheter    Specimen: Urine, Catheter   Result Value Ref Range    Color, UA Dark Yellow (A) Yellow, Straw    Appearance, UA Clear Clear    pH, UA 5.5 5.0 - 8.0    Specific Gravity, UA >1.030 (H) 1.005 - 1.030    Glucose, UA >=1000 mg/dL (3+) (A) Negative    Ketones, UA Negative Negative    Bilirubin, UA Negative Negative    Blood, UA Negative Negative    Protein, UA Trace (A) Negative    Leuk Esterase, UA Negative Negative    Nitrite, UA Negative Negative    Urobilinogen, UA 1.0 E.U./dL 0.2 - 1.0 E.U./dL   Procalcitonin    Specimen: Arm, Right; Blood   Result Value Ref Range    Procalcitonin 0.12 0.00 - 0.25 ng/mL   Lactic Acid, Plasma    Specimen: Arm, Right; Blood   Result Value Ref Range    Lactate 1.4 0.5 - 2.0 mmol/L   CBC Auto Differential    Specimen: Arm, Right; Blood   Result Value Ref Range    WBC 9.82 3.40 - 10.80 10*3/mm3    RBC 4.38 4.14 - 5.80 10*6/mm3    Hemoglobin 12.2 (L) 13.0 - 17.7 g/dL    Hematocrit 40.2 37.5 - 51.0 %    MCV 91.8 79.0 - 97.0 fL    MCH 27.9 26.6 - 33.0 pg    MCHC 30.3 (L) 31.5 - 35.7 g/dL    RDW 14.6 12.3 - 15.4 %    RDW-SD 49.5 37.0 - 54.0 fl    MPV 9.0 6.0 - 12.0 fL    Platelets 192 140 - 450 10*3/mm3    Neutrophil % 67.5 42.7 - 76.0 %    Lymphocyte % 21.4 19.6 - 45.3 %    Monocyte % 8.8 5.0 - 12.0 %    Eosinophil % 0.1 (L) 0.3 - 6.2 %    Basophil % 0.5 0.0 - 1.5 %    Immature Grans % 1.7 (H) 0.0 - 0.5 %    Neutrophils, Absolute 6.63 1.70 - 7.00 10*3/mm3    Lymphocytes, Absolute 2.10 0.70 - 3.10 10*3/mm3    Monocytes, Absolute 0.86 0.10 - 0.90 10*3/mm3    Eosinophils, Absolute 0.01 0.00 - 0.40 10*3/mm3    Basophils, Absolute 0.05 0.00 - 0.20 10*3/mm3    Immature Grans, Absolute 0.17 (H) 0.00 - 0.05 10*3/mm3    nRBC  0.0 0.0 - 0.2 /100 WBC   Troponin    Specimen: Arm, Right; Blood   Result Value Ref Range    Troponin T 0.016 0.000 - 0.030 ng/mL   Blood Gas, Arterial -    Specimen: Arterial Blood   Result Value Ref Range    Site Right Radial     Jarad's Test Positive     pH, Arterial 7.282 (L) 7.350 - 7.450 pH units    pCO2, Arterial 96.3 (C) 35.0 - 45.0 mm Hg    pO2, Arterial 69.7 (L) 83.0 - 108.0 mm Hg    HCO3, Arterial 45.5 (H) 20.0 - 26.0 mmol/L    Base Excess, Arterial 14.6 (H) 0.0 - 2.0 mmol/L    O2 Saturation, Arterial 94.3 94.0 - 99.0 %    Temperature 37.0 C    Barometric Pressure for Blood Gas 753 mmHg    Modality Nasal Cannula     Flow Rate 4.5 lpm    Ventilator Mode NA     Notified [facility] [id number]     Notified By [id number]     Notified Time 11/15/2020 14:54     Collected by [id number]     pCO2, Temperature Corrected 96.3 (C) 35 - 45 mm Hg    pH, Temp Corrected 7.282 (L) 7.350 - 7.450 pH Units    pO2, Temperature Corrected 69.7 (L) 83 - 108 mm Hg   CBC (No Diff)    Specimen: Blood   Result Value Ref Range    WBC 7.49 3.40 - 10.80 10*3/mm3    RBC 4.58 4.14 - 5.80 10*6/mm3    Hemoglobin 12.4 (L) 13.0 - 17.7 g/dL    Hematocrit 41.6 37.5 - 51.0 %    MCV 90.8 79.0 - 97.0 fL    MCH 27.1 26.6 - 33.0 pg    MCHC 29.8 (L) 31.5 - 35.7 g/dL    RDW 14.5 12.3 - 15.4 %    RDW-SD 47.5 37.0 - 54.0 fl    MPV 8.9 6.0 - 12.0 fL    Platelets 201 140 - 450 10*3/mm3   Basic Metabolic Panel    Specimen: Blood   Result Value Ref Range    Glucose 269 (H) 65 - 99 mg/dL    BUN 8 8 - 23 mg/dL    Creatinine 0.40 (L) 0.76 - 1.27 mg/dL    Sodium 137 136 - 145 mmol/L    Potassium 4.3 3.5 - 5.2 mmol/L    Chloride 94 (L) 98 - 107 mmol/L    CO2 39.0 (H) 22.0 - 29.0 mmol/L    Calcium 8.9 8.6 - 10.5 mg/dL    eGFR Non African Amer >150 >60 mL/min/1.73    BUN/Creatinine Ratio 20.0 7.0 - 25.0    Anion Gap 4.0 (L) 5.0 - 15.0 mmol/L   Hemoglobin A1c    Specimen: Blood   Result Value Ref Range    Hemoglobin A1C 8.50 (H) 4.80 - 5.60 %   Blood Gas, Arterial -    Specimen:  Arterial Blood   Result Value Ref Range    Site Right Radial     Jarad's Test Positive     pH, Arterial 7.463 (H) 7.350 - 7.450 pH units    pCO2, Arterial 56.3 (H) 35.0 - 45.0 mm Hg    pO2, Arterial 53.3 (C) 83.0 - 108.0 mm Hg    HCO3, Arterial 40.3 (H) 20.0 - 26.0 mmol/L    Base Excess, Arterial 14.1 (H) 0.0 - 2.0 mmol/L    O2 Saturation, Arterial 90.8 (L) 94.0 - 99.0 %    Temperature 37.0 C    Barometric Pressure for Blood Gas 759 mmHg    Modality BiPap     Flow Rate 15.0 lpm    Ventilator Mode NA     Set Mech Resp Rate 18.0     IPAP 16     EPAP 8     Notified [facility] Lisseth [id number]     Notified By [id number]     Notified Time 11/16/2020 10:35     Collected by [id number]     pCO2, Temperature Corrected 56.3 (H) 35 - 45 mm Hg    pH, Temp Corrected 7.463 (H) 7.350 - 7.450 pH Units    pO2, Temperature Corrected 53.3 (C) 83 - 108 mm Hg   CBC (No Diff)    Specimen: Blood   Result Value Ref Range    WBC 10.51 3.40 - 10.80 10*3/mm3    RBC 4.35 4.14 - 5.80 10*6/mm3    Hemoglobin 11.7 (L) 13.0 - 17.7 g/dL    Hematocrit 37.6 37.5 - 51.0 %    MCV 86.4 79.0 - 97.0 fL    MCH 26.9 26.6 - 33.0 pg    MCHC 31.1 (L) 31.5 - 35.7 g/dL    RDW 14.4 12.3 - 15.4 %    RDW-SD 45.1 37.0 - 54.0 fl    MPV 8.8 6.0 - 12.0 fL    Platelets 238 140 - 450 10*3/mm3   Basic Metabolic Panel    Specimen: Blood   Result Value Ref Range    Glucose 191 (H) 65 - 99 mg/dL    BUN 11 8 - 23 mg/dL    Creatinine 0.54 (L) 0.76 - 1.27 mg/dL    Sodium 137 136 - 145 mmol/L    Potassium 3.8 3.5 - 5.2 mmol/L    Chloride 96 (L) 98 - 107 mmol/L    CO2 34.0 (H) 22.0 - 29.0 mmol/L    Calcium 9.1 8.6 - 10.5 mg/dL    eGFR Non African Amer >150 >60 mL/min/1.73    BUN/Creatinine Ratio 20.4 7.0 - 25.0    Anion Gap 7.0 5.0 - 15.0 mmol/L   POC Glucose Once    Specimen: Blood   Result Value Ref Range    Glucose 145 (H) 70 - 130 mg/dL   POC Glucose Once    Specimen: Blood   Result Value Ref Range    Glucose 247 (H) 70 - 130 mg/dL   POC Glucose Once    Specimen: Blood   Result Value Ref Range     Glucose 249 (H) 70 - 130 mg/dL   POC Glucose Once    Specimen: Blood   Result Value Ref Range    Glucose 252 (H) 70 - 130 mg/dL   POC Glucose Once    Specimen: Blood   Result Value Ref Range    Glucose 132 (H) 70 - 130 mg/dL   POC Glucose Once    Specimen: Blood   Result Value Ref Range    Glucose 102 70 - 130 mg/dL   POC Glucose Once    Specimen: Blood   Result Value Ref Range    Glucose 176 (H) 70 - 130 mg/dL   POC Glucose Once    Specimen: Blood   Result Value Ref Range    Glucose 165 (H) 70 - 130 mg/dL   POC Glucose Once    Specimen: Blood   Result Value Ref Range    Glucose 92 70 - 130 mg/dL   ECG 12 Lead   Result Value Ref Range    QT Interval 338 ms    QTC Interval 431 ms   Renal ultrasound independent review    The renal ultrasound is available for me to review.  Treatment recommendations require an independent review.  This film has been reviewed by the radiologist to determine any non urologic abnormalities that are presents.  However, I very closely inspected the kidneys for size, symmetry, contour, parenchymal thickness, perinephric reaction, presence of calcifications, and intrarenal dilation of the collecting system.       The right kidney appears normal on this ultrasound.  The renal parenchymal is normal in thickness.  There are no solid masses or cysts.  There is no hydronephrosis.  There are no stones.      The left kidney appears normal on this ultrasound.  The renal parenchymal is normal in thickness.  There are no solid masses or cysts.  There is no hydronephrosis.  There are no stones.      The bladder appears normal on thisultrsaound.  The bladder appears normal in thickness.  There no masses or stones seen on this exam.     CT independent review  The CT scan of the abdomen/pelvis done without contrast is available for me to review.  Treatment recommendations require an independent review.  First I scanned the liver, spleen, and bowel pattern.  The retroperitoneum including the major vessels  and lymphatic packages are briefly reviewed.  This film as been reviewed by the radiologist to determine any non urologic abnormalities that are present.  The kidneys are closely inspected for size, symmetry, contour, parenchymal thickness, perinephric reaction, presence of calcifications, and intrarenal dilation of the collecting system.  The ureters are inspected for their course, caliber, and any calcifications.  The bladder is inspected for its thickness, size, and presence of any calcifications.  This scan shows:    The right kidney appears normal on this non-contrasted CT scan.  The renal parenchymal is normal in thickness.  There are no solid masses or cysts.  There is no hydronephrosis.  There are no stones.      The left kidney appears normal on this non-contrasted CT scan.  The renal parenchymal is normal in thickness.  There are no solid masses or cysts.  There is no hydronephrosis.  There are no stones.      The bladder appears normal on this non-contrasted CT scan.  The bladder appears normal in thickness.  There no masses or stones seen on this exam.        Assessment and Plan    Diagnoses and all orders for this visit:    1. Hydronephrosis, left (Primary)  -     Cancel: POC Urinalysis Dipstick, Multipro    2. BPH with urinary obstruction  -     tamsulosin (FLOMAX) 0.4 MG capsule 24 hr capsule; Take 2 capsules by mouth Every Night.  Dispense: 90 capsule; Refill: 3    3. Urinary retention due to benign prostatic hyperplasia      Patient originally was referred in June for left-sided hydronephrosis on a PET scan.  He was recently hospitalized.  He did have issues with acute urinary retention.  I reviewed records from this.  Also reviewed images with a CT scan and a renal ultrasound which showed resolution of his hydronephrosis.  His creatinine is 0.5.  I have recommended no ureteral stent placement.    Regarding his voiding symptoms we will increase his Flomax to 0.8 he is not a surgical candidate for  any sort of procedures.  I will have him return in 6 months with NP.

## 2020-11-20 NOTE — OUTREACH NOTE
Medical Week 1 Survey      Responses   [facility] patient discharged from?  [place]   Does the patient have one of the following disease processes/diagnoses(primary or secondary)?  Other   Week 1 attempt successful?  Yes   Call start time  1542   Call end time  1550   Discharge diagnosis  Acute on chronic respiratory failure with hypercapnia Pneumonia   Meds reviewed with patient/caregiver?  Yes   Is the patient having any side effects they believe may be caused by any medication additions or changes?  No   Does the patient have all medications ordered at discharge?  Yes   Is the patient taking all medications as directed (includes completed medication regime)?  Yes   Does the patient have a primary care provider?   Yes   Does the patient have an appointment with their PCP within 7 days of discharge?  Yes   Has the patient kept scheduled appointments due by today?  N/A   What is the Home health agency?   [facility]   Has home health visited the patient within 72 hours of discharge?  No   Psychosocial issues?  No   Did the patient receive a copy of their discharge instructions?  Yes   Nursing interventions  Reviewed instructions with patient   What is the patient's perception of their health status since discharge?  Improving   Is the patient/caregiver able to teach back signs and symptoms related to disease process for when to call PCP?  Yes   Is the patient/caregiver able to teach back signs and symptoms related to disease process for when to call 911?  Yes   Is the patient/caregiver able to teach back the hierarchy of who to call/visit for symptoms/problems? PCP, Specialist, Home health nurse, Urgent Care, ED, 911  Yes   If the patient is a current smoker, are they able to teach back resources for cessation?  Smoking cessation medications, [id number]   Additional teach back comments  Using IS, encouraged stopping smoking, says he is breathing better.   Week 1 call completed?  Yes   Wrap up additional comments   Appts next week. [facility].          Ros Treviño RN

## 2020-11-23 ENCOUNTER — OFFICE VISIT (OUTPATIENT)
Dept: UROLOGY | Facility: CLINIC | Age: 60
End: 2020-11-23

## 2020-11-23 VITALS — TEMPERATURE: 97.8 F | WEIGHT: 219 LBS | HEIGHT: 67 IN | BODY MASS INDEX: 34.37 KG/M2

## 2020-11-23 DIAGNOSIS — N40.1 URINARY RETENTION DUE TO BENIGN PROSTATIC HYPERPLASIA: ICD-10-CM

## 2020-11-23 DIAGNOSIS — N13.30 HYDRONEPHROSIS, LEFT: Primary | ICD-10-CM

## 2020-11-23 DIAGNOSIS — N13.8 BPH WITH URINARY OBSTRUCTION: ICD-10-CM

## 2020-11-23 DIAGNOSIS — R33.8 URINARY RETENTION DUE TO BENIGN PROSTATIC HYPERPLASIA: ICD-10-CM

## 2020-11-23 DIAGNOSIS — N40.1 BPH WITH URINARY OBSTRUCTION: ICD-10-CM

## 2020-11-23 PROCEDURE — 99204 OFFICE O/P NEW MOD 45 MIN: CPT | Performed by: UROLOGY

## 2020-11-23 RX ORDER — TAMSULOSIN HYDROCHLORIDE 0.4 MG/1
2 CAPSULE ORAL NIGHTLY
Qty: 180 CAPSULE | Refills: 3 | Status: SHIPPED | OUTPATIENT
Start: 2020-11-23

## 2020-11-23 RX ORDER — TAMSULOSIN HYDROCHLORIDE 0.4 MG/1
2 CAPSULE ORAL NIGHTLY
Qty: 90 CAPSULE | Refills: 3 | Status: SHIPPED | OUTPATIENT
Start: 2020-11-23 | End: 2020-11-23 | Stop reason: SDUPTHER

## 2020-11-30 ENCOUNTER — READMISSION MANAGEMENT (OUTPATIENT)
Dept: CALL CENTER | Facility: HOSPITAL | Age: 60
End: 2020-11-30

## 2020-11-30 NOTE — OUTREACH NOTE
Medical Week 2 Survey      Responses   [facility] patient discharged from?  [place]   Does the patient have one of the following disease processes/diagnoses(primary or secondary)?  Other   Week 2 attempt successful?  No   Unsuccessful attempts  Attempt 1          Lilliana Mariano LPN

## 2020-12-01 ENCOUNTER — READMISSION MANAGEMENT (OUTPATIENT)
Dept: CALL CENTER | Facility: HOSPITAL | Age: 60
End: 2020-12-01

## 2020-12-01 LAB — HBA1C MFR BLD: 8.8 % (ref 4–6)

## 2020-12-01 NOTE — OUTREACH NOTE
Medical Week 2 Survey      Responses   [facility] patient discharged from?  [place]   Does the patient have one of the following disease processes/diagnoses(primary or secondary)?  Other   Week 2 attempt successful?  No   Unsuccessful attempts  Attempt 2          Ros Treviño RN

## 2020-12-09 ENCOUNTER — READMISSION MANAGEMENT (OUTPATIENT)
Dept: CALL CENTER | Facility: HOSPITAL | Age: 60
End: 2020-12-09

## 2020-12-09 NOTE — OUTREACH NOTE
Medical Week 3 Survey      Responses   [facility] patient discharged from?  [place]   Does the patient have one of the following disease processes/diagnoses(primary or secondary)?  Other   Week 3 attempt successful?  Yes   Call start time  1724   Call end time  1727   Medication alerts for this patient  no medication added    Meds reviewed with patient/caregiver?  Yes   Is the patient taking all medications as directed (includes completed medication regime)?  Yes   Comments regarding appointments  had a phone visit  with provider   Has the patient kept scheduled appointments due by today?  Yes   What is the patient's perception of their health status since discharge?  Same [feels about  the same]   Week 3 Call Completed?  Yes   Wrap up additional comments  says he feels about the same, has chemo today and feels nauseated          María Petersen RN

## 2020-12-30 ENCOUNTER — TELEPHONE (OUTPATIENT)
Dept: GASTROENTEROLOGY | Facility: CLINIC | Age: 60
End: 2020-12-30

## 2020-12-30 NOTE — TELEPHONE ENCOUNTER
I have sent 2 letters to pt re: recall colon and have had no response. I called and spoke to him about it today-he is agreeable to proceeding but [facility]. I advised him he would need to call [facility] and let them know it was time for another colon so they could give authorization. Once they fax auth to our office, we will reach out to him to schedule appt-he [facility] and will start on that process. Pt advised to call me back with any further questions/problems.

## 2021-01-01 ENCOUNTER — TELEPHONE (OUTPATIENT)
Dept: UROLOGY | Facility: CLINIC | Age: 61
End: 2021-01-01

## 2021-01-01 ENCOUNTER — PROCEDURE VISIT (OUTPATIENT)
Dept: UROLOGY | Facility: CLINIC | Age: 61
End: 2021-01-01

## 2021-01-01 ENCOUNTER — OFFICE VISIT (OUTPATIENT)
Dept: UROLOGY | Facility: CLINIC | Age: 61
End: 2021-01-01

## 2021-01-01 VITALS — BODY MASS INDEX: 32.33 KG/M2 | WEIGHT: 206 LBS | HEIGHT: 67 IN | TEMPERATURE: 97.3 F

## 2021-01-01 DIAGNOSIS — N13.8 BPH WITH OBSTRUCTION/LOWER URINARY TRACT SYMPTOMS: Primary | ICD-10-CM

## 2021-01-01 DIAGNOSIS — N40.1 BPH WITH OBSTRUCTION/LOWER URINARY TRACT SYMPTOMS: Primary | ICD-10-CM

## 2021-01-01 LAB
BILIRUB BLD-MCNC: NEGATIVE MG/DL
CLARITY, POC: CLEAR
COLOR UR: YELLOW
GLUCOSE UR STRIP-MCNC: ABNORMAL MG/DL
KETONES UR QL: NEGATIVE
LEUKOCYTE EST, POC: NEGATIVE
NITRITE UR-MCNC: NEGATIVE MG/ML
PH UR: 5.5 [PH] (ref 5–8)
PROT UR STRIP-MCNC: NEGATIVE MG/DL
RBC # UR STRIP: NEGATIVE /UL
SP GR UR: 1.01 (ref 1–1.03)
UROBILINOGEN UR QL: NORMAL

## 2021-01-01 PROCEDURE — 81003 URINALYSIS AUTO W/O SCOPE: CPT | Performed by: PHYSICIAN ASSISTANT

## 2021-01-01 PROCEDURE — 52000 CYSTOURETHROSCOPY: CPT | Performed by: UROLOGY

## 2021-01-01 PROCEDURE — 51798 US URINE CAPACITY MEASURE: CPT | Performed by: PHYSICIAN ASSISTANT

## 2021-01-01 PROCEDURE — 99213 OFFICE O/P EST LOW 20 MIN: CPT | Performed by: PHYSICIAN ASSISTANT

## 2021-01-01 RX ORDER — DILTIAZEM HYDROCHLORIDE 180 MG/1
180 CAPSULE, EXTENDED RELEASE ORAL
COMMUNITY

## 2021-04-12 NOTE — TELEPHONE ENCOUNTER
Pt called stated that he was having bladder pain. Tried to make an appointment for the pt but he kept saying that the date and times that were given would not work for him due to him needing a ride. Pt stated that he would call back when he had a better idea when he could get to the office

## 2021-06-23 NOTE — PROGRESS NOTES
Pre- operative diagnosis:  Benign prostatic hypertrophy    Post operative diagnosis:  Same    Procedure:  The patient was prepped and draped in a normal sterile fashion.  The urethra was anesthetized with 2% lidocaine jelly.  A flexible cystoscope was introduced per urethra.      Urethra:  Normal    Bladder:  There is no evidence of a stone, foreign body or mass within the bladder.  The bladder is minimally trabeculated.  The bladder neck is without contracture.    Ureteral orifices:  Normal position bilaterally and Clear efflux bilaterally    Prostate:  lateral lobe hypertrophy    Patient tolerated the procedure well    Complications: none    Blood loss: minimal    Follow up:    Routine follow up    Patient on maximal medical therapy with Cardura 8 mg and finasteride 5 mg.  He is performing intermittent catheterization I would recommend continuing this.  Unfortunately he is not an operative candidate given his very poor lung function.  Follow-up in 6 months with nurse practitioner.  If we are performing UroLift in the office at that time this may be an option for him.

## (undated) DEVICE — ENDOGATOR AUXILIARY WATER JET CONNECTOR: Brand: ENDOGATOR

## (undated) DEVICE — MSK O2 MD CONCENTR A/ LF 7FT 1P/U

## (undated) DEVICE — CUFF,BP,DISP,1 TUBE,ADULT,HP: Brand: MEDLINE

## (undated) DEVICE — Device: Brand: DEFENDO AIR/WATER/SUCTION AND BIOPSY VALVE

## (undated) DEVICE — TBG SMPL FLTR LINE NASL 02/C02 A/ BX/100

## (undated) DEVICE — SENSR O2 OXIMAX FNGR A/ 18IN NONSTR

## (undated) DEVICE — THE CHANNEL CLEANING BRUSH IS A NYLON FLEXI BRUSH ATTACHED TO A FLEXIBLE PLASTIC SHEATH DESIGNED TO SAFELY REMOVE DEBRIS FROM FLEXIBLE ENDOSCOPES.

## (undated) DEVICE — NEB MIST MAX